# Patient Record
Sex: MALE | Race: WHITE | NOT HISPANIC OR LATINO | Employment: OTHER | URBAN - METROPOLITAN AREA
[De-identification: names, ages, dates, MRNs, and addresses within clinical notes are randomized per-mention and may not be internally consistent; named-entity substitution may affect disease eponyms.]

---

## 2017-01-12 DIAGNOSIS — I25.10 CAD (CORONARY ARTERY DISEASE): Primary | ICD-10-CM

## 2017-01-12 DIAGNOSIS — E78.49 OTHER HYPERLIPIDEMIA: ICD-10-CM

## 2017-01-12 DIAGNOSIS — I25.10 CORONARY ARTERIOSCLEROSIS: ICD-10-CM

## 2017-01-12 DIAGNOSIS — I10 HYPERTENSION: ICD-10-CM

## 2017-01-12 RX ORDER — SIMVASTATIN 40 MG
40 TABLET ORAL AT BEDTIME
Qty: 30 TABLET | Refills: 0 | Status: SHIPPED | OUTPATIENT
Start: 2017-01-12 | End: 2017-02-09

## 2017-01-12 RX ORDER — METOPROLOL SUCCINATE 25 MG/1
12.5 TABLET, EXTENDED RELEASE ORAL DAILY
Qty: 15 TABLET | Refills: 0 | Status: SHIPPED | OUTPATIENT
Start: 2017-01-12 | End: 2017-02-09

## 2017-01-12 NOTE — TELEPHONE ENCOUNTER
Simvastatin 40mg tabs     Last Written Prescription Date: 12/18/2015  Last Fill Quantity: 90, # refills: 3  Last Office Visit with [facility], [facility] or  Health prescribing provider: 11/17/2015       CHOL      122   5/13/2015  HDL       43   5/13/2015  LDL       62   5/13/2015  TRIG       83   5/13/2015  CHOLHDLRATIO      2.8   5/13/2015    Metoprolol succinate er 25mg tabs      Last Written Prescription Date: 10/17/2016  Last Fill Quantity: 45, # refills: 0    Last Office Visit with [facility], [facility] or [facility] prescribing provider:  11/17/2015   Future Office Visit:        BP Readings from Last 3 Encounters:   07/22/16 113/65   07/21/16 137/88   07/21/16 147/96

## 2017-01-13 ENCOUNTER — TELEPHONE (OUTPATIENT)
Dept: FAMILY MEDICINE | Facility: CLINIC | Age: 67
End: 2017-01-13

## 2017-01-13 DIAGNOSIS — R97.20 ELEVATED PROSTATE SPECIFIC ANTIGEN (PSA): Primary | ICD-10-CM

## 2017-01-13 NOTE — TELEPHONE ENCOUNTER
Patient called wanting an order put in to have a PSA draw done.  Patient would like it done today if possible.    Kati Lundberg, [facility]

## 2017-01-14 NOTE — TELEPHONE ENCOUNTER
Spoke with the patient and ordered the PSA. He will be scheduling an appointment for the near future in urology.

## 2017-01-17 ENCOUNTER — TELEPHONE (OUTPATIENT)
Dept: UROLOGY | Facility: CLINIC | Age: 67
End: 2017-01-17

## 2017-01-18 DIAGNOSIS — R97.20 ELEVATED PROSTATE SPECIFIC ANTIGEN (PSA): ICD-10-CM

## 2017-01-18 LAB — PSA SERPL-ACNC: 1.49 UG/L (ref 0–4)

## 2017-01-18 PROCEDURE — 84153 ASSAY OF PSA TOTAL: CPT | Performed by: UROLOGY

## 2017-01-26 ENCOUNTER — OFFICE VISIT (OUTPATIENT)
Dept: ENDOCRINOLOGY | Facility: CLINIC | Age: 67
End: 2017-01-26

## 2017-01-26 VITALS
BODY MASS INDEX: 24.36 KG/M2 | DIASTOLIC BLOOD PRESSURE: 96 MMHG | WEIGHT: 174 LBS | HEIGHT: 71 IN | SYSTOLIC BLOOD PRESSURE: 165 MMHG | HEART RATE: 59 BPM

## 2017-01-26 DIAGNOSIS — E71.41 PRIMARY CARNITINE DEFICIENCY (H): Primary | ICD-10-CM

## 2017-01-26 RX ORDER — LEVOCARNITINE 330 MG/1
330 TABLET ORAL DAILY
Qty: 90 TABLET | Refills: 3 | Status: SHIPPED | OUTPATIENT
Start: 2017-01-26 | End: 2018-01-15

## 2017-01-26 ASSESSMENT — PAIN SCALES - GENERAL: PAINLEVEL: NO PAIN (0)

## 2017-01-26 NOTE — Clinical Note
2017       RE: Ambrocio Vázquez  [address] [address]     Dear Colleague,    Thank you for referring your patient, Ambrocio Vázquez, to the [facility] METABOLIC DISORDERS at [facility]. Please see a copy of my visit note below.    Adult Metabolism Clinic Return Visit  Name:  Ambrocio Vázquez  :   1950  MRN:   [id number]  Date of Visit: 2017  PCP: Joelle Randle    Immunization status is: unknown.  Managing Metabolic Center(s):  [facility].    Chief Complaint:  Mr. Ambrocio Vázquez is a 66 year old male whom I saw in follow up in Metabolism Clinic for carnitine wasting heterozygosity.  Mr. Vázquez was accompanied to this visit by his  wife. He also saw our genetic counselor at this visit.     Assessment:    Ambrocio Vázquez is a 66 year old male with a history of carnitine wasting heterozygosity seen in clinic for routine follow-up.      Plan:    1. Testing ordered at this visit:   Orders Placed This Encounter   Procedures     Carnitine free and total     GENETIC COUNSELING SERVICES   .    Results are as noted, below. Additional recommendations based on these laboratory results:  ***  2. Medications: Continue carnitine as prescribed   4.   Genetic counseling with {METABOLIC PEDIATRIC GEN COUNSELORS:[id number]} to review family history.  5. Continue to observe emergency precautions as previously discussed.  Our on-call metabolic service is available 24 hours/day by calling the page  ([phone]) and asking for the Genetics and Metabolism doctor on call.  6. Return to the Adult Metabolism Clinic in 24 months for follow-up.     7.  We will look into the paper chart to find initial genetic testing and pre-supplementation blood carnitine levels.  ----------  History of Present Illness:  Visit Diagnosis:  Primary carnitine deficiency (H)    Patient Active Problem List   Diagnosis      Primary carnitine deficiency (H)     Sciatica     Essential hypertension, benign     ERECTILE DYSFUNCTION     Disorder of prostate     Colon polyp     Abnormal echocardiogram     CAD (coronary artery disease)     Cardiomyopathy (H)     CARDIOVASCULAR SCREENING; LDL GOAL LESS THAN 100     Advanced directives, counseling/discussion     Former smoker     Hyperlipidemia     Hypertension     History of angina     Coronary arteriosclerosis        Discussed at this visit:  Ambrocio Vázquez is an 66 year old male with a history of carnitine wasting heterozygosity here for routine follow-up. He has no new concerns at this visit. We discussed his history of ECHO with bubble study and family history of abdominal aortic aneurism in relating to his continued screening ECHO and EKGs. We decided to defer decision on this testing to his primary care physician and cardiologist both of whom he will see soon. We also discussed the continued need for carnitine supplementation and decided to continue on his current regimen while we look in his paper chart to determine if a pre-supplementation blood level was ever taken.    Interval History:  Ambrocio was last seen in our clinic on 6/2/15.  Since the last clinic visit Ambrocio was seen for 0 urgent clinic visits due to n/a.  He was seen in the emergency department 0 times, and 0 of those visits were metabolic related.  He currently knows how to contact the 24 hour metabolic specialist on call.  0 hospitalizations occurred. Acute metabolic decompensation was noted during 0 of those hospitalizations.  0 inpatient days were metabolic related with 0 days spent in the intensive care unit. He had general anesthesia and general surgery for inguinal hernia repair since the last clinic visit.  Reported surgical complications were none.      Other health services currently received are primary care, cardiology  Current research study participation none.             Past Medical History:  Past Medical  History   Diagnosis Date     Coronary artery disease      ischemic heart dz.LMA-nl,   stenting of Lad, diag small 80%, Lcx30%, RCA50%     Cardiomyopathy (H)      Hyperlipidaemia      Hypertension      Colon polyp      tubular adenoma     Carnitine deficiency (H)      ED (erectile dysfunction)      Sciatica      BPH (benign prostatic hyperplasia)      Impaired fasting glucose      Intervertebral lumbar disc disorder with myelopathy, lumbar region (aka DISK)      lumbar disk disease     H/O angina pectoris      Pneumonia      possibly as child, pt unsure     PFO (patent foramen ovale)        Personal History:  Family History Update: There was no new family history information elicited at this visit  Family History   Problem Relation Age of Onset     C.A.D. Father      Alzheimer Disease Father      HEART DISEASE Father      Alzheimer Disease Paternal Grandmother      CANCER Brother      brain tumor- age 46     HEART DISEASE Mother      aortic anerysum    .    Lives with wife  Stressors for patient and family: none  Community resources received currently are unknown.  Current insurance status {CURRENT INSURANCE STATUS:[id number]}.     Social History     Social History     Marital Status:      Spouse Name: Nava Vázquez     Number of Children: 3     Years of Education: 18+     Occupational History           [employer]            Social History Main Topics     Smoking status: Former Smoker -- 0.50 packs/day     Types: Cigarettes     Smokeless tobacco: Never Used     Alcohol Use: 0.0 oz/week     0 Standard drinks or equivalent per week      Comment: 1-2 a week     Drug Use: No     Sexual Activity:     Partners: Female     Birth Control/ Protection: Surgical     Other Topics Concern     Parent/Sibling W/ Cabg, Mi Or Angioplasty Before 65f 55m? No     Caffeine Concern No     2-3 coffee a day     Exercise Yes     12 visits to the gym per month.     Seat Belt Yes     Social History Narrative       I have reviewed Ambrocio s past medical history, family history, social history, medications and allergies as documented in the patient's electronic medical record.  There were no additional findings except as noted.     Review of Systems:  Constitional: negative  Eyes: negative - normal vision  Ears/Nose/Throat: negative - normal hearing  Respiratory: negative  Cardiovascular: negative  Gastrointestinal: negative  Genitourinary: negative  Hematologic/Lymphatic: negative  Allergy/Immunologic: negative - no drug allergies  Musculoskeletal: negative  Endocrine: negative  Integument: negative  Neurologic: negative  Psychiatric: negative    Nutrition History:  Not discussed.    Medications:  Current Outpatient Prescriptions   Medication Sig Dispense Refill     levOCARNitine (CARNITOR) 330 MG tablet Take 1 tablet (330 mg) by mouth daily 90 tablet 3     metoprolol (TOPROL-XL) 25 MG 24 hr tablet Take 0.5 tablets (12.5 mg) by mouth daily (Needs follow-up appointment for this medication) 15 tablet 0     simvastatin (ZOCOR) 40 MG tablet Take 1 tablet (40 mg) by mouth At Bedtime (Needs follow-up appointment for this medication) 30 tablet 0     lisinopril (PRINIVIL,ZESTRIL) 30 MG tablet Take 1 tablet (30 mg) by mouth daily 90 tablet 2     finasteride (PROSCAR) 5 MG tablet Take 1 tablet (5 mg) by mouth daily Patient said he takes one tablet daily 90 tablet 3     sildenafil (VIAGRA) 100 MG tablet Take 1 tablet (100 mg) by mouth daily as needed for erectile dysfunction 12 tablet 6     nitroglycerin (NITROSTAT) 0.4 MG SL tablet Place 1 tablet (0.4 mg) under the tongue See Admin Instructions for chest pain 25 tablet 3     naproxen sodium (ALEVE) 220 MG capsule Take 220 mg by mouth as needed.       fish oil-omega-3 fatty acids (FISH OIL) 1000 MG capsule Take 2 g by mouth daily.       ASPIRIN 81 MG OR TABS ONE DAILY       [DISCONTINUED] levOCARNitine (CARNITOR) 330 MG tablet Take 1 tablet (330 mg) by mouth daily 90 tablet 0       "  Allergies:  Allergies   Allergen Reactions     Augmentin Nausea and Vomiting     Sulfa Drugs Nausea        Physical Examination:  Blood pressure 165/96, pulse 59, height 1.803 m (5' 11\"), weight 78.926 kg (174 lb).  Body surface area is 1.99 meters squared.    Physical exam not performed at this visit.    Results of laboratory studies collected at this visit and available when this note was completed:   Results for orders placed or performed in visit on 01/18/17   PSA, screen   Result Value Ref Range    PSA 1.49 0 - 4 ug/L     IRuiz, am acting as a scribe for and in the presence of Dr. Person.    Ruiz Campbell, MS4  [facility] [employer]    MARY PERSON M.D.  Professor and Director   Division of Genetics and Metabolism  Department of Pediatrics  [facility]    Routed to patient in Comm Mgt  Also to Joelle Randle  ***        Again, thank you for allowing me to participate in the care of your patient.      Sincerely,    Mary Person MD    "

## 2017-01-26 NOTE — Clinical Note
2017      RE: Ambrocio Vázquez  [address] [address]       Adult Metabolism Clinic Return Visit  Name:  Ambrocio Vázquez  :   1950  MRN:   [id number]  Date of Visit: 2017  PCP: Joelle Randle    Immunization status is: unknown.  Managing Metabolic Center(s):  [facility] Adult Metabolism [facility].    Chief Complaint:  Mr. Ambrocio Vázquez is a 66 year old male whom I saw in follow up in Metabolism Clinic for carnitine wasting heterozygosity.  Mr. Vázquez was accompanied to this visit by his  wife. He also saw our genetic counselor at this visit.     Assessment:    Ambrocio Vázquez is a 66 year old male with heterozygote state for carnitine uptake disorder seen in clinic for routine follow-up. He has persisting low carnitine on a small supplemental dose of carnitine.     Plan:    1. Testing ordered at this visit:   Orders Placed This Encounter   Procedures     Carnitine free and total     GENETIC COUNSELING SERVICES   .    Results are as noted, below. Additional recommendations based on these laboratory results:  Carnitine levels are marginal.  Ambrocio should continue taking carnitine.  2. Medications: Continue carnitine as prescribed   3.   Genetic counseling with Siomara Sandy MS, [facility] to review family history.  4. Return to the Adult Metabolism Clinic in 24 months for follow-up.     5.  We will look into the paper chart to find initial genetic testing and pre-supplementation blood carnitine levels.  ----------  History of Present Illness:  Visit Diagnosis:  Primary carnitine deficiency (H)    Patient Active Problem List   Diagnosis     Primary carnitine deficiency (H)     Sciatica     Essential hypertension, benign     ERECTILE DYSFUNCTION     Disorder of prostate     Colon polyp     Abnormal echocardiogram     CAD (coronary artery disease)     Cardiomyopathy (H)     CARDIOVASCULAR SCREENING; LDL GOAL LESS THAN 100     Advanced directives,  counseling/discussion     Former smoker     Hyperlipidemia     Hypertension     History of angina     Coronary arteriosclerosis     Discussed at this visit:  Ambrocio Vázquez is an 66 year old male with a history of uptake disorder heterozygosity here for routine follow-up. He has no new concerns at this visit. We discussed his history of ECHO with bubble study and family history of abdominal aortic aneurysm in relating to his continued screening ECHO and EKGs. We decided to defer decision on this testing to his primary care physician and cardiologist both of whom he will see soon. We also discussed the continued need for carnitine supplementation and decided to continue on his current regimen while we look in his paper chart to determine if a pre-supplementation blood level was ever taken.    Interval History:  Ambrocio was last seen in our clinic on 6/7/15 by Dr Mena.  Since the last clinic visit Ambrocio was not seen for any urgent clinic visits.  He was not seen in the emergency department.  No hospitalizations occurred.  He had no general anesthesia and no surgical procedures.      Other health services currently received are primary care, cardiology  Current research study participation: none.             Past Medical History:  Past Medical History   Diagnosis Date     Coronary artery disease      ischemic heart dz.LMA-nl,   stenting of Lad, diag small 80%, Lcx30%, RCA50%     Cardiomyopathy (H)      Hyperlipidaemia      Hypertension      Colon polyp      tubular adenoma     Carnitine deficiency (H)      ED (erectile dysfunction)      Sciatica      BPH (benign prostatic hyperplasia)      Impaired fasting glucose      Intervertebral lumbar disc disorder with myelopathy, lumbar region (aka DISK)      lumbar disk disease     H/O angina pectoris      Pneumonia      possibly as child, pt unsure     PFO (patent foramen ovale)      Personal History:  Family History Update: There was no new family history information  elicited at this visit  Family History   Problem Relation Age of Onset     C.A.D. Father      Alzheimer Disease Father      HEART DISEASE Father      Alzheimer Disease Paternal Grandmother      CANCER Brother      brain tumor- age 46     HEART DISEASE Mother      aortic anerysum    .  Lives with wife  Stressors for patient and family: none  Community resources received currently are unknown.  Current insurance status commercial/private.     Social History     Social History     Marital Status:      Spouse Name: Nava Vázquez     Number of Children: 3     Years of Education: 18+     Occupational History           [employer]            Social History Main Topics     Smoking status: Former Smoker -- 0.50 packs/day     Types: Cigarettes     Smokeless tobacco: Never Used     Alcohol Use: 0.0 oz/week     0 Standard drinks or equivalent per week      Comment: 1-2 a week     Drug Use: No     Sexual Activity:     Partners: Female     Birth Control/ Protection: Surgical     Other Topics Concern     Parent/Sibling W/ Cabg, Mi Or Angioplasty Before 65f 55m? No     Caffeine Concern No     2-3 coffee a day     Exercise Yes     12 visits to the gym per month.     Seat Belt Yes     Social History Narrative      I have reviewed Ambrocio s past medical history, family history, social history, medications and allergies as documented in the patient's electronic medical record.  There were no additional findings except as noted.     Review of Systems:  Constitional: negative  Eyes: negative - normal vision  Ears/Nose/Throat: negative - normal hearing  Respiratory: negative  Cardiovascular: continuing monitoring  Gastrointestinal: negative  Genitourinary: negative  Hematologic/Lymphatic: negative  Allergy/Immunologic: negative - no drug allergies  Musculoskeletal: negative  Endocrine: negative  Integument: negative  Neurologic: negative  Psychiatric: negative    Nutrition History:  Not  "discussed.    Medications:  Current Outpatient Prescriptions   Medication Sig Dispense Refill     levOCARNitine (CARNITOR) 330 MG tablet Take 1 tablet (330 mg) by mouth daily 90 tablet 3     metoprolol (TOPROL-XL) 25 MG 24 hr tablet Take 0.5 tablets (12.5 mg) by mouth daily (Needs follow-up appointment for this medication) 15 tablet 0     simvastatin (ZOCOR) 40 MG tablet Take 1 tablet (40 mg) by mouth At Bedtime (Needs follow-up appointment for this medication) 30 tablet 0     lisinopril (PRINIVIL,ZESTRIL) 30 MG tablet Take 1 tablet (30 mg) by mouth daily 90 tablet 2     finasteride (PROSCAR) 5 MG tablet Take 1 tablet (5 mg) by mouth daily Patient said he takes one tablet daily 90 tablet 3     sildenafil (VIAGRA) 100 MG tablet Take 1 tablet (100 mg) by mouth daily as needed for erectile dysfunction 12 tablet 6     nitroglycerin (NITROSTAT) 0.4 MG SL tablet Place 1 tablet (0.4 mg) under the tongue See Admin Instructions for chest pain 25 tablet 3     naproxen sodium (ALEVE) 220 MG capsule Take 220 mg by mouth as needed.       fish oil-omega-3 fatty acids (FISH OIL) 1000 MG capsule Take 2 g by mouth daily.       ASPIRIN 81 MG OR TABS ONE DAILY       [DISCONTINUED] levOCARNitine (CARNITOR) 330 MG tablet Take 1 tablet (330 mg) by mouth daily 90 tablet 0     Allergies:  Allergies   Allergen Reactions     Augmentin Nausea and Vomiting     Sulfa Drugs Nausea     Physical Examination:  Blood pressure 165/96, pulse 59, height 1.803 m (5' 11\"), weight 78.926 kg (174 lb).  Body surface area is 1.99 meters squared.    Physical exam not performed at this visit.    Results of laboratory studies collected at this visit and available when this note was completed:   Results for orders placed or performed in visit on 01/26/17   Carnitine free and total   Result Value Ref Range    Carnitine Free 23 (L)     CarnitineTotal 31 (L)     Carnitine Esterified 8     Carnitine Esterified/Free Ratio 0.3      Ruiz PEREIRA, am acting as a " scribe for and in the presence of Dr. Garcia.    Ruiz Campbell, MS4  [facility] [employer]    AMBROCIO DOVE was seen in the [facility] Pediatric Metabolism [facility] by me.  I reviewed, edited and augmented the history & performed all key aspects of the physical exam. Assessment and plan were recorded as per my discussion with Ruiz Campbell, MS4.    DARRIAN GARCIA M.D.  Professor and Director   Division of Genetics and Metabolism  Department of Pediatrics  [facility]      Routed to patient in Comm Mgt  Also to Joelle Randle    I spent a total of 30 minutes face-to-face with Ambrocio Dove. Over 50% of my time was spent counseling the patient and /or coordinating care regarding carnitine deficiency.

## 2017-01-26 NOTE — PROGRESS NOTES
Adult Metabolism Clinic Return Visit  Name:  Ambrocio Vázquez  :   1950  MRN:   [id number]  Date of Visit: 2017  PCP: Joelle Randle    Immunization status is: unknown.  Managing Metabolic Center(s):  [facility] Adult Metabolism [facility].    Chief Complaint:  Mr. Ambrocio Vázquez is a 66 year old male whom I saw in follow up in Metabolism Clinic for carnitine wasting heterozygosity.  Mr. Vázquez was accompanied to this visit by his  wife. He also saw our genetic counselor at this visit.     Assessment:    Ambrocio Vázquez is a 66 year old male with heterozygote state for carnitine uptake disorder seen in clinic for routine follow-up. He has persisting low carnitine on a small supplemental dose of carnitine.     Plan:    1. Testing ordered at this visit:   Orders Placed This Encounter   Procedures     Carnitine free and total     GENETIC COUNSELING SERVICES   .    Results are as noted, below. Additional recommendations based on these laboratory results:  Carnitine levels are marginal.  Ambrocio should continue taking carnitine.  2. Medications: Continue carnitine as prescribed   3.   Genetic counseling with Siomara Sandy MS, [facility] to review family history.  4. Return to the Adult Metabolism Clinic in 24 months for follow-up.     5.  We will look into the paper chart to find initial genetic testing and pre-supplementation blood carnitine levels.  ----------  History of Present Illness:  Visit Diagnosis:  Primary carnitine deficiency (H)    Patient Active Problem List   Diagnosis     Primary carnitine deficiency (H)     Sciatica     Essential hypertension, benign     ERECTILE DYSFUNCTION     Disorder of prostate     Colon polyp     Abnormal echocardiogram     CAD (coronary artery disease)     Cardiomyopathy (H)     CARDIOVASCULAR SCREENING; LDL GOAL LESS THAN 100     Advanced directives, counseling/discussion     Former smoker     Hyperlipidemia     Hypertension     History of angina      Coronary arteriosclerosis     Discussed at this visit:  Ambrocio Vázquez is an 66 year old male with a history of uptake disorder heterozygosity here for routine follow-up. He has no new concerns at this visit. We discussed his history of ECHO with bubble study and family history of abdominal aortic aneurysm in relating to his continued screening ECHO and EKGs. We decided to defer decision on this testing to his primary care physician and cardiologist both of whom he will see soon. We also discussed the continued need for carnitine supplementation and decided to continue on his current regimen while we look in his paper chart to determine if a pre-supplementation blood level was ever taken.    Interval History:  Ambrocio was last seen in our clinic on 6/7/15 by Dr Mena.  Since the last clinic visit Ambrocio was not seen for any urgent clinic visits.  He was not seen in the emergency department.  No hospitalizations occurred.  He had no general anesthesia and no surgical procedures.      Other health services currently received are primary care, cardiology  Current research study participation: none.             Past Medical History:  Past Medical History   Diagnosis Date     Coronary artery disease      ischemic heart dz.LMA-nl,   stenting of Lad, diag small 80%, Lcx30%, RCA50%     Cardiomyopathy (H)      Hyperlipidaemia      Hypertension      Colon polyp      tubular adenoma     Carnitine deficiency (H)      ED (erectile dysfunction)      Sciatica      BPH (benign prostatic hyperplasia)      Impaired fasting glucose      Intervertebral lumbar disc disorder with myelopathy, lumbar region (aka DISK)      lumbar disk disease     H/O angina pectoris      Pneumonia      possibly as child, pt unsure     PFO (patent foramen ovale)      Personal History:  Family History Update: There was no new family history information elicited at this visit  Family History   Problem Relation Age of Onset     C.A.D. Father      Alzheimer  Disease Father      HEART DISEASE Father      Alzheimer Disease Paternal Grandmother      CANCER Brother      brain tumor- age 46     HEART DISEASE Mother      aortic anerysum    .  Lives with wife  Stressors for patient and family: none  Community resources received currently are unknown.  Current insurance status commercial/private.     Social History     Social History     Marital Status:      Spouse Name: Nava Vázquez     Number of Children: 3     Years of Education: 18+     Occupational History           [employer]            Social History Main Topics     Smoking status: Former Smoker -- 0.50 packs/day     Types: Cigarettes     Smokeless tobacco: Never Used     Alcohol Use: 0.0 oz/week     0 Standard drinks or equivalent per week      Comment: 1-2 a week     Drug Use: No     Sexual Activity:     Partners: Female     Birth Control/ Protection: Surgical     Other Topics Concern     Parent/Sibling W/ Cabg, Mi Or Angioplasty Before 65f 55m? No     Caffeine Concern No     2-3 coffee a day     Exercise Yes     12 visits to the gym per month.     Seat Belt Yes     Social History Narrative      I have reviewed Ambrocio s past medical history, family history, social history, medications and allergies as documented in the patient's electronic medical record.  There were no additional findings except as noted.     Review of Systems:  Constitional: negative  Eyes: negative - normal vision  Ears/Nose/Throat: negative - normal hearing  Respiratory: negative  Cardiovascular: continuing monitoring  Gastrointestinal: negative  Genitourinary: negative  Hematologic/Lymphatic: negative  Allergy/Immunologic: negative - no drug allergies  Musculoskeletal: negative  Endocrine: negative  Integument: negative  Neurologic: negative  Psychiatric: negative    Nutrition History:  Not discussed.    Medications:  Current Outpatient Prescriptions   Medication Sig Dispense Refill     levOCARNitine (CARNITOR) 330  "MG tablet Take 1 tablet (330 mg) by mouth daily 90 tablet 3     metoprolol (TOPROL-XL) 25 MG 24 hr tablet Take 0.5 tablets (12.5 mg) by mouth daily (Needs follow-up appointment for this medication) 15 tablet 0     simvastatin (ZOCOR) 40 MG tablet Take 1 tablet (40 mg) by mouth At Bedtime (Needs follow-up appointment for this medication) 30 tablet 0     lisinopril (PRINIVIL,ZESTRIL) 30 MG tablet Take 1 tablet (30 mg) by mouth daily 90 tablet 2     finasteride (PROSCAR) 5 MG tablet Take 1 tablet (5 mg) by mouth daily Patient said he takes one tablet daily 90 tablet 3     sildenafil (VIAGRA) 100 MG tablet Take 1 tablet (100 mg) by mouth daily as needed for erectile dysfunction 12 tablet 6     nitroglycerin (NITROSTAT) 0.4 MG SL tablet Place 1 tablet (0.4 mg) under the tongue See Admin Instructions for chest pain 25 tablet 3     naproxen sodium (ALEVE) 220 MG capsule Take 220 mg by mouth as needed.       fish oil-omega-3 fatty acids (FISH OIL) 1000 MG capsule Take 2 g by mouth daily.       ASPIRIN 81 MG OR TABS ONE DAILY       [DISCONTINUED] levOCARNitine (CARNITOR) 330 MG tablet Take 1 tablet (330 mg) by mouth daily 90 tablet 0     Allergies:  Allergies   Allergen Reactions     Augmentin Nausea and Vomiting     Sulfa Drugs Nausea     Physical Examination:  Blood pressure 165/96, pulse 59, height 1.803 m (5' 11\"), weight 78.926 kg (174 lb).  Body surface area is 1.99 meters squared.    Physical exam not performed at this visit.    Results of laboratory studies collected at this visit and available when this note was completed:   Results for orders placed or performed in visit on 01/26/17   Carnitine free and total   Result Value Ref Range    Carnitine Free 23 (L)     CarnitineTotal 31 (L)     Carnitine Esterified 8     Carnitine Esterified/Free Ratio 0.3      Ruiz PEREIRA, am acting as a scribe for and in the presence of Dr. Garcia.    Ruiz Campbell, MS4  [employer]    JUDY DOVE was " seen in the [facility] Pediatric Metabolism Clinic by me.  I reviewed, edited and augmented the history & performed all key aspects of the physical exam. Assessment and plan were recorded as per my discussion with Ruiz Campbell, MS4.    DARRIAN PERSON M.D.  Professor and Director   Division of Genetics and Metabolism  Department of Pediatrics  [facility]      Routed to patient in Comm Mgt  Also to Joelle Randle    I spent a total of 30 minutes face-to-face with Ambrocio Vázquez. Over 50% of my time was spent counseling the patient and /or coordinating care regarding carnitine deficiency.

## 2017-01-26 NOTE — PROGRESS NOTES
Appointment Date: 17    Presenting Information:   Ambrocio Vázquez was seen in Adult Metabolism Clinic for an appointment with Dr. Garcia due to his history of carnitine deficiency.  He was accompanied by his wife, Nava, who was also seen today for the same indication.  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  Their daughter, Delaney, has primary carnitine deficiency due to two mutations in the [id number] gene and she is also followed in our clinic.  Per the request of Dr. Garcia, I met with Feliciano for a brief genetic consult to update the family history, and to update needs.    Family History:   A three generation pedigree was obtained during an appointment for Feliciano's daughter, Delaney.  The family history was updated today and this has been scanned into EPIC.  The following information was provided:      Ambrocio is 66 years of age.  He has a history of heart disease in addition to his low carnitine levels.        Feliciano have a daughter (Delaney) and two sons.  Delaney has very low carnitine levels due to primary carnitine deficiency, which was initially ascertained following her daughter's abnormal  screen.  Delaney is otherwise healthy.  Ambrocio's two sons are reported to be in good health.      Ambrocio's daughter, Delaney, has three daughters, all of whom have a history of heart defects (but are otherwise healthy).  The oldest daughter had surgery to repair an atrial septal defect.  The middle daughter had a small atrial septal defect vs. PFO that spontaneously resolved.  The youngest daughter has a PFO that has not required surgical repair.        Ambrocio had a brother who  in his 40's from a brain tumor.  He had another brother who  at birth.  He has one brother who is thought to be healthy, and this brother has a son with very mild cerebral palsy.  Ambrocio's mother  in her 80's from an aortic aneurysm.  His father  in his 80's from  natural causes.      Ambrocio is primarily [place]ian and Scandinavian, and there is no known consanguinity in the family.    Discussion:  We had a brief discussion about the genetics and inheritance of primary carnitine deficiency.  Primary carnitine deficiency is an autosomal recessive condition that impairs the body's ability to produce energy from long chain fatty acids.  The production of energy from fats requires the cofactor carnitine, which is transported into the mitochondria by a carnitine transporter.  If there is a defect in the transporter (due to mutations in the [id number] gene), the body is unable to properly cycle carnitine and energy production from fats is decreased.  Primary carnitine deficiency has a wide clinical presentation ranging from metabolic decompensation in infancy, to fatigue in adulthood, to asymptomatic individuals.  Primary carnitine deficiency is caused by mutations in the KVP80L6 gene.  Individuals who have a mutation in both copies of their [id number] gene have primary carnitine deficiency and are managed with carnitine supplementation.  Those who have a single mutation are considered carriers.  While carriers are usually asymptomatic, some carriers may have slightly low carnitine levels (are mildly symptomatic) and still benefit from carnitine supplements.      Feliciano's daughter, Delaney, had molecular testing (we believe on a research basis) and was found to have two mutations in the carnitine transporter gene [c.136C>G (P46S) and c.844C>T (R282X)].  These mutations are referenced in an article that was published several years ago that reported on several families with primary carnitine deficiency.  We do not have a copy of Delaney's results in EPIC.  Nava cannot recall if she and Ambrocio had carrier testing to confirm their specific mutations (though we know they are obligate carriers).  We will request their paper charts to see if there is any evidence that they had prior mutation  testing.  Dr. Garcia is also interested if we can find any carnitine levels for Ambrocio and Nava prior to initiation of carnitine supplementation.  We will update Ambrocio and Nava once we have additional information from their old records.      If we are unable to find any evidence of familial mutation testing for Ambrocio and Nava, they would have the option to have repeat testing in a clinical lab if desired.    Plan/Summary:  1. The family history was updated today and scanned into EPIC.  2. We will request the paper charts for Feliciano to see if we can find any evidence of prior genetic test results, and we will update the couple when we have additional information.  3. Further follow up as recommended by Dr. Jose Sandy, MS, [facility]  Certified Genetic Counselor  [facility] [place]  [phone]      Approximate Time Spent in Consultation: 12 minutes

## 2017-01-26 NOTE — MR AVS SNAPSHOT
After Visit Summary   1/26/2017    Ambrocio Vázquez    MRN: [id number]           Patient Information     Date Of Birth          1950        Visit Information        Provider Department      1/26/2017 1:45 PM Mary Garcia MD [facility] Metabolic Disorders        Today's Diagnoses     Primary carnitine deficiency (H)    -  1       Care Instructions    Metabolism Clinic    Maintain metabolic medications.  Call if any general care questions arise - contact our nurse coordinator Mena Oliveira at [phone].            Follow-ups after your visit        Additional Services     GENETIC COUNSELING SERVICES       GENETICS COUNSELING SERVICES ASSOCIATED WITH THIS ENCOUNTER.                  Follow-up notes from your care team     Return in about 2 years (around 1/26/2019).      Your next 10 appointments already scheduled     Jan 26, 2017  3:00 PM   LAB with  LAB   [facility] ([facility])    [address] [place] [id number] [phone]           Patient must bring picture ID.  Patient should be prepared to give a urine specimen  Please do not eat 10-12 hours before your appointment if you are coming in fasting for labs on lipids, cholesterol, or glucose (sugar).  Pregnant women should follow their Care Team instructions. Water with medications is okay. Do not drink coffee or other fluids.   If you have concerns about taking  your medications, please ask at office or if scheduling via [employer], send a message by clicking on Secure Messaging, Message Your Care Team.            Feb 07, 2017  3:30 PM   New Visit with RETA Iglesias MD   [facility]    [address] [facility] [address] [phone]              Who to contact     Please call your clinic at [phone] to:    Ask questions about your health    Make or cancel appointments    Discuss your medicines    Learn about your test  "results    Speak to your doctor   If you have compliments or concerns about an experience at your clinic, or if you wish to file a complaint, please contact [facility] Patient Relations at [phone] or email us at [email or web address]         Additional Information About Your Visit        [employer] Information     [employer] gives you secure access to your electronic health record. If you see a primary care provider, you can also send messages to your care team and make appointments. If you have questions, please call your primary care clinic.  If you do not have a primary care provider, please call [phone] and they will assist you.      [facility] is an electronic gateway that provides easy, online access to your medical records. With [facility], you can request a clinic appointment, read your test results, renew a prescription or communicate with your care team.     To access your existing account, please contact your [facility] or call [phone] for assistance.        Care EveryWhere ID     This is your Care EveryWhere ID. This could be used by other organizations to access your [facility] medical records  [id number]        Your Vitals Were     Pulse Height BMI (Body Mass Index)             59 1.803 m (5' 11\") 24.28 kg/m2          Blood Pressure from Last 3 Encounters:   01/26/17 165/96   07/22/16 113/65   07/21/16 137/88    Weight from Last 3 Encounters:   01/26/17 78.926 kg (174 lb)   07/21/16 76.658 kg (169 lb)   07/20/16 77.565 kg (171 lb)              We Performed the Following     Carnitine free and total     GENETIC COUNSELING SERVICES          Today's Medication Changes          These changes are accurate as of: 1/26/17  2:53 PM.  If you have any questions, ask your nurse or doctor.               Stop taking these medicines if you haven't already. Please contact your care team if you have questions.     doxycycline 100 MG capsule "   Commonly known as:  VIBRAMYCIN   Stopped by:  Mary Garcia MD           doxycycline 100 MG tablet   Commonly known as:  VIBRA-TABS   Stopped by:  Mary Garcia MD           HYDROcodone-acetaminophen 5-325 MG per tablet   Commonly known as:  NORCO   Stopped by:  Mary Garcia MD           metoclopramide 10 MG tablet   Commonly known as:  REGLAN   Stopped by:  Mary Garcia MD                Where to get your medicines      These medications were sent to [facility] - [facility] [address]  [address] [facility] [address]    Hours:  Mon-Fri 8:30am-5:00pm Toll Free [phone] Phone:  [phone]    - levOCARNitine 330 MG tablet             Primary Care Provider Office Phone # Fax #    Joelle Randle [phone]       [facility] [address] [address]        Thank you!     Thank you for choosing [facility] METABOLIC DISORDERS  for your care. Our goal is always to provide you with excellent care. Hearing back from our patients is one way we can continue to improve our services. Please take a few minutes to complete the written survey that you may receive in the mail after your visit with us. Thank you!             Your Updated Medication List - Protect others around you: Learn how to safely use, store and throw away your medicines at www.disposemymeds.org.          This list is accurate as of: 1/26/17  2:53 PM.  Always use your most recent med list.                   Brand Name Dispense Instructions for use    ALEVE 220 MG capsule   Generic drug:  naproxen sodium      Take 220 mg by mouth as needed.       aspirin 81 MG tablet          ONE DAILY       finasteride 5 MG tablet    PROSCAR    90 tablet    Take 1 tablet (5 mg) by mouth daily Patient said he takes one tablet daily       fish oil-omega-3 fatty acids 1000 MG capsule      Take 2 g by mouth daily.       levOCARNitine 330 MG tablet    CARNITOR    90  tablet    Take 1 tablet (330 mg) by mouth daily       lisinopril 30 MG tablet    PRINIVIL,ZESTRIL    90 tablet    Take 1 tablet (30 mg) by mouth daily       metoprolol 25 MG 24 hr tablet    TOPROL-XL    15 tablet    Take 0.5 tablets (12.5 mg) by mouth daily (Needs follow-up appointment for this medication)       nitroglycerin 0.4 MG sublingual tablet    NITROSTAT    25 tablet    Place 1 tablet (0.4 mg) under the tongue See Admin Instructions for chest pain       sildenafil 100 MG cap/tab    REVATIO/VIAGRA    12 tablet    Take 1 tablet (100 mg) by mouth daily as needed for erectile dysfunction       simvastatin 40 MG tablet    ZOCOR    30 tablet    Take 1 tablet (40 mg) by mouth At Bedtime (Needs follow-up appointment for this medication)

## 2017-01-26 NOTE — PATIENT INSTRUCTIONS
Metabolism Clinic    Maintain metabolic medications.  Call if any general care questions arise - contact our nurse coordinator Mena Oliveira at [phone].

## 2017-01-26 NOTE — Clinical Note
2017       RE: Ambrocio Vázquez  [address] [facility] [address]     Dear Colleague,    Thank you for referring your patient, Ambrocio Vázquez, to the  HEALTH METABOLIC DISORDERS at [facility]. Please see a copy of my visit note below.    Appointment Date: 17    Presenting Information:   Ambrocio Vázquez was seen in [facility] for an appointment with Dr. Garcia due to his history of carnitine deficiency.  He was accompanied by his wife, Nava, who was also seen today for the same indication.  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  Their daughter, Delaney, has primary carnitine deficiency due to two mutations in the [id number] gene and she is also followed in our clinic.  Per the request of Dr. Garcia, I met with Feliciano for a brief genetic consult to update the family history, and to update needs.    Family History:   A three generation pedigree was obtained during an appointment for Feliciano's daughter, Delaney.  The family history was updated today and this has been scanned into EPIC.  The following information was provided:      Ambrocio is 66 years of age.  He has a history of heart disease in addition to his low carnitine levels.        Feliciano have a daughter (Delaney) and two sons.  Delaney has very low carnitine levels due to primary carnitine deficiency, which was initially ascertained following her daughter's abnormal  screen.  Delaney is otherwise healthy.  Ambrocio's two sons are reported to be in good health.      Ambrocio's daughter, Delaney, has three daughters, all of whom have a history of heart defects (but are otherwise healthy).  The oldest daughter had surgery to repair an atrial septal defect.  The middle daughter had a small atrial septal defect vs. PFO that spontaneously resolved.  The youngest daughter has a PFO that has not required surgical repair.        Ambrocio  had a brother who  in his 40's from a brain tumor.  He had another brother who  at birth.  He has one brother who is thought to be healthy, and this brother has a son with very mild cerebral palsy.  Ambrocio's mother  in her 80's from an aortic aneurysm.  His father  in his 80's from natural causes.      Ambrocio is primarily [place] and [place]avian, and there is no known consanguinity in the family.    Discussion:  We had a brief discussion about the genetics and inheritance of primary carnitine deficiency.  Primary carnitine deficiency is an autosomal recessive condition that impairs the body's ability to produce energy from long chain fatty acids.  The production of energy from fats requires the cofactor carnitine, which is transported into the mitochondria by a carnitine transporter.  If there is a defect in the transporter (due to mutations in the [id number] gene), the body is unable to properly cycle carnitine and energy production from fats is decreased.  Primary carnitine deficiency has a wide clinical presentation ranging from metabolic decompensation in infancy, to fatigue in adulthood, to asymptomatic individuals.  Primary carnitine deficiency is caused by mutations in the [id number] gene.  Individuals who have a mutation in both copies of their [id number] gene have primary carnitine deficiency and are managed with carnitine supplementation.  Those who have a single mutation are considered carriers.  While carriers are usually asymptomatic, some carriers may have slightly low carnitine levels (are mildly symptomatic) and still benefit from carnitine supplements.      Feliciano's daughter, Delaney, had molecular testing (we believe on a research basis) and was found to have two mutations in the carnitine transporter gene [c.136C>G (P46S) and c.844C>T (R282X)].  These mutations are referenced in an article that was published several years ago that reported on several families with primary carnitine  deficiency.  We do not have a copy of Delaney's results in EPIC.  Nava cannot recall if she and Ambrocio had carrier testing to confirm their specific mutations (though we know they are obligate carriers).  We will request their paper charts to see if there is any evidence that they had prior mutation testing.  Dr. Garcia is also interested if we can find any carnitine levels for Ambrocio and Nava prior to initiation of carnitine supplementation.  We will update Ambrocio and Nava once we have additional information from their old records.      If we are unable to find any evidence of familial mutation testing for Ambrocio and Nava, they would have the option to have repeat testing in a clinical lab if desired.    Plan/Summary:  1. The family history was updated today and scanned into EPIC.  2. We will request the paper charts for Ambrocio and Nava to see if we can find any evidence of prior genetic test results, and we will update the couple when we have additional information.  3. Further follow up as recommended by Dr. Jose Sandy, MS, [facility]  Certified Genetic Counselor  [facility], [place]  [phone]      Approximate Time Spent in Consultation: 12 minutes

## 2017-01-26 NOTE — NURSING NOTE
Chief Complaint   Patient presents with     RECHECK     F/U CARNITINE     Yamileth Hawley, CMA  Endocrinology & Diabetes 3G

## 2017-01-31 LAB
ACYLCARNITINE SERPL-SCNC: 8 UMOL/L
CARN ESTERS/C0 SERPL-SRTO: 0.3 {RATIO}
CARNITINE FREE SERPL-SCNC: 23 UMOL/L
CARNITINE SERPL-SCNC: 31 UMOL/L

## 2017-02-09 ENCOUNTER — OFFICE VISIT (OUTPATIENT)
Dept: FAMILY MEDICINE | Facility: CLINIC | Age: 67
End: 2017-02-09
Payer: COMMERCIAL

## 2017-02-09 VITALS
SYSTOLIC BLOOD PRESSURE: 132 MMHG | HEIGHT: 71 IN | WEIGHT: 170.7 LBS | DIASTOLIC BLOOD PRESSURE: 74 MMHG | TEMPERATURE: 96.6 F | BODY MASS INDEX: 23.9 KG/M2 | HEART RATE: 60 BPM

## 2017-02-09 DIAGNOSIS — E71.41 PRIMARY CARNITINE DEFICIENCY (H): ICD-10-CM

## 2017-02-09 DIAGNOSIS — I25.10 CORONARY ARTERY DISEASE INVOLVING NATIVE HEART WITHOUT ANGINA PECTORIS, UNSPECIFIED VESSEL OR LESION TYPE: ICD-10-CM

## 2017-02-09 DIAGNOSIS — I25.10 CORONARY ARTERIOSCLEROSIS: ICD-10-CM

## 2017-02-09 DIAGNOSIS — N42.9 DISORDER OF PROSTATE, UNSPECIFIED: ICD-10-CM

## 2017-02-09 DIAGNOSIS — Z00.00 ROUTINE GENERAL MEDICAL EXAMINATION AT A HEALTH CARE FACILITY: Primary | ICD-10-CM

## 2017-02-09 DIAGNOSIS — Z23 NEED FOR PROPHYLACTIC VACCINATION AGAINST STREPTOCOCCUS PNEUMONIAE (PNEUMOCOCCUS): ICD-10-CM

## 2017-02-09 DIAGNOSIS — I10 ESSENTIAL HYPERTENSION: ICD-10-CM

## 2017-02-09 DIAGNOSIS — Z23 NEED FOR VACCINATION: ICD-10-CM

## 2017-02-09 DIAGNOSIS — I10 ESSENTIAL HYPERTENSION, BENIGN: ICD-10-CM

## 2017-02-09 DIAGNOSIS — E78.49 OTHER HYPERLIPIDEMIA: ICD-10-CM

## 2017-02-09 DIAGNOSIS — Z87.891 FORMER SMOKER: ICD-10-CM

## 2017-02-09 DIAGNOSIS — Z11.59 NEED FOR HEPATITIS C SCREENING TEST: ICD-10-CM

## 2017-02-09 PROBLEM — Z82.49 FAMILY HISTORY OF AORTIC ANEURYSM: Status: ACTIVE | Noted: 2017-02-09

## 2017-02-09 LAB
ANION GAP SERPL CALCULATED.3IONS-SCNC: 3 MMOL/L (ref 3–14)
BUN SERPL-MCNC: 19 MG/DL (ref 7–30)
CALCIUM SERPL-MCNC: 8.8 MG/DL (ref 8.5–10.1)
CHLORIDE SERPL-SCNC: 104 MMOL/L (ref 94–109)
CHOLEST SERPL-MCNC: 131 MG/DL
CO2 SERPL-SCNC: 32 MMOL/L (ref 20–32)
CREAT SERPL-MCNC: 1.03 MG/DL (ref 0.66–1.25)
GFR SERPL CREATININE-BSD FRML MDRD: 72 ML/MIN/1.7M2
GLUCOSE SERPL-MCNC: 109 MG/DL (ref 70–99)
HDLC SERPL-MCNC: 45 MG/DL
LDLC SERPL CALC-MCNC: 71 MG/DL
NONHDLC SERPL-MCNC: 86 MG/DL
POTASSIUM SERPL-SCNC: 4 MMOL/L (ref 3.4–5.3)
SODIUM SERPL-SCNC: 139 MMOL/L (ref 133–144)
TRIGL SERPL-MCNC: 77 MG/DL

## 2017-02-09 PROCEDURE — 86803 HEPATITIS C AB TEST: CPT | Performed by: FAMILY MEDICINE

## 2017-02-09 PROCEDURE — 90670 PCV13 VACCINE IM: CPT | Performed by: FAMILY MEDICINE

## 2017-02-09 PROCEDURE — 90471 IMMUNIZATION ADMIN: CPT | Performed by: FAMILY MEDICINE

## 2017-02-09 PROCEDURE — 99397 PER PM REEVAL EST PAT 65+ YR: CPT | Mod: 25 | Performed by: FAMILY MEDICINE

## 2017-02-09 PROCEDURE — 80048 BASIC METABOLIC PNL TOTAL CA: CPT | Performed by: FAMILY MEDICINE

## 2017-02-09 PROCEDURE — 80061 LIPID PANEL: CPT | Performed by: FAMILY MEDICINE

## 2017-02-09 PROCEDURE — 36415 COLL VENOUS BLD VENIPUNCTURE: CPT | Performed by: FAMILY MEDICINE

## 2017-02-09 RX ORDER — LISINOPRIL 30 MG/1
30 TABLET ORAL DAILY
Qty: 90 TABLET | Refills: 3 | Status: SHIPPED | OUTPATIENT
Start: 2017-02-09 | End: 2018-01-15

## 2017-02-09 RX ORDER — METOPROLOL SUCCINATE 25 MG/1
12.5 TABLET, EXTENDED RELEASE ORAL DAILY
Qty: 45 TABLET | Refills: 3 | Status: SHIPPED | OUTPATIENT
Start: 2017-02-09 | End: 2017-11-14

## 2017-02-09 RX ORDER — NITROGLYCERIN 0.4 MG/1
0.4 TABLET SUBLINGUAL SEE ADMIN INSTRUCTIONS
Qty: 25 TABLET | Refills: 3 | Status: SHIPPED | OUTPATIENT
Start: 2017-02-09 | End: 2018-04-11

## 2017-02-09 RX ORDER — SIMVASTATIN 40 MG
40 TABLET ORAL AT BEDTIME
Qty: 30 TABLET | Refills: 0 | Status: SHIPPED | OUTPATIENT
Start: 2017-02-09 | End: 2017-03-22

## 2017-02-09 RX ORDER — FINASTERIDE 5 MG/1
5 TABLET, FILM COATED ORAL DAILY
Qty: 90 TABLET | Refills: 3 | Status: SHIPPED | OUTPATIENT
Start: 2017-02-09 | End: 2018-04-27

## 2017-02-09 NOTE — NURSING NOTE
"Chief Complaint   Patient presents with     Physical       Initial /91 mmHg  Pulse 60  Temp(Src) 96.6  F (35.9  C) (Tympanic)  Ht 5' 11\" (1.803 m)  Wt 170 lb 11.2 oz (77.429 kg)  BMI 23.82 kg/m2 Estimated body mass index is 23.82 kg/(m^2) as calculated from the following:    Height as of this encounter: 5' 11\" (1.803 m).    Weight as of this encounter: 170 lb 11.2 oz (77.429 kg).  Medication Reconciliation: complete   Kristin Damian LPN    "

## 2017-02-09 NOTE — MR AVS SNAPSHOT
After Visit Summary   2/9/2017    Ambrocio Vázquez    MRN: [id number]           Patient Information     Date Of Birth          1950        Visit Information        Provider Department      2/9/2017 7:30 AM Joelle Randle MD [facility]        Today's Diagnoses     Routine general medical examination at a health care facility    -  1     Encounter for routine adult medical exam with abnormal findings         Need for hepatitis C screening test         Need for prophylactic vaccination against Streptococcus pneumoniae (pneumococcus)         Coronary artery disease involving native heart without angina pectoris, unspecified vessel or lesion type         Essential hypertension         Other hyperlipidemia         Coronary arteriosclerosis         Disorder of prostate, unspecified         Need for vaccination           Care Instructions      Preventive Health Recommendations:   Male Ages 65 and over    Yearly exam:             See your health care provider every year in order to  o   Review health changes.   o   Discuss preventive care.    o   Review your medicines if your doctor has prescribed any.    Talk with your health care provider about whether you should have a test to screen for prostate cancer (PSA).    Every 3 years, have a diabetes test (fasting glucose). If you are at risk for diabetes, you should have this test more often.    Every 5 years, have a cholesterol test. Have this test more often if you are at risk for high cholesterol or heart disease.     Every 10 years, have a colonoscopy. Or, have a yearly FIT test (stool test). These exams will check for colon cancer.    Talk to with your health care provider about screening for Abdominal Aortic Aneurysm if you have a family history of AAA or have a history of smoking.    Shots:     Get a flu shot each year.     Get a tetanus shot every 10 years.     Talk to your doctor about your pneumonia vaccines. There are now two you  should receive - Pneumovax (PPSV 23) and Prevnar (PCV 13).     Talk to your doctor about a shingles vaccine.     Talk to your doctor about the hepatitis B vaccine.  Nutrition:     Eat at least 5 servings of fruits and vegetables each day.     Eat whole-grain bread, whole-wheat pasta and brown rice instead of white grains and rice.     Talk to your provider about Calcium and Vitamin D.   Lifestyle    Exercise for at least 150 minutes a week (30 minutes a day, 5 days a week). This will help you control your weight and prevent disease.     Limit alcohol to one drink per day.     No smoking.     Wear sunscreen to prevent skin cancer.     See your dentist every six months for an exam and cleaning.     See your eye doctor every 1 to 2 years to screen for conditions such as glaucoma, macular degeneration, cataracts, etc   Preventive Health Recommendations:       Male Ages 65 and over    Yearly exam:             See your health care provider every year in order to  o   Review health changes.   o   Discuss preventive care.    o   Review your medicines if your doctor has prescribed any.  Talk with your health care provider about whether you should have a test to screen for prostate cancer (PSA).  Every 3 years, have a diabetes test (fasting glucose). If you are at risk for diabetes, you should have this test more often.  Every 5 years, have a cholesterol test. Have this test more often if you are at risk for high cholesterol or heart disease.   Every 10 years, have a colonoscopy. Or, have a yearly FIT test (stool test). These exams will check for colon cancer.  Talk to with your health care provider about screening for Abdominal Aortic Aneurysm if you have a family history of AAA or have a history of smoking.  Shots:   Get a flu shot each year.   Get a tetanus shot every 10 years.   Talk to your doctor about your pneumonia vaccines. There are now two you should receive - Pneumovax (PPSV 23) and Prevnar (PCV 13).  Talk to your  doctor about a shingles vaccine.   Talk to your doctor about the hepatitis B vaccine.  Nutrition:   Eat at least 5 servings of fruits and vegetables each day.   Eat whole-grain bread, whole-wheat pasta and brown rice instead of white grains and rice.   Talk to your doctor about Calcium and Vitamin D.   Lifestyle  Exercise for at least 150 minutes a week (30 minutes a day, 5 days a week). This will help you control your weight and prevent disease.   Limit alcohol to one drink per day.   No smoking.   Wear sunscreen to prevent skin cancer.   See your dentist every six months for an exam and cleaning.   See your eye doctor every 1 to 2 years to screen for conditions such as glaucoma, macular degeneration and cataracts.  Preventive Health Recommendations:       Male Ages 65 and over    Yearly exam:             See your health care provider every year in order to  o   Review health changes.   o   Discuss preventive care.    o   Review your medicines if your doctor has prescribed any.  Talk with your health care provider about whether you should have a test to screen for prostate cancer (PSA).  Every 3 years, have a diabetes test (fasting glucose). If you are at risk for diabetes, you should have this test more often.  Every 5 years, have a cholesterol test. Have this test more often if you are at risk for high cholesterol or heart disease.   Every 10 years, have a colonoscopy. Or, have a yearly FIT test (stool test). These exams will check for colon cancer.  Talk to with your health care provider about screening for Abdominal Aortic Aneurysm if you have a family history of AAA or have a history of smoking.  Shots:   Get a flu shot each year.   Get a tetanus shot every 10 years.   Talk to your doctor about your pneumonia vaccines. There are now two you should receive - Pneumovax (PPSV 23) and Prevnar (PCV 13).  Talk to your doctor about a shingles vaccine.   Talk to your doctor about the hepatitis B vaccine.  Nutrition:    Eat at least 5 servings of fruits and vegetables each day.   Eat whole-grain bread, whole-wheat pasta and brown rice instead of white grains and rice.   Talk to your doctor about Calcium and Vitamin D.   Lifestyle  Exercise for at least 150 minutes a week (30 minutes a day, 5 days a week). This will help you control your weight and prevent disease.   Limit alcohol to one drink per day.   No smoking.   Wear sunscreen to prevent skin cancer.   See your dentist every six months for an exam and cleaning.   See your eye doctor every 1 to 2 years to screen for conditions such as glaucoma, macular degeneration and cataracts.  Preventive Health Recommendations:   Male Ages 65 and over    Yearly exam:             See your health care provider every year in order to  o   Review health changes.   o   Discuss preventive care.    o   Review your medicines if your doctor has prescribed any.  Talk with your health care provider about whether you should have a test to screen for prostate cancer (PSA).  Every 3 years, have a diabetes test (fasting glucose). If you are at risk for diabetes, you should have this test more often.  Every 5 years, have a cholesterol test. Have this test more often if you are at risk for high cholesterol or heart disease.   Every 10 years, have a colonoscopy. Or, have a yearly FIT test (stool test). These exams will check for colon cancer.  Talk to with your health care provider about screening for Abdominal Aortic Aneurysm if you have a family history of AAA or have a history of smoking.    Shots:   Get a flu shot each year.   Get a tetanus shot every 10 years.   Talk to your doctor about your pneumonia vaccines. There are now two you should receive - Pneumovax (PPSV 23) and Prevnar (PCV 13).   Talk to your doctor about a shingles vaccine.   Talk to your doctor about the hepatitis B vaccine.  Nutrition:   Eat at least 5 servings of fruits and vegetables each day.   Eat whole-grain bread, whole-wheat  pasta and brown rice instead of white grains and rice.   Talk to your provider about Calcium and Vitamin D.   Lifestyle  Exercise for at least 150 minutes a week (30 minutes a day, 5 days a week). This will help you control your weight and prevent disease.   Limit alcohol to one drink per day.   No smoking.   Wear sunscreen to prevent skin cancer.   See your dentist every six months for an exam and cleaning.   See your eye doctor every 1 to 2 years to screen for conditions such as glaucoma, macular degeneration, cataracts, etc         Follow-ups after your visit        Your next 10 appointments already scheduled     Feb 27, 2017  3:30 PM   New Visit with RETA Iglesias MD   [facility] ([facility]    [address] [address] [facility] [address] [phone]              Who to contact     Normal or non-critical lab and imaging results will be communicated to you by [employer], letter or phone within 4 business days after the clinic has received the results. If you do not hear from us within 7 days, please contact the clinic through [employer] or phone. If you have a critical or abnormal lab result, we will notify you by phone as soon as possible.  Submit refill requests through [employer] or call your pharmacy and they will forward the refill request to us. Please allow 3 business days for your refill to be completed.          If you need to speak with a  for additional information , please call: [phone]             Additional Information About Your Visit        [employer] Information     [employer] gives you secure access to your electronic health record. If you see a primary care provider, you can also send messages to your care team and make appointments. If you have questions, please call your primary care clinic.  If you do not have a primary care provider, please call [phone] and they will assist you.        Care EveryWhere ID     This is your Care EveryWhere ID.  "This could be used by other organizations to access your [facility] medical records  [id number]        Your Vitals Were     Pulse Temperature Height BMI (Body Mass Index)          60 96.6  F (35.9  C) (Tympanic) 5' 11\" (1.803 m) 23.82 kg/m2         Blood Pressure from Last 3 Encounters:   02/09/17 132/74   01/26/17 165/96   07/22/16 113/65    Weight from Last 3 Encounters:   02/09/17 170 lb 11.2 oz (77.429 kg)   01/26/17 174 lb (78.926 kg)   07/21/16 169 lb (76.658 kg)              We Performed the Following     ADMIN 1st VACCINE     BASIC METABOLIC PANEL     HEART FAILURE ACTION PLAN ORDER     Hepatitis C Screen Reflex to HCV RNA Quant and Genotype     LIPID REFLEX TO DIRECT LDL PANEL     PNEUMOCOCCAL CONJ VACCINE 13 VALENT IM (PREVNAR 13)          Today's Medication Changes          These changes are accurate as of: 2/9/17  8:21 AM.  If you have any questions, ask your nurse or doctor.               These medicines have changed or have updated prescriptions.        Dose/Directions    metoprolol 25 MG 24 hr tablet   Commonly known as:  TOPROL-XL   This may have changed:  additional instructions   Used for:  Coronary artery disease involving native heart without angina pectoris, unspecified vessel or lesion type, Essential hypertension   Changed by:  Joelle Randle MD        Dose:  12.5 mg   Take 0.5 tablets (12.5 mg) by mouth daily   Quantity:  45 tablet   Refills:  3       simvastatin 40 MG tablet   Commonly known as:  ZOCOR   This may have changed:  additional instructions   Used for:  Other hyperlipidemia, Coronary arteriosclerosis   Changed by:  Joelle Randle MD        Dose:  40 mg   Take 1 tablet (40 mg) by mouth At Bedtime   Quantity:  30 tablet   Refills:  0            Where to get your medicines      These medications were sent to [facility] - [place], [place] - [facility] [address]  [address], [facility] [address]    Hours:  Mon-Fri 8:30am-5:00pm Toll " Free [phone] Phone:  [phone]    - finasteride 5 MG tablet  - lisinopril 30 MG tablet  - metoprolol 25 MG 24 hr tablet  - nitroglycerin 0.4 MG sublingual tablet  - simvastatin 40 MG tablet             Primary Care Provider Office Phone # Fax #    Joelel Randle [phone]       [facility] [id number] [place] [address]        Thank you!     Thank you for choosing [facility]  for your care. Our goal is always to provide you with excellent care. Hearing back from our patients is one way we can continue to improve our services. Please take a few minutes to complete the written survey that you may receive in the mail after your visit with us. Thank you!             Your Updated Medication List - Protect others around you: Learn how to safely use, store and throw away your medicines at www.disposemymeds.org.          This list is accurate as of: 2/9/17  8:21 AM.  Always use your most recent med list.                   Brand Name Dispense Instructions for use    ALEVE 220 MG capsule   Generic drug:  naproxen sodium      Take 220 mg by mouth as needed.       aspirin 81 MG tablet          ONE DAILY       finasteride 5 MG tablet    PROSCAR    90 tablet    Take 1 tablet (5 mg) by mouth daily Patient said he takes one tablet daily       fish oil-omega-3 fatty acids 1000 MG capsule      Take 2 g by mouth daily.       levOCARNitine 330 MG tablet    CARNITOR    90 tablet    Take 1 tablet (330 mg) by mouth daily       lisinopril 30 MG tablet    PRINIVIL,ZESTRIL    90 tablet    Take 1 tablet (30 mg) by mouth daily       metoprolol 25 MG 24 hr tablet    TOPROL-XL    45 tablet    Take 0.5 tablets (12.5 mg) by mouth daily       nitroglycerin 0.4 MG sublingual tablet    NITROSTAT    25 tablet    Place 1 tablet (0.4 mg) under the tongue See Admin Instructions for chest pain       sildenafil 100 MG cap/tab    REVATIO/VIAGRA    12 tablet    Take 1 tablet (100 mg) by mouth daily  as needed for erectile dysfunction       simvastatin 40 MG tablet    ZOCOR    30 tablet    Take 1 tablet (40 mg) by mouth At Bedtime

## 2017-02-09 NOTE — PATIENT INSTRUCTIONS
Preventive Health Recommendations:   Male Ages 65 and over    Yearly exam:             See your health care provider every year in order to  o   Review health changes.   o   Discuss preventive care.    o   Review your medicines if your doctor has prescribed any.    Talk with your health care provider about whether you should have a test to screen for prostate cancer (PSA).    Every 3 years, have a diabetes test (fasting glucose). If you are at risk for diabetes, you should have this test more often.    Every 5 years, have a cholesterol test. Have this test more often if you are at risk for high cholesterol or heart disease.     Every 10 years, have a colonoscopy. Or, have a yearly FIT test (stool test). These exams will check for colon cancer.    Talk to with your health care provider about screening for Abdominal Aortic Aneurysm if you have a family history of AAA or have a history of smoking.    Shots:     Get a flu shot each year.     Get a tetanus shot every 10 years.     Talk to your doctor about your pneumonia vaccines. There are now two you should receive - Pneumovax (PPSV 23) and Prevnar (PCV 13).     Talk to your doctor about a shingles vaccine.     Talk to your doctor about the hepatitis B vaccine.  Nutrition:     Eat at least 5 servings of fruits and vegetables each day.     Eat whole-grain bread, whole-wheat pasta and brown rice instead of white grains and rice.     Talk to your provider about Calcium and Vitamin D.   Lifestyle    Exercise for at least 150 minutes a week (30 minutes a day, 5 days a week). This will help you control your weight and prevent disease.     Limit alcohol to one drink per day.     No smoking.     Wear sunscreen to prevent skin cancer.     See your dentist every six months for an exam and cleaning.     See your eye doctor every 1 to 2 years to screen for conditions such as glaucoma, macular degeneration, cataracts, etc   Preventive Health Recommendations:       Male Ages 65  and over    Yearly exam:             See your health care provider every year in order to  o   Review health changes.   o   Discuss preventive care.    o   Review your medicines if your doctor has prescribed any.  Talk with your health care provider about whether you should have a test to screen for prostate cancer (PSA).  Every 3 years, have a diabetes test (fasting glucose). If you are at risk for diabetes, you should have this test more often.  Every 5 years, have a cholesterol test. Have this test more often if you are at risk for high cholesterol or heart disease.   Every 10 years, have a colonoscopy. Or, have a yearly FIT test (stool test). These exams will check for colon cancer.  Talk to with your health care provider about screening for Abdominal Aortic Aneurysm if you have a family history of AAA or have a history of smoking.  Shots:   Get a flu shot each year.   Get a tetanus shot every 10 years.   Talk to your doctor about your pneumonia vaccines. There are now two you should receive - Pneumovax (PPSV 23) and Prevnar (PCV 13).  Talk to your doctor about a shingles vaccine.   Talk to your doctor about the hepatitis B vaccine.  Nutrition:   Eat at least 5 servings of fruits and vegetables each day.   Eat whole-grain bread, whole-wheat pasta and brown rice instead of white grains and rice.   Talk to your doctor about Calcium and Vitamin D.   Lifestyle  Exercise for at least 150 minutes a week (30 minutes a day, 5 days a week). This will help you control your weight and prevent disease.   Limit alcohol to one drink per day.   No smoking.   Wear sunscreen to prevent skin cancer.   See your dentist every six months for an exam and cleaning.   See your eye doctor every 1 to 2 years to screen for conditions such as glaucoma, macular degeneration and cataracts.  Preventive Health Recommendations:       Male Ages 65 and over    Yearly exam:             See your health care provider every year in order to  o    Review health changes.   o   Discuss preventive care.    o   Review your medicines if your doctor has prescribed any.  Talk with your health care provider about whether you should have a test to screen for prostate cancer (PSA).  Every 3 years, have a diabetes test (fasting glucose). If you are at risk for diabetes, you should have this test more often.  Every 5 years, have a cholesterol test. Have this test more often if you are at risk for high cholesterol or heart disease.   Every 10 years, have a colonoscopy. Or, have a yearly FIT test (stool test). These exams will check for colon cancer.  Talk to with your health care provider about screening for Abdominal Aortic Aneurysm if you have a family history of AAA or have a history of smoking.  Shots:   Get a flu shot each year.   Get a tetanus shot every 10 years.   Talk to your doctor about your pneumonia vaccines. There are now two you should receive - Pneumovax (PPSV 23) and Prevnar (PCV 13).  Talk to your doctor about a shingles vaccine.   Talk to your doctor about the hepatitis B vaccine.  Nutrition:   Eat at least 5 servings of fruits and vegetables each day.   Eat whole-grain bread, whole-wheat pasta and brown rice instead of white grains and rice.   Talk to your doctor about Calcium and Vitamin D.   Lifestyle  Exercise for at least 150 minutes a week (30 minutes a day, 5 days a week). This will help you control your weight and prevent disease.   Limit alcohol to one drink per day.   No smoking.   Wear sunscreen to prevent skin cancer.   See your dentist every six months for an exam and cleaning.   See your eye doctor every 1 to 2 years to screen for conditions such as glaucoma, macular degeneration and cataracts.  Preventive Health Recommendations:   Male Ages 65 and over    Yearly exam:             See your health care provider every year in order to  o   Review health changes.   o   Discuss preventive care.    o   Review your medicines if your doctor has  prescribed any.  Talk with your health care provider about whether you should have a test to screen for prostate cancer (PSA).  Every 3 years, have a diabetes test (fasting glucose). If you are at risk for diabetes, you should have this test more often.  Every 5 years, have a cholesterol test. Have this test more often if you are at risk for high cholesterol or heart disease.   Every 10 years, have a colonoscopy. Or, have a yearly FIT test (stool test). These exams will check for colon cancer.  Talk to with your health care provider about screening for Abdominal Aortic Aneurysm if you have a family history of AAA or have a history of smoking.    Shots:   Get a flu shot each year.   Get a tetanus shot every 10 years.   Talk to your doctor about your pneumonia vaccines. There are now two you should receive - Pneumovax (PPSV 23) and Prevnar (PCV 13).   Talk to your doctor about a shingles vaccine.   Talk to your doctor about the hepatitis B vaccine.  Nutrition:   Eat at least 5 servings of fruits and vegetables each day.   Eat whole-grain bread, whole-wheat pasta and brown rice instead of white grains and rice.   Talk to your provider about Calcium and Vitamin D.   Lifestyle  Exercise for at least 150 minutes a week (30 minutes a day, 5 days a week). This will help you control your weight and prevent disease.   Limit alcohol to one drink per day.   No smoking.   Wear sunscreen to prevent skin cancer.   See your dentist every six months for an exam and cleaning.   See your eye doctor every 1 to 2 years to screen for conditions such as glaucoma, macular degeneration, cataracts, etc

## 2017-02-09 NOTE — NURSING NOTE
Screening Questionnaire for Adult Immunization    Are you sick today?   No   Do you have allergies to medications, food, a vaccine component or latex?   Yes- see allergies    Have you ever had a serious reaction after receiving a vaccination?   No   Do you have a long-term health problem with heart disease, lung disease, asthma, kidney disease, metabolic disease (e.g. diabetes), anemia, or other blood disorder?   No   Do you have cancer, leukemia, HIV/AIDS, or any other immune system problem?   No   In the past 3 months, have you taken medications that affect  your immune system, such as prednisone, other steroids, or anticancer drugs; drugs for the treatment of rheumatoid arthritis, Crohn s disease, or psoriasis; or have you had radiation treatments?   No   Have you had a seizure, or a brain or other nervous system problem?   No   During the past year, have you received a transfusion of blood or blood     products, or been given immune (gamma) globulin or antiviral drug?   No   For women: Are you pregnant or is there a chance you could become        pregnant during the next month?   No   Have you received any vaccinations in the past 4 weeks?   No     Immunization questionnaire was positive for at least one answer.  Notified Dr. Joelle Randle.      MNVFC doesn't apply on this patient       Screening performed by Kristin Damian on 2/9/2017 at 8:27 AM.

## 2017-02-09 NOTE — PROGRESS NOTES
SUBJECTIVE:     CC: Ambrocio Vázquez is an 66 year old male who presents for preventative health visit.       Healthy Habits:    Do you get at least three servings of calcium containing foods daily (dairy, green leafy vegetables, etc.)? yes    Amount of exercise or daily activities, outside of work: 3 day(s) per week     Problems taking medications regularly No    Medication side effects: No    Have you had an eye exam in the past two years? yes    Do you see a dentist twice per year? yes    Do you have sleep apnea, excessive snoring or daytime drowsiness? Snoring     Bad lyme illness last summer - fully resolve    Going to [place] in a week     Has a gym membership - recumbent bike    Today's PHQ-2 Score: 0  PHQ-2 ( 1999 Pfizer) 2/9/2017 11/17/2015   Q1: Little interest or pleasure in doing things 0 0   Q2: Feeling down, depressed or hopeless 0 0   PHQ-2 Score 0 0       Tough 4 years of work  Planning to slow down and considering retiring     Son is struggling   May have ptsd and depression    Abuse: Current or Past(Physical, Sexual or Emotional)- No  Do you feel safe in your environment - Yes    Social History   Substance Use Topics     Smoking status: Former Smoker -- 0.50 packs/day     Types: Cigarettes     Smokeless tobacco: Never Used     Alcohol Use: 0.0 oz/week     0 Standard drinks or equivalent per week      Comment: 1-2 a week     The patient does not drink >3 drinks per day nor >7 drinks per week.    Last PSA:   PSA   Date Value Ref Range Status   01/18/2017 1.49 0 - 4 ug/L Final     Comment:     Assay Method:  Chemiluminescence using Siemens Vista analyzer       Recent Labs   Lab Test  05/13/15   0726  11/04/14   0730   CHOL  122  124   HDL  43  38*   LDL  62  71   TRIG  83  76   CHOLHDLRATIO  2.8  3.3       Reviewed orders with patient. Reviewed health maintenance and updated orders accordingly - Yes    All Histories reviewed and updated in Epic.      ROS:  C: NEGATIVE for fever, chills, change in  "weight  I: NEGATIVE for worrisome rashes, moles or lesions  E: NEGATIVE for vision changes or irritation  ENT: NEGATIVE for ear, mouth and throat problems  R: NEGATIVE for significant cough or SOB  CV: NEGATIVE for chest pain, palpitations or peripheral edema  GI: NEGATIVE for nausea, abdominal pain, heartburn, or change in bowel habits   male: negative for dysuria, hematuria, decreased urinary stream, erectile dysfunction, urethral discharge  M: NEGATIVE for significant arthralgias or myalgia  N: NEGATIVE for weakness, dizziness or paresthesias  P: NEGATIVE for changes in mood or affect    Problem list, Medication list, Allergies, and Medical/Social/Surgical histories reviewed in [facility] and updated as appropriate.  OBJECTIVE:     /91 mmHg  Pulse 60  Temp(Src) 96.6  F (35.9  C) (Tympanic)  Ht 5' 11\" (1.803 m)  Wt 170 lb 11.2 oz (77.429 kg)  BMI 23.82 kg/m2  EXAM:  GENERAL: healthy, alert and no distress  EYES: Eyes grossly normal to inspection, PERRL and conjunctivae and sclerae normal  HENT: ear canals and TM's normal, nose and mouth without ulcers or lesions  NECK: no adenopathy, no asymmetry, masses, or scars and thyroid normal to palpation  RESP: lungs clear to auscultation - no rales, rhonchi or wheezes  CV: regular rate and rhythm, normal S1 S2, no S3 or S4, no murmur, click or rub, no peripheral edema and peripheral pulses strong  ABDOMEN: soft, nontender, no hepatosplenomegaly, no masses and bowel sounds normal   (male): normal male genitalia without lesions or urethral discharge, no hernia  MS: no gross musculoskeletal defects noted, no edema  SKIN: no suspicious lesions or rashes  NEURO: Normal strength and tone, mentation intact and speech normal  PSYCH: mentation appears normal, affect normal/bright    ASSESSMENT/PLAN:     (Z00.00) Routine general medical examination at a health care facility  (primary encounter diagnosis)  Comment: We discussed self testicular exams, exercise 30mins/day, " and calcium with vitamin D at 1200mg/day, preferably from dietary sources.  Healthy diet and daily Exercise were discussed as well.     Discussed that yearly prostate screening ( psa) is not recommended by USPSTF  Discussed risks/benefits of screening including that no screening could miss cancer and that screening could cause unnecessary procedures.   He understands and agrees and chooses  to do psa testing - he sees urology for this.   Plan:     (Z11.59) Need for hepatitis C screening test  Comment:   Plan: Hepatitis C Screen Reflex to HCV RNA Quant and         Genotype            (Z23) Need for prophylactic vaccination against Streptococcus pneumoniae (pneumococcus)  Comment:   Plan:     (I25.10) Coronary artery disease involving native heart without angina pectoris, unspecified vessel or lesion type  Comment:   Plan: metoprolol (TOPROL-XL) 25 MG 24 hr tablet,         nitroglycerin (NITROSTAT) 0.4 MG sublingual         tablet            (I10) Essential hypertension  Comment:   Plan: BASIC METABOLIC PANEL, metoprolol (TOPROL-XL)         25 MG 24 hr tablet, lisinopril         (PRINIVIL,ZESTRIL) 30 MG tablet            (E78.4) Other hyperlipidemia  Comment:   Plan: LIPID REFLEX TO DIRECT LDL PANEL, simvastatin         (ZOCOR) 40 MG tablet            (I25.10) Coronary arteriosclerosis  Comment: 5/4/10 - 100% occlusion of proximal LAD with 2 [place] stents.   6/28/11 - seen in f/u. Adenosine stress 6/23/11 - normal LV perfusion, some atypical chest qamar  Plan: HEART FAILURE ACTION PLAN ORDER, simvastatin         (ZOCOR) 40 MG tablet            (N42.9) Disorder of prostate, unspecified  Comment:   Plan: finasteride (PROSCAR) 5 MG tablet            (Z23) Need for vaccination  Comment:   Plan: PNEUMOCOCCAL CONJ VACCINE 13 VALENT IM (PREVNAR        13), ADMIN 1st VACCINE              COUNSELING:  Reviewed preventive health counseling, as reflected in patient instructions       Regular exercise       Healthy  "diet/nutrition         reports that he has quit smoking. His smoking use included Cigarettes. He smoked 0.50 packs per day. He has never used smokeless tobacco.    Estimated body mass index is 24.28 kg/(m^2) as calculated from the following:    Height as of 1/26/17: 5' 11\" (1.803 m).    Weight as of 1/26/17: 174 lb (78.926 kg).       Counseling Resources:  ATP IV Guidelines  Pooled Cohorts Equation Calculator  FRAX Risk Assessment  ICSI Preventive Guidelines  Dietary Guidelines for Americans, 2010  USDA's MyPlate  ASA Prophylaxis  Lung CA Screening    Joelle Randle MD  [facility]  "

## 2017-02-10 LAB — HCV AB SERPL QL IA: NORMAL

## 2017-02-17 ENCOUNTER — TELEPHONE (OUTPATIENT)
Dept: ONCOLOGY | Facility: CLINIC | Age: 67
End: 2017-02-17

## 2017-02-20 NOTE — TELEPHONE ENCOUNTER
Mr. Vázquez had previously filed a complaint that resulted in me being contacted by a  in the corporate compliance department at [facility]. His concern was his medical history  had previously been given to a genetic counselor by his daughter and  this medical information  was in his daughter's chart. In 2017, he was in clinic and saw a  genetic counselor and they discussed his medical history again.    The genetic counselor that saw Mr. Vázquez in 2017 explained that when a patient sees a genetic counselor, it is standard of care to take a thorough family history. It is also standard of care to ask family history when seeing a health care provider.  I also explained that his name,  and other demographic information was not in his daughter's records. His medical history in his daughter's medical record could not be linked back to him.     Mr. Vázquez requested my contact information. I said that I would contact him with my name and phone number through [employer] which I did do on Friday, 17. I also gave him the phone number to patient relations.     Babita Trinidad MS [facility]  [phone].

## 2017-02-27 ENCOUNTER — OFFICE VISIT (OUTPATIENT)
Dept: UROLOGY | Facility: CLINIC | Age: 67
End: 2017-02-27
Payer: COMMERCIAL

## 2017-02-27 VITALS — RESPIRATION RATE: 18 BRPM | HEART RATE: 80 BPM | SYSTOLIC BLOOD PRESSURE: 124 MMHG | DIASTOLIC BLOOD PRESSURE: 79 MMHG

## 2017-02-27 DIAGNOSIS — N52.01 ERECTILE DYSFUNCTION DUE TO ARTERIAL INSUFFICIENCY: Primary | ICD-10-CM

## 2017-02-27 PROCEDURE — 99214 OFFICE O/P EST MOD 30 MIN: CPT | Performed by: UROLOGY

## 2017-02-27 RX ORDER — SILDENAFIL CITRATE 20 MG/1
40-100 TABLET ORAL PRN
Qty: 50 TABLET | Refills: 3 | Status: SHIPPED | OUTPATIENT
Start: 2017-02-27 | End: 2018-01-15

## 2017-02-27 NOTE — MR AVS SNAPSHOT
After Visit Summary   2/27/2017    Ambrocio Vázquez    MRN: [id number]           Patient Information     Date Of Birth          1950        Visit Information        Provider Department      2/27/2017 3:30 PM RETA Iglesias MD [facility]        Today's Diagnoses     Erectile dysfunction due to arterial insufficiency    -  1      Care Instructions    Per Physician's instructions          Follow-ups after your visit        Who to contact     If you have questions or need follow up information about today's clinic visit or your schedule please contact [facility] directly at [phone].  Normal or non-critical lab and imaging results will be communicated to you by [email or web address], letter or phone within 4 business days after the clinic has received the results. If you do not hear from us within 7 days, please contact the clinic through [employer] or phone. If you have a critical or abnormal lab result, we will notify you by phone as soon as possible.  Submit refill requests through [employer] or call your pharmacy and they will forward the refill request to us. Please allow 3 business days for your refill to be completed.          Additional Information About Your Visit        MyChart Information     E-Sign gives you secure access to your electronic health record. If you see a primary care provider, you can also send messages to your care team and make appointments. If you have questions, please call your primary care clinic.  If you do not have a primary care provider, please call [phone] and they will assist you.        Care EveryWhere ID     This is your Care EveryWhere ID. This could be used by other organizations to access your [facility] medical records  [id number]        Your Vitals Were     Pulse Respirations                80 18           Blood Pressure from Last 3 Encounters:   02/27/17 124/79   02/09/17 132/74   01/26/17 (!) 165/96    Weight from Last 3 Encounters:    02/09/17 170 lb 11.2 oz (77.4 kg)   01/26/17 174 lb (78.9 kg)   07/21/16 169 lb (76.7 kg)              Today, you had the following     No orders found for display         Today's Medication Changes          These changes are accurate as of: 2/27/17 11:59 PM.  If you have any questions, ask your nurse or doctor.               Start taking these medicines.        Dose/Directions    sildenafil 20 MG tablet   Commonly known as:  REVATIO/VIAGRA   Used for:  Erectile dysfunction due to arterial insufficiency   Started by:  RETA Iglesias MD        Dose:   mg   Take 2-5 tablets ( mg) by mouth as needed for erectile dysfunction.  Never use with nitroglycerin, terazosin or doxazosin.   Quantity:  50 tablet   Refills:  3            Where to get your medicines      Some of these will need a paper prescription and others can be bought over the counter.  Ask your nurse if you have questions.     Bring a paper prescription for each of these medications     sildenafil 20 MG tablet                Primary Care Provider Office Phone # Fax #    Joelle Aniyah Randle [phone]       [facility] [address] [facility] [id number]        Thank you!     Thank you for choosing [facility]  for your care. Our goal is always to provide you with excellent care. Hearing back from our patients is one way we can continue to improve our services. Please take a few minutes to complete the written survey that you may receive in the mail after your visit with us. Thank you!             Your Updated Medication List - Protect others around you: Learn how to safely use, store and throw away your medicines at www.disposemymeds.org.          This list is accurate as of: 2/27/17 11:59 PM.  Always use your most recent med list.                   Brand Name Dispense Instructions for use    ALEVE 220 MG capsule   Generic drug:  naproxen sodium      Take 220 mg by mouth as needed.       aspirin 81 MG  tablet          ONE DAILY       finasteride 5 MG tablet    PROSCAR    90 tablet    Take 1 tablet (5 mg) by mouth daily Patient said he takes one tablet daily       fish oil-omega-3 fatty acids 1000 MG capsule      Take 2 g by mouth daily.       levOCARNitine 330 MG tablet    CARNITOR    90 tablet    Take 1 tablet (330 mg) by mouth daily       lisinopril 30 MG tablet    PRINIVIL,ZESTRIL    90 tablet    Take 1 tablet (30 mg) by mouth daily       metoprolol 25 MG 24 hr tablet    TOPROL-XL    45 tablet    Take 0.5 tablets (12.5 mg) by mouth daily       nitroglycerin 0.4 MG sublingual tablet    NITROSTAT    25 tablet    Place 1 tablet (0.4 mg) under the tongue See Admin Instructions for chest pain       sildenafil 100 MG cap/tab    REVATIO/VIAGRA    12 tablet    Take 1 tablet (100 mg) by mouth daily as needed for erectile dysfunction       sildenafil 20 MG tablet    REVATIO/VIAGRA    50 tablet    Take 2-5 tablets ( mg) by mouth as needed for erectile dysfunction.  Never use with nitroglycerin, terazosin or doxazosin.       simvastatin 40 MG tablet    ZOCOR    30 tablet    Take 1 tablet (40 mg) by mouth At Bedtime

## 2017-02-27 NOTE — NURSING NOTE
"Chief Complaint   Patient presents with     RECHECK     PSA, Discuss medication        Initial /79 (BP Location: Right arm, Patient Position: Chair, Cuff Size: Adult Large)  Pulse 80  Resp 18 Estimated body mass index is 23.81 kg/(m^2) as calculated from the following:    Height as of 2/9/17: 5' 11\" (1.803 m).    Weight as of 2/9/17: 170 lb 11.2 oz (77.4 kg).  BP completed using cuff size: regular      Mindi Kong CMA     "

## 2017-02-28 NOTE — PROGRESS NOTES
Appointment source: Established Patient  Patient name: Ambrocio Vázquez  Urology Staff: Shon Iglesias MD    Subjective: This is a 66 year old year old male returning for follow up of elevated PSA    Objective:  PSA history:    Component      Latest Ref Rng & Units 1/3/2008 4/6/2009 8/22/2011 1/8/2013   PSA      0 - 4 ug/L 3.63 2.9 1.61 3.75     Component      Latest Ref Rng & Units 4/10/2013 7/22/2014 11/10/2015 1/18/2017   PSA      0 - 4 ug/L 1.54 1.74 1.88 1.49     Assessment:  Continues finasteride for control of voiding symptoms. Doing well without significant voiding issues.    PSA 1.49 ng/ml down from 1.88 ng/ml two years ago.    Two prior benign prostate biopsies when PSA was in 5 ng range.    Rectal examination normal.    Is complaining of mild erectile dysfunction.    Plan:  Return in one year for follow up. Sildenafil prescription for erectile dysfunction.    Total time 25 minutes, counseling 20 minutes discussing significance of PSA

## 2017-03-22 DIAGNOSIS — E78.49 OTHER HYPERLIPIDEMIA: ICD-10-CM

## 2017-03-22 DIAGNOSIS — I25.10 CORONARY ARTERIOSCLEROSIS: ICD-10-CM

## 2017-03-22 RX ORDER — SIMVASTATIN 40 MG
40 TABLET ORAL AT BEDTIME
Qty: 90 TABLET | Refills: 3 | Status: SHIPPED | OUTPATIENT
Start: 2017-03-22 | End: 2017-11-14

## 2017-03-22 NOTE — TELEPHONE ENCOUNTER
Prescription approved per [facility] Refill Protocol.  Lonnie Mckeon RN

## 2017-03-28 ENCOUNTER — HOSPITAL ENCOUNTER (EMERGENCY)
Facility: CLINIC | Age: 67
Discharge: HOME | End: 2017-03-28
Attending: EMERGENCY MEDICINE | Admitting: EMERGENCY MEDICINE
Payer: COMMERCIAL

## 2017-03-28 ENCOUNTER — APPOINTMENT (OUTPATIENT)
Dept: CT IMAGING | Facility: CLINIC | Age: 67
End: 2017-03-28
Attending: EMERGENCY MEDICINE
Payer: COMMERCIAL

## 2017-03-28 VITALS
DIASTOLIC BLOOD PRESSURE: 108 MMHG | SYSTOLIC BLOOD PRESSURE: 174 MMHG | TEMPERATURE: 98.2 F | OXYGEN SATURATION: 96 % | BODY MASS INDEX: 25.8 KG/M2 | RESPIRATION RATE: 9 BRPM | WEIGHT: 185 LBS

## 2017-03-28 DIAGNOSIS — K29.00 ACUTE GASTRITIS WITHOUT HEMORRHAGE, UNSPECIFIED GASTRITIS TYPE: ICD-10-CM

## 2017-03-28 LAB
ALBUMIN SERPL-MCNC: 3.9 G/DL (ref 3.4–5)
ALP SERPL-CCNC: 60 U/L (ref 40–150)
ALT SERPL W P-5'-P-CCNC: 18 U/L (ref 0–70)
ANION GAP SERPL CALCULATED.3IONS-SCNC: 5 MMOL/L (ref 3–14)
AST SERPL W P-5'-P-CCNC: 13 U/L (ref 0–45)
BASOPHILS # BLD AUTO: 0 10E9/L (ref 0–0.2)
BASOPHILS NFR BLD AUTO: 0.2 %
BILIRUB SERPL-MCNC: 1.1 MG/DL (ref 0.2–1.3)
BUN SERPL-MCNC: 17 MG/DL (ref 7–30)
CALCIUM SERPL-MCNC: 8.8 MG/DL (ref 8.5–10.1)
CHLORIDE SERPL-SCNC: 107 MMOL/L (ref 94–109)
CO2 SERPL-SCNC: 27 MMOL/L (ref 20–32)
CREAT SERPL-MCNC: 0.83 MG/DL (ref 0.66–1.25)
DIFFERENTIAL METHOD BLD: ABNORMAL
EOSINOPHIL # BLD AUTO: 0 10E9/L (ref 0–0.7)
EOSINOPHIL NFR BLD AUTO: 0.3 %
ERYTHROCYTE [DISTWIDTH] IN BLOOD BY AUTOMATED COUNT: 12.4 % (ref 10–15)
GFR SERPL CREATININE-BSD FRML MDRD: ABNORMAL ML/MIN/1.7M2
GLUCOSE SERPL-MCNC: 174 MG/DL (ref 70–99)
HCT VFR BLD AUTO: 52 % (ref 40–53)
HGB BLD-MCNC: 17.7 G/DL (ref 13.3–17.7)
IMM GRANULOCYTES # BLD: 0 10E9/L (ref 0–0.4)
IMM GRANULOCYTES NFR BLD: 0.1 %
LIPASE SERPL-CCNC: 108 U/L (ref 73–393)
LYMPHOCYTES # BLD AUTO: 0.6 10E9/L (ref 0.8–5.3)
LYMPHOCYTES NFR BLD AUTO: 5.8 %
MCH RBC QN AUTO: 31.4 PG (ref 26.5–33)
MCHC RBC AUTO-ENTMCNC: 34 G/DL (ref 31.5–36.5)
MCV RBC AUTO: 92 FL (ref 78–100)
MONOCYTES # BLD AUTO: 0.3 10E9/L (ref 0–1.3)
MONOCYTES NFR BLD AUTO: 2.6 %
NEUTROPHILS # BLD AUTO: 10 10E9/L (ref 1.6–8.3)
NEUTROPHILS NFR BLD AUTO: 91 %
PLATELET # BLD AUTO: 183 10E9/L (ref 150–450)
POTASSIUM SERPL-SCNC: 5.4 MMOL/L (ref 3.4–5.3)
PROT SERPL-MCNC: 7.7 G/DL (ref 6.8–8.8)
RBC # BLD AUTO: 5.64 10E12/L (ref 4.4–5.9)
SODIUM SERPL-SCNC: 139 MMOL/L (ref 133–144)
TROPONIN I SERPL-MCNC: NORMAL UG/L (ref 0–0.04)
WBC # BLD AUTO: 11 10E9/L (ref 4–11)

## 2017-03-28 PROCEDURE — 96375 TX/PRO/DX INJ NEW DRUG ADDON: CPT

## 2017-03-28 PROCEDURE — 25000128 H RX IP 250 OP 636

## 2017-03-28 PROCEDURE — 84484 ASSAY OF TROPONIN QUANT: CPT | Performed by: EMERGENCY MEDICINE

## 2017-03-28 PROCEDURE — 96361 HYDRATE IV INFUSION ADD-ON: CPT

## 2017-03-28 PROCEDURE — 25500064 ZZH RX 255 OP 636: Performed by: RADIOLOGY

## 2017-03-28 PROCEDURE — 99285 EMERGENCY DEPT VISIT HI MDM: CPT | Mod: 25

## 2017-03-28 PROCEDURE — 25000125 ZZHC RX 250: Performed by: RADIOLOGY

## 2017-03-28 PROCEDURE — 83690 ASSAY OF LIPASE: CPT | Performed by: EMERGENCY MEDICINE

## 2017-03-28 PROCEDURE — 80053 COMPREHEN METABOLIC PANEL: CPT | Performed by: EMERGENCY MEDICINE

## 2017-03-28 PROCEDURE — 25000128 H RX IP 250 OP 636: Performed by: EMERGENCY MEDICINE

## 2017-03-28 PROCEDURE — 96376 TX/PRO/DX INJ SAME DRUG ADON: CPT

## 2017-03-28 PROCEDURE — 96374 THER/PROPH/DIAG INJ IV PUSH: CPT | Mod: 59

## 2017-03-28 PROCEDURE — 85025 COMPLETE CBC W/AUTO DIFF WBC: CPT | Performed by: EMERGENCY MEDICINE

## 2017-03-28 PROCEDURE — 99285 EMERGENCY DEPT VISIT HI MDM: CPT | Performed by: EMERGENCY MEDICINE

## 2017-03-28 PROCEDURE — 74177 CT ABD & PELVIS W/CONTRAST: CPT

## 2017-03-28 RX ORDER — PROMETHAZINE HYDROCHLORIDE 25 MG/ML
12.5 INJECTION, SOLUTION INTRAMUSCULAR; INTRAVENOUS
Status: DISCONTINUED | OUTPATIENT
Start: 2017-03-28 | End: 2017-03-28 | Stop reason: HOSPADM

## 2017-03-28 RX ORDER — SODIUM CHLORIDE 9 MG/ML
1000 INJECTION, SOLUTION INTRAVENOUS CONTINUOUS
Status: DISCONTINUED | OUTPATIENT
Start: 2017-03-28 | End: 2017-03-28 | Stop reason: HOSPADM

## 2017-03-28 RX ORDER — ONDANSETRON 2 MG/ML
INJECTION INTRAMUSCULAR; INTRAVENOUS
Status: COMPLETED
Start: 2017-03-28 | End: 2017-03-28

## 2017-03-28 RX ORDER — ONDANSETRON 2 MG/ML
4 INJECTION INTRAMUSCULAR; INTRAVENOUS
Status: COMPLETED | OUTPATIENT
Start: 2017-03-28 | End: 2017-03-28

## 2017-03-28 RX ORDER — PROMETHAZINE HYDROCHLORIDE 25 MG/1
12.5 TABLET ORAL EVERY 6 HOURS PRN
Qty: 7 TABLET | Refills: 1 | Status: SHIPPED | OUTPATIENT
Start: 2017-03-28 | End: 2017-11-14

## 2017-03-28 RX ORDER — ONDANSETRON 2 MG/ML
4 INJECTION INTRAMUSCULAR; INTRAVENOUS ONCE
Status: DISCONTINUED | OUTPATIENT
Start: 2017-03-28 | End: 2017-03-28 | Stop reason: HOSPADM

## 2017-03-28 RX ORDER — HYDROCODONE BITARTRATE AND ACETAMINOPHEN 5; 325 MG/1; MG/1
1 TABLET ORAL EVERY 4 HOURS PRN
Qty: 7 TABLET | Refills: 0 | Status: SHIPPED | OUTPATIENT
Start: 2017-03-28 | End: 2017-11-14

## 2017-03-28 RX ORDER — HYDROMORPHONE HYDROCHLORIDE 1 MG/ML
0.5 INJECTION, SOLUTION INTRAMUSCULAR; INTRAVENOUS; SUBCUTANEOUS EVERY 30 MIN PRN
Status: DISCONTINUED | OUTPATIENT
Start: 2017-03-28 | End: 2017-03-28 | Stop reason: HOSPADM

## 2017-03-28 RX ORDER — IOPAMIDOL 755 MG/ML
90 INJECTION, SOLUTION INTRAVASCULAR ONCE
Status: COMPLETED | OUTPATIENT
Start: 2017-03-28 | End: 2017-03-28

## 2017-03-28 RX ADMIN — SODIUM CHLORIDE 62 ML: 9 INJECTION, SOLUTION INTRAVENOUS at 09:48

## 2017-03-28 RX ADMIN — SODIUM CHLORIDE 1000 ML: 9 INJECTION, SOLUTION INTRAVENOUS at 08:19

## 2017-03-28 RX ADMIN — SODIUM CHLORIDE 1000 ML: 9 INJECTION, SOLUTION INTRAVENOUS at 09:16

## 2017-03-28 RX ADMIN — HYDROMORPHONE HYDROCHLORIDE 0.5 MG: 1 INJECTION, SOLUTION INTRAMUSCULAR; INTRAVENOUS; SUBCUTANEOUS at 11:31

## 2017-03-28 RX ADMIN — ONDANSETRON 4 MG: 2 INJECTION INTRAMUSCULAR; INTRAVENOUS at 08:20

## 2017-03-28 RX ADMIN — PROMETHAZINE HYDROCHLORIDE 12.5 MG: 25 INJECTION INTRAMUSCULAR; INTRAVENOUS at 11:27

## 2017-03-28 RX ADMIN — HYDROMORPHONE HYDROCHLORIDE 0.5 MG: 1 INJECTION, SOLUTION INTRAMUSCULAR; INTRAVENOUS; SUBCUTANEOUS at 08:22

## 2017-03-28 RX ADMIN — IOPAMIDOL 90 ML: 755 INJECTION, SOLUTION INTRAVENOUS at 09:47

## 2017-03-28 RX ADMIN — ONDANSETRON 4 MG: 2 INJECTION INTRAMUSCULAR; INTRAVENOUS at 08:51

## 2017-03-28 ASSESSMENT — ENCOUNTER SYMPTOMS
HEMATOLOGIC/LYMPHATIC NEGATIVE: 1
EYES NEGATIVE: 1
NEUROLOGICAL NEGATIVE: 1
NAUSEA: 1
ABDOMINAL PAIN: 1
MUSCULOSKELETAL NEGATIVE: 1
CARDIOVASCULAR NEGATIVE: 1
ENDOCRINE NEGATIVE: 1
ALLERGIC/IMMUNOLOGIC NEGATIVE: 1
RESPIRATORY NEGATIVE: 1
CONSTITUTIONAL NEGATIVE: 1

## 2017-03-28 NOTE — DISCHARGE INSTRUCTIONS
Gastritis (Adult)    Gastritis is inflammation and irritation of the stomach lining. It can be present for a short time (acute) or be long lasting (chronic). Gastritis is often caused by infection with bacteria called H pylori. More than a third of people in the [place] have this bacteria in their bodies. In many cases, H pylori causes no problems or symptoms. In some people, though, the infection irritates the stomach lining and causes gastritis. Other causes of stomach irritation include drinking alcohol or taking pain-relieving medicines called NSAIDs (such as aspirin or ibuprofen).   Symptoms of gastritis can include:    Abdominal pain or bloating    Loss of appetite    Nausea or vomiting    Vomiting blood or having black stools    Feeling more tired than usual  An inflamed and irritated stomach lining is more likely to develop a sore called an ulcer. To help prevent this, gastritis should be treated.  Home care  If needed, medicines may be prescribed. If you have H pylori infection, treating it will likely relieve your symptoms. Other changes can help reduce stomach irritation and help it heal.    If you have been prescribed medicines for H pylori infection, take them as directed. Take all of the medicine until it is finished or your healthcare provider tells you to stop, even if you feel better.    Your healthcare provider may recommend avoiding NSAIDs. If you take daily aspirin for your heart or other medical reasons, do not stop without talking to your healthcare provider first.    Avoid drinking alcohol.    Stop smoking. Smoking can irritate the stomach and delay healing. As much as possible, stay away from second hand smoke.  Follow-up care  Follow up with your healthcare provider, or as advised by our staff. Testing may be needed to check for inflammation or an ulcer.  When to seek medical advice  Call your healthcare provider for any of the following:    Stomach pain that gets worse or moves to the lower  right abdomen (appendix area)    Chest pain that appears or gets worse, or spreads to the back, neck, shoulder, or arm    Frequent vomiting (can t keep down liquids)    Blood in the stool or vomit (red or black in color)    Feeling weak or dizzy    Fever of 100.4 F (38 C) or higher, or as directed by your healthcare provider    1946-4357 [employer]. [address] [place] [address]. All rights reserved. This information is not intended as a substitute for professional medical care. Always follow your healthcare professional's instructions.          Understanding Gastritis  Gastritis is a painful inflammation of the stomach lining. It has a number of causes. Gastritis and its symptoms can be relieved with treatment. Work with your doctor to find ways to treat your symptoms.    When you have gastritis  Acids may damage the stomach lining when the built-in defenses of the stomach don t function as they should. The stomach lining can then become inflamed. When this occurs, it is called gastritis.  Causes of gastritis  Gastritis has many causes. They may include:    Aspirin and anti-inflammatory medications    Tobacco use    Alcohol use    Helicobacter pylori (H. pylori) bacteria    Trauma from injuries, burns, or major surgery    Critical illness or autoimmune disorders     An irritated or inflamed stomach lining        A healthy stomach lining    Common symptoms  With gastritis, you may notice one or more of the following:    A burning feeling in your upper abdomen    Pain that occurs after eating certain foods    Gas or a bloated feeling in your stomach    Frequent belching    Nausea with or without vomiting    Loss of appetite    Fatigue    [id number] [employer]. [address] [place] [address]. All rights reserved. This information is not intended as a substitute for professional medical care. Always follow your healthcare professional's instructions.

## 2017-03-28 NOTE — ED PROVIDER NOTES
History     Chief Complaint   Patient presents with     Abdominal Pain     mid upper abd pain since 2330 with n/v     HPI  Ambrocio Vázquez is a 67 year old male with a history of primary carnitine deficiency, history of sciatica, essential hypertension, rectal dysfunction former smoker and a known history of coronary for sclerosis with coronary artery disease who presents for mid upper abdominal pain since 11:30 PM last night with some nausea and vomiting.  Patient reports he has a history of Lyme's disease which was treated in 2016.  Symptoms are somewhat reminiscent although he has no headache or joint pain.  Patient tells me he is on lisinopril, metoprolol, finasteride, baby aspirin, and simvastatin.  His wife had similar symptoms with some nausea and diarrhea but no vomiting no abdominal pain over the weekend.  He did have 1 alcoholic drink a [place] ice tea with his wife.  No report of daily alcohol use.  He did have a normal bowel movement this morning at about vomited 3:30 AM prior to ED arrival and continues to have upper abdominal cramping and discomfort.  He reports no prior history of abdominal surgery.  Because of ongoing pain and discomfort utilizing emergency department for further care.    Social history: Lives in [place], [place]. Here in ED with spouse by private car.    Past medical history:  Patient Active Problem List   Diagnosis     Primary carnitine deficiency (H)     Sciatica     Essential hypertension, benign     ERECTILE DYSFUNCTION     Disorder of prostate     Colon polyp     Abnormal echocardiogram     CAD (coronary artery disease)     Cardiomyopathy (H)     CARDIOVASCULAR SCREENING; LDL GOAL LESS THAN 100     Advanced directives, counseling/discussion     Former smoker     Hyperlipidemia     Hypertension     History of angina     Coronary arteriosclerosis     Family history of aortic aneurysm     Medications:  Current Facility-Administered Medications   Medication      HYDROmorphone (PF) (DILAUDID) injection 0.5 mg     0.9% sodium chloride infusion     ondansetron (ZOFRAN) injection 4 mg     promethazine (PHENERGAN) IV injection 12.5 mg     Current Outpatient Prescriptions   Medication     HYDROcodone-acetaminophen (NORCO) 5-325 MG per tablet     promethazine (PHENERGAN) 25 MG tablet     simvastatin (ZOCOR) 40 MG tablet     metoprolol (TOPROL-XL) 25 MG 24 hr tablet     lisinopril (PRINIVIL,ZESTRIL) 30 MG tablet     finasteride (PROSCAR) 5 MG tablet     levOCARNitine (CARNITOR) 330 MG tablet     fish oil-omega-3 fatty acids (FISH OIL) 1000 MG capsule     ASPIRIN 81 MG OR TABS     sildenafil (REVATIO/VIAGRA) 20 MG tablet     nitroglycerin (NITROSTAT) 0.4 MG sublingual tablet     naproxen sodium (ALEVE) 220 MG capsule     Allergies:     Allergies   Allergen Reactions     Augmentin Nausea and Vomiting     Sulfa Drugs Nausea     I have reviewed the Medications, Allergies, Past Medical and Surgical History, and Social History in the Epic system.    Review of Systems   Constitutional: Negative.    HENT: Negative.    Eyes: Negative.    Respiratory: Negative.    Cardiovascular: Negative.    Gastrointestinal: Positive for abdominal pain and nausea.   Endocrine: Negative.    Genitourinary: Negative.    Musculoskeletal: Negative.    Skin: Negative.    Allergic/Immunologic: Negative.    Neurological: Negative.    Hematological: Negative.        Physical Exam   BP: (!) 186/100  Heart Rate: 56  Temp: 98.2  F (36.8  C)  Resp: 16  Weight: 83.9 kg (185 lb)  SpO2: 99 %  Physical Exam   Constitutional: He is oriented to person, place, and time. He appears well-developed and well-nourished. He appears distressed.   HENT:   Head: Normocephalic and atraumatic.   Eyes: Conjunctivae and EOM are normal. Pupils are equal, round, and reactive to light. Right eye exhibits no discharge. Left eye exhibits no discharge. No scleral icterus.   Neck: Normal range of motion. Neck supple. No JVD present. No tracheal  deviation present. No thyromegaly present.   Cardiovascular: Normal rate and regular rhythm.    Pulmonary/Chest: Effort normal and breath sounds normal. No stridor. No respiratory distress. He has no wheezes. He has no rales. He exhibits no tenderness.   Abdominal: Soft. He exhibits no distension and no mass. There is tenderness in the epigastric area. There is no rebound and no guarding.       Lymphadenopathy:     He has no cervical adenopathy.   Neurological: He is alert and oriented to person, place, and time. He displays normal reflexes. No cranial nerve deficit. He exhibits normal muscle tone. Coordination normal.   Skin: He is not diaphoretic.   Psychiatric: His behavior is normal. Judgment and thought content normal.       ED Course     ED Course     Procedures             EKG Interpretation:      Interpreted by Jose Lay  Time reviewed: 8:30AM  Symptoms at time of EKG: Epigastric abdominal pain   Rhythm: normal sinus   Rate: Normal  Axis: Normal  Ectopy: none  Conduction: normal  ST Segments/ T Waves: Non-specific ST-T wave changes  Q Waves: nonspecific  Comparison to prior: Unchanged from 11/17/15    Clinical Impression: no acute changes          Critical Care time:  none                 ED medications:  Medications   HYDROmorphone (PF) (DILAUDID) injection 0.5 mg (0.5 mg Intravenous Given 3/28/17 1131)   0.9% sodium chloride infusion (1,000 mLs Intravenous New Bag 3/28/17 0916)   ondansetron (ZOFRAN) injection 4 mg (not administered)   promethazine (PHENERGAN) IV injection 12.5 mg (12.5 mg Intravenous Given 3/28/17 1127)   ondansetron (ZOFRAN) injection 4 mg (4 mg Intravenous Given 3/28/17 0820)   0.9% sodium chloride BOLUS (0 mLs Intravenous Stopped 3/28/17 0916)   iopamidol (ISOVUE-370) solution 90 mL (90 mLs Intravenous Given 3/28/17 0947)   sodium chloride 0.9 % for CT scan flush dose 62 mL (62 mLs Intravenous Given 3/28/17 0948)   ondansetron (ZOFRAN) 2 MG/ML injection (4 mg  Given 3/28/17  0851)       ED labs and imaging:  Results for orders placed or performed during the hospital encounter of 03/28/17 (from the past 24 hour(s))   Troponin I   Result Value Ref Range    Troponin I ES  0.000 - 0.045 ug/L     <0.015  The 99th percentile for upper reference range is 0.045 ug/L.  Troponin values in   the range of 0.045 - 0.120 ug/L may be associated with risks of adverse   clinical events.     CBC with platelets, differential   Result Value Ref Range    WBC 11.0 4.0 - 11.0 10e9/L    RBC Count 5.64 4.4 - 5.9 10e12/L    Hemoglobin 17.7 13.3 - 17.7 g/dL    Hematocrit 52.0 40.0 - 53.0 %    MCV 92 78 - 100 fl    MCH 31.4 26.5 - 33.0 pg    MCHC 34.0 31.5 - 36.5 g/dL    RDW 12.4 10.0 - 15.0 %    Platelet Count 183 150 - 450 10e9/L    Diff Method Automated Method     % Neutrophils 91.0 %    % Lymphocytes 5.8 %    % Monocytes 2.6 %    % Eosinophils 0.3 %    % Basophils 0.2 %    % Immature Granulocytes 0.1 %    Absolute Neutrophil 10.0 (H) 1.6 - 8.3 10e9/L    Absolute Lymphocytes 0.6 (L) 0.8 - 5.3 10e9/L    Absolute Monocytes 0.3 0.0 - 1.3 10e9/L    Absolute Eosinophils 0.0 0.0 - 0.7 10e9/L    Absolute Basophils 0.0 0.0 - 0.2 10e9/L    Abs Immature Granulocytes 0.0 0 - 0.4 10e9/L   Comprehensive metabolic panel   Result Value Ref Range    Sodium 139 133 - 144 mmol/L    Potassium 5.4 (H) 3.4 - 5.3 mmol/L    Chloride 107 94 - 109 mmol/L    Carbon Dioxide 27 20 - 32 mmol/L    Anion Gap 5 3 - 14 mmol/L    Glucose 174 (H) 70 - 99 mg/dL    Urea Nitrogen 17 7 - 30 mg/dL    Creatinine 0.83 0.66 - 1.25 mg/dL    GFR Estimate >90  Non  GFR Calc   >60 mL/min/1.7m2    GFR Estimate If Black >90   GFR Calc   >60 mL/min/1.7m2    Calcium 8.8 8.5 - 10.1 mg/dL    Bilirubin Total 1.1 0.2 - 1.3 mg/dL    Albumin 3.9 3.4 - 5.0 g/dL    Protein Total 7.7 6.8 - 8.8 g/dL    Alkaline Phosphatase 60 40 - 150 U/L    ALT 18 0 - 70 U/L    AST 13 0 - 45 U/L   Lipase   Result Value Ref Range    Lipase 108 73 - 393 U/L    CT Abdomen Pelvis w Contrast    Narrative    CT ABDOMEN AND PELVIS WITH CONTRAST  3/28/2017 10:02 AM     HISTORY: Epigastric upper abdominal pain and discomfort.  Evaluate for  acute pancreatitis versus other acute intra-abdominal process  including internal hernia, obstruction versus mesenteric ischemia  versus other    TECHNIQUE:  CT abdomen and pelvis with 90 mL Isovue-370 IV. Radiation  dose for this scan was reduced using automated exposure control,  adjustment of the mA and/or kV according to patient size, or iterative  reconstruction technique.    COMPARISON: [facility] 7/22/2016.    FINDINGS: There are multiple fluid-dilated mid abdominal small bowel  loops. The proximal small bowel is nondilated. The most distal small  bowel is also decompressed. There is fluid distending the stomach.  There is some wall and fold thickening of the distal stomach. A few of  the small bowel folds at the jejunum also appear mildly thickened.  There is no free air or abscess. Moderate rectal distention with  stool. Normal appendix. Aortic calcifications. Liver, gallbladder,  adrenals, spleen, and kidneys show no acute abnormalities. A few small  cysts within the right kidney and liver are suggested. There is no  acute inflammatory change of the pancreas, or pancreatic fluid  collection identified. There is a pancreatic head calcification noted  on series 3 image 33. No biliary or pancreatic duct dilatation noted.  Enlarged prostate measuring 5.4 cm.      Impression    IMPRESSION:  1. Multiple mid abdominal fluid-dilated small bowel loops. Some fold  thickening of the distal stomach and a few proximal small bowel loops.  Findings may represent an enteritis and gastritis. A developing small  bowel obstruction cannot be entirely excluded, and close clinical  followup is recommended.  2. No other acute abnormality. No abscess or free air.  3. Enlarged prostate.       ED Vitals:  Vitals:    03/28/17 0845 03/28/17 0900 03/28/17 0930  03/28/17 1114   BP: (!) 183/99 138/85 (!) 181/102 (!) 174/112   Resp: 8 14 12    Temp:       TempSrc:       SpO2: 99%  99% 97%   Weight:         Assessments & Plan (with Medical Decision Making)   Clinical impression: pleasant 67-year-old male who presented with acute upper abdominal pain, nausea and vomiting.  History of hypertension lisinopril metoprolol, takes a baby aspirin also on Zocor.  Reports allergy to sulfa and Augmentin.  The Jimmy disease in 2016.  Wife with similar symptoms of nausea and vomiting.  No diarrhea no abdominal discomfort.  No foreign travel.  No fever no bloody stools.  On my exam he is appears uncomfortable but in no acute distress.  Mild tenderness in the upper abdomen no tenderness over the liver.  He appears bloated.  Hyperactive bowel sounds.  Afebrile hypertensive initially in triage.       ED course and Plan:   he was given IV fluids.  He was given IV Dilaudid for pain and discomfort.  He was also given IV Zofran.  Labs were obtained and CT imaging were obtained.  with location of his pain in the epigatrium an EKG was obtained which showed sinus bradycardia with non-specific T-wave changes which is unchanged from comparison EKG dated 11/7/2015. Normal arrival troponin was symptom onset at 11:30 PM.  Normal lipase normal liver enzymes. CT abdomen and pelvis today shows enteritis and gastritis with multiple dilated small bowel loops and thickened distal and proximal small bowel.  There is no free or abscess.  No obstruction or hernia.  See detailed report for radiologist interpretation above.  Patient was reevaluated after CT imaging IV antibiotics and IV Dilaudid.  He had some relief with his nausea after additional doses of Zofran and Phenergan.  Pain was modestly controlled.  He is discharged to home with symptoms related to gastritis of unclear cause.  for gi bleeding or perforation.  He requested vicodin for pain as he tolerated this when he had lyme's arthritis and lyme's  disease treated in 2016.  he preferred phenergan for nausea over zofran. Patient was hypertensive during his course of care and emergency department.  This is multifactorial in the context of pain and he also has an underlying history of hypertension did not take his lisinopril today.          Disclaimer: This note consists of symbols derived from keyboarding, dictation and/or voice recognition software. As a result, there may be errors in the script that have gone undetected. Please consider this when interpreting information found in this chart.  I have reviewed the nursing notes.    I have reviewed the findings, diagnosis, plan and need for follow up with the patient.    New Prescriptions    HYDROCODONE-ACETAMINOPHEN (NORCO) 5-325 MG PER TABLET    Take 1 tablet by mouth every 4 hours as needed    PROMETHAZINE (PHENERGAN) 25 MG TABLET    Take 0.5 tablets (12.5 mg) by mouth every 6 hours as needed for nausea       Final diagnoses:   Acute gastritis without hemorrhage, unspecified gastritis type - Inflammatory changes noted over the gastric and proximal small bowel on CT imaging       3/28/2017   [facility] EMERGENCY DEPARTMENT     Jose Lay MD  03/28/17 4170

## 2017-03-28 NOTE — ED AVS SNAPSHOT
[facility] Emergency Department    [address] [address]    Phone:  [phone]    Fax:  [phone]                                       Ambrocio Vázquez   MRN: [id number]    Department:  [facility] Emergency Department   Date of Visit:  3/28/2017           After Visit Summary Signature Page     I have received my discharge instructions, and my questions have been answered. I have discussed any challenges I see with this plan with the nurse or doctor.    ..........................................................................................................................................  Patient/Patient Representative Signature      ..........................................................................................................................................  Patient Representative Print Name and Relationship to Patient    ..................................................               ................................................  Date                                            Time    ..........................................................................................................................................  Reviewed by Signature/Title    ...................................................              ..............................................  Date                                                            Time

## 2017-03-28 NOTE — ED AVS SNAPSHOT
[facility] Emergency Department    [address] [facility] [address]    Phone:  [phone]    Fax:  [phone]                                       Ambrocio Vázquez   MRN: [id number]    Department:  [facility] Emergency Department   Date of Visit:  3/28/2017           Patient Information     Date Of Birth          1950        Your diagnoses for this visit were:     Acute gastritis without hemorrhage, unspecified gastritis type Inflammatory changes noted over the gastric and proximal small bowel on CT imaging       You were seen by Jose Lay MD.      Follow-up Information     Follow up with [facility] Emergency Department.    Specialty:  EMERGENCY MEDICINE    Why:  As needed, If symptoms worsen including persistent nausea and vomiting despite the use of Phenergan, bloody stools or fever or worsening of progressive abdominal pain despite use of Vicodin.     Contact information:    [address] [place] [id number] [phone]    Additional information:    The medical center is located at   [address] [place] [address]. (between [address]   [address] in [place], four miles north   of [place]).        Discharge Instructions         Gastritis (Adult)    Gastritis is inflammation and irritation of the stomach lining. It can be present for a short time (acute) or be long lasting (chronic). Gastritis is often caused by infection with bacteria called H pylori. More than a third of people in the  have this bacteria in their bodies. In many cases, H pylori causes no problems or symptoms. In some people, though, the infection irritates the stomach lining and causes gastritis. Other causes of stomach irritation include drinking alcohol or taking pain-relieving medicines called NSAIDs (such as aspirin or ibuprofen).   Symptoms of gastritis can include:    Abdominal pain or bloating    Loss of appetite    Nausea or vomiting    Vomiting blood or having black  stools    Feeling more tired than usual  An inflamed and irritated stomach lining is more likely to develop a sore called an ulcer. To help prevent this, gastritis should be treated.  Home care  If needed, medicines may be prescribed. If you have H pylori infection, treating it will likely relieve your symptoms. Other changes can help reduce stomach irritation and help it heal.    If you have been prescribed medicines for H pylori infection, take them as directed. Take all of the medicine until it is finished or your healthcare provider tells you to stop, even if you feel better.    Your healthcare provider may recommend avoiding NSAIDs. If you take daily aspirin for your heart or other medical reasons, do not stop without talking to your healthcare provider first.    Avoid drinking alcohol.    Stop smoking. Smoking can irritate the stomach and delay healing. As much as possible, stay away from second hand smoke.  Follow-up care  Follow up with your healthcare provider, or as advised by our staff. Testing may be needed to check for inflammation or an ulcer.  When to seek medical advice  Call your healthcare provider for any of the following:    Stomach pain that gets worse or moves to the lower right abdomen (appendix area)    Chest pain that appears or gets worse, or spreads to the back, neck, shoulder, or arm    Frequent vomiting (can t keep down liquids)    Blood in the stool or vomit (red or black in color)    Feeling weak or dizzy    Fever of 100.4 F (38 C) or higher, or as directed by your healthcare provider    8949-2651 [employer]. [address] [place] [address], [place] [address]. All rights reserved. This information is not intended as a substitute for professional medical care. Always follow your healthcare professional's instructions.          Understanding Gastritis  Gastritis is a painful inflammation of the stomach lining. It has a number of causes. Gastritis and its symptoms can be relieved  with treatment. Work with your doctor to find ways to treat your symptoms.    When you have gastritis  Acids may damage the stomach lining when the built-in defenses of the stomach don t function as they should. The stomach lining can then become inflamed. When this occurs, it is called gastritis.  Causes of gastritis  Gastritis has many causes. They may include:    Aspirin and anti-inflammatory medications    Tobacco use    Alcohol use    Helicobacter pylori (H. pylori) bacteria    Trauma from injuries, burns, or major surgery    Critical illness or autoimmune disorders     An irritated or inflamed stomach lining        A healthy stomach lining    Common symptoms  With gastritis, you may notice one or more of the following:    A burning feeling in your upper abdomen    Pain that occurs after eating certain foods    Gas or a bloated feeling in your stomach    Frequent belching    Nausea with or without vomiting    Loss of appetite    Fatigue    7684-9877 [employer]. [address] [place] [address]. All rights reserved. This information is not intended as a substitute for professional medical care. Always follow your healthcare professional's instructions.          Discharge References/Attachments     HYDROCODONE BITARTRATE, ACETAMINOPHEN ORAL TABLET (ENGLISH)    PROMETHAZINE HYDROCHLORIDE ORAL TABLET (ENGLISH)      24 Hour Appointment Hotline       To make an appointment at any [facility], call [phone] ([phone]). If you don't have a family doctor or clinic, we will help you find one. [facility] are conveniently located to serve the needs of you and your family.             Review of your medicines      START taking        Dose / Directions Last dose taken    HYDROcodone-acetaminophen 5-325 MG per tablet   Commonly known as:  NORCO   Dose:  1 tablet   Quantity:  7 tablet        Take 1 tablet by mouth every 4 hours as needed   Refills:  0        promethazine 25 MG  tablet   Commonly known as:  PHENERGAN   Dose:  12.5 mg   Quantity:  7 tablet        Take 0.5 tablets (12.5 mg) by mouth every 6 hours as needed for nausea   Refills:  1          Our records show that you are taking the medicines listed below. If these are incorrect, please call your family doctor or clinic.        Dose / Directions Last dose taken    ALEVE 220 MG capsule   Dose:  220 mg   Generic drug:  naproxen sodium        Take 220 mg by mouth as needed.   Refills:  0        aspirin 81 MG tablet   Quantity:           ONE DAILY   Refills:  0        finasteride 5 MG tablet   Commonly known as:  PROSCAR   Dose:  5 mg   Quantity:  90 tablet        Take 1 tablet (5 mg) by mouth daily Patient said he takes one tablet daily   Refills:  3        fish oil-omega-3 fatty acids 1000 MG capsule   Dose:  1 g        Take 1 g by mouth 2 times daily   Refills:  0        levOCARNitine 330 MG tablet   Commonly known as:  CARNITOR   Dose:  330 mg   Quantity:  90 tablet        Take 1 tablet (330 mg) by mouth daily   Refills:  3        lisinopril 30 MG tablet   Commonly known as:  PRINIVIL,ZESTRIL   Dose:  30 mg   Quantity:  90 tablet        Take 1 tablet (30 mg) by mouth daily   Refills:  3        metoprolol 25 MG 24 hr tablet   Commonly known as:  TOPROL-XL   Dose:  12.5 mg   Quantity:  45 tablet        Take 0.5 tablets (12.5 mg) by mouth daily   Refills:  3        nitroglycerin 0.4 MG sublingual tablet   Commonly known as:  NITROSTAT   Dose:  0.4 mg   Quantity:  25 tablet        Place 1 tablet (0.4 mg) under the tongue See Admin Instructions for chest pain   Refills:  3        sildenafil 20 MG tablet   Commonly known as:  REVATIO/VIAGRA   Dose:   mg   Quantity:  50 tablet        Take 2-5 tablets ( mg) by mouth as needed for erectile dysfunction.  Never use with nitroglycerin, terazosin or doxazosin.   Refills:  3        simvastatin 40 MG tablet   Commonly known as:  ZOCOR   Dose:  40 mg   Quantity:  90 tablet         Take 1 tablet (40 mg) by mouth At Bedtime   Refills:  3                Prescriptions were sent or printed at these locations (2 Prescriptions)                   [facility] [place], [place] - [address] [facility]   [address] [facility] [address]    Telephone:  [phone]   Fax:  [phone]   Hours:                  E-Prescribed (1 of 2)         promethazine (PHENERGAN) 25 MG tablet                 Printed at Department/Unit printer (1 of 2)         HYDROcodone-acetaminophen (NORCO) 5-325 MG per tablet                Procedures and tests performed during your visit     CBC with platelets, differential    CT Abdomen Pelvis w Contrast    Comprehensive metabolic panel    EKG 12 lead    Lipase    Troponin I      Orders Needing Specimen Collection     None      Pending Results     Date and Time Order Name Status Description    3/28/2017 0814 CT Abdomen Pelvis w Contrast Preliminary             Pending Culture Results     No orders found from 3/26/2017 to 3/29/2017.             Test Results from your hospital stay     3/28/2017  9:05 AM - Interface, Flexilab Results      Component Results     Component Value Ref Range & Units Status    Troponin I ES  0.000 - 0.045 ug/L Final    <0.015  The 99th percentile for upper reference range is 0.045 ug/L.  Troponin values in   the range of 0.045 - 0.120 ug/L may be associated with risks of adverse   clinical events.           3/28/2017  8:40 AM - Interface, Flexilab Results      Component Results     Component Value Ref Range & Units Status    WBC 11.0 4.0 - 11.0 10e9/L Final    RBC Count 5.64 4.4 - 5.9 10e12/L Final    Hemoglobin 17.7 13.3 - 17.7 g/dL Final    Hematocrit 52.0 40.0 - 53.0 % Final    MCV 92 78 - 100 fl Final    MCH 31.4 26.5 - 33.0 pg Final    MCHC 34.0 31.5 - 36.5 g/dL Final    RDW 12.4 10.0 - 15.0 % Final    Platelet Count 183 150 - 450 10e9/L Final    Diff Method Automated Method  Final    % Neutrophils 91.0 % Final    % Lymphocytes 5.8 % Final     % Monocytes 2.6 % Final    % Eosinophils 0.3 % Final    % Basophils 0.2 % Final    % Immature Granulocytes 0.1 % Final    Absolute Neutrophil 10.0 (H) 1.6 - 8.3 10e9/L Final    Absolute Lymphocytes 0.6 (L) 0.8 - 5.3 10e9/L Final    Absolute Monocytes 0.3 0.0 - 1.3 10e9/L Final    Absolute Eosinophils 0.0 0.0 - 0.7 10e9/L Final    Absolute Basophils 0.0 0.0 - 0.2 10e9/L Final    Abs Immature Granulocytes 0.0 0 - 0.4 10e9/L Final         3/28/2017  9:05 AM - Interface, Flexilab Results      Component Results     Component Value Ref Range & Units Status    Sodium 139 133 - 144 mmol/L Final    Potassium 5.4 (H) 3.4 - 5.3 mmol/L Final    Specimen slightly hemolyzed, potassium may be falsely elevated    Chloride 107 94 - 109 mmol/L Final    Carbon Dioxide 27 20 - 32 mmol/L Final    Anion Gap 5 3 - 14 mmol/L Final    Glucose 174 (H) 70 - 99 mg/dL Final    Urea Nitrogen 17 7 - 30 mg/dL Final    Creatinine 0.83 0.66 - 1.25 mg/dL Final    GFR Estimate >90  Non  GFR Calc   >60 mL/min/1.7m2 Final    GFR Estimate If Black >90   GFR Calc   >60 mL/min/1.7m2 Final    Calcium 8.8 8.5 - 10.1 mg/dL Final    Bilirubin Total 1.1 0.2 - 1.3 mg/dL Final    Albumin 3.9 3.4 - 5.0 g/dL Final    Protein Total 7.7 6.8 - 8.8 g/dL Final    Alkaline Phosphatase 60 40 - 150 U/L Final    ALT 18 0 - 70 U/L Final    AST 13 0 - 45 U/L Final         3/28/2017  9:05 AM - Interface, Flexilab Results      Component Results     Component Value Ref Range & Units Status    Lipase 108 73 - 393 U/L Final         3/28/2017 10:14 AM - Interface, Radiant Ib      Narrative     CT ABDOMEN AND PELVIS WITH CONTRAST  3/28/2017 10:02 AM     HISTORY: Epigastric upper abdominal pain and discomfort.  Evaluate for  acute pancreatitis versus other acute intra-abdominal process  including internal hernia, obstruction versus mesenteric ischemia  versus other    TECHNIQUE:  CT abdomen and pelvis with 90 mL Isovue-370 IV. Radiation  dose for  this scan was reduced using automated exposure control,  adjustment of the mA and/or kV according to patient size, or iterative  reconstruction technique.    COMPARISON: [facility] 7/22/2016.    FINDINGS: There are multiple fluid-dilated mid abdominal small bowel  loops. The proximal small bowel is nondilated. The most distal small  bowel is also decompressed. There is fluid distending the stomach.  There is some wall and fold thickening of the distal stomach. A few of  the small bowel folds at the jejunum also appear mildly thickened.  There is no free air or abscess. Moderate rectal distention with  stool. Normal appendix. Aortic calcifications. Liver, gallbladder,  adrenals, spleen, and kidneys show no acute abnormalities. A few small  cysts within the right kidney and liver are suggested. There is no  acute inflammatory change of the pancreas, or pancreatic fluid  collection identified. There is a pancreatic head calcification noted  on series 3 image 33. No biliary or pancreatic duct dilatation noted.  Enlarged prostate measuring 5.4 cm.        Impression     IMPRESSION:  1. Multiple mid abdominal fluid-dilated small bowel loops. Some fold  thickening of the distal stomach and a few proximal small bowel loops.  Findings may represent an enteritis and gastritis. A developing small  bowel obstruction cannot be entirely excluded, and close clinical  followup is recommended.  2. No other acute abnormality. No abscess or free air.  3. Enlarged prostate.                Thank you for choosing [facility]       Thank you for choosing [facility] for your care. Our goal is always to provide you with excellent care. Hearing back from our patients is one way we can continue to improve our services. Please take a few minutes to complete the written survey that you may receive in the mail after you visit with us. Thank you!        [employer] Information     [employer] gives you secure access to your electronic health record. If you see a  primary care provider, you can also send messages to your care team and make appointments. If you have questions, please call your primary care clinic.  If you do not have a primary care provider, please call [phone] and they will assist you.        Care EveryWhere ID     This is your Care EveryWhere ID. This could be used by other organizations to access your [facility] medical records  [id number]        After Visit Summary       This is your record. Keep this with you and show to your community pharmacist(s) and doctor(s) at your next visit.

## 2017-05-22 DIAGNOSIS — H92.12 OTORRHEA, LEFT: ICD-10-CM

## 2017-05-24 RX ORDER — OFLOXACIN 3 MG/ML
SOLUTION AURICULAR (OTIC)
Qty: 5 ML | Refills: 0 | Status: SHIPPED | OUTPATIENT
Start: 2017-05-24 | End: 2017-11-14

## 2017-06-07 ENCOUNTER — RADIANT APPOINTMENT (OUTPATIENT)
Dept: ULTRASOUND IMAGING | Facility: CLINIC | Age: 67
End: 2017-06-07
Attending: FAMILY MEDICINE
Payer: COMMERCIAL

## 2017-06-07 ENCOUNTER — OFFICE VISIT (OUTPATIENT)
Dept: OPTOMETRY | Facility: CLINIC | Age: 67
End: 2017-06-07
Payer: COMMERCIAL

## 2017-06-07 DIAGNOSIS — H52.11 MYOPIA OF RIGHT EYE: ICD-10-CM

## 2017-06-07 DIAGNOSIS — Z87.891 FORMER SMOKER: ICD-10-CM

## 2017-06-07 DIAGNOSIS — H52.4 PRESBYOPIA: Primary | ICD-10-CM

## 2017-06-07 DIAGNOSIS — H26.9 CATARACTS, BOTH EYES: ICD-10-CM

## 2017-06-07 DIAGNOSIS — H52.02 HYPEROPIA OF LEFT EYE WITH ASTIGMATISM: ICD-10-CM

## 2017-06-07 DIAGNOSIS — H52.202 HYPEROPIA OF LEFT EYE WITH ASTIGMATISM: ICD-10-CM

## 2017-06-07 PROCEDURE — 76775 US EXAM ABDO BACK WALL LIM: CPT

## 2017-06-07 PROCEDURE — 92015 DETERMINE REFRACTIVE STATE: CPT | Performed by: OPTOMETRIST

## 2017-06-07 PROCEDURE — 92004 COMPRE OPH EXAM NEW PT 1/>: CPT | Performed by: OPTOMETRIST

## 2017-06-07 ASSESSMENT — REFRACTION_WEARINGRX
OS_CYLINDER: +1.00
OS_ADD: +2.50
OD_SPHERE: -0.25
SPECS_TYPE: PAL
OD_CYLINDER: SPHERE
OS_AXIS: 145
OS_SPHERE: -0.50
OD_ADD: +2.50

## 2017-06-07 ASSESSMENT — VISUAL ACUITY
OD_SC: 20/20
OD_CC+: -1
OS_SC: 20/120
OS_SC: 20/20
OD_CC: 20/20
CORRECTION_TYPE: GLASSES
OS_CC: 20/20
METHOD: SNELLEN - LINEAR
OS_CC: 20/30
OS_CC+: -1
OD_CC: 20/30 -1
OD_SC: 20/80 -1

## 2017-06-07 ASSESSMENT — TONOMETRY
OS_IOP_MMHG: 10
IOP_METHOD: APPLANATION
OD_IOP_MMHG: 9

## 2017-06-07 ASSESSMENT — REFRACTION_MANIFEST
OS_AXIS: 165
OS_ADD: +2.50
OD_CYLINDER: SPHERE
OD_SPHERE: -0.25
OS_CYLINDER: +0.75
OD_ADD: +2.50
OS_SPHERE: -0.50

## 2017-06-07 ASSESSMENT — CONF VISUAL FIELD
OD_NORMAL: 1
OS_NORMAL: 1

## 2017-06-07 ASSESSMENT — CUP TO DISC RATIO
OS_RATIO: 0.3
OD_RATIO: 0.4

## 2017-06-07 ASSESSMENT — EXTERNAL EXAM - RIGHT EYE: OD_EXAM: NORMAL

## 2017-06-07 ASSESSMENT — SLIT LAMP EXAM - LIDS
COMMENTS: NORMAL
COMMENTS: NORMAL

## 2017-06-07 ASSESSMENT — EXTERNAL EXAM - LEFT EYE: OS_EXAM: NORMAL

## 2017-06-07 NOTE — PATIENT INSTRUCTIONS
A final glasses prescription was given.  The prescription change is very mild, no change necessary.  Monitor cataracts by having yearly exams.  Wear sunglasses when outside.  Return to clinic 1 year for Comprehensive Vision Exam      Divya Friedman O.D  [address] [place]  [address]    [phone]

## 2017-06-07 NOTE — PROGRESS NOTES
Chief Complaint   Patient presents with     COMPREHENSIVE EYE EXAM      Accompanied by self   Last Eye Exam: 9 months ago/ ok per insurance  Dilated Previously: Yes    What are you currently using to see?  glasses       Distance Vision Acuity: Satisfied with vision    Near Vision Acuity: Not satisfied     Eye Comfort: good  Do you use eye drops? : No  Occupation or Hobbies: engineer Joelle Sandhu, Optometric Tech          Medical, surgical and family histories reviewed and updated 6/7/2017.       OBJECTIVE: See Ophthalmology exam    ASSESSMENT:    ICD-10-CM    1. Presbyopia H52.4 EYE EXAM (SIMPLE-NONBILLABLE)     REFRACTION   2. Myopia of right eye H52.11 EYE EXAM (SIMPLE-NONBILLABLE)     REFRACTION   3. Hyperopia of left eye with astigmatism H52.02 EYE EXAM (SIMPLE-NONBILLABLE)    H52.202 REFRACTION   4. Cataracts, both eyes H26.9 EYE EXAM (SIMPLE-NONBILLABLE)    Mild      PLAN:   A final glasses prescription was given.  The prescription change is very mild, no change necessary.  Monitor cataracts by having yearly exams.  Wear sunglasses when outside.  Return to clinic 1 year for Comprehensive Vision Exam      Divya Friedman O.D  [address]  [place]  [address]    [phone]

## 2017-06-22 DIAGNOSIS — N52.01 ERECTILE DYSFUNCTION DUE TO ARTERIAL INSUFFICIENCY: ICD-10-CM

## 2017-06-22 RX ORDER — SILDENAFIL CITRATE 20 MG/1
40-100 TABLET ORAL PRN
Qty: 50 TABLET | Refills: 3 | Status: CANCELLED | OUTPATIENT
Start: 2017-06-22

## 2017-06-22 RX ORDER — SILDENAFIL 100 MG/1
100 TABLET, FILM COATED ORAL DAILY PRN
Qty: 10 TABLET | Refills: 3 | Status: SHIPPED | OUTPATIENT
Start: 2017-06-22 | End: 2019-01-29

## 2017-11-14 ENCOUNTER — OFFICE VISIT (OUTPATIENT)
Dept: FAMILY MEDICINE | Facility: CLINIC | Age: 67
End: 2017-11-14
Payer: COMMERCIAL

## 2017-11-14 VITALS
BODY MASS INDEX: 22.78 KG/M2 | HEART RATE: 60 BPM | WEIGHT: 162.7 LBS | TEMPERATURE: 98.1 F | HEIGHT: 71 IN | DIASTOLIC BLOOD PRESSURE: 103 MMHG | SYSTOLIC BLOOD PRESSURE: 159 MMHG

## 2017-11-14 DIAGNOSIS — M54.31 SCIATICA OF RIGHT SIDE: ICD-10-CM

## 2017-11-14 DIAGNOSIS — I10 ESSENTIAL HYPERTENSION: ICD-10-CM

## 2017-11-14 DIAGNOSIS — Z00.01 ENCOUNTER FOR ROUTINE ADULT MEDICAL EXAM WITH ABNORMAL FINDINGS: Primary | ICD-10-CM

## 2017-11-14 DIAGNOSIS — I25.10 CORONARY ARTERIOSCLEROSIS: ICD-10-CM

## 2017-11-14 DIAGNOSIS — G56.03 BILATERAL CARPAL TUNNEL SYNDROME: ICD-10-CM

## 2017-11-14 DIAGNOSIS — I10 ESSENTIAL HYPERTENSION, BENIGN: ICD-10-CM

## 2017-11-14 DIAGNOSIS — I25.10 CORONARY ARTERY DISEASE INVOLVING NATIVE HEART WITHOUT ANGINA PECTORIS, UNSPECIFIED VESSEL OR LESION TYPE: ICD-10-CM

## 2017-11-14 DIAGNOSIS — M25.551 HIP PAIN, RIGHT: ICD-10-CM

## 2017-11-14 DIAGNOSIS — E78.49 OTHER HYPERLIPIDEMIA: ICD-10-CM

## 2017-11-14 PROCEDURE — 99397 PER PM REEVAL EST PAT 65+ YR: CPT | Performed by: FAMILY MEDICINE

## 2017-11-14 PROCEDURE — 99213 OFFICE O/P EST LOW 20 MIN: CPT | Mod: 25 | Performed by: FAMILY MEDICINE

## 2017-11-14 RX ORDER — METOPROLOL SUCCINATE 25 MG/1
25 TABLET, EXTENDED RELEASE ORAL DAILY
Qty: 90 TABLET | Refills: 1 | Status: SHIPPED | OUTPATIENT
Start: 2017-11-14 | End: 2018-03-14

## 2017-11-14 RX ORDER — SIMVASTATIN 40 MG
40 TABLET ORAL AT BEDTIME
Qty: 90 TABLET | Refills: 3 | Status: SHIPPED | OUTPATIENT
Start: 2017-11-14 | End: 2018-07-26

## 2017-11-14 RX ORDER — LISINOPRIL 40 MG/1
40 TABLET ORAL DAILY
Qty: 90 TABLET | Refills: 1 | Status: SHIPPED | OUTPATIENT
Start: 2017-11-14 | End: 2018-03-14
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
## 2017-11-14 NOTE — NURSING NOTE
"Chief Complaint   Patient presents with     Physical       Initial BP (!) 159/103 (BP Location: Right arm, Patient Position: Sitting, Cuff Size: Adult Large)  Pulse 60  Temp 98.1  F (36.7  C) (Oral)  Ht 5' 10.75\" (1.797 m)  Wt 162 lb 11.2 oz (73.8 kg)  BMI 22.85 kg/m2 Estimated body mass index is 22.85 kg/(m^2) as calculated from the following:    Height as of this encounter: 5' 10.75\" (1.797 m).    Weight as of this encounter: 162 lb 11.2 oz (73.8 kg).  Medication Reconciliation: complete   Kristin Damian LPN    "

## 2017-11-14 NOTE — MR AVS SNAPSHOT
After Visit Summary   11/14/2017    Ambrocio Vázquez    MRN: [id number]           Patient Information     Date Of Birth          1950        Visit Information        Provider Department      11/14/2017 2:00 PM Joelle Randle MD [facility]        Today's Diagnoses     Encounter for routine adult medical exam with abnormal findings    -  1    Hip pain, right        Sciatica of right side        Other hyperlipidemia        Coronary arteriosclerosis        Essential hypertension, benign        Coronary artery disease involving native heart without angina pectoris, unspecified vessel or lesion type        Essential hypertension        Bilateral carpal tunnel syndrome          Care Instructions      Preventive Health Recommendations:   Male Ages 65 and over    Yearly exam:             See your health care provider every year in order to  o   Review health changes.   o   Discuss preventive care.    o   Review your medicines if your doctor has prescribed any.    Talk with your health care provider about whether you should have a test to screen for prostate cancer (PSA).    Every 3 years, have a diabetes test (fasting glucose). If you are at risk for diabetes, you should have this test more often.    Every 5 years, have a cholesterol test. Have this test more often if you are at risk for high cholesterol or heart disease.     Every 10 years, have a colonoscopy. Or, have a yearly FIT test (stool test). These exams will check for colon cancer.    Talk to with your health care provider about screening for Abdominal Aortic Aneurysm if you have a family history of AAA or have a history of smoking.    Shots:     Get a flu shot each year.     Get a tetanus shot every 10 years.     Talk to your doctor about your pneumonia vaccines. There are now two you should receive - Pneumovax (PPSV 23) and Prevnar (PCV 13).     Talk to your doctor about a shingles vaccine.     Talk to your doctor about the  hepatitis B vaccine.  Nutrition:     Eat at least 5 servings of fruits and vegetables each day.     Eat whole-grain bread, whole-wheat pasta and brown rice instead of white grains and rice.     Talk to your provider about Calcium and Vitamin D.   Lifestyle    Exercise for at least 150 minutes a week (30 minutes a day, 5 days a week). This will help you control your weight and prevent disease.     Limit alcohol to one drink per day.     No smoking.     Wear sunscreen to prevent skin cancer.     See your dentist every six months for an exam and cleaning.     See your eye doctor every 1 to 2 years to screen for conditions such as glaucoma, macular degeneration, cataracts, etc           Follow-ups after your visit        Who to contact     Normal or non-critical lab and imaging results will be communicated to you by [employer], letter or phone within 4 business days after the clinic has received the results. If you do not hear from us within 7 days, please contact the clinic through [employer] or phone. If you have a critical or abnormal lab result, we will notify you by phone as soon as possible.  Submit refill requests through [employer] or call your pharmacy and they will forward the refill request to us. Please allow 3 business days for your refill to be completed.          If you need to speak with a  for additional information , please call: [phone]             Additional Information About Your Visit        [employer] Information     [employer] gives you secure access to your electronic health record. If you see a primary care provider, you can also send messages to your care team and make appointments. If you have questions, please call your primary care clinic.  If you do not have a primary care provider, please call [phone] and they will assist you.        Care EveryWhere ID     This is your Care EveryWhere ID. This could be used by other organizations to access your [facility]  "records  [id number]        Your Vitals Were     Pulse Temperature Height BMI (Body Mass Index)          60 98.1  F (36.7  C) (Oral) 5' 10.75\" (1.797 m) 22.85 kg/m2         Blood Pressure from Last 3 Encounters:   11/14/17 (!) 159/103   03/28/17 (!) 174/108   02/27/17 124/79    Weight from Last 3 Encounters:   11/14/17 162 lb 11.2 oz (73.8 kg)   03/28/17 185 lb (83.9 kg)   02/09/17 170 lb 11.2 oz (77.4 kg)              We Performed the Following     Basic metabolic panel          Today's Medication Changes          These changes are accurate as of: 11/14/17  3:13 PM.  If you have any questions, ask your nurse or doctor.               These medicines have changed or have updated prescriptions.        Dose/Directions    * lisinopril 30 MG tablet   Commonly known as:  PRINIVIL,ZESTRIL   This may have changed:  Another medication with the same name was added. Make sure you understand how and when to take each.   Used for:  Essential hypertension   Changed by:  Joelle Randle MD        Dose:  30 mg   Take 1 tablet (30 mg) by mouth daily   Quantity:  90 tablet   Refills:  3       * lisinopril 40 MG tablet   Commonly known as:  PRINIVIL/ZESTRIL   This may have changed:  You were already taking a medication with the same name, and this prescription was added. Make sure you understand how and when to take each.   Used for:  Essential hypertension, benign   Changed by:  Joelle Randle MD        Dose:  40 mg   Take 1 tablet (40 mg) by mouth daily   Quantity:  90 tablet   Refills:  1       metoprolol 25 MG 24 hr tablet   Commonly known as:  TOPROL-XL   This may have changed:  how much to take   Used for:  Coronary artery disease involving native heart without angina pectoris, unspecified vessel or lesion type, Essential hypertension   Changed by:  Joelle Randle MD        Dose:  25 mg   Take 1 tablet (25 mg) by mouth daily   Quantity:  90 tablet   Refills:  1       * Notice:  This list has 2 medication(s) " that are the same as other medications prescribed for you. Read the directions carefully, and ask your doctor or other care provider to review them with you.      Stop taking these medicines if you haven't already. Please contact your care team if you have questions.     HYDROcodone-acetaminophen 5-325 MG per tablet   Commonly known as:  NORCO   Stopped by:  Joelle Randle MD           ofloxacin 0.3 % otic solution   Commonly known as:  FLOXIN   Stopped by:  Joelle Randle MD           promethazine 25 MG tablet   Commonly known as:  PHENERGAN   Stopped by:  Joelle Randle MD                Where to get your medicines      These medications were sent to [facility] - [place], [place] - [address]  [address] [facility] [address]    Hours:  Mon-Fri 8:30am-5:00pm Toll Free [phone] Phone:  [phone]     lisinopril 40 MG tablet    metoprolol 25 MG 24 hr tablet    simvastatin 40 MG tablet                Primary Care Provider Office Phone # Fax #    Joelle Randle [phone] [address] [facility] [address]        Equal Access to Services     [facility] AH: Hadii aad ku hadasho Soomaali, waaxda luqadaha, qaybta kaalmada adeegyada, waxay idiin haykarln adegary kharaqueta argueta ah. So [facility] [phone].    ATENCIÓN: Si habla español, tiene a armenta disposición servicios gratuitos de asistencia lingüística. Cl al [phone].    We comply with applicable federal civil rights laws and [place] laws. We do not discriminate on the basis of race, color, national origin, age, disability, sex, sexual orientation, or gender identity.            Thank you!     Thank you for choosing [facility]  for your care. Our goal is always to provide you with excellent care. Hearing back from our patients is one way we can continue to improve our services. Please take a few minutes to complete the written survey that you may  receive in the mail after your visit with us. Thank you!             Your Updated Medication List - Protect others around you: Learn how to safely use, store and throw away your medicines at www.disposemymeds.org.          This list is accurate as of: 11/14/17  3:13 PM.  Always use your most recent med list.                   Brand Name Dispense Instructions for use Diagnosis    ALEVE 220 MG capsule   Generic drug:  naproxen sodium      Take 220 mg by mouth as needed.        aspirin 81 MG tablet          ONE DAILY        finasteride 5 MG tablet    PROSCAR    90 tablet    Take 1 tablet (5 mg) by mouth daily Patient said he takes one tablet daily    Disorder of prostate, unspecified       fish oil-omega-3 fatty acids 1000 MG capsule      Take 1 g by mouth 2 times daily        levOCARNitine 330 MG tablet    CARNITOR    90 tablet    Take 1 tablet (330 mg) by mouth daily    Primary carnitine deficiency (H)       * lisinopril 30 MG tablet    PRINIVIL,ZESTRIL    90 tablet    Take 1 tablet (30 mg) by mouth daily    Essential hypertension       * lisinopril 40 MG tablet    PRINIVIL/ZESTRIL    90 tablet    Take 1 tablet (40 mg) by mouth daily    Essential hypertension, benign       metoprolol 25 MG 24 hr tablet    TOPROL-XL    90 tablet    Take 1 tablet (25 mg) by mouth daily    Coronary artery disease involving native heart without angina pectoris, unspecified vessel or lesion type, Essential hypertension       nitroGLYcerin 0.4 MG sublingual tablet    NITROSTAT    25 tablet    Place 1 tablet (0.4 mg) under the tongue See Admin Instructions for chest pain    Coronary artery disease involving native heart without angina pectoris, unspecified vessel or lesion type       sildenafil 100 MG tablet    VIAGRA    10 tablet    Take 1 tablet (100 mg) by mouth daily as needed for erectile dysfunction Take 30 min to 4 hours before intercourse.  Never use with nitroglycerin, terazosin or doxazosin.    Erectile dysfunction due to arterial  insufficiency       sildenafil 20 MG tablet    REVATIO    50 tablet    Take 2-5 tablets ( mg) by mouth as needed for erectile dysfunction.  Never use with nitroglycerin, terazosin or doxazosin.    Erectile dysfunction due to arterial insufficiency       simvastatin 40 MG tablet    ZOCOR    90 tablet    Take 1 tablet (40 mg) by mouth At Bedtime    Other hyperlipidemia, Coronary arteriosclerosis       * Notice:  This list has 2 medication(s) that are the same as other medications prescribed for you. Read the directions carefully, and ask your doctor or other care provider to review them with you.

## 2017-12-07 ENCOUNTER — TELEPHONE (OUTPATIENT)
Dept: FAMILY MEDICINE | Facility: CLINIC | Age: 67
End: 2017-12-07

## 2017-12-07 DIAGNOSIS — M54.30 SCIATICA, UNSPECIFIED LATERALITY: Primary | ICD-10-CM

## 2017-12-07 NOTE — TELEPHONE ENCOUNTER
Reason for call:  Patient reporting a symptom    Symptom or request: Ambrocio was recently seen and he has considered spine surgeon appointment and thinks he does need to do something.  He stating that his sciatica is really acting up also and it's difficult for him to stand, walk or even sleep.  He states that he is not able to go back to previous surgeon, however he is out of network now and so that is not an option.  He would need to see someone else.  What would you recommend for him?  Please review and advise.  Thank you..Jessica Mckeon    Duration (how long have symptoms been present): Shortly after appointment on 11/14/17.      Have you been treated for this before? No    Additional comments: none    Phone Number patient can be reached at:  Home number on file [phone] (home)    Best Time:  Any time    Can we leave a detailed message on this number:  YES    Call taken on 12/7/2017 at 3:08 PM by Jessica Mckeon

## 2017-12-08 NOTE — TELEPHONE ENCOUNTER
Left message with wife to call the clinic RN.  Karen Salas RN    Need to give patient phone number for the surgeon:  All areas ~ [phone]

## 2017-12-08 NOTE — TELEPHONE ENCOUNTER
Patient given phone number to ortho. He will call them. He would like to schedule an appointment and peak to a surgeon. He would like a referral asap to see if he can still get in before the end of the year. Please advise. Thank you.  Karen Salas RN

## 2017-12-08 NOTE — TELEPHONE ENCOUNTER
I have placed a referral for him to a medical spine specialist.   This may not be a surgeon, however. Would he like a surgeon ?     NEDA Randle MD

## 2017-12-12 ENCOUNTER — OFFICE VISIT (OUTPATIENT)
Dept: NEUROSURGERY | Facility: CLINIC | Age: 67
End: 2017-12-12
Payer: COMMERCIAL

## 2017-12-12 VITALS
HEART RATE: 55 BPM | TEMPERATURE: 97.3 F | DIASTOLIC BLOOD PRESSURE: 82 MMHG | RESPIRATION RATE: 16 BRPM | HEIGHT: 71 IN | BODY MASS INDEX: 23.8 KG/M2 | SYSTOLIC BLOOD PRESSURE: 141 MMHG | WEIGHT: 170 LBS | OXYGEN SATURATION: 98 %

## 2017-12-12 DIAGNOSIS — M54.16 LUMBAR RADICULOPATHY: Primary | ICD-10-CM

## 2017-12-12 PROCEDURE — 99204 OFFICE O/P NEW MOD 45 MIN: CPT | Performed by: NURSE PRACTITIONER

## 2017-12-12 ASSESSMENT — PAIN SCALES - GENERAL: PAINLEVEL: SEVERE PAIN (6)

## 2017-12-12 NOTE — PROGRESS NOTES
"Dr. Gabino Velez  [facility]  Neurosurgery Clinic Visit      CC: Right leg pain    Primary care Provider: Joelle Randle      Reason For Visit:   I was asked by Dr. Randle to consult on the patient for right sided sciatica.      HPI: Ambrocio Vázquez is a 67 year old male with right lower extremity pain that has becoming increasingly more bothersome over the past several months.  His history is significant for previous back surgery, which he states was an L4-5 decompression in 2001 with Dr. Villegas at [facility].  He did well following his lumbar surgery and describes similar pain these past few months that he experienced prior to his surgery.  He also has noticed weakness to the right foot, \"It tends to flap when I walk.\"  He states this has been present for 4+ weeks.  He denies falls.  He denies changes to bowel or bladder. He describes an intermittent pain to the mid-lower back, \"But my main issue is my right leg.\"  He states he has \"nerve pain\" along the right shin and lateral calf that starts below the knee and radiates into the foot.  He describes it as, \"A brutal pain.\" The pain increases with activities such as prolonged walking.  He finds relief while bending forward and leaning on an object.  When asked about pain in the thigh or upper leg, \"That isn't from my back, I have hip issues.\"  He states he has pain in the right hip, notably when getting up in the morning, and it shoots into the right anterior thigh.  He denies pain to the LLE.  He has not had recent PT or injections.    Pain right now: 6    Past Medical History:   Diagnosis Date     BPH (benign prostatic hyperplasia)      Cardiomyopathy (H)      Carnitine deficiency (H)      Colon polyp     tubular adenoma     Coronary artery disease     ischemic heart dz.LMA-nl,   stenting of Lad, diag small 80%, Lcx30%, RCA50%     ED (erectile dysfunction)      H/O angina pectoris      Hyperlipidaemia      Hypertension      Impaired " fasting glucose      Intervertebral lumbar disc disorder with myelopathy, lumbar region (aka DISK)     lumbar disk disease     PFO (patent foramen ovale)      Pneumonia     possibly as child, pt unsure     Sciatica        Past Medical History reviewed with patient during visit.    Past Surgical History:   Procedure Laterality Date     C LAMINECTOMY,>2 SGMT,LUMBAR  2001     COLONOSCOPY       COLONOSCOPY  5/3/2013    Procedure: COLONOSCOPY;  Colonoscopy;  Surgeon: Genaro Krishnan MD;  Location: [facility]     CORONARY ANGIOGRAPHY ADULT ORDER  6/7/2014    No restenosis     HEART CATH, ANGIOPLASTY  5/2010    complex  angioplasty/thrombectomy SHAZIA stenting proximal and ostial LAD;LMA-nl,   stenting of Lad, diag small 80%, Lcx30%, RCA50%     HERNIA REPAIR      [facility]     HERNIORRHAPHY INGUINAL  1/11/2013    Procedure: HERNIORRHAPHY INGUINAL;  LEFT INGUINAL HERNIA REPAIR WITH MESH;  Surgeon: Santos Coleman MD;  Location: [facility]     HERNIORRHAPHY INGUINAL Right 12/29/2015    Procedure: HERNIORRHAPHY INGUINAL;  Surgeon: Santos Coleman MD;  Location:  OR     PAST SURGICAL HISTORY  2002    hernia repair, right     PAST SURGICAL HISTORY      pilonidal cyst and sebaceous cyst surg     PE TUBES  2/10    left ear PE for persistant ZINA     SOFT TISSUE SURGERY      right axilla lipoma removed     Past Surgical History reviewed with patient during visit.    Current Outpatient Prescriptions   Medication     simvastatin (ZOCOR) 40 MG tablet     lisinopril (PRINIVIL/ZESTRIL) 40 MG tablet     metoprolol (TOPROL-XL) 25 MG 24 hr tablet     sildenafil (VIAGRA) 100 MG cap/tab     sildenafil (REVATIO/VIAGRA) 20 MG tablet     lisinopril (PRINIVIL,ZESTRIL) 30 MG tablet     finasteride (PROSCAR) 5 MG tablet     nitroglycerin (NITROSTAT) 0.4 MG sublingual tablet     levOCARNitine (CARNITOR) 330 MG tablet     naproxen sodium (ALEVE) 220 MG capsule     fish oil-omega-3 fatty acids (FISH OIL) 1000 MG capsule     ASPIRIN 81 MG  "OR TABS     No current facility-administered medications for this visit.        Allergies   Allergen Reactions     Augmentin Nausea and Vomiting     Sulfa Drugs Nausea       Social History     Social History     Marital status:      Spouse name: Nava Vázquez     Number of children: 3     Years of education: 18+     Occupational History           [employer]       [employer]     Social History Main Topics     Smoking status: Former Smoker     Packs/day: 0.50     Types: Cigarettes     Smokeless tobacco: Never Used     Alcohol use 0.0 oz/week     0 Standard drinks or equivalent per week      Comment: 1-2 a week     Drug use: No     Sexual activity: Yes     Partners: Female     Birth control/ protection: Surgical     Other Topics Concern     Parent/Sibling W/ Cabg, Mi Or Angioplasty Before 65f 55m? No     Caffeine Concern No     2-3 coffee a day     Exercise Yes     12 visits to the gym per month.     Seat Belt Yes     Social History Narrative       Family History   Problem Relation Age of Onset     C.A.D. Father      Alzheimer Disease Father      HEART DISEASE Father      CANCER Brother      brain tumor- age 46     HEART DISEASE Mother      aortic anerysum      Alzheimer Disease Paternal Grandmother      Glaucoma No family hx of      Macular Degeneration No family hx of          ROS: 10 point ROS neg other than the symptoms noted above in the HPI.    Vital Signs: /82  Pulse 55  Temp 97.3  F (36.3  C) (Oral)  Resp 16  Ht 5' 10.75\" (1.797 m)  Wt 170 lb (77.1 kg)  SpO2 98%  BMI 23.88 kg/m2    Examination:  Constitutional:  Alert, well nourished, NAD.  HEENT: Normocephalic, atraumatic.   Pulm:  Without shortness of breath   CV:  No pitting edema of BLE.    Neurological:  Awake  Alert  Oriented x 3  Speech clear  Cranial nerves II - XII intact    Motor exam   Shoulder Abduction:  Right:  5/5   Left:  5/5  Biceps:                      Right:  5/5   Left:  " "5/5  Triceps:                     Right:  5/5   Left:  5/5  Wrist Extensors:       Right:  5/5   Left:  5/5  Wrist Flexors:           Right:  5/5   Left:  5/5  Intrinsics:                   Right:  5/5   Left:  5/5  Hip Flexor:                Right: 5/5  Left:  5/5  Hip Adductor:             Right:  5/5  Left:  5/5  Hip Abductor:             Right:  5/5  Left:  5/5  Gastroc Soleus:        Right:  5/5  Left:  5/5  Tib/Ant:                      Right:  4-5/5  Left:  5/5  EHL:                          Right:  3/5  Left:  5/5  DF:                            Right:  3/5  Left:  5/5  PF:                            Right:  5/5   Left:  5/5  Sensation normal to bilateral upper and lower extremities  Clonus negative  DTRs 1+ symmetric  Gait: Able to stand from a seated position. Normal non-antalgic, non-myelopathic gait.  Cervical examination reveals good range of motion.  No tenderness to palpation of the cervical spine or paraspinous muscles bilaterally.    Lumbar examination reveals no tenderness of the spine or paraspinous muscles.  Hip height is symmetrical. Negative SI joint, sciatic notch or greater trochanteric tenderness to palpation bilaterally.  FROM without difficulty. Straight leg raise is negative bilaterally.      Imaging: None recently    Assessment/Plan:   Lumbar Radiculopathy  Lumbar DDD     Ambrocio Vázquez is a 67 year old male with right lower extremity pain that has becoming increasingly more bothersome over the past several months.  His history is significant for previous back surgery, which he states was an L4-5 decompression in 2001 with Dr. Villegas at [facility].  He did well following his lumbar surgery and describes similar pain these past few months that he experienced prior to his surgery.  He also has noticed weakness to the right foot, \"It tends to flap when I walk.\"  He states this has been present for 4+ weeks.  He denies falls.  He denies changes to bowel or bladder. He describes an intermittent " "pain to the mid-lower back, \"But my main issue is my right leg.\"  He states he has \"nerve pain\" along the right shin and lateral calf that starts below the knee and radiates into the foot.  He describes it as, \"A brutal pain.\" The pain increases with activities such as prolonged walking.  He finds relief while bending forward and leaning on an object.  When asked about pain in the thigh or upper leg, \"That isn't from my back, I have hip issues.\"  He states he has pain in the right hip, notably when getting up in the morning, and it shoots into the right anterior thigh.  He denies pain to the LLE.  He has not had recent PT or injections.  We discussed ordering a lumbar MRI.  He is agreeable.  We will contact him with results once received.    Patient Instructions   Schedule lumbar MRI  We will call you with results  Please contact the clinic if pain persists at [phone].      [facility] Wink[facility]  [facility]  [address] [address]    Tel [phone]  Pager [phone]    "

## 2017-12-12 NOTE — MR AVS SNAPSHOT
After Visit Summary   12/12/2017    Ambrocio Vázquez    MRN: [id number]           Patient Information     Date Of Birth          1950        Visit Information        Provider Department      12/12/2017 9:00 AM Stacy Winkler NP [facility]        Today's Diagnoses     Lumbar radiculopathy    -  1      Care Instructions    Schedule lumbar MRI  We will call you with results  Please contact the clinic if pain persists at [phone].            Follow-ups after your visit        Future tests that were ordered for you today     Open Future Orders        Priority Expected Expires Ordered    MR Lumbar Spine w/o Contrast Routine  12/12/2018 12/12/2017            Who to contact     If you have questions or need follow up information about today's clinic visit or your schedule please contact [facility] directly at [phone].  Normal or non-critical lab and imaging results will be communicated to you by Colibriahart, letter or phone within 4 business days after the clinic has received the results. If you do not hear from us within 7 days, please contact the clinic through Colibriahart or phone. If you have a critical or abnormal lab result, we will notify you by phone as soon as possible.  Submit refill requests through Exacaster or call your pharmacy and they will forward the refill request to us. Please allow 3 business days for your refill to be completed.          Additional Information About Your Visit        MyChart Information     Exacaster gives you secure access to your electronic health record. If you see a primary care provider, you can also send messages to your care team and make appointments. If you have questions, please call your primary care clinic.  If you do not have a primary care provider, please call [phone] and they will assist you.        Care EveryWhere ID     This is your Care EveryWhere ID. This could be used by other organizations to access your  "[facility] medical records  [id number]        Your Vitals Were     Pulse Temperature Respirations Height Pulse Oximetry BMI (Body Mass Index)    55 97.3  F (36.3  C) (Oral) 16 5' 10.75\" (1.797 m) 98% 23.88 kg/m2       Blood Pressure from Last 3 Encounters:   12/12/17 141/82   11/14/17 (!) 159/103   03/28/17 (!) 174/108    Weight from Last 3 Encounters:   12/12/17 170 lb (77.1 kg)   11/14/17 162 lb 11.2 oz (73.8 kg)   03/28/17 185 lb (83.9 kg)               Primary Care Provider Office Phone # Fax #    Joelle Randle [phone] [address] [place] [address]        Equal Access to Services     [facility]: Hadii aad ku hadasho Soomaali, waaxda luqadaha, qaybta kaalmada adeegyada, manuel castilloin hayirvin argueta . So [facility] [phone].    ATENCIÓN: Si habla español, tiene a armenta disposición servicios gratuitos de asistencia lingüística. Cl al [phone].    We comply with applicable federal civil rights laws and [place] laws. We do not discriminate on the basis of race, color, national origin, age, disability, sex, sexual orientation, or gender identity.            Thank you!     Thank you for choosing [facility]  for your care. Our goal is always to provide you with excellent care. Hearing back from our patients is one way we can continue to improve our services. Please take a few minutes to complete the written survey that you may receive in the mail after your visit with us. Thank you!             Your Updated Medication List - Protect others around you: Learn how to safely use, store and throw away your medicines at www.disposemymeds.org.          This list is accurate as of: 12/12/17  9:43 AM.  Always use your most recent med list.                   Brand Name Dispense Instructions for use Diagnosis    ALEVE 220 MG capsule   Generic drug:  naproxen sodium      Take 220 mg by mouth as needed.        aspirin 81 MG tablet          ONE DAILY        " finasteride 5 MG tablet    PROSCAR    90 tablet    Take 1 tablet (5 mg) by mouth daily Patient said he takes one tablet daily    Disorder of prostate, unspecified       fish oil-omega-3 fatty acids 1000 MG capsule      Take 1 g by mouth 2 times daily        levOCARNitine 330 MG tablet    CARNITOR    90 tablet    Take 1 tablet (330 mg) by mouth daily    Primary carnitine deficiency (H)       * lisinopril 30 MG tablet    PRINIVIL,ZESTRIL    90 tablet    Take 1 tablet (30 mg) by mouth daily    Essential hypertension       * lisinopril 40 MG tablet    PRINIVIL/ZESTRIL    90 tablet    Take 1 tablet (40 mg) by mouth daily    Essential hypertension, benign       metoprolol 25 MG 24 hr tablet    TOPROL-XL    90 tablet    Take 1 tablet (25 mg) by mouth daily    Coronary artery disease involving native heart without angina pectoris, unspecified vessel or lesion type, Essential hypertension       nitroGLYcerin 0.4 MG sublingual tablet    NITROSTAT    25 tablet    Place 1 tablet (0.4 mg) under the tongue See Admin Instructions for chest pain    Coronary artery disease involving native heart without angina pectoris, unspecified vessel or lesion type       sildenafil 100 MG tablet    VIAGRA    10 tablet    Take 1 tablet (100 mg) by mouth daily as needed for erectile dysfunction Take 30 min to 4 hours before intercourse.  Never use with nitroglycerin, terazosin or doxazosin.    Erectile dysfunction due to arterial insufficiency       sildenafil 20 MG tablet    REVATIO    50 tablet    Take 2-5 tablets ( mg) by mouth as needed for erectile dysfunction.  Never use with nitroglycerin, terazosin or doxazosin.    Erectile dysfunction due to arterial insufficiency       simvastatin 40 MG tablet    ZOCOR    90 tablet    Take 1 tablet (40 mg) by mouth At Bedtime    Other hyperlipidemia, Coronary arteriosclerosis       * Notice:  This list has 2 medication(s) that are the same as other medications prescribed for you. Read the  directions carefully, and ask your doctor or other care provider to review them with you.

## 2017-12-12 NOTE — NURSING NOTE
"Ambrocio Vázquez is a 67 year old male who presents for:  Chief Complaint   Patient presents with     Neurologic Problem     Right lower leg pain from the lateral to the anterior to the ankle. He also has a lot of hip pain, has arthritic hips. Onset: 30 years. He has had surgery on his low back, 2001, decompression. It helped him for 16 years. The last 6 months pain has gotten worse. He has trouble sleeping. Bottom of his right foot and ankle are numb. He feels he has foot drop.         Initial Vitals:  /82  Pulse 55  Temp 97.3  F (36.3  C) (Oral)  Resp 16  Ht 5' 10.75\" (1.797 m)  Wt 170 lb (77.1 kg)  SpO2 98%  BMI 23.88 kg/m2 Estimated body mass index is 23.88 kg/(m^2) as calculated from the following:    Height as of this encounter: 5' 10.75\" (1.797 m).    Weight as of this encounter: 170 lb (77.1 kg).. Body surface area is 1.96 meters squared. BP completed using cuff size: regular  Severe Pain (6)    Do you feel safe in your environment?  Yes  Do you need any refills today? No    Nursing Comments: Right lower leg pain from the lateral to the anterior to the ankle. He also has a lot of hip pain, has arthritic hips. Onset: 30 years. He has had surgery on his low back, 2001, decompression. It helped him for 16 years. The last 6 months pain has gotten worse. He has trouble sleeping. Bottom of his right foot and ankle are numb. He feels he has foot drop.          Kary Sullivan"

## 2017-12-12 NOTE — PATIENT INSTRUCTIONS
Schedule lumbar MRI  We will call you with results  Please contact the clinic if pain persists at [phone].

## 2017-12-12 NOTE — LETTER
"    12/12/2017         RE: Ambrocio Vázquez  [address] [address]        Dear Colleague,    Thank you for referring your patient, Ambrocio Vázquez, to the [facility]. Please see a copy of my visit note below.    Dr. Gabino Velez  [facility]  Neurosurgery Clinic Visit      CC: Right leg pain    Primary care Provider: Joelle Randle      Reason For Visit:   I was asked by Dr. Randle to consult on the patient for right sided sciatica.      HPI: Ambrocio Vázquez is a 67 year old male with right lower extremity pain that has becoming increasingly more bothersome over the past several months.  His history is significant for previous back surgery, which he states was an L4-5 decompression in 2001 with Dr. Villegas at [facility].  He did well following his lumbar surgery and describes similar pain these past few months that he experienced prior to his surgery.  He also has noticed weakness to the right foot, \"It tends to flap when I walk.\"  He states this has been present for 4+ weeks.  He denies falls.  He denies changes to bowel or bladder. He describes an intermittent pain to the mid-lower back, \"But my main issue is my right leg.\"  He states he has \"nerve pain\" along the right shin and lateral calf that starts below the knee and radiates into the foot.  He describes it as, \"A brutal pain.\" The pain increases with activities such as prolonged walking.  He finds relief while bending forward and leaning on an object.  When asked about pain in the thigh or upper leg, \"That isn't from my back, I have hip issues.\"  He states he has pain in the right hip, notably when getting up in the morning, and it shoots into the right anterior thigh.  He denies pain to the LLE.  He has not had recent PT or injections.    Pain right now: 6    Past Medical History:   Diagnosis Date     BPH (benign prostatic hyperplasia)      Cardiomyopathy (H)      Carnitine deficiency (H)      " Colon polyp     tubular adenoma     Coronary artery disease     ischemic heart dz.LMA-nl,   stenting of Lad, diag small 80%, Lcx30%, RCA50%     ED (erectile dysfunction)      H/O angina pectoris      Hyperlipidaemia      Hypertension      Impaired fasting glucose      Intervertebral lumbar disc disorder with myelopathy, lumbar region (aka DISK)     lumbar disk disease     PFO (patent foramen ovale)      Pneumonia     possibly as child, pt unsure     Sciatica        Past Medical History reviewed with patient during visit.    Past Surgical History:   Procedure Laterality Date     C LAMINECTOMY,>2 SGMT,LUMBAR  2001     COLONOSCOPY       COLONOSCOPY  5/3/2013    Procedure: COLONOSCOPY;  Colonoscopy;  Surgeon: Genaro Krishnan MD;  Location: [facility]     CORONARY ANGIOGRAPHY ADULT ORDER  6/7/2014    No restenosis     HEART CATH, ANGIOPLASTY  5/2010    complex  angioplasty/thrombectomy SHAZIA stenting proximal and ostial LAD;LMA-nl,   stenting of Lad, diag small 80%, Lcx30%, RCA50%     HERNIA REPAIR      [facility]     HERNIORRHAPHY INGUINAL  1/11/2013    Procedure: HERNIORRHAPHY INGUINAL;  LEFT INGUINAL HERNIA REPAIR WITH MESH;  Surgeon: Santos Coleman MD;  Location:  OR     HERNIORRHAPHY INGUINAL Right 12/29/2015    Procedure: HERNIORRHAPHY INGUINAL;  Surgeon: Santos Coleman MD;  Location:  OR     PAST SURGICAL HISTORY  2002    hernia repair, right     PAST SURGICAL HISTORY      pilonidal cyst and sebaceous cyst surg     PE TUBES  2/10    left ear PE for persistant ZINA     SOFT TISSUE SURGERY      right axilla lipoma removed     Past Surgical History reviewed with patient during visit.    Current Outpatient Prescriptions   Medication     simvastatin (ZOCOR) 40 MG tablet     lisinopril (PRINIVIL/ZESTRIL) 40 MG tablet     metoprolol (TOPROL-XL) 25 MG 24 hr tablet     sildenafil (VIAGRA) 100 MG cap/tab     sildenafil (REVATIO/VIAGRA) 20 MG tablet     lisinopril (PRINIVIL,ZESTRIL) 30 MG tablet      "finasteride (PROSCAR) 5 MG tablet     nitroglycerin (NITROSTAT) 0.4 MG sublingual tablet     levOCARNitine (CARNITOR) 330 MG tablet     naproxen sodium (ALEVE) 220 MG capsule     fish oil-omega-3 fatty acids (FISH OIL) 1000 MG capsule     ASPIRIN 81 MG OR TABS     No current facility-administered medications for this visit.        Allergies   Allergen Reactions     Augmentin Nausea and Vomiting     Sulfa Drugs Nausea       Social History     Social History     Marital status:      Spouse name: Nava Vázquez     Number of children: 3     Years of education: 18+     Occupational History           [employer]       [employer]     Social History Main Topics     Smoking status: Former Smoker     Packs/day: 0.50     Types: Cigarettes     Smokeless tobacco: Never Used     Alcohol use 0.0 oz/week     0 Standard drinks or equivalent per week      Comment: 1-2 a week     Drug use: No     Sexual activity: Yes     Partners: Female     Birth control/ protection: Surgical     Other Topics Concern     Parent/Sibling W/ Cabg, Mi Or Angioplasty Before 65f 55m? No     Caffeine Concern No     2-3 coffee a day     Exercise Yes     12 visits to the gym per month.     Seat Belt Yes     Social History Narrative       Family History   Problem Relation Age of Onset     C.A.D. Father      Alzheimer Disease Father      HEART DISEASE Father      CANCER Brother      brain tumor- age 46     HEART DISEASE Mother      aortic anerysum      Alzheimer Disease Paternal Grandmother      Glaucoma No family hx of      Macular Degeneration No family hx of          ROS: 10 point ROS neg other than the symptoms noted above in the HPI.    Vital Signs: /82  Pulse 55  Temp 97.3  F (36.3  C) (Oral)  Resp 16  Ht 5' 10.75\" (1.797 m)  Wt 170 lb (77.1 kg)  SpO2 98%  BMI 23.88 kg/m2    Examination:  Constitutional:  Alert, well nourished, NAD.  HEENT: Normocephalic, atraumatic.   Pulm:  Without shortness of " breath   CV:  No pitting edema of BLE.    Neurological:  Awake  Alert  Oriented x 3  Speech clear  Cranial nerves II - XII intact    Motor exam   Shoulder Abduction:  Right:  5/5   Left:  5/5  Biceps:                      Right:  5/5   Left:  5/5  Triceps:                     Right:  5/5   Left:  5/5  Wrist Extensors:       Right:  5/5   Left:  5/5  Wrist Flexors:           Right:  5/5   Left:  5/5  Intrinsics:                   Right:  5/5   Left:  5/5  Hip Flexor:                Right: 5/5  Left:  5/5  Hip Adductor:             Right:  5/5  Left:  5/5  Hip Abductor:             Right:  5/5  Left:  5/5  Gastroc Soleus:        Right:  5/5  Left:  5/5  Tib/Ant:                      Right:  4-5/5  Left:  5/5  EHL:                          Right:  3/5  Left:  5/5  DF:                            Right:  3/5  Left:  5/5  PF:                            Right:  5/5   Left:  5/5  Sensation normal to bilateral upper and lower extremities  Clonus negative  DTRs 1+ symmetric  Gait: Able to stand from a seated position. Normal non-antalgic, non-myelopathic gait.  Cervical examination reveals good range of motion.  No tenderness to palpation of the cervical spine or paraspinous muscles bilaterally.    Lumbar examination reveals no tenderness of the spine or paraspinous muscles.  Hip height is symmetrical. Negative SI joint, sciatic notch or greater trochanteric tenderness to palpation bilaterally.  FROM without difficulty. Straight leg raise is negative bilaterally.      Imaging: None recently    Assessment/Plan:   Lumbar Radiculopathy  Lumbar DDD     Ambrocio Vázquez is a 67 year old male with right lower extremity pain that has becoming increasingly more bothersome over the past several months.  His history is significant for previous back surgery, which he states was an L4-5 decompression in 2001 with Dr. Villegas at [facility].  He did well following his lumbar surgery and describes similar pain these past few months that he  "experienced prior to his surgery.  He also has noticed weakness to the right foot, \"It tends to flap when I walk.\"  He states this has been present for 4+ weeks.  He denies falls.  He denies changes to bowel or bladder. He describes an intermittent pain to the mid-lower back, \"But my main issue is my right leg.\"  He states he has \"nerve pain\" along the right shin and lateral calf that starts below the knee and radiates into the foot.  He describes it as, \"A brutal pain.\" The pain increases with activities such as prolonged walking.  He finds relief while bending forward and leaning on an object.  When asked about pain in the thigh or upper leg, \"That isn't from my back, I have hip issues.\"  He states he has pain in the right hip, notably when getting up in the morning, and it shoots into the right anterior thigh.  He denies pain to the LLE.  He has not had recent PT or injections.  We discussed ordering a lumbar MRI.  He is agreeable.  We will contact him with results once received.    Patient Instructions   Schedule lumbar MRI  We will call you with results  Please contact the clinic if pain persists at [phone].      Stacy Winkler [facility]  [facility]  [address] [address]    Tel [phone]  Pager [phone]      Again, thank you for allowing me to participate in the care of your patient.        Sincerely,        Stacy Winkler, NP    "

## 2017-12-13 ENCOUNTER — HOSPITAL ENCOUNTER (OUTPATIENT)
Dept: MRI IMAGING | Facility: CLINIC | Age: 67
Discharge: HOME | End: 2017-12-13
Attending: NURSE PRACTITIONER | Admitting: NURSE PRACTITIONER
Payer: COMMERCIAL

## 2017-12-13 DIAGNOSIS — M54.16 LUMBAR RADICULOPATHY: ICD-10-CM

## 2017-12-13 PROCEDURE — 72148 MRI LUMBAR SPINE W/O DYE: CPT

## 2017-12-18 ENCOUNTER — TELEPHONE (OUTPATIENT)
Dept: FAMILY MEDICINE | Facility: CLINIC | Age: 67
End: 2017-12-18

## 2017-12-18 NOTE — TELEPHONE ENCOUNTER
Message left on [phone] (H) advising him to call the neurosurgery office at number noted below.   Lonnie Mckeon RN

## 2017-12-18 NOTE — TELEPHONE ENCOUNTER
Reason for call:  Patient reporting a symptom     Symptom or request:   Ambrocio has been having hip and back pain.  He was hoping to get his results of his recent MRI, but was advised to call the ordering physican at [facility] and so number was given to contact them.  He states that it's been brutal trying to stand or sleep at night.  He was wondering if he would get something for the pain.  He had found some very old hydrocodone in his medicine cabinet but they don't seem to work very well -  Old medication from 1998 and 2001.  He is also wondering if he should have a hip x-ray.  He had them x-rayed about 10 years ago and showed at that time some changes, but thinking he needs another x-ray to see how bad it has gotten.  It is both hips, however his right hip is worst.  Please call and assess. Thank you..Jessica Mckeon    Duration (how long have symptoms been present): quite a while    Have you been treated for this before? No    Additional comments: none    Phone Number patient can be reached at:  Home number on file [phone] (home)    Best Time:  Any time    Can we leave a detailed message on this number:  YES    Call taken on 12/18/2017 at 9:26 AM by Jessica Mckeon

## 2017-12-18 NOTE — TELEPHONE ENCOUNTER
Neurosurgery Office visit note on 12/12/17:  Patient Instructions   Schedule lumbar MRI  We will call you with results  Please contact the clinic if pain persists at [phone].        Stacy Winkler [facility]  [facility]  [address] [address]     Tel [phone]

## 2017-12-19 ENCOUNTER — TELEPHONE (OUTPATIENT)
Dept: NEUROSURGERY | Facility: CLINIC | Age: 67
End: 2017-12-19

## 2017-12-19 ENCOUNTER — TELEPHONE (OUTPATIENT)
Dept: FAMILY MEDICINE | Facility: CLINIC | Age: 67
End: 2017-12-19

## 2017-12-19 DIAGNOSIS — M54.16 LUMBAR RADICULOPATHY: ICD-10-CM

## 2017-12-19 DIAGNOSIS — M48.061 SPINAL STENOSIS OF LUMBAR REGION WITHOUT NEUROGENIC CLAUDICATION: Primary | ICD-10-CM

## 2017-12-19 RX ORDER — HYDROCODONE BITARTRATE AND ACETAMINOPHEN 5; 325 MG/1; MG/1
1-2 TABLET ORAL EVERY 6 HOURS PRN
Qty: 20 TABLET | Refills: 0 | Status: SHIPPED | OUTPATIENT
Start: 2017-12-19 | End: 2018-01-15

## 2017-12-19 NOTE — TELEPHONE ENCOUNTER
Notes reviewed.  Spinal stenosis and upcoming appointment with neurosurgery to discuss surgery.    I will give norco for severe pain. He should try to use nsaids or tylenol for mild pain.     Prescription  Walked to pharmacy.    NEDA Randle MD

## 2017-12-19 NOTE — TELEPHONE ENCOUNTER
Reason for Call:  Medication or medication refill:    Do you use a [facility]?  Name of the pharmacy and phone number for the current request:  [facility] - [phone]    Name of the medication requested: Norco or something to help with the pain in spine and hip.  He received MRI results from Stacy (neuro) but she is not able to prescribed medication.  Advised to contact his primary.  Please review and order if appropriate.  Thank you..Jessica Mckeon     Other request: none    Can we leave a detailed message on this number? YES    Phone number patient can be reached at: Cell number on file:    Telephone Information:   Mobile [phone]       Best Time: any time    Call taken on 12/19/2017 at 2:50 PM by Jessica Mckeon

## 2017-12-19 NOTE — TELEPHONE ENCOUNTER
TC to patient to review MRI results.  Patient continues to have RLE pain and is interested in discussing surgical options.  He plans to look into seeing if Dr. Villegas, his previous surgeon is in his network, otherwise, he will contact our office to schedule with Dr. Velez.

## 2018-01-09 ENCOUNTER — OFFICE VISIT (OUTPATIENT)
Dept: NEUROSURGERY | Facility: CLINIC | Age: 68
End: 2018-01-09
Payer: COMMERCIAL

## 2018-01-09 VITALS
BODY MASS INDEX: 24.08 KG/M2 | HEIGHT: 71 IN | SYSTOLIC BLOOD PRESSURE: 172 MMHG | WEIGHT: 172 LBS | OXYGEN SATURATION: 99 % | HEART RATE: 61 BPM | TEMPERATURE: 98 F | DIASTOLIC BLOOD PRESSURE: 99 MMHG | RESPIRATION RATE: 16 BRPM

## 2018-01-09 DIAGNOSIS — M54.16 LUMBAR RADICULOPATHY: Primary | ICD-10-CM

## 2018-01-09 DIAGNOSIS — M96.1 POST LAMINECTOMY SYNDROME: ICD-10-CM

## 2018-01-09 DIAGNOSIS — M54.41 CHRONIC BILATERAL LOW BACK PAIN WITH BILATERAL SCIATICA: ICD-10-CM

## 2018-01-09 DIAGNOSIS — M40.30 FLAT BACK: ICD-10-CM

## 2018-01-09 DIAGNOSIS — M54.42 CHRONIC BILATERAL LOW BACK PAIN WITH BILATERAL SCIATICA: ICD-10-CM

## 2018-01-09 DIAGNOSIS — G89.29 CHRONIC BILATERAL LOW BACK PAIN WITH BILATERAL SCIATICA: ICD-10-CM

## 2018-01-09 PROCEDURE — 99214 OFFICE O/P EST MOD 30 MIN: CPT | Performed by: NEUROLOGICAL SURGERY

## 2018-01-09 RX ORDER — METHYLPREDNISOLONE 4 MG
TABLET, DOSE PACK ORAL
Qty: 21 TABLET | Refills: 0 | Status: SHIPPED | OUTPATIENT
Start: 2018-01-09 | End: 2018-01-15

## 2018-01-09 RX ORDER — OXYCODONE AND ACETAMINOPHEN 5; 325 MG/1; MG/1
1-2 TABLET ORAL EVERY 4 HOURS PRN
Qty: 50 TABLET | Refills: 0 | Status: ON HOLD | OUTPATIENT
Start: 2018-01-09 | End: 2018-01-31

## 2018-01-09 ASSESSMENT — PAIN SCALES - GENERAL: PAINLEVEL: SEVERE PAIN (6)

## 2018-01-09 NOTE — LETTER
1/9/2018         RE: Ambrocio Vázquez  [address] [address]        Dear Colleague,    Thank you for referring your patient, Ambrocio Vázquez, to the [facility]. Please see a copy of my visit note below.    Neurosurgery Follow Up Clinic Visit      Ambrocio Vázquez returns for follow up visit regarding his LBP and R>L pain    Currently the patient describes having chronic LBP and R>L radicular pain and paresthesias. He has difficulty ambulating and feels that his pain is affecting his ability to ambulate. He has a positive shopping cart sign and has tried PT and TODD in the past. He has had lumbar decompression by Dr. Myers in 2001. He also feels that his right foot flops when he ambulates.    Exam is stable  Right dorsiflexion 4-/5  Otherwise there are no focal deficits    MRI lumbar - severe foraminal stenosis and severe DDD from L3-S1 with loss of lordosis and compression of the exiting roots. He also has flat back syndrome      Plan:   1. Will obtain scoliosis X-rays and right hip X-ray  2. Medrol dospak  3. Oxycodone  4. I have recommended bilateral L3-S1 decompression with facetectomies and TLIFs to elevate the DDD and decompress the foramen. I discussed with the patient and spouse the risk of surgery to include, but, not be limited to; nerve injury, pseudoarthrosis, failure of hardware, failure of improvement of symptoms, CSF leak,  infection, post op hematoma, the need for recurrent surgery, paralysis, coma and death. He will call if he wants to proceed with surgery.            Again, thank you for allowing me to participate in the care of your patient.        Sincerely,        Gabino Velez MD

## 2018-01-09 NOTE — MR AVS SNAPSHOT
After Visit Summary   1/9/2018    Ambrocio Vázquez    MRN: [id number]           Patient Information     Date Of Birth          1950        Visit Information        Provider Department      1/9/2018 3:20 PM Gabino Velez MD [facility]        Today's Diagnoses     Lumbar radiculopathy    -  1      Care Instructions    - special x-rays are being ordered for you and we will contact you to discuss them tomorrow    - medrol dose pac and oxycodone are being prescribed today, please take as directed          Follow-ups after your visit        Who to contact     If you have questions or need follow up information about today's clinic visit or your schedule please contact [facility] directly at [phone].  Normal or non-critical lab and imaging results will be communicated to you by [employer], letter or phone within 4 business days after the clinic has received the results. If you do not hear from us within 7 days, please contact the clinic through [employer] or phone. If you have a critical or abnormal lab result, we will notify you by phone as soon as possible.  Submit refill requests through [employer] or call your pharmacy and they will forward the refill request to us. Please allow 3 business days for your refill to be completed.          Additional Information About Your Visit        MyChart Information     [employer] gives you secure access to your electronic health record. If you see a primary care provider, you can also send messages to your care team and make appointments. If you have questions, please call your primary care clinic.  If you do not have a primary care provider, please call [phone] and they will assist you.        Care EveryWhere ID     This is your Care EveryWhere ID. This could be used by other organizations to access your [place] medical records  [id number]        Your Vitals Were     Pulse Temperature Respirations Height Pulse Oximetry BMI  "(Body Mass Index)    61 98  F (36.7  C) (Oral) 16 5' 10.75\" (1.797 m) 99% 24.16 kg/m2       Blood Pressure from Last 3 Encounters:   01/09/18 (!) 172/99   12/12/17 141/82   11/14/17 (!) 159/103    Weight from Last 3 Encounters:   01/09/18 172 lb (78 kg)   12/12/17 170 lb (77.1 kg)   11/14/17 162 lb 11.2 oz (73.8 kg)              Today, you had the following     No orders found for display         Today's Medication Changes          These changes are accurate as of: 1/9/18  3:56 PM.  If you have any questions, ask your nurse or doctor.               Start taking these medicines.        Dose/Directions    methylPREDNISolone 4 MG tablet   Commonly known as:  MEDROL DOSEPAK   Used for:  Lumbar radiculopathy   Started by:  Gabino Velez MD        Follow package instructions   Quantity:  21 tablet   Refills:  0       oxyCODONE-acetaminophen 5-325 MG per tablet   Commonly known as:  PERCOCET   Used for:  Lumbar radiculopathy   Started by:  Gabino Velez MD        Dose:  1-2 tablet   Take 1-2 tablets by mouth every 4 hours as needed for pain   Quantity:  50 tablet   Refills:  0            Where to get your medicines      Some of these will need a paper prescription and others can be bought over the counter.  Ask your nurse if you have questions.     Bring a paper prescription for each of these medications     methylPREDNISolone 4 MG tablet    oxyCODONE-acetaminophen 5-325 MG per tablet                Primary Care Provider Office Phone # Fax #    Joelle Randle [phone] [address] [address] the [facility] [address]        Equal Access to Services     GLENDY BUI AH: Hadpatricia mauricio Sodarnell, waaxda luqadaha, qaybta kaalmamanuel plata. So [facility] [phone].    ATENCIÓN: Si habla español, tiene a armenta disposición servicios gratuitos de asistencia lingüística. Llame al [phone].    We comply with applicable federal civil rights " laws and [place] laws. We do not discriminate on the basis of race, color, national origin, age, disability, sex, sexual orientation, or gender identity.            Thank you!     Thank you for choosing [facility]  for your care. Our goal is always to provide you with excellent care. Hearing back from our patients is one way we can continue to improve our services. Please take a few minutes to complete the written survey that you may receive in the mail after your visit with us. Thank you!             Your Updated Medication List - Protect others around you: Learn how to safely use, store and throw away your medicines at www.disposemymeds.org.          This list is accurate as of: 1/9/18  3:56 PM.  Always use your most recent med list.                   Brand Name Dispense Instructions for use Diagnosis    ALEVE 220 MG capsule   Generic drug:  naproxen sodium      Take 220 mg by mouth as needed.        aspirin 81 MG tablet          ONE DAILY        finasteride 5 MG tablet    PROSCAR    90 tablet    Take 1 tablet (5 mg) by mouth daily Patient said he takes one tablet daily    Disorder of prostate, unspecified       fish oil-omega-3 fatty acids 1000 MG capsule      Take 1 g by mouth 2 times daily        HYDROcodone-acetaminophen 5-325 MG per tablet    NORCO    20 tablet    Take 1-2 tablets by mouth every 6 hours as needed for moderate to severe pain maximum 4 tablet(s) per day    Spinal stenosis of lumbar region without neurogenic claudication, Lumbar radiculopathy       levOCARNitine 330 MG tablet    CARNITOR    90 tablet    Take 1 tablet (330 mg) by mouth daily    Primary carnitine deficiency (H)       * lisinopril 30 MG tablet    PRINIVIL,ZESTRIL    90 tablet    Take 1 tablet (30 mg) by mouth daily    Essential hypertension       * lisinopril 40 MG tablet    PRINIVIL/ZESTRIL    90 tablet    Take 1 tablet (40 mg) by mouth daily    Essential hypertension, benign       methylPREDNISolone 4 MG tablet     MEDROL DOSEPAK    21 tablet    Follow package instructions    Lumbar radiculopathy       metoprolol 25 MG 24 hr tablet    TOPROL-XL    90 tablet    Take 1 tablet (25 mg) by mouth daily    Coronary artery disease involving native heart without angina pectoris, unspecified vessel or lesion type, Essential hypertension       nitroGLYcerin 0.4 MG sublingual tablet    NITROSTAT    25 tablet    Place 1 tablet (0.4 mg) under the tongue See Admin Instructions for chest pain    Coronary artery disease involving native heart without angina pectoris, unspecified vessel or lesion type       oxyCODONE-acetaminophen 5-325 MG per tablet    PERCOCET    50 tablet    Take 1-2 tablets by mouth every 4 hours as needed for pain    Lumbar radiculopathy       sildenafil 100 MG tablet    VIAGRA    10 tablet    Take 1 tablet (100 mg) by mouth daily as needed for erectile dysfunction Take 30 min to 4 hours before intercourse.  Never use with nitroglycerin, terazosin or doxazosin.    Erectile dysfunction due to arterial insufficiency       sildenafil 20 MG tablet    REVATIO    50 tablet    Take 2-5 tablets ( mg) by mouth as needed for erectile dysfunction.  Never use with nitroglycerin, terazosin or doxazosin.    Erectile dysfunction due to arterial insufficiency       simvastatin 40 MG tablet    ZOCOR    90 tablet    Take 1 tablet (40 mg) by mouth At Bedtime    Other hyperlipidemia, Coronary arteriosclerosis       * Notice:  This list has 2 medication(s) that are the same as other medications prescribed for you. Read the directions carefully, and ask your doctor or other care provider to review them with you.

## 2018-01-09 NOTE — NURSING NOTE
"Ambrocio Vázquez is a 67 year old male who presents for:  Chief Complaint   Patient presents with     Back Pain     Right sciatica. Onset: most of adult life. Hx of surgery in 2001 with Dr. Villegas, L4-5 decompression at [facility]. The surgery did help with the sciatica. The sciatica has been starting to come back but in the last several months it has become unbearable. He denies any pain in the left leg but has numbness in bilateral feet. Pain in the right leg is mainly from below knee down. He has arthritic hips and has pain in right hip. No recent treatments.         Initial Vitals:  BP (!) 172/99  Pulse 61  Temp 98  F (36.7  C) (Oral)  Resp 16  Ht 5' 10.75\" (1.797 m)  Wt 172 lb (78 kg)  SpO2 99%  BMI 24.16 kg/m2 Estimated body mass index is 24.16 kg/(m^2) as calculated from the following:    Height as of this encounter: 5' 10.75\" (1.797 m).    Weight as of this encounter: 172 lb (78 kg).. Body surface area is 1.97 meters squared. BP completed using cuff size: regular  Severe Pain (6)    Do you feel safe in your environment?  Yes  Do you need any refills today? No    Nursing Comments: Right sciatica. Onset: most of adult life. Hx of surgery in 2001 with Dr. Villegas, L4-5 decompression at [facility]. The surgery did help with the sciatica. The sciatica has been starting to come back but in the last several months it has become unbearable. He denies any pain in the left leg but has numbness in bilateral feet. Pain in the right leg is mainly from below knee down. He has arthritic hips and has pain in right hip. No recent treatments.         Kary Sullivan    "

## 2018-01-09 NOTE — PATIENT INSTRUCTIONS
- special x-rays are being ordered for you and we will contact you to discuss them tomorrow    - medrol dose pac and oxycodone are being prescribed today, please take as directed

## 2018-01-10 ENCOUNTER — TELEPHONE (OUTPATIENT)
Dept: FAMILY MEDICINE | Facility: CLINIC | Age: 68
End: 2018-01-10

## 2018-01-10 NOTE — PROGRESS NOTES
Neurosurgery Follow Up Clinic Visit      Ambrocio Vázquez returns for follow up visit regarding his LBP and R>L pain    Currently the patient describes having chronic LBP and R>L radicular pain and paresthesias. He has difficulty ambulating and feels that his pain is affecting his ability to ambulate. He has a positive shopping cart sign and has tried PT and TODD in the past. He has had lumbar decompression by Dr. Myers in 2001. He also feels that his right foot flops when he ambulates.    Exam is stable  Right dorsiflexion 4-/5  Otherwise there are no focal deficits    MRI lumbar - severe foraminal stenosis and severe DDD from L3-S1 with loss of lordosis and compression of the exiting roots. He also has flat back syndrome      Plan:   1. Will obtain scoliosis X-rays and right hip X-ray  2. Medrol dospak  3. Oxycodone  4. I have recommended bilateral L3-S1 decompression with facetectomies and TLIFs to elevate the DDD and decompress the foramen. I discussed with the patient and spouse the risk of surgery to include, but, not be limited to; nerve injury, pseudoarthrosis, failure of hardware, failure of improvement of symptoms, CSF leak,  infection, post op hematoma, the need for recurrent surgery, paralysis, coma and death. He will call if he wants to proceed with surgery.

## 2018-01-10 NOTE — TELEPHONE ENCOUNTER
Reason for Call:  Other Visit on 11/14/17.      Detailed comments: Ambrocio was seen on 11/14/17 for his back and leg issues.  It was scheduled as a well visit, however he was told that he already had one  in February of 2017 so this wasn't going to be a well visit.  It was coded as such anyway and he received a huge bill that insurance will not cover due to the fact that this is his 2nd one in 2017.  Not sure if Kristin told him that he already had one or who.  Could you please review and advise.  Thank you..Jessica Mckeon    Phone Number Patient can be reached at: Home number on file [phone] (home)    Best Time: any time    Can we leave a detailed message on this number? YES    Call taken on 1/10/2018 at 2:43 PM by Jessica Mckeon

## 2018-01-10 NOTE — NURSING NOTE
Spoke to Dr Velez and he would like to obtain 2 vw scoli xrays, special lumbar x-ray, and right hip x-ray. Spoke to patient on the phone to discuss. Patient ok with obtaining imaging at [facility] where they have the right equipment for Dr Velez' specifications. Orders faxed to [facility] on 1/10/18. Informed patient they will be contacting him to schedule. Patient is still going to think about the surgery that was recommended by Dr Velez and call to schedule if he wishes to proceed.

## 2018-01-11 ENCOUNTER — TELEPHONE (OUTPATIENT)
Dept: FAMILY MEDICINE | Facility: CLINIC | Age: 68
End: 2018-01-11

## 2018-01-11 NOTE — LETTER
[facility]  [address]  [facility] [address] [phone]      February 6, 2018      Ambrocio Vázquez  [address] [place] [address]        Dear Ambrocio,     As part of [facility]'s commitment to health and wellness we have recently reviewed your chart and your medical record indicates that you are due for one or more of the following:    -- Lab tests: You are due for the following: Cholesterol labs.  Please schedule a lab   appointment. Plan to come fasting 8-10 hours prior to your appointment - nothing to eat or drink EXCEPT water.     -- Blood pressure check. I noticed that your blood pressure was elevated at a recent visit.Please call to schedule a nurse only visit to have it rechecked.      Please try to schedule and/or complete the tests above within the next 2-4 weeks.   The number to call to schedule an appointment at [facility] [phone].    While we work hard to maintain accurate records on all our patients, it is always possible that this notice does not accurately reflect tests that you may have had. To ensure that we do not continue to send you notices please verify, at your next visit or by a On[employer] Player message, that we have accurate dates of your tests, even if these were done many years ago.     Working together for your health is our goal.    Thank you for choosing [facility] for your healthcare needs.      Sincerely,     [facility] Care Team

## 2018-01-11 NOTE — TELEPHONE ENCOUNTER
Panel Management Review      Patient has the following on his problem list:     Hypertension   Last three blood pressure readings:  BP Readings from Last 3 Encounters:   01/09/18 (!) 172/99   12/12/17 141/82   11/14/17 (!) 159/103     Blood pressure: FAILED    HTN Guidelines:  Age 18-59 BP range:  Less than 140/90  Age 60-85 with Diabetes:  Less than 140/90  Age 60-85 without Diabetes:  less than 150/90        Composite cancer screening  Chart review shows that this patient is due/due soon for the following None  Summary:    Patient is due/failing the following:   BMP and LDL    Action needed:       Type of outreach:will send letter will send letter     Questions for provider review:    Do you want patient to be seen in clinic or do lab and bp check?                                                                                                                                     Kristin Damian LPN       Chart routed to Provider .

## 2018-01-12 ENCOUNTER — TELEPHONE (OUTPATIENT)
Dept: NEUROSURGERY | Facility: CLINIC | Age: 68
End: 2018-01-12

## 2018-01-12 NOTE — TELEPHONE ENCOUNTER
Spoke to patient. He was seen by Dr Velez on 1/9/18 at [facility]. He recommended bilateral L3-S1 decompression with facetectomies and TLIFs to elevate the DDD and decompress the foramen. Patient was going to think about it and call when he wants to proceed. Patient has decided he would like to move forward with surgery. Patient informed he is scheduled for the x-rays Dr Velez at [facility] on 1/15/2018. Informed patient that surgery scheduler will be in contact with him to set that up. Message was sent to Dr Velez for surgery form. Patient verbalized understanding.

## 2018-01-12 NOTE — TELEPHONE ENCOUNTER
I'm sorry. I didn't see that he had already had one in 2017.  It looks like it was mainly a problem based visit but I'm not sure if I can change a charge once it is billed out.      I will send this to my  and clinic manager to see what they think    Thanks, NEDA Randle MD

## 2018-01-15 ENCOUNTER — TELEPHONE (OUTPATIENT)
Dept: FAMILY MEDICINE | Facility: CLINIC | Age: 68
End: 2018-01-15

## 2018-01-15 DIAGNOSIS — I25.10 CAD (CORONARY ARTERY DISEASE): Primary | ICD-10-CM

## 2018-01-15 NOTE — TELEPHONE ENCOUNTER
Tita from [facility] called and said that patient needs an MRSA-MSSA test done. Spoke with Dylan at the [facility] at [phone] because MSSA test is listed as being done only at [facility]. Dylan said that if MRSA-PCR is ordered . [id number]; that the results will be given as if the MSSA was also ordered. Left message with Brittney at [facility] at [phone] to have Babita Fowler call back tomorrow to let her know.   Lonnie Mckeon, RN

## 2018-01-16 ENCOUNTER — OFFICE VISIT (OUTPATIENT)
Dept: FAMILY MEDICINE | Facility: CLINIC | Age: 68
End: 2018-01-16
Payer: COMMERCIAL

## 2018-01-16 VITALS
HEIGHT: 71 IN | WEIGHT: 163.5 LBS | SYSTOLIC BLOOD PRESSURE: 137 MMHG | BODY MASS INDEX: 22.89 KG/M2 | TEMPERATURE: 97.9 F | HEART RATE: 57 BPM | DIASTOLIC BLOOD PRESSURE: 97 MMHG

## 2018-01-16 DIAGNOSIS — M54.16 LUMBAR RADICULOPATHY: ICD-10-CM

## 2018-01-16 DIAGNOSIS — Z01.812 PRE-OPERATIVE LABORATORY EXAMINATION: ICD-10-CM

## 2018-01-16 DIAGNOSIS — I10 ESSENTIAL HYPERTENSION, BENIGN: ICD-10-CM

## 2018-01-16 DIAGNOSIS — F41.9 ANXIETY: ICD-10-CM

## 2018-01-16 DIAGNOSIS — Z01.818 PRE-OPERATIVE EXAMINATION: Primary | ICD-10-CM

## 2018-01-16 DIAGNOSIS — D58.2 ELEVATED HEMOGLOBIN (H): ICD-10-CM

## 2018-01-16 LAB
ANION GAP SERPL CALCULATED.3IONS-SCNC: 4 MMOL/L (ref 3–14)
BASOPHILS # BLD AUTO: 0 10E9/L (ref 0–0.2)
BASOPHILS NFR BLD AUTO: 0.3 %
BUN SERPL-MCNC: 23 MG/DL (ref 7–30)
CALCIUM SERPL-MCNC: 9.4 MG/DL (ref 8.5–10.1)
CHLORIDE SERPL-SCNC: 106 MMOL/L (ref 94–109)
CO2 SERPL-SCNC: 31 MMOL/L (ref 20–32)
CREAT SERPL-MCNC: 0.9 MG/DL (ref 0.66–1.25)
DIFFERENTIAL METHOD BLD: ABNORMAL
EOSINOPHIL # BLD AUTO: 0.3 10E9/L (ref 0–0.7)
EOSINOPHIL NFR BLD AUTO: 2.8 %
ERYTHROCYTE [DISTWIDTH] IN BLOOD BY AUTOMATED COUNT: 13.2 % (ref 10–15)
GFR SERPL CREATININE-BSD FRML MDRD: 84 ML/MIN/1.7M2
GLUCOSE SERPL-MCNC: 107 MG/DL (ref 70–99)
HCT VFR BLD AUTO: 54.6 % (ref 40–53)
HGB BLD-MCNC: 18 G/DL (ref 13.3–17.7)
LYMPHOCYTES # BLD AUTO: 1.8 10E9/L (ref 0.8–5.3)
LYMPHOCYTES NFR BLD AUTO: 18.5 %
MCH RBC QN AUTO: 31.5 PG (ref 26.5–33)
MCHC RBC AUTO-ENTMCNC: 33 G/DL (ref 31.5–36.5)
MCV RBC AUTO: 96 FL (ref 78–100)
MONOCYTES # BLD AUTO: 0.9 10E9/L (ref 0–1.3)
MONOCYTES NFR BLD AUTO: 9.7 %
MRSA DNA SPEC QL NAA+PROBE: NEGATIVE
NEUTROPHILS # BLD AUTO: 6.6 10E9/L (ref 1.6–8.3)
NEUTROPHILS NFR BLD AUTO: 68.7 %
PLATELET # BLD AUTO: 204 10E9/L (ref 150–450)
POTASSIUM SERPL-SCNC: 4.4 MMOL/L (ref 3.4–5.3)
RBC # BLD AUTO: 5.72 10E12/L (ref 4.4–5.9)
SODIUM SERPL-SCNC: 141 MMOL/L (ref 133–144)
SPECIMEN SOURCE: NORMAL
WBC # BLD AUTO: 9.5 10E9/L (ref 4–11)

## 2018-01-16 PROCEDURE — 99214 OFFICE O/P EST MOD 30 MIN: CPT | Performed by: FAMILY MEDICINE

## 2018-01-16 PROCEDURE — 87641 MR-STAPH DNA AMP PROBE: CPT | Performed by: NEUROLOGICAL SURGERY

## 2018-01-16 PROCEDURE — 87640 STAPH A DNA AMP PROBE: CPT | Mod: 59 | Performed by: NEUROLOGICAL SURGERY

## 2018-01-16 PROCEDURE — 85025 COMPLETE CBC W/AUTO DIFF WBC: CPT | Performed by: FAMILY MEDICINE

## 2018-01-16 PROCEDURE — 36415 COLL VENOUS BLD VENIPUNCTURE: CPT | Performed by: FAMILY MEDICINE

## 2018-01-16 PROCEDURE — 80048 BASIC METABOLIC PNL TOTAL CA: CPT | Performed by: FAMILY MEDICINE

## 2018-01-16 RX ORDER — LORAZEPAM 0.5 MG/1
0.5 TABLET ORAL EVERY 8 HOURS PRN
Qty: 10 TABLET | Refills: 0 | Status: SHIPPED | OUTPATIENT
Start: 2018-01-16 | End: 2018-06-06

## 2018-01-16 NOTE — PROGRESS NOTES
[facility]  [address] [address]  [phone]  Dept: [phone]    PRE-OP EVALUATION:  Today's date: 2018    Ambrocio Vázquez (: 1950) presents for pre-operative evaluation assessment as requested by Dr. Velez .  He requires evaluation and anesthesia risk assessment prior to undergoing surgery/procedure for treatment of :  Right-sided findings are at L4-L5 and L5-S1 : Severe right L4-L5 foraminal stenosis  And moderate-severe right L5-S1 foraminal stenosis   Small-moderate-sized left L5-S1 disc herniation causing mild displacement of the left S1 nerve root.   Moderate-severe left L3-L4 foraminal stenosis due to loss of disc space height and disc material extending into the left L3-L4 neural foramen.    Proposed procedure: Bilateral L3 to S1 decompression with facetectomies and Transforaminal lumbar interbody fusion    Date of Surgery/ Procedure: 2018  Time of Surgery/ Procedure: 7:30am   Hospital/Surgical Facility: [facility]   Primary Physician: Joelle Randle  Type of Anesthesia Anticipated: General    Patient has a Health Care Directive or Living Will:  YES     1. Yes - Do you have a history of heart attack, stroke, stent, bypass or surgery on an artery in the head, neck, heart or legs? Stent in heart -    Has an upcoming appointment with cardiology for clearance. Will do a nuc med stress test   2. NO - Do you ever have any pain or discomfort in your chest?  3. NO - Do you have a history of  Heart Failure?  4. NO - Are you troubled by shortness of breath when: walking on the level, up a slight hill or at night?  5. NO - Do you currently have a cold, bronchitis or other respiratory infection?  6. NO - Do you have a cough, shortness of breath or wheezing?  7. NO - Do you sometimes get pains in the calves of your legs when you walk?  8. NO - Do you or anyone in your family have previous history of blood clots?  9. NO - Do you or does  anyone in your family have a serious bleeding problem such as prolonged bleeding following surgeries or cuts?  10. NO - Have you ever had problems with anemia or been told to take iron pills?  11. NO - Have you had any abnormal blood loss such as black, tarry or bloody stools, or abnormal vaginal bleeding?  12. NO - Have you ever had a blood transfusion?  13. NO - Have you or any of your relatives ever had problems with anesthesia?  14. Yes - Do you have sleep apnea, excessive snoring or daytime drowsiness? Snoring   15. NO - Do you have any prosthetic heart valves?  16. NO - Do you have prosthetic joints?  17. NO - Is there any chance that you may be pregnant?        HPI:                                                      Brief HPI related to upcoming procedure: history of sciatica but recently pain in right leg and hip was worsening and causing right foot weakness   On prednisone and percocet for pain  Not sleeping due to pain    Anxiety is high due to upcoming surgery  He is worrying a lot and it is affecting his sleep     See problem list for active medical problems.  Problems all longstanding and stable, except as noted/documented.  See ROS for pertinent symptoms related to these conditions.                                                                                                  .    MEDICAL HISTORY:                                                    Patient Active Problem List    Diagnosis Date Noted     Family history of aortic aneurysm 02/09/2017     Priority: Medium     Gets the ultrasounds done with cardiologist  Mother had AAA       Coronary arteriosclerosis 12/18/2015     Priority: Medium     5/4/10 - 100% occlusion of proximal LAD with 2 [place] stents.   6/28/11 - seen in f/u. Adenosine stress 6/23/11 - normal LV perfusion, some atypical chest qamar       Hyperlipidemia      Priority: Medium     Diagnosis updated by automated process. Provider to review and confirm.       Hypertension       Priority: Medium     History of angina      Priority: Medium     Former smoker 01/18/2014     Priority: Medium     Advanced directives, counseling/discussion 03/13/2013     Priority: Medium     Advance Care Planning:   ACP Review and Resources Provided: Reviewed chart for advance care plan. Ambrocio Vázquez has no plan or code status on file. Discussed available resources and provided with information.   Added by Christina Garrido on 3/13/2013         CARDIOVASCULAR SCREENING; LDL GOAL LESS THAN 100 10/31/2010     Priority: Medium     CAD (coronary artery disease) 05/07/2010     Priority: Medium     5/4/10 - 100% occlusion of proximal LAD with 2 [place] stents.   6/28/11 - seen in f/u. Adenosine stress 6/23/11 - normal LV perfusion, some atypical chest pain but no ECG changes consistent with ischemia    Stent placed 5/10 - was on effient x 2 years . Now off        Cardiomyopathy (H) 05/07/2010     Priority: Medium     Mild - likely due to CAD, 2 stent placement 5/4/10 - EF 40%        Colon polyp 04/29/2009     Priority: Medium     Seen 8/09 colonoscopy - tubular adenoma - repeat recommended in 3 years.       Abnormal echocardiogram 04/29/2009     Priority: Medium     Some dilatation of cardiac root, some thickeing of LV - followed by Dr. 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 His daughter is affected and has two mutations in this gene.  He is maintained on one 330 mg tablet daily with normal levels.         Sciatica 03/31/2008     Priority: Medium     S/p 2 injections - follow by [facility]       Essential hypertension, benign 03/31/2008     Priority: Medium     ERECTILE DYSFUNCTION 03/31/2008     Priority: Medium     Disorder of prostate 03/31/2008     Priority: Medium      Followed by Urology for elevated PSA levels - Bx's done in past.  Problem list name updated by automated process. Provider to review        Past Medical History:   Diagnosis Date     Arthritis      BPH (benign prostatic hyperplasia)      Cardiomyopathy (H)      Carnitine deficiency (H)      Colon polyp     tubular adenoma     Coronary artery disease     ischemic heart dz.LMA-nl,   stenting of Lad, diag small 80%, Lcx30%, RCA50%     ED (erectile dysfunction)      H/O angina pectoris      Hyperlipidaemia      Hypertension     Cardilogist last visit several yeara ago now follewd by Primary DR.      Impaired fasting glucose      Intervertebral lumbar disc disorder with myelopathy, lumbar region (aka DISK)     lumbar disk disease     Other chronic pain     Back pain for many years.     PFO (patent foramen ovale)      Sciatica      Stented coronary artery     Pt will call for cardiac clearance prior to surgery. [facility]     Past Surgical History:   Procedure Laterality Date     C LAMINECTOMY,>2 SGMT,LUMBAR  2001     COLONOSCOPY       COLONOSCOPY  5/3/2013    Procedure: COLONOSCOPY;  Colonoscopy;  Surgeon: Genaro Krishnan MD;  Location: [facility]     CORONARY ANGIOGRAPHY ADULT ORDER  6/7/2014    No restenosis     HEART CATH, ANGIOPLASTY  5/2010    complex  angioplasty/thrombectomy SHAZIA stenting proximal and ostial LAD;LMA-nl,   stenting of Lad, diag small 80%, Lcx30%, RCA50%     HERNIA REPAIR      LIH RI     HERNIORRHAPHY INGUINAL  1/11/2013    Procedure: HERNIORRHAPHY INGUINAL;  LEFT INGUINAL HERNIA REPAIR WITH MESH;  Surgeon: Santos Coleman MD;  Location:  OR     HERNIORRHAPHY INGUINAL Right 12/29/2015    Procedure: HERNIORRHAPHY INGUINAL;  Surgeon: Santos Coleman MD;  Location:  OR     PAST SURGICAL HISTORY  2002    hernia repair, right     PAST SURGICAL HISTORY      pilonidal cyst and sebaceous cyst surg     PE TUBES  2/10    left ear PE for persistant ZINA     SOFT TISSUE SURGERY       right axilla lipoma removed     Current Outpatient Prescriptions   Medication Sig Dispense Refill     oxyCODONE-acetaminophen (PERCOCET) 5-325 MG per tablet Take 1-2 tablets by mouth every 4 hours as needed for pain 50 tablet 0     simvastatin (ZOCOR) 40 MG tablet Take 1 tablet (40 mg) by mouth At Bedtime 90 tablet 3     lisinopril (PRINIVIL/ZESTRIL) 40 MG tablet Take 1 tablet (40 mg) by mouth daily 90 tablet 1     metoprolol (TOPROL-XL) 25 MG 24 hr tablet Take 1 tablet (25 mg) by mouth daily 90 tablet 1     sildenafil (VIAGRA) 100 MG cap/tab Take 1 tablet (100 mg) by mouth daily as needed for erectile dysfunction Take 30 min to 4 hours before intercourse.  Never use with nitroglycerin, terazosin or doxazosin. 10 tablet 3     finasteride (PROSCAR) 5 MG tablet Take 1 tablet (5 mg) by mouth daily Patient said he takes one tablet daily 90 tablet 3     nitroglycerin (NITROSTAT) 0.4 MG sublingual tablet Place 1 tablet (0.4 mg) under the tongue See Admin Instructions for chest pain 25 tablet 3     fish oil-omega-3 fatty acids (FISH OIL) 1000 MG capsule Take 1 g by mouth daily        ASPIRIN 81 MG OR TABS ONE DAILY       OTC products: no recent use of herbal medications or other supplements  Just completed a course of prednisone  Takes occasional nsaids     Allergies   Allergen Reactions     Augmentin Nausea and Vomiting     Sulfa Drugs Nausea      Latex Allergy: NO    Social History   Substance Use Topics     Smoking status: Former Smoker     Packs/day: 0.50     Years: 30.00     Types: Cigarettes     Quit date: 1/15/2010     Smokeless tobacco: Never Used     Alcohol use 0.0 oz/week     0 Standard drinks or equivalent per week      Comment: 1-2 a week     History   Drug Use No       REVIEW OF SYSTEMS:                                                    Constitutional, neuro, ENT, endocrine, pulmonary, cardiac, gastrointestinal, genitourinary, musculoskeletal - exception is right leg/hip symptoms,  integument and psychiatric  "systems are negative, except as otherwise noted.      EXAM:                                                    BP (!) 137/97 (BP Location: Right arm, Patient Position: Sitting, Cuff Size: Adult Regular)  Pulse 57  Temp 97.9  F (36.6  C) (Tympanic)  Ht 5' 10.75\" (1.797 m)  Wt 163 lb 8 oz (74.2 kg)  BMI 22.97 kg/m2   BP Readings from Last 6 Encounters:   01/16/18 (!) 137/97   01/09/18 (!) 172/99   12/12/17 141/82   11/14/17 (!) 159/103   03/28/17 (!) 174/108   02/27/17 124/79         GENERAL APPEARANCE: healthy, alert and no distress     EYES: EOMI,  PERRL     HENT: ear canals and TM's normal and nose and mouth without ulcers or lesions     NECK: no adenopathy, no asymmetry, masses, or scars and thyroid normal to palpation     RESP: lungs clear to auscultation - no rales, rhonchi or wheezes     CV: regular rates and rhythm, normal S1 S2, no S3 or S4 and no murmur, click or rub     ABDOMEN:  soft, nontender, no HSM or masses and bowel sounds normal     MS: extremities normal- no gross deformities noted, no evidence of inflammation in joints, FROM in all extremities.     NEURO: Normal strength and tone, sensory exam grossly normal, mentation intact and speech normal     PSYCH: mentation appears normal. and affect normal/bright    DIAGNOSTICS:                                                    EKG: Not indicated due to upcoming nuc med stress test and cardiology appointment     Results for orders placed or performed in visit on 01/16/18   Basic metabolic panel   Result Value Ref Range    Sodium 141 133 - 144 mmol/L    Potassium 4.4 3.4 - 5.3 mmol/L    Chloride 106 94 - 109 mmol/L    Carbon Dioxide 31 20 - 32 mmol/L    Anion Gap 4 3 - 14 mmol/L    Glucose 107 (H) 70 - 99 mg/dL    Urea Nitrogen 23 7 - 30 mg/dL    Creatinine 0.90 0.66 - 1.25 mg/dL    GFR Estimate 84 >60 mL/min/1.7m2    GFR Estimate If Black >90 >60 mL/min/1.7m2    Calcium 9.4 8.5 - 10.1 mg/dL   CBC with platelets differential   Result Value Ref Range "    WBC 9.5 4.0 - 11.0 10e9/L    RBC Count 5.72 4.4 - 5.9 10e12/L    Hemoglobin 18.0 (H) 13.3 - 17.7 g/dL    Hematocrit 54.6 (H) 40.0 - 53.0 %    MCV 96 78 - 100 fl    MCH 31.5 26.5 - 33.0 pg    MCHC 33.0 31.5 - 36.5 g/dL    RDW 13.2 10.0 - 15.0 %    Platelet Count 204 150 - 450 10e9/L    Diff Method Automated Method     % Neutrophils 68.7 %    % Lymphocytes 18.5 %    % Monocytes 9.7 %    % Eosinophils 2.8 %    % Basophils 0.3 %    Absolute Neutrophil 6.6 1.6 - 8.3 10e9/L    Absolute Lymphocytes 1.8 0.8 - 5.3 10e9/L    Absolute Monocytes 0.9 0.0 - 1.3 10e9/L    Absolute Eosinophils 0.3 0.0 - 0.7 10e9/L    Absolute Basophils 0.0 0.0 - 0.2 10e9/L   Methicillin Resistant Staph Aureus PCR   Result Value Ref Range    Specimen Description Nares     S Aur Meth Resis PCR Negative NEG^Negative       Recent Labs   Lab Test  03/28/17   0758  02/09/17   0825  07/29/16   1345  07/22/16   1010   06/07/10   0657   05/03/10   1304   HGB  17.7   --    --   15.4   < >  15.0   < >  17.0   PLT  183   --    --   163   < >  195   < >  198   INR   --    --    --    --    --   1.08   --   1.06   NA  139  139   --   137   < >  143   < >  142   POTASSIUM  5.4*  4.0   --   3.9   < >  3.9   < >  4.6   CR  0.83  1.03   --   0.66   < >  0.90   < >  0.90   A1C   --    --   6.2*   --    --    --    --    --     < > = values in this interval not displayed.        IMPRESSION:                                                    Reason for surgery/procedure:  lumbar L3-5 foraminal stenosis    Diagnosis/reason for consult:   Pre op consultation  Hypertension  CAD with history of cardiac stenting  Hyperlipidemia   Primary carnitine deficiency  BPH    The proposed surgical procedure is considered INTERMEDIATE risk.    REVISED CARDIAC RISK INDEX  The patient has the following serious cardiovascular risks for perioperative complications such as (MI, PE, VFib and 3  AV Block):  No serious cardiac risks  INTERPRETATION: 1 risks: Class II (low risk - 0.9%  complication rate)    The patient has the following additional risks for perioperative complications:  No identified additional risks      RECOMMENDATIONS:                                                        --Patient is to take all scheduled medications on the day of surgery EXCEPT for modifications listed below.  No nsaids for 7 days prior  No ASA for 7 days prior - confirm with cardiology  No fish oil for 7 days prior    Hypertension - he feels it is elevated due to anxiety and pain. He reports normal blood pressure in home environment. Will continue current blood pressure medications and he should take them on the day of surgery.     Has upcoming nuc med study and cardiology appointment     Anxiety about procedure - I have given him a small quantity of ativan to use for severe anxiety over the next few weeks.     APPROVAL GIVEN to proceed with proposed procedure, without further diagnostic evaluation, assuming he is also cleared by cardiology.      Signed Electronically by: Joelle Randle MD    Copy of this evaluation report is provided to requesting physician.    [facility] Preop Guidelines

## 2018-01-16 NOTE — TELEPHONE ENCOUNTER
Babita Fowler RN at [facility] notified of nasal testing and pre-op being done today.  Lonnie Mckeon RN

## 2018-01-16 NOTE — NURSING NOTE
"Chief Complaint   Patient presents with     Pre-Op Exam       Initial BP (!) 137/97 (BP Location: Right arm, Patient Position: Sitting, Cuff Size: Adult Regular)  Pulse 57  Temp 97.9  F (36.6  C) (Tympanic)  Ht 5' 10.75\" (1.797 m)  Wt 163 lb 8 oz (74.2 kg)  BMI 22.97 kg/m2 Estimated body mass index is 22.97 kg/(m^2) as calculated from the following:    Height as of this encounter: 5' 10.75\" (1.797 m).    Weight as of this encounter: 163 lb 8 oz (74.2 kg).  Medication Reconciliation: complete   Kristin Damian LPN    "

## 2018-01-16 NOTE — LETTER
[facility]  [address] [address]  [phone]        January 24, 2019    Ambrocio Vázquez  [address] [address]              Dear Ambrocio Vázquez    This is to remind you that your LAB is due.    You may call our office at [phone] to schedule an appointment.    Please disregard this notice if you have already had your labs drawn or made an appointment.        Sincerely,        Joelle Randle MD

## 2018-01-16 NOTE — TELEPHONE ENCOUNTER
Per clinic manager,  The situation was discussed with the patient and a reduction in the bill will occur.     NEDA Randle MD

## 2018-01-16 NOTE — MR AVS SNAPSHOT
After Visit Summary   1/16/2018    Ambrocio Vázquez    MRN: [id number]           Patient Information     Date Of Birth          1950        Visit Information        Provider Department      1/16/2018 11:30 AM Joelle Randle MD [facility]        Today's Diagnoses     Pre-operative examination    -  1    Pre-operative laboratory examination        Lumbar radiculopathy          Care Instructions      Before Your Surgery      Call your surgeon if there is any change in your health. This includes signs of a cold or flu (such as a sore throat, runny nose, cough, rash or fever).    Do not smoke, drink alcohol or take over the counter medicine (unless your surgeon or primary care doctor tells you to) for the 24 hours before and after surgery.    If you take prescribed drugs: Follow your doctor s orders about which medicines to take and which to stop until after surgery.    Eating and drinking prior to surgery: follow the instructions from your surgeon    Take a shower or bath the night before surgery. Use the soap your surgeon gave you to gently clean your skin. If you do not have soap from your surgeon, use your regular soap. Do not shave or scrub the surgery site.  Wear clean pajamas and have clean sheets on your bed.           Follow-ups after your visit        Your next 10 appointments already scheduled     Jan 19, 2018 10:30 AM CST   Ech Complete with [id number]   [facility] CV Echocardiography (Cardiovascular Imaging at [facility])    [address] [place] [address] [phone]           1. Please bring or wear a comfortable two-piece outfit. 2. You may eat, drink and take your normal medicines. 3. For any questions that cannot be answered, please contact the ordering physician            Jan 19, 2018 11:30 AM CST   NM SH CV MPI WITH NEO 1 DAY with SCINM1   [facility] CV Nuclear Medicine (Cardiovascular Imaging at [place]  [facility]    [address]  [address]  [place] [id number] [phone]           For a ONE day exam: Allow 3-4 hours for test. For a TWO day exam: Allow 2 hours PER day for test.  You may need to stop some medicines before the test. Follow your doctor s orders. - If you take a beta blocker: Follow your doctor s specific instructions on taking it prior to and on the day of your exam. - If you take Aggrenox or dipyridamole (Persantine, Permole), stop taking it 48 hours before your test. - If you take Viagra, Cialis or Levitra, stop taking it 48 hours before your test. - If you take theophylline or aminophylline, stop taking it 12 hours before your test.  For patients with diabetes: - If you take insulin, call your diabetes care team. Ask if you should take a 1/2 dose the morning of your test. - If you take diabetes medicine by mouth, don t take it on the morning of your test. Bring it with you to take after the test. (If you have questions, call your diabetes care team.)  Do not take nitrates on the day of your test. Do not wear your Nitro-Patch.  Stop all caffeine 12 hours before the test. This includes coffee, tea, soda pop, chocolate and certain medicines (such as Anacin, Excedrin and NoDoz). Also avoid decaf coffee and tea, as these contain small amounts of caffeine.  No alcohol, smoking or other tobacco for 12 hours before the test.  Stop eating 3 hours before the test. You may drink water.  Please wear a loose two-piece outfit. If you will have an exercise test, bring rubber-soled walking shoes.  When you arrive, please tell us if you: - Have diabetes - Are breastfeeding - May be pregnant - Have a pacemaker of ICD (implantable defibrillator).  Please call your Imaging Department at your exam site with any questions.            Jan 23, 2018  9:00 AM CST   Pre-Op physical with Joelle Randle MD   [facility]    [address] [facility] [address]    [phone]            Jan 23, 2018  1:30 PM CST   New Visit with Sammy Linares MD   [facility] ([facility])    [address]  [place] [id number] [phone]            Jan 29, 2018   Procedure with Gabino Velez MD   [facility] (--)    [address]  [facility] [id number] [phone]              Future tests that were ordered for you today     Open Future Orders        Priority Expected Expires Ordered    Follow-Up with Cardiologist Routine 1/22/2018 1/15/2019 1/15/2018    NM Lexiscan stress test Routine 1/17/2018 1/15/2019 1/15/2018    Echocardiogram Complete Routine 1/17/2018 1/15/2019 1/15/2018            Who to contact     Normal or non-critical lab and imaging results will be communicated to you by [employer], letter or phone within 4 business days after the clinic has received the results. If you do not hear from us within 7 days, please contact the clinic through [employer] or phone. If you have a critical or abnormal lab result, we will notify you by phone as soon as possible.  Submit refill requests through [employer] or call your pharmacy and they will forward the refill request to us. Please allow 3 business days for your refill to be completed.          If you need to speak with a  for additional information , please call: [phone]             Additional Information About Your Visit        [employer] Information     [employer] gives you secure access to your electronic health record. If you see a primary care provider, you can also send messages to your care team and make appointments. If you have questions, please call your primary care clinic.  If you do not have a primary care provider, please call [phone] and they will assist you.        Care EveryWhere ID     This is your Care EveryWhere ID. This could be used by other organizations to access your [facility]  "records  [id number]        Your Vitals Were     Pulse Temperature Height BMI (Body Mass Index)          57 97.9  F (36.6  C) (Tympanic) 5' 10.75\" (1.797 m) 22.97 kg/m2         Blood Pressure from Last 3 Encounters:   01/16/18 (!) 137/97   01/09/18 (!) 172/99   12/12/17 141/82    Weight from Last 3 Encounters:   01/16/18 163 lb 8 oz (74.2 kg)   01/15/18 165 lb (74.8 kg)   01/09/18 172 lb (78 kg)              We Performed the Following     Basic metabolic panel     CBC with platelets differential     Methicillin Resistant Staph Aureus PCR        Primary Care Provider Office Phone # Fax #    Joelle Randle [phone] [address] [facility] [address]        Equal Access to Services     [facility]: Hadii nevin beyer hadasho Sodarnell, waaxda luqadaha, qaybta kaalmada adeegyada, manuel cabrera hayirvin argueta . So [facility] [phone].    ATENCIÓN: Si habla español, tiene a armenta disposición servicios gratuitos de asistencia lingüística. Llame al [phone].    We comply with applicable federal civil rights laws and [place] laws. We do not discriminate on the basis of race, color, national origin, age, disability, sex, sexual orientation, or gender identity.            Thank you!     Thank you for choosing [facility]  for your care. Our goal is always to provide you with excellent care. Hearing back from our patients is one way we can continue to improve our services. Please take a few minutes to complete the written survey that you may receive in the mail after your visit with us. Thank you!             Your Updated Medication List - Protect others around you: Learn how to safely use, store and throw away your medicines at www.disposemymeds.org.          This list is accurate as of: 1/16/18 12:16 PM.  Always use your most recent med list.                   Brand Name Dispense Instructions for use Diagnosis    aspirin 81 MG tablet          ONE DAILY        finasteride 5 " MG tablet    PROSCAR    90 tablet    Take 1 tablet (5 mg) by mouth daily Patient said he takes one tablet daily    Disorder of prostate, unspecified       fish oil-omega-3 fatty acids 1000 MG capsule      Take 1 g by mouth daily        lisinopril 40 MG tablet    PRINIVIL/ZESTRIL    90 tablet    Take 1 tablet (40 mg) by mouth daily    Essential hypertension, benign       metoprolol succinate 25 MG 24 hr tablet    TOPROL-XL    90 tablet    Take 1 tablet (25 mg) by mouth daily    Coronary artery disease involving native heart without angina pectoris, unspecified vessel or lesion type, Essential hypertension       nitroGLYcerin 0.4 MG sublingual tablet    NITROSTAT    25 tablet    Place 1 tablet (0.4 mg) under the tongue See Admin Instructions for chest pain    Coronary artery disease involving native heart without angina pectoris, unspecified vessel or lesion type       oxyCODONE-acetaminophen 5-325 MG per tablet    PERCOCET    50 tablet    Take 1-2 tablets by mouth every 4 hours as needed for pain    Lumbar radiculopathy       sildenafil 100 MG tablet    VIAGRA    10 tablet    Take 1 tablet (100 mg) by mouth daily as needed for erectile dysfunction Take 30 min to 4 hours before intercourse.  Never use with nitroglycerin, terazosin or doxazosin.    Erectile dysfunction due to arterial insufficiency       simvastatin 40 MG tablet    ZOCOR    90 tablet    Take 1 tablet (40 mg) by mouth At Bedtime    Other hyperlipidemia, Coronary arteriosclerosis

## 2018-01-18 ENCOUNTER — TELEPHONE (OUTPATIENT)
Dept: FAMILY MEDICINE | Facility: CLINIC | Age: 68
End: 2018-01-18

## 2018-01-18 NOTE — TELEPHONE ENCOUNTER
Reason for Call:  Other question -      Detailed comments: Ambrocio is having a NM Mirtha Stress test tomorrow morning and he was suppose to check with his provider about his beta blockers.  Should he not take his metoprolol tomorrow?  Please review and advise in Dr. Randle's absence.  Thank you..Jessica Mckeon    Phone Number Patient can be reached at: Home number on file [phone] (home) If no answer at home call his cell.  Thank you..Jessica Mckeon    Best Time: none    Can we leave a detailed message on this number? YES    Call taken on 1/18/2018 at 2:58 PM by Jessica Mckeon

## 2018-01-19 ENCOUNTER — HOSPITAL ENCOUNTER (OUTPATIENT)
Dept: CARDIOLOGY | Facility: CLINIC | Age: 68
Discharge: HOME | End: 2018-01-19
Attending: INTERNAL MEDICINE | Admitting: INTERNAL MEDICINE
Payer: COMMERCIAL

## 2018-01-19 DIAGNOSIS — I25.10 CAD (CORONARY ARTERY DISEASE): ICD-10-CM

## 2018-01-19 PROCEDURE — 93018 CV STRESS TEST I&R ONLY: CPT | Performed by: INTERNAL MEDICINE

## 2018-01-19 PROCEDURE — 93017 CV STRESS TEST TRACING ONLY: CPT

## 2018-01-19 PROCEDURE — 78452 HT MUSCLE IMAGE SPECT MULT: CPT | Mod: 26 | Performed by: INTERNAL MEDICINE

## 2018-01-19 PROCEDURE — 93306 TTE W/DOPPLER COMPLETE: CPT

## 2018-01-19 PROCEDURE — A9502 TC99M TETROFOSMIN: HCPCS | Performed by: INTERNAL MEDICINE

## 2018-01-19 PROCEDURE — 93016 CV STRESS TEST SUPVJ ONLY: CPT | Performed by: INTERNAL MEDICINE

## 2018-01-19 PROCEDURE — 34300033 ZZH RX 343: Performed by: INTERNAL MEDICINE

## 2018-01-19 PROCEDURE — 93306 TTE W/DOPPLER COMPLETE: CPT | Mod: 26 | Performed by: INTERNAL MEDICINE

## 2018-01-19 RX ORDER — AMINOPHYLLINE 25 MG/ML
50-100 INJECTION, SOLUTION INTRAVENOUS
Status: DISCONTINUED | OUTPATIENT
Start: 2018-01-19 | End: 2018-01-19 | Stop reason: CLARIF

## 2018-01-19 RX ORDER — ACYCLOVIR 200 MG/1
0-1 CAPSULE ORAL
Status: DISCONTINUED | OUTPATIENT
Start: 2018-01-19 | End: 2018-01-19 | Stop reason: CLARIF

## 2018-01-19 RX ORDER — REGADENOSON 0.08 MG/ML
0.4 INJECTION, SOLUTION INTRAVENOUS ONCE
Status: DISCONTINUED | OUTPATIENT
Start: 2018-01-19 | End: 2018-01-19 | Stop reason: CLARIF

## 2018-01-19 RX ORDER — ALBUTEROL SULFATE 90 UG/1
2 AEROSOL, METERED RESPIRATORY (INHALATION) EVERY 5 MIN PRN
Status: DISCONTINUED | OUTPATIENT
Start: 2018-01-19 | End: 2018-01-19 | Stop reason: CLARIF

## 2018-01-19 RX ADMIN — TETROFOSMIN 3.5 MCI.: 1.38 INJECTION, POWDER, LYOPHILIZED, FOR SOLUTION INTRAVENOUS at 11:38

## 2018-01-19 RX ADMIN — TETROFOSMIN 9.2 MCI.: 1.38 INJECTION, POWDER, LYOPHILIZED, FOR SOLUTION INTRAVENOUS at 13:43

## 2018-01-23 ENCOUNTER — TELEPHONE (OUTPATIENT)
Dept: NEUROSURGERY | Facility: CLINIC | Age: 68
End: 2018-01-23

## 2018-01-23 ENCOUNTER — TELEPHONE (OUTPATIENT)
Dept: FAMILY MEDICINE | Facility: CLINIC | Age: 68
End: 2018-01-23

## 2018-01-23 ENCOUNTER — OFFICE VISIT (OUTPATIENT)
Dept: CARDIOLOGY | Facility: CLINIC | Age: 68
End: 2018-01-23
Payer: COMMERCIAL

## 2018-01-23 VITALS
WEIGHT: 166 LBS | DIASTOLIC BLOOD PRESSURE: 80 MMHG | BODY MASS INDEX: 23.24 KG/M2 | HEIGHT: 71 IN | SYSTOLIC BLOOD PRESSURE: 146 MMHG | HEART RATE: 60 BPM

## 2018-01-23 DIAGNOSIS — I10 ESSENTIAL HYPERTENSION: ICD-10-CM

## 2018-01-23 DIAGNOSIS — I25.10 CORONARY ARTERY DISEASE INVOLVING NATIVE CORONARY ARTERY OF NATIVE HEART WITHOUT ANGINA PECTORIS: Primary | ICD-10-CM

## 2018-01-23 DIAGNOSIS — Q21.12 PFO (PATENT FORAMEN OVALE): ICD-10-CM

## 2018-01-23 DIAGNOSIS — Q25.43 ANEURYSM OF AORTIC ROOT: ICD-10-CM

## 2018-01-23 PROCEDURE — 99204 OFFICE O/P NEW MOD 45 MIN: CPT | Performed by: INTERNAL MEDICINE

## 2018-01-23 NOTE — PROGRESS NOTES
Service Date: 01/23/2018      PRIMARY CARE PHYSICIAN:  Dr. Joelle Randle.      Dear Dr. Randle:      I had the pleasure of seeing your patient, Ambrocio Vázquez, at [facility] for preoperative cardiology evaluation.      HISTORY OF PRESENT ILLNESS:  Kurt Vázquez is a delightful 67-year-old male accompanied by his wife at [facility] for cardiology evaluation before back surgery on 01/29.  The patient has a previous decompression of L4-5 in 2001.  He has now developed worsening sciatic pain in his right leg.  The patient finds standing quite painful and his walking has been limited.  He is status post chronic total occlusion of his LAD with intracoronary stenting of the vessel in 2010.  He has not had any recurrent angina.  A small diagonal branch vessel was severely stenosed but too small for intervention.  He had moderate circumflex and right coronary artery disease.  He has a history of hyperlipidemia which has been under excellent control with simvastatin.  I reviewed his lipids with him today from 02/09/2017 including total cholesterol 131, HDL 45, LDL 71 and triglycerides 77.  The patient has a history of a patent foramen ovale noted on echocardiography in 2015.  A repeat echocardiogram was performed on 01/19/2018 demonstrating mild aortic root dilatation at 3.9 cm.  The interatrial septum was aneurysmal with a probable left-to-right intraatrial shunt suggesting a PFO.  The shunt did not appear significant.  The right ventricle was mildly dilated similarly to a previous echo on 05/13/2015.  Left ventricular size and function was normal without regional wall motion abnormalities.  Resting ejection fraction was 60%-65%.  Insignificant tricuspid insufficiency was noted.  The patient also underwent a nuclear stress test on 01/19 without evidence of ischemia or infarction.  His resting ejection fraction was 62%.  This is unchanged from 10/26/2012.  The patient states that he  goes to the gym 12 times per month and does some bike pedaling free of angina.  He denies PND, orthopnea or peripheral edema.      Since the patient was last seen by Dr. Mckinnon in 2014 the patient underwent redo right inguinal herniorrhaphy.  No other hospitalizations or surgery.      PHYSICAL EXAMINATION:  As listed below.  The patient's systolic blood pressure is mildly elevated at 146/80.  I noted that his blood pressure has been somewhat elevated throughout the month of January and even in November.      ASSESSMENT:   1.  Ambrocio Vázquez is a pleasant 67-year-old male who presents for cardiology preoperative evaluation.  The patient has no evidence of ischemia or infarction.  His echocardiogram is normal other than an aortic root enlargement probably on the basis of ongoing hypertension.  Additionally the patient has a patent foramen ovale which is likely mild.  The patient's cardiac risk for his pending surgery is low with a morbidity and mortality rate of under 1%.   2.  Systolic hypertension.  The patient has had both systolic and diastolic hypertension over the last several months according to our records.  His primary care physician needs to attend to this sometime around, before or after his surgery.  Perhaps the addition of amlodipine would be helpful.   3.  The patient's PFO probably represents trivial shunting.  I have asked the patient to return to see Dr. Mckinnon in 1 year to discuss whether a followup echocardiography or even transesophageal echocardiogram should be considered.   4.  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  Lowering his blood pressure and avoiding heavy lifting is recommended.        It is my pleasure to assist in the care of Ambrocio Vázquez.  All his questions were answered to his satisfaction.      Sammy Beckham MD       cc:   Joelle Randle MD    [facility]    [address] [place]  [address]         SAMMY BECKHAM MD, FACC              D: 2018   T: 2018   MT: KO      Name:     JUDY DOVE   MRN:      [id number]        Account:      [id number]   :      1950           Service Date: 2018      Document: [id number]

## 2018-01-23 NOTE — TELEPHONE ENCOUNTER
Returned call. Spoke to patient. Verified check in time and he should stop ASA 7 days prior to surgery . Patient verbalized understanding.

## 2018-01-23 NOTE — PROGRESS NOTES
HPI and Plan:   See dictation:[id number]    Orders Placed This Encounter   Procedures     Follow-Up with Cardiologist       No orders of the defined types were placed in this encounter.      There are no discontinued medications.      Encounter Diagnoses   Name Primary?     Coronary artery disease involving native coronary artery of native heart without angina pectoris Yes     PFO (patent foramen ovale)        CURRENT MEDICATIONS:  Current Outpatient Prescriptions   Medication Sig Dispense Refill     LORazepam (ATIVAN) 0.5 MG tablet Take 1 tablet (0.5 mg) by mouth every 8 hours as needed for anxiety 10 tablet 0     oxyCODONE-acetaminophen (PERCOCET) 5-325 MG per tablet Take 1-2 tablets by mouth every 4 hours as needed for pain 50 tablet 0     simvastatin (ZOCOR) 40 MG tablet Take 1 tablet (40 mg) by mouth At Bedtime 90 tablet 3     lisinopril (PRINIVIL/ZESTRIL) 40 MG tablet Take 1 tablet (40 mg) by mouth daily 90 tablet 1     metoprolol (TOPROL-XL) 25 MG 24 hr tablet Take 1 tablet (25 mg) by mouth daily 90 tablet 1     finasteride (PROSCAR) 5 MG tablet Take 1 tablet (5 mg) by mouth daily Patient said he takes one tablet daily 90 tablet 3     nitroglycerin (NITROSTAT) 0.4 MG sublingual tablet Place 1 tablet (0.4 mg) under the tongue See Admin Instructions for chest pain 25 tablet 3     fish oil-omega-3 fatty acids (FISH OIL) 1000 MG capsule Take 1 g by mouth daily        ASPIRIN 81 MG OR TABS ONE DAILY       sildenafil (VIAGRA) 100 MG cap/tab Take 1 tablet (100 mg) by mouth daily as needed for erectile dysfunction Take 30 min to 4 hours before intercourse.  Never use with nitroglycerin, terazosin or doxazosin. 10 tablet 3       ALLERGIES     Allergies   Allergen Reactions     Augmentin Nausea and Vomiting     Sulfa Drugs Nausea       PAST MEDICAL HISTORY:  Past Medical History:   Diagnosis Date     Arthritis      BPH (benign prostatic hyperplasia)      Cardiomyopathy (H)      Carnitine deficiency (H)      Colon polyp      tubular adenoma     Coronary artery disease     ischemic heart dz.LMA-nl,   stenting of Lad, diag small 80%, Lcx30%, RCA50%     ED (erectile dysfunction)      H/O angina pectoris      Hyperlipidaemia      Hypertension     Cardilogist last visit several yeara ago now follewd by Primary DR.      Impaired fasting glucose      Intervertebral lumbar disc disorder with myelopathy, lumbar region (aka DISK)     lumbar disk disease     Other chronic pain     Back pain for many years.     PFO (patent foramen ovale)      Sciatica      Stented coronary artery     Pt will call for cardiac clearance prior to surgery. [facility]       PAST SURGICAL HISTORY:  Past Surgical History:   Procedure Laterality Date     C LAMINECTOMY,>2 SGMT,LUMBAR       COLONOSCOPY       COLONOSCOPY  5/3/2013    Procedure: COLONOSCOPY;  Colonoscopy;  Surgeon: Genaro Krishnan MD;  Location: [facility]     CORONARY ANGIOGRAPHY ADULT ORDER  2014    No restenosis     HEART CATH, ANGIOPLASTY  2010    complex  angioplasty/thrombectomy SHAZIA stenting proximal and ostial LAD;LMA-nl,   stenting of Lad, diag small 80%, Lcx30%, RCA50%     HERNIA REPAIR      [facility]     HERNIORRHAPHY INGUINAL  2013    Procedure: HERNIORRHAPHY INGUINAL;  LEFT INGUINAL HERNIA REPAIR WITH MESH;  Surgeon: Satnos Coleman MD;  Location:  OR     HERNIORRHAPHY INGUINAL Right 2015    Procedure: HERNIORRHAPHY INGUINAL;  Surgeon: Santos Coleman MD;  Location:  OR     PAST SURGICAL HISTORY  2002    hernia repair, right     PAST SURGICAL HISTORY      pilonidal cyst and sebaceous cyst surg     PE TUBES  2/10    left ear PE for persistant ZINA     SOFT TISSUE SURGERY      right axilla lipoma removed       FAMILY HISTORY:  Family History   Problem Relation Age of Onset     C.A.D. Father      Alzheimer Disease Father      HEART DISEASE Father      CANCER Brother      brain tumor- age 46     HEART DISEASE Mother      aortic anerysum      Alzheimer  "Disease Paternal Grandmother      Glaucoma No family hx of      Macular Degeneration No family hx of        SOCIAL HISTORY:  Social History     Social History     Marital status:      Spouse name: Nava Vázquez     Number of children: 3     Years of education: 18+     Occupational History           [employer]       [employer]     Social History Main Topics     Smoking status: Former Smoker     Packs/day: 0.50     Years: 30.00     Types: Cigarettes     Quit date: 1/15/2010     Smokeless tobacco: Never Used     Alcohol use 0.0 oz/week     0 Standard drinks or equivalent per week      Comment: 1-2 a week     Drug use: No     Sexual activity: Yes     Partners: Female     Birth control/ protection: Surgical     Other Topics Concern     Parent/Sibling W/ Cabg, Mi Or Angioplasty Before 65f 55m? No     Caffeine Concern No     2-3 coffee a day     Exercise Yes     12 visits to the gym per month.     Seat Belt Yes     Social History Narrative       Review of Systems:  Skin:  Negative       Eyes:  Positive for glasses    ENT:  Negative      Respiratory:  Negative       Cardiovascular:  Negative      Gastroenterology: Negative      Genitourinary:  not assessed      Musculoskeletal:  Positive for back pain    Neurologic:  Positive for headaches    Psychiatric:  Negative      Heme/Lymph/Imm:  Negative      Endocrine:  Negative        Physical Exam:  Vitals: /80  Pulse 60  Ht 1.797 m (5' 10.75\")  Wt 75.3 kg (166 lb)  BMI 23.32 kg/m2    Constitutional:  cooperative, alert and oriented, well developed, well nourished, in no acute distress        Skin:  warm and dry to the touch, no apparent skin lesions or masses noted          Head:  normocephalic, no masses or lesions        Eyes:  pupils equal and round, conjunctivae and lids unremarkable, sclera white, no xanthalasma, EOMS intact, no nystagmus        Lymph:      ENT:  no pallor or cyanosis, dentition good        Neck:  carotid " pulses are full and equal bilaterally, JVP normal, no carotid bruit        Respiratory:  normal breath sounds, clear to auscultation, normal A-P diameter, normal symmetry, normal respiratory excursion, no use of accessory muscles         Cardiac: regular rhythm, normal S1/S2, no S3 or S4, apical impulse not displaced, no murmurs, gallops or rubs                pulses full and equal, no bruits auscultated                                        GI:  abdomen soft, non-tender, BS normoactive, no mass, no HSM, no bruits        Extremities and Muscular Skeletal:  no deformities, clubbing, cyanosis, erythema observed;no edema              Neurological:  no gross motor deficits;affect appropriate        Psych:  Alert and Oriented x 3        CC  No referring provider defined for this encounter.

## 2018-01-23 NOTE — TELEPHONE ENCOUNTER
Reason for Call:  Other questions:    Detailed comments: Ambrocio has seen Cardiologist today.  Has been cleared for surgery after all his testing.  After he left the clinic, cardiologist called him and stated he was going back through his records and is a little bit concerned about his hx of high blood pressure.  He is wondering if Dr. Randle would like to intervene or adjust his medication before his surgery?    Ambrocio also would like your opinion if he should continue with aspirin before, during and after surgery or stop it.  His cardiologist of course states he should continue.  He just wanted your thoughts on it also.  Please review and advise.  Thank you..Jessica Mckeon    Phone Number Patient can be reached at: Home number on file [phone] (home)    Best Time: any time    Can we leave a detailed message on this number? YES    Call taken on 1/23/2018 at 3:40 PM by Jessica Mckeon

## 2018-01-23 NOTE — MR AVS SNAPSHOT
After Visit Summary   1/23/2018    Ambrocio Vázquez    MRN: [id number]           Patient Information     Date Of Birth          1950        Visit Information        Provider Department      1/23/2018 1:30 PM Sammy Linares MD [facility]        Today's Diagnoses     Coronary artery disease involving native coronary artery of native heart without angina pectoris    -  1    PFO (patent foramen ovale)           Follow-ups after your visit        Additional Services     Follow-Up with Cardiologist                 Your next 10 appointments already scheduled     Jan 29, 2018   Procedure with Gabino Velez MD   [facility] (--)    [address] [facility] [address]   [phone]              Future tests that were ordered for you today     Open Future Orders        Priority Expected Expires Ordered    Follow-Up with Cardiologist Routine 1/23/2019 1/24/2019 1/23/2018            Who to contact     If you have questions or need follow up information about today's clinic visit or your schedule please contact [facility] directly at [phone].  Normal or non-critical lab and imaging results will be communicated to you by [employer], letter or phone within 4 business days after the clinic has received the results. If you do not hear from us within 7 days, please contact the clinic through [employer] or phone. If you have a critical or abnormal lab result, we will notify you by phone as soon as possible.  Submit refill requests through [employer] or call your pharmacy and they will forward the refill request to us. Please allow 3 business days for your refill to be completed.          Additional Information About Your Visit        MyChart Information     [employer] gives you secure access to your electronic health record. If you see a primary care provider, you can also send messages to your care  "team and make appointments. If you have questions, please call your primary care clinic.  If you do not have a primary care provider, please call [phone] and they will assist you.        Care EveryWhere ID     This is your Care EveryWhere ID. This could be used by other organizations to access your [facility] medical records  [id number]        Your Vitals Were     Pulse Height BMI (Body Mass Index)             60 1.797 m (5' 10.75\") 23.32 kg/m2          Blood Pressure from Last 3 Encounters:   01/23/18 146/80   01/16/18 (!) 137/97   01/09/18 (!) 172/99    Weight from Last 3 Encounters:   01/23/18 75.3 kg (166 lb)   01/16/18 74.2 kg (163 lb 8 oz)   01/15/18 74.8 kg (165 lb)              We Performed the Following     Follow-Up with Cardiologist        Primary Care Provider Office Phone # Fax #    Joelle Randle [phone] [address] [facility] [address]        Equal Access to Services     [facility]: Hadii aad ku hadasho Sodarnell, waaxda luqadaha, qaybta kaalmada geo, manuel argueta . So [facility] [phone].    ATENCIÓN: Si habla español, tiene a armenta disposición servicios gratuitos de asistencia lingüística. [facility] [phone].    We comply with applicable federal civil rights laws and [place] laws. We do not discriminate on the basis of race, color, national origin, age, disability, sex, sexual orientation, or gender identity.            Thank you!     Thank you for choosing [facility]  for your care. Our goal is always to provide you with excellent care. Hearing back from our patients is one way we can continue to improve our services. Please take a few minutes to complete the written survey that you may receive in the mail after your visit with us. Thank you!             Your Updated Medication List - Protect others around you: Learn how to safely use, store and throw away your medicines at " www.disposemymeds.org.          This list is accurate as of: 1/23/18  2:04 PM.  Always use your most recent med list.                   Brand Name Dispense Instructions for use Diagnosis    aspirin 81 MG tablet          ONE DAILY        finasteride 5 MG tablet    PROSCAR    90 tablet    Take 1 tablet (5 mg) by mouth daily Patient said he takes one tablet daily    Disorder of prostate, unspecified       fish oil-omega-3 fatty acids 1000 MG capsule      Take 1 g by mouth daily        lisinopril 40 MG tablet    PRINIVIL/ZESTRIL    90 tablet    Take 1 tablet (40 mg) by mouth daily    Essential hypertension, benign       LORazepam 0.5 MG tablet    ATIVAN    10 tablet    Take 1 tablet (0.5 mg) by mouth every 8 hours as needed for anxiety    Anxiety       metoprolol succinate 25 MG 24 hr tablet    TOPROL-XL    90 tablet    Take 1 tablet (25 mg) by mouth daily    Coronary artery disease involving native heart without angina pectoris, unspecified vessel or lesion type, Essential hypertension       nitroGLYcerin 0.4 MG sublingual tablet    NITROSTAT    25 tablet    Place 1 tablet (0.4 mg) under the tongue See Admin Instructions for chest pain    Coronary artery disease involving native heart without angina pectoris, unspecified vessel or lesion type       oxyCODONE-acetaminophen 5-325 MG per tablet    PERCOCET    50 tablet    Take 1-2 tablets by mouth every 4 hours as needed for pain    Lumbar radiculopathy       sildenafil 100 MG tablet    VIAGRA    10 tablet    Take 1 tablet (100 mg) by mouth daily as needed for erectile dysfunction Take 30 min to 4 hours before intercourse.  Never use with nitroglycerin, terazosin or doxazosin.    Erectile dysfunction due to arterial insufficiency       simvastatin 40 MG tablet    ZOCOR    90 tablet    Take 1 tablet (40 mg) by mouth At Bedtime    Other hyperlipidemia, Coronary arteriosclerosis

## 2018-01-23 NOTE — LETTER
1/23/2018      Joelle Randle MD  [address] [facility] [address]      RE: Ambrocio Vázquez       Dear Colleague,    I had the pleasure of seeing Ambrocio Vázquez in the [facility].    Service Date: 01/23/2018      PRIMARY CARE PHYSICIAN:  Dr. Joelle Randle.      Dear Dr. Randle:      I had the pleasure of seeing your patient, Ambrocio Vázquez, at [facility] for preoperative cardiology evaluation.      HISTORY OF PRESENT ILLNESS:  Kurt Vázquez is a delightful 67-year-old male accompanied by his wife at [facility] for cardiology evaluation before back surgery on 01/29.  The patient has a previous decompression of L4-5 in 2001.  He has now developed worsening sciatic pain in his right leg.  The patient finds standing quite painful and his walking has been limited.  He is status post chronic total occlusion of his LAD with intracoronary stenting of the vessel in 2010.  He has not had any recurrent angina.  A small diagonal branch vessel was severely stenosed but too small for intervention.  He had moderate circumflex and right coronary artery disease.  He has a history of hyperlipidemia which has been under excellent control with simvastatin.  I reviewed his lipids with him today from 02/09/2017 including total cholesterol 131, HDL 45, LDL 71 and triglycerides 77.  The patient has a history of a patent foramen ovale noted on echocardiography in 2015.  A repeat echocardiogram was performed on 01/19/2018 demonstrating mild aortic root dilatation at 3.9 cm.  The interatrial septum was aneurysmal with a probable left-to-right intraatrial shunt suggesting a PFO.  The shunt did not appear significant.  The right ventricle was mildly dilated similarly to a previous echo on 05/13/2015.  Left ventricular size and function was normal without regional wall motion abnormalities.  Resting ejection fraction was 60%-65%.  Insignificant tricuspid  insufficiency was noted.  The patient also underwent a nuclear stress test on 01/19 without evidence of ischemia or infarction.  His resting ejection fraction was 62%.  This is unchanged from 10/26/2012.  The patient states that he goes to the gym 12 times per month and does some bike pedaling free of angina.  He denies PND, orthopnea or peripheral edema.      Since the patient was last seen by Dr. Mckinnon in 2014 the patient underwent redo right inguinal herniorrhaphy.  No other hospitalizations or surgery.      PHYSICAL EXAMINATION:  As listed below.  The patient's systolic blood pressure is mildly elevated at 146/80.  I noted that his blood pressure has been somewhat elevated throughout the month of January and even in November.      ASSESSMENT:   1.  Ambrocio Vázquez is a pleasant 67-year-old male who presents for cardiology preoperative evaluation.  The patient has no evidence of ischemia or infarction.  His echocardiogram is normal other than an aortic root enlargement probably on the basis of ongoing hypertension.  Additionally the patient has a patent foramen ovale which is likely mild.  The patient's cardiac risk for his pending surgery is low with a morbidity and mortality rate of under 1%.   2.  Systolic hypertension.  The patient has had both systolic and diastolic hypertension over the last several months according to our records.  His primary care physician needs to attend to this sometime around, before or after his surgery.  Perhaps the addition of amlodipine would be helpful.   3.  The patient's PFO probably represents trivial shunting.  I have asked the patient to return to see Dr. Mckinnon in 1 year to discuss whether a followup echocardiography or even transesophageal echocardiogram should be considered.   4.  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  Lowering his blood pressure and avoiding heavy lifting is recommended.        It is my pleasure to assist in the care of Ambrocio  Gurpreet.  All his questions were answered to his satisfaction.     Outpatient Encounter Prescriptions as of 1/23/2018   Medication Sig Dispense Refill     LORazepam (ATIVAN) 0.5 MG tablet Take 1 tablet (0.5 mg) by mouth every 8 hours as needed for anxiety 10 tablet 0     oxyCODONE-acetaminophen (PERCOCET) 5-325 MG per tablet Take 1-2 tablets by mouth every 4 hours as needed for pain 50 tablet 0     simvastatin (ZOCOR) 40 MG tablet Take 1 tablet (40 mg) by mouth At Bedtime 90 tablet 3     lisinopril (PRINIVIL/ZESTRIL) 40 MG tablet Take 1 tablet (40 mg) by mouth daily 90 tablet 1     metoprolol (TOPROL-XL) 25 MG 24 hr tablet Take 1 tablet (25 mg) by mouth daily 90 tablet 1     finasteride (PROSCAR) 5 MG tablet Take 1 tablet (5 mg) by mouth daily Patient said he takes one tablet daily 90 tablet 3     nitroglycerin (NITROSTAT) 0.4 MG sublingual tablet Place 1 tablet (0.4 mg) under the tongue See Admin Instructions for chest pain 25 tablet 3     fish oil-omega-3 fatty acids (FISH OIL) 1000 MG capsule Take 1 g by mouth daily        ASPIRIN 81 MG OR TABS ONE DAILY       sildenafil (VIAGRA) 100 MG cap/tab Take 1 tablet (100 mg) by mouth daily as needed for erectile dysfunction Take 30 min to 4 hours before intercourse.  Never use with nitroglycerin, terazosin or doxazosin. 10 tablet 3     No facility-administered encounter medications on file as of 1/23/2018.      Again, thank you for allowing me to participate in the care of your patient.      Sincerely,    Sammy Linares MD     [facility]

## 2018-01-24 ENCOUNTER — ANESTHESIA EVENT (OUTPATIENT)
Dept: SURGERY | Facility: CLINIC | Age: 68
DRG: 454 | End: 2018-01-24
Payer: COMMERCIAL

## 2018-01-24 NOTE — TELEPHONE ENCOUNTER
I think Ambrocio and I discussed the blood pressure and he felt it was elevated due to anxiety? He says it is normal at home. He is currently on topril 25 and lisinopril 40.  Pulse is 55-60 so I wouldn't recommend increasing the toprol.   I did give him some ativan to take in the days prior to surgery because he is very nervous. This may lower his blood pressure .    Can you ask him to confirm that home blood pressures are low? If not, we should add in a little bit of diuretic this week, and then would need to check lytes in a week or two.      Because the surgery is non vascular and he hasn't recently had a cardiac event, I would recommend holding the aspirin.       Thanks, NEDA Randle MD     Current Outpatient Prescriptions   Medication     LORazepam (ATIVAN) 0.5 MG tablet     oxyCODONE-acetaminophen (PERCOCET) 5-325 MG per tablet     simvastatin (ZOCOR) 40 MG tablet     lisinopril (PRINIVIL/ZESTRIL) 40 MG tablet     metoprolol (TOPROL-XL) 25 MG 24 hr tablet     sildenafil (VIAGRA) 100 MG cap/tab     finasteride (PROSCAR) 5 MG tablet     nitroglycerin (NITROSTAT) 0.4 MG sublingual tablet     fish oil-omega-3 fatty acids (FISH OIL) 1000 MG capsule     ASPIRIN 81 MG OR TABS     No current facility-administered medications for this visit.            BP Readings from Last 6 Encounters:   01/23/18 146/80   01/16/18 (!) 137/97   01/09/18 (!) 172/99   12/12/17 141/82   11/14/17 (!) 159/103   03/28/17 (!) 174/108

## 2018-01-25 ENCOUNTER — TELEPHONE (OUTPATIENT)
Dept: NEUROSURGERY | Facility: CLINIC | Age: 68
End: 2018-01-25

## 2018-01-25 NOTE — TELEPHONE ENCOUNTER
Pre-operative Education    Education included but not limited to:  - Pre-operative physical with primary care physician within 30 days of surgical date. Pre op completed on 1/16/18 at [facility].   - Pre-operative clearance (cardiology, hematology, etc).   - Discontinue Aspirin, NSAIDs, Diclofenac x 7 days prior to surgical date.   -May try tylenol for pain 1000 mg three times per day for pain  -Do not begin taking Non-Steroidal Anti-Inflammatory Drugs or NSAIDs (Advil,Motrin, Ibuprofen,Nuprin, Diclofenac,Meloxicam, Aleve, Celebrex, Aspirin, etc.) until 12 weeks after surgery if you had a fusion. May cause bleeding and interfere with bone healing.  -Patient is not a smoker  -Forms to be completed  -Surgical risks: blood clots in the leg or lung, problems urinating, nerve damage, drainage from the incision, infection,stiffness  -Preparation timeline  - When to start NPO           -Special bathing procedure.Patient received Hibiclens and showering instruction sheet.   Hospital stay:  Checking in  The surgery itself   Recovery room  - Transition to the hospital room where you will begin your recovery  - Managing pain   -2-4 night hospitalization.   - Post operative incisional pain x 1-2 weeks which will require pain medications and muscle relaxants.   -Do NOT drive while taking narcotic pain medication.  -Post operative incision care-Keep your incision clean and dry at all times. OK to remove dressing on postop day 2. OK to shower on postop day 3 and allow water to run over incision, pat dry after shower. No bathing, swimming or submerging in water. Call immediately or come to ED if any drainage occurs, or you develop new pain. Watch for signs of infection: redness, swelling, warmth, drainage, and fever of 101 degrees or higher. Notify clinic.   - Post operative activity limitations: 6-8 weeks, no lifting > 10 pounds, no bending, twisting, or overhead reaching.  -Orthotics will fit you for a brace in the  [facility].Lumbar Fusion: wear for 6-8 weeks   - Follow up appointments in 2 weeks for suture/staple removal and 6 weeks with Nurse Practitioner with X-ray prior. Please call to schedule follow up appointment at [phone].   - Spine Education book was also given to the patient for further review. [facility] surgery info and patient instructions to [email or web address] on 1/25/18.      Patient verbalized understanding of above instructions. All questions were answered to the best of my ability and the patient's satisfaction. Patient advised to call with any additional questions or concerns.  Deepika Fernandez RN

## 2018-01-25 NOTE — TELEPHONE ENCOUNTER
Patient reports that when he was at the cardiology office, his BP was good. Patient could not remember what they were. Cardiologist was pleased with the BP's. They just wanted him to touch base with his primary. He has been off of the aspirin. PAtient does not feel that he soul need to take the diuretic. Patient has surgery on Monday.   Karen Salas RN

## 2018-01-25 NOTE — PATIENT INSTRUCTIONS
Patient Instructions  Pre-Operative:  -Surgery scheduled at [facility] for bilateral L3-S1 decompression with facetectomies and TLIFs (transforaminal lumbar interbody fusion)  -Surgical risks: blood clots in the leg or lung, problems urinating, nerve damage, drainage from the incision, infection,stiffness  - Pre-operative physical with primary care physician within 30 days of surgical date. Pre op completed on 1/16/2018.   -2-4 night hospitalization.    -Shower procedure: please shower with antimicrobial soap the night before surgery and morning of surgery. Please refer to showering instruction sheet in folder.  -Stop all solid foods 8 hours before surgery.  -Keep drinking clear liquids until 4 hours before surgery. Clear liquids include water, clear juice, black coffee, or clear tea without milk, Gatorade, clear soda.   - Discontinue Aspirin, NSAIDs (Advil/Ibuprofen, Naproxen,Nuprin,Relafen/Nabumetone, Diclofenac,Meloxicam, Aleve, Celebrex) x 7 days prior to surgical date.  - May try tylenol for pain 1000 mg three times per day for pain      Post-Operative:  -Do not begin taking Non-Steroidal Anti-Inflammatory Drugs or NSAIDs (Advil/ibuprofen, Naproxen,Nuprin, Relafen/Nabumetone, Diclofenac,Meloxicam, Aleve, Celebrex, Aspirin, etc.) until 12 weeks after surgery. May cause bleeding and interfere with bone healing.   - Post operative incisional pain x 1-2 weeks which will require pain medications and muscle relaxants. You will receive medication upon discharge.  -Do NOT drive while taking narcotic pain medication.  -Do not drink alcohol while using any pain medication  -Post operative incision care- Watch for signs of infection: redness, swelling, warmth, drainage, and fever of 101 degrees or higher. Notify clinic [phone].  -No submerging incision in water such as pools, hot tubs,baths for at least 8 weeks or until incision is healed. Showers are fine.   - Post operative activity limitations: 6-8  weeks, no lifting > 10 pounds, no bending, twisting, or overhead reaching.  -Orthotics will fit you for a brace in the hospital.Lumbar Fusion: wear for 6-8 weeks   - Follow up appointments: 2 weeks suture/staple removal and 6 week post op follow up visit with Nurse practitioner and x-ray prior to appointment.Please call to schedule follow up appointment at [phone].  -If you are currently employed, you will need to be off work for 4-6 weeks for post op recovery and healing.   - For information on spinal fusion please visit our website at [email or web address]   -Please call AMISHA Poe with questions or concerns at [phone].

## 2018-01-25 NOTE — H&P (VIEW-ONLY)
[facility]  [address] [address] [address] [facility] [place] [address]  [phone]  Dept: [phone]    PRE-OP EVALUATION:  Today's date: 2018    Ambrocio Vázquez (: 1950) presents for pre-operative evaluation assessment as requested by Dr. Velez .  He requires evaluation and anesthesia risk assessment prior to undergoing surgery/procedure for treatment of :  Right-sided findings are at L4-L5 and L5-S1 : Severe right L4-L5 foraminal stenosis  And moderate-severe right L5-S1 foraminal stenosis   Small-moderate-sized left L5-S1 disc herniation causing mild displacement of the left S1 nerve root.   Moderate-severe left L3-L4 foraminal stenosis due to loss of disc space height and disc material extending into the left L3-L4 neural foramen.    Proposed procedure: Bilateral L3 to S1 decompression with facetectomies and Transforaminal lumbar interbody fusion    Date of Surgery/ Procedure: 2018  Time of Surgery/ Procedure: 7:30am   Hospital/Surgical Facility: [facility]   Primary Physician: Joelle Randle  Type of Anesthesia Anticipated: General    Patient has a Health Care Directive or Living Will:  YES     1. Yes - Do you have a history of heart attack, stroke, stent, bypass or surgery on an artery in the head, neck, heart or legs? Stent in heart -    Has an upcoming appointment with cardiology for clearance. Will do a nuc med stress test   2. NO - Do you ever have any pain or discomfort in your chest?  3. NO - Do you have a history of  Heart Failure?  4. NO - Are you troubled by shortness of breath when: walking on the level, up a slight hill or at night?  5. NO - Do you currently have a cold, bronchitis or other respiratory infection?  6. NO - Do you have a cough, shortness of breath or wheezing?  7. NO - Do you sometimes get pains in the calves of your legs when you walk?  8. NO - Do you or anyone in your family have previous history of blood clots?  9. NO - Do you or does  anyone in your family have a serious bleeding problem such as prolonged bleeding following surgeries or cuts?  10. NO - Have you ever had problems with anemia or been told to take iron pills?  11. NO - Have you had any abnormal blood loss such as black, tarry or bloody stools, or abnormal vaginal bleeding?  12. NO - Have you ever had a blood transfusion?  13. NO - Have you or any of your relatives ever had problems with anesthesia?  14. Yes - Do you have sleep apnea, excessive snoring or daytime drowsiness? Snoring   15. NO - Do you have any prosthetic heart valves?  16. NO - Do you have prosthetic joints?  17. NO - Is there any chance that you may be pregnant?        HPI:                                                      Brief HPI related to upcoming procedure: history of sciatica but recently pain in right leg and hip was worsening and causing right foot weakness   On prednisone and percocet for pain  Not sleeping due to pain    Anxiety is high due to upcoming surgery  He is worrying a lot and it is affecting his sleep     See problem list for active medical problems.  Problems all longstanding and stable, except as noted/documented.  See ROS for pertinent symptoms related to these conditions.                                                                                                  .    MEDICAL HISTORY:                                                    Patient Active Problem List    Diagnosis Date Noted     Family history of aortic aneurysm 02/09/2017     Priority: Medium     Gets the ultrasounds done with cardiologist  Mother had AAA       Coronary arteriosclerosis 12/18/2015     Priority: Medium     5/4/10 - 100% occlusion of proximal LAD with 2 [place] stents.   6/28/11 - seen in f/u. Adenosine stress 6/23/11 - normal LV perfusion, some atypical chest qamar       Hyperlipidemia      Priority: Medium     Diagnosis updated by automated process. Provider to review and confirm.       Hypertension       Priority: Medium     History of angina      Priority: Medium     Former smoker 01/18/2014     Priority: Medium     Advanced directives, counseling/discussion 03/13/2013     Priority: Medium     Advance Care Planning:   ACP Review and Resources Provided: Reviewed chart for advance care plan. Ambrocio Vázquez has no plan or code status on file. Discussed available resources and provided with information.   Added by Christina Garrido on 3/13/2013         CARDIOVASCULAR SCREENING; LDL GOAL LESS THAN 100 10/31/2010     Priority: Medium     CAD (coronary artery disease) 05/07/2010     Priority: Medium     5/4/10 - 100% occlusion of proximal LAD with 2 [place] stents.   6/28/11 - seen in f/u. Adenosine stress 6/23/11 - normal LV perfusion, some atypical chest pain but no ECG changes consistent with ischemia    Stent placed 5/10 - was on effient x 2 years . Now off        Cardiomyopathy (H) 05/07/2010     Priority: Medium     Mild - likely due to CAD, 2 stent placement 5/4/10 - EF 40%        Colon polyp 04/29/2009     Priority: Medium     Seen 8/09 colonoscopy - tubular adenoma - repeat recommended in 3 years.       Abnormal echocardiogram 04/29/2009     Priority: Medium     Some dilatation of cardiac root, some thickeing of LV - followed by Dr. 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 His daughter is affected and has two mutations in this gene.  He is maintained on one 330 mg tablet daily with normal levels.         Sciatica 03/31/2008     Priority: Medium     S/p 2 injections - follow by [facility]       Essential hypertension, benign 03/31/2008     Priority: Medium     ERECTILE DYSFUNCTION 03/31/2008     Priority: Medium     Disorder of prostate 03/31/2008     Priority: Medium      Followed by Urology for elevated PSA levels - Bx's done in past.  Problem list name updated by automated process. Provider to review        Past Medical History:   Diagnosis Date     Arthritis      BPH (benign prostatic hyperplasia)      Cardiomyopathy (H)      Carnitine deficiency (H)      Colon polyp     tubular adenoma     Coronary artery disease     ischemic heart dz.LMA-nl,   stenting of Lad, diag small 80%, Lcx30%, RCA50%     ED (erectile dysfunction)      H/O angina pectoris      Hyperlipidaemia      Hypertension     Cardilogist last visit several yeara ago now follewd by Primary DR.      Impaired fasting glucose      Intervertebral lumbar disc disorder with myelopathy, lumbar region (aka DISK)     lumbar disk disease     Other chronic pain     Back pain for many years.     PFO (patent foramen ovale)      Sciatica      Stented coronary artery     Pt will call for cardiac clearance prior to surgery. [facility]     Past Surgical History:   Procedure Laterality Date     C LAMINECTOMY,>2 SGMT,LUMBAR  2001     COLONOSCOPY       COLONOSCOPY  5/3/2013    Procedure: COLONOSCOPY;  Colonoscopy;  Surgeon: Genaro Krishnan MD;  Location: [facility]     CORONARY ANGIOGRAPHY ADULT ORDER  6/7/2014    No restenosis     HEART CATH, ANGIOPLASTY  5/2010    complex  angioplasty/thrombectomy SHAZIA stenting proximal and ostial LAD;LMA-nl,   stenting of Lad, diag small 80%, Lcx30%, RCA50%     HERNIA REPAIR      [facility] RI     HERNIORRHAPHY INGUINAL  1/11/2013    Procedure: HERNIORRHAPHY INGUINAL;  LEFT INGUINAL HERNIA REPAIR WITH MESH;  Surgeon: Santos Coleman MD;  Location:  OR     HERNIORRHAPHY INGUINAL Right 12/29/2015    Procedure: HERNIORRHAPHY INGUINAL;  Surgeon: Santos Coleman MD;  Location:  OR     PAST SURGICAL HISTORY  2002    hernia repair, right     PAST SURGICAL HISTORY      pilonidal cyst and sebaceous cyst surg     PE TUBES  2/10    left ear PE for persistant ZINA     SOFT TISSUE SURGERY       right axilla lipoma removed     Current Outpatient Prescriptions   Medication Sig Dispense Refill     oxyCODONE-acetaminophen (PERCOCET) 5-325 MG per tablet Take 1-2 tablets by mouth every 4 hours as needed for pain 50 tablet 0     simvastatin (ZOCOR) 40 MG tablet Take 1 tablet (40 mg) by mouth At Bedtime 90 tablet 3     lisinopril (PRINIVIL/ZESTRIL) 40 MG tablet Take 1 tablet (40 mg) by mouth daily 90 tablet 1     metoprolol (TOPROL-XL) 25 MG 24 hr tablet Take 1 tablet (25 mg) by mouth daily 90 tablet 1     sildenafil (VIAGRA) 100 MG cap/tab Take 1 tablet (100 mg) by mouth daily as needed for erectile dysfunction Take 30 min to 4 hours before intercourse.  Never use with nitroglycerin, terazosin or doxazosin. 10 tablet 3     finasteride (PROSCAR) 5 MG tablet Take 1 tablet (5 mg) by mouth daily Patient said he takes one tablet daily 90 tablet 3     nitroglycerin (NITROSTAT) 0.4 MG sublingual tablet Place 1 tablet (0.4 mg) under the tongue See Admin Instructions for chest pain 25 tablet 3     fish oil-omega-3 fatty acids (FISH OIL) 1000 MG capsule Take 1 g by mouth daily        ASPIRIN 81 MG OR TABS ONE DAILY       OTC products: no recent use of herbal medications or other supplements  Just completed a course of prednisone  Takes occasional nsaids     Allergies   Allergen Reactions     Augmentin Nausea and Vomiting     Sulfa Drugs Nausea      Latex Allergy: NO    Social History   Substance Use Topics     Smoking status: Former Smoker     Packs/day: 0.50     Years: 30.00     Types: Cigarettes     Quit date: 1/15/2010     Smokeless tobacco: Never Used     Alcohol use 0.0 oz/week     0 Standard drinks or equivalent per week      Comment: 1-2 a week     History   Drug Use No       REVIEW OF SYSTEMS:                                                    Constitutional, neuro, ENT, endocrine, pulmonary, cardiac, gastrointestinal, genitourinary, musculoskeletal - exception is right leg/hip symptoms,  integument and psychiatric  "systems are negative, except as otherwise noted.      EXAM:                                                    BP (!) 137/97 (BP Location: Right arm, Patient Position: Sitting, Cuff Size: Adult Regular)  Pulse 57  Temp 97.9  F (36.6  C) (Tympanic)  Ht 5' 10.75\" (1.797 m)  Wt 163 lb 8 oz (74.2 kg)  BMI 22.97 kg/m2   BP Readings from Last 6 Encounters:   01/16/18 (!) 137/97   01/09/18 (!) 172/99   12/12/17 141/82   11/14/17 (!) 159/103   03/28/17 (!) 174/108   02/27/17 124/79         GENERAL APPEARANCE: healthy, alert and no distress     EYES: EOMI,  PERRL     HENT: ear canals and TM's normal and nose and mouth without ulcers or lesions     NECK: no adenopathy, no asymmetry, masses, or scars and thyroid normal to palpation     RESP: lungs clear to auscultation - no rales, rhonchi or wheezes     CV: regular rates and rhythm, normal S1 S2, no S3 or S4 and no murmur, click or rub     ABDOMEN:  soft, nontender, no HSM or masses and bowel sounds normal     MS: extremities normal- no gross deformities noted, no evidence of inflammation in joints, FROM in all extremities.     NEURO: Normal strength and tone, sensory exam grossly normal, mentation intact and speech normal     PSYCH: mentation appears normal. and affect normal/bright    DIAGNOSTICS:                                                    EKG: Not indicated due to upcoming nuc med stress test and cardiology appointment     Results for orders placed or performed in visit on 01/16/18   Basic metabolic panel   Result Value Ref Range    Sodium 141 133 - 144 mmol/L    Potassium 4.4 3.4 - 5.3 mmol/L    Chloride 106 94 - 109 mmol/L    Carbon Dioxide 31 20 - 32 mmol/L    Anion Gap 4 3 - 14 mmol/L    Glucose 107 (H) 70 - 99 mg/dL    Urea Nitrogen 23 7 - 30 mg/dL    Creatinine 0.90 0.66 - 1.25 mg/dL    GFR Estimate 84 >60 mL/min/1.7m2    GFR Estimate If Black >90 >60 mL/min/1.7m2    Calcium 9.4 8.5 - 10.1 mg/dL   CBC with platelets differential   Result Value Ref Range "    WBC 9.5 4.0 - 11.0 10e9/L    RBC Count 5.72 4.4 - 5.9 10e12/L    Hemoglobin 18.0 (H) 13.3 - 17.7 g/dL    Hematocrit 54.6 (H) 40.0 - 53.0 %    MCV 96 78 - 100 fl    MCH 31.5 26.5 - 33.0 pg    MCHC 33.0 31.5 - 36.5 g/dL    RDW 13.2 10.0 - 15.0 %    Platelet Count 204 150 - 450 10e9/L    Diff Method Automated Method     % Neutrophils 68.7 %    % Lymphocytes 18.5 %    % Monocytes 9.7 %    % Eosinophils 2.8 %    % Basophils 0.3 %    Absolute Neutrophil 6.6 1.6 - 8.3 10e9/L    Absolute Lymphocytes 1.8 0.8 - 5.3 10e9/L    Absolute Monocytes 0.9 0.0 - 1.3 10e9/L    Absolute Eosinophils 0.3 0.0 - 0.7 10e9/L    Absolute Basophils 0.0 0.0 - 0.2 10e9/L   Methicillin Resistant Staph Aureus PCR   Result Value Ref Range    Specimen Description Nares     S Aur Meth Resis PCR Negative NEG^Negative       Recent Labs   Lab Test  03/28/17   0758  02/09/17   0825  07/29/16   1345  07/22/16   1010   06/07/10   0657   05/03/10   1304   HGB  17.7   --    --   15.4   < >  15.0   < >  17.0   PLT  183   --    --   163   < >  195   < >  198   INR   --    --    --    --    --   1.08   --   1.06   NA  139  139   --   137   < >  143   < >  142   POTASSIUM  5.4*  4.0   --   3.9   < >  3.9   < >  4.6   CR  0.83  1.03   --   0.66   < >  0.90   < >  0.90   A1C   --    --   6.2*   --    --    --    --    --     < > = values in this interval not displayed.        IMPRESSION:                                                    Reason for surgery/procedure:  lumbar L3-5 foraminal stenosis    Diagnosis/reason for consult:   Pre op consultation  Hypertension  CAD with history of cardiac stenting  Hyperlipidemia   Primary carnitine deficiency  BPH    The proposed surgical procedure is considered INTERMEDIATE risk.    REVISED CARDIAC RISK INDEX  The patient has the following serious cardiovascular risks for perioperative complications such as (MI, PE, VFib and 3  AV Block):  No serious cardiac risks  INTERPRETATION: 1 risks: Class II (low risk - 0.9%  complication rate)    The patient has the following additional risks for perioperative complications:  No identified additional risks      RECOMMENDATIONS:                                                        --Patient is to take all scheduled medications on the day of surgery EXCEPT for modifications listed below.  No nsaids for 7 days prior  No ASA for 7 days prior - confirm with cardiology  No fish oil for 7 days prior    Hypertension - he feels it is elevated due to anxiety and pain. He reports normal blood pressure in home environment. Will continue current blood pressure medications and he should take them on the day of surgery.     Has upcoming nuc med study and cardiology appointment     Anxiety about procedure - I have given him a small quantity of ativan to use for severe anxiety over the next few weeks.     APPROVAL GIVEN to proceed with proposed procedure, without further diagnostic evaluation, assuming he is also cleared by cardiology.      Signed Electronically by: Joelle Randle MD    Copy of this evaluation report is provided to requesting physician.    [facility] Preop Guidelines

## 2018-01-25 NOTE — PHARMACY-ADMISSION MEDICATION HISTORY
Pharmacy reviewed prior to admission med list from pre-admitting rn, ISIDRO Ohara        Prior to Admission medications    Medication Sig Last Dose Taking? Auth Provider   oxyCODONE-acetaminophen (PERCOCET) 5-325 MG per tablet Take 1-2 tablets by mouth every 4 hours as needed for pain  Yes Gabino Velez MD   simvastatin (ZOCOR) 40 MG tablet Take 1 tablet (40 mg) by mouth At Bedtime  Yes Joelle Randle MD   lisinopril (PRINIVIL/ZESTRIL) 40 MG tablet Take 1 tablet (40 mg) by mouth daily  Yes Joelle Randle MD   metoprolol (TOPROL-XL) 25 MG 24 hr tablet Take 1 tablet (25 mg) by mouth daily  Yes Joelle Randle MD   sildenafil (VIAGRA) 100 MG cap/tab Take 1 tablet (100 mg) by mouth daily as needed for erectile dysfunction Take 30 min to 4 hours before intercourse.  Never use with nitroglycerin, terazosin or doxazosin.  Yes Joelle Randle MD   finasteride (PROSCAR) 5 MG tablet Take 1 tablet (5 mg) by mouth daily Patient said he takes one tablet daily  Yes Joelle Randle MD   nitroglycerin (NITROSTAT) 0.4 MG sublingual tablet Place 1 tablet (0.4 mg) under the tongue See Admin Instructions for chest pain  Yes Joelle Randle MD   fish oil-omega-3 fatty acids (FISH OIL) 1000 MG capsule Take 1 g by mouth daily   Yes Reported, Patient   ASPIRIN 81 MG OR TABS ONE DAILY  Yes Shelbie Mathew MD   LORazepam (ATIVAN) 0.5 MG tablet Take 1 tablet (0.5 mg) by mouth every 8 hours as needed for anxiety   Joelle Randle MD

## 2018-01-29 ENCOUNTER — HOSPITAL ENCOUNTER (INPATIENT)
Facility: CLINIC | Age: 68
LOS: 3 days | Discharge: HOME | DRG: 454 | End: 2018-02-01
Attending: NEUROLOGICAL SURGERY | Admitting: NEUROLOGICAL SURGERY
Payer: COMMERCIAL

## 2018-01-29 ENCOUNTER — APPOINTMENT (OUTPATIENT)
Dept: GENERAL RADIOLOGY | Facility: CLINIC | Age: 68
DRG: 454 | End: 2018-01-29
Attending: NEUROLOGICAL SURGERY
Payer: COMMERCIAL

## 2018-01-29 ENCOUNTER — ANESTHESIA (OUTPATIENT)
Dept: SURGERY | Facility: CLINIC | Age: 68
DRG: 454 | End: 2018-01-29
Payer: COMMERCIAL

## 2018-01-29 DIAGNOSIS — Z98.1 S/P LUMBAR FUSION: Primary | ICD-10-CM

## 2018-01-29 LAB
ABO + RH BLD: NORMAL
ABO + RH BLD: NORMAL
BLD GP AB SCN SERPL QL: NORMAL
BLOOD BANK CMNT PATIENT-IMP: NORMAL
GLUCOSE BLDC GLUCOMTR-MCNC: 135 MG/DL (ref 70–99)
SPECIMEN EXP DATE BLD: NORMAL

## 2018-01-29 PROCEDURE — 86850 RBC ANTIBODY SCREEN: CPT | Performed by: ANESTHESIOLOGY

## 2018-01-29 PROCEDURE — C1762 CONN TISS, HUMAN(INC FASCIA): HCPCS | Performed by: NEUROLOGICAL SURGERY

## 2018-01-29 PROCEDURE — 22853 INSJ BIOMECHANICAL DEVICE: CPT | Performed by: NEUROLOGICAL SURGERY

## 2018-01-29 PROCEDURE — 25000128 H RX IP 250 OP 636: Performed by: ANESTHESIOLOGY

## 2018-01-29 PROCEDURE — 71000013 ZZH RECOVERY PHASE 1 LEVEL 1 EA ADDTL HR: Performed by: NEUROLOGICAL SURGERY

## 2018-01-29 PROCEDURE — 22216 INCIS ADDL SPINE SEGMENT: CPT | Performed by: NEUROLOGICAL SURGERY

## 2018-01-29 PROCEDURE — 22633 ARTHRD CMBN 1NTRSPC LUMBAR: CPT | Performed by: NEUROLOGICAL SURGERY

## 2018-01-29 PROCEDURE — 22214 INCIS 1 VERTEBRAL SEG LUMBAR: CPT | Mod: 51 | Performed by: NEUROLOGICAL SURGERY

## 2018-01-29 PROCEDURE — 37000009 ZZH ANESTHESIA TECHNICAL FEE, EACH ADDTL 15 MIN: Performed by: NEUROLOGICAL SURGERY

## 2018-01-29 PROCEDURE — 71000012 ZZH RECOVERY PHASE 1 LEVEL 1 FIRST HR: Performed by: NEUROLOGICAL SURGERY

## 2018-01-29 PROCEDURE — 22634 ARTHRD CMBN 1NTRSPC EA ADDL: CPT | Performed by: NEUROLOGICAL SURGERY

## 2018-01-29 PROCEDURE — 25000132 ZZH RX MED GY IP 250 OP 250 PS 637: Mod: GY | Performed by: NEUROLOGICAL SURGERY

## 2018-01-29 PROCEDURE — 27210794 ZZH OR GENERAL SUPPLY STERILE: Performed by: NEUROLOGICAL SURGERY

## 2018-01-29 PROCEDURE — C1713 ANCHOR/SCREW BN/BN,TIS/BN: HCPCS | Performed by: NEUROLOGICAL SURGERY

## 2018-01-29 PROCEDURE — 25000125 ZZHC RX 250: Performed by: NEUROLOGICAL SURGERY

## 2018-01-29 PROCEDURE — 27210995 ZZH RX 272: Performed by: NEUROLOGICAL SURGERY

## 2018-01-29 PROCEDURE — 40000985 XR LUMBAR SPINE PORT 1 VW: Mod: TC

## 2018-01-29 PROCEDURE — 25000128 H RX IP 250 OP 636

## 2018-01-29 PROCEDURE — 00000146 ZZHCL STATISTIC GLUCOSE BY METER IP

## 2018-01-29 PROCEDURE — 37000008 ZZH ANESTHESIA TECHNICAL FEE, 1ST 30 MIN: Performed by: NEUROLOGICAL SURGERY

## 2018-01-29 PROCEDURE — 0SG3071 FUSION OF LUMBOSACRAL JOINT WITH AUTOLOGOUS TISSUE SUBSTITUTE, POSTERIOR APPROACH, POSTERIOR COLUMN, OPEN APPROACH: ICD-10-PCS | Performed by: NEUROLOGICAL SURGERY

## 2018-01-29 PROCEDURE — 0ST40ZZ RESECTION OF LUMBOSACRAL DISC, OPEN APPROACH: ICD-10-PCS | Performed by: NEUROLOGICAL SURGERY

## 2018-01-29 PROCEDURE — 25000566 ZZH SEVOFLURANE, EA 15 MIN: Performed by: NEUROLOGICAL SURGERY

## 2018-01-29 PROCEDURE — 25000125 ZZHC RX 250: Performed by: NURSE ANESTHETIST, CERTIFIED REGISTERED

## 2018-01-29 PROCEDURE — 25000128 H RX IP 250 OP 636: Performed by: NURSE ANESTHETIST, CERTIFIED REGISTERED

## 2018-01-29 PROCEDURE — 36000069 ZZH SURGERY LEVEL 5 EA 15 ADDTL MIN: Performed by: NEUROLOGICAL SURGERY

## 2018-01-29 PROCEDURE — 8E0WXBF COMPUTER ASSISTED PROCEDURE OF TRUNK REGION, WITH FLUOROSCOPY: ICD-10-PCS | Performed by: NEUROLOGICAL SURGERY

## 2018-01-29 PROCEDURE — 25000128 H RX IP 250 OP 636: Performed by: NEUROLOGICAL SURGERY

## 2018-01-29 PROCEDURE — 12000007 ZZH R&B INTERMEDIATE

## 2018-01-29 PROCEDURE — 86900 BLOOD TYPING SEROLOGIC ABO: CPT | Performed by: ANESTHESIOLOGY

## 2018-01-29 PROCEDURE — 25000300 ZZH OR RX SURGIFLO HEMOSTATIC MATRIX 10ML 199102S OPNP: Performed by: NEUROLOGICAL SURGERY

## 2018-01-29 PROCEDURE — 36000071 ZZH SURGERY LEVEL 5 W FLUORO 1ST 30 MIN: Performed by: NEUROLOGICAL SURGERY

## 2018-01-29 PROCEDURE — 86901 BLOOD TYPING SEROLOGIC RH(D): CPT | Performed by: ANESTHESIOLOGY

## 2018-01-29 PROCEDURE — 40000278 XR SURGERY CARM FLUORO LESS THAN 5 MIN: Mod: TC

## 2018-01-29 PROCEDURE — 0SG1071 FUSION OF 2 OR MORE LUMBAR VERTEBRAL JOINTS WITH AUTOLOGOUS TISSUE SUBSTITUTE, POSTERIOR APPROACH, POSTERIOR COLUMN, OPEN APPROACH: ICD-10-PCS | Performed by: NEUROLOGICAL SURGERY

## 2018-01-29 PROCEDURE — 0ST20ZZ RESECTION OF LUMBAR VERTEBRAL DISC, OPEN APPROACH: ICD-10-PCS | Performed by: NEUROLOGICAL SURGERY

## 2018-01-29 PROCEDURE — 25800025 ZZH RX 258: Performed by: NEUROLOGICAL SURGERY

## 2018-01-29 PROCEDURE — A9270 NON-COVERED ITEM OR SERVICE: HCPCS | Mod: GY | Performed by: NEUROLOGICAL SURGERY

## 2018-01-29 PROCEDURE — 0SG10AJ FUSION OF 2 OR MORE LUMBAR VERTEBRAL JOINTS WITH INTERBODY FUSION DEVICE, POSTERIOR APPROACH, ANTERIOR COLUMN, OPEN APPROACH: ICD-10-PCS | Performed by: NEUROLOGICAL SURGERY

## 2018-01-29 PROCEDURE — 0SG30AJ FUSION OF LUMBOSACRAL JOINT WITH INTERBODY FUSION DEVICE, POSTERIOR APPROACH, ANTERIOR COLUMN, OPEN APPROACH: ICD-10-PCS | Performed by: NEUROLOGICAL SURGERY

## 2018-01-29 PROCEDURE — 22842 INSERT SPINE FIXATION DEVICE: CPT | Performed by: NEUROLOGICAL SURGERY

## 2018-01-29 PROCEDURE — 93010 ELECTROCARDIOGRAM REPORT: CPT | Performed by: INTERNAL MEDICINE

## 2018-01-29 PROCEDURE — 40000306 ZZH STATISTIC PRE PROC ASSESS II: Performed by: NEUROLOGICAL SURGERY

## 2018-01-29 DEVICE — GRAFT BONE MAGNIFUSE 1CMX10CM 7509211: Type: IMPLANTABLE DEVICE | Site: SPINE LUMBAR | Status: FUNCTIONAL

## 2018-01-29 DEVICE — IMPLANTABLE DEVICE: Type: IMPLANTABLE DEVICE | Site: SPINE LUMBAR | Status: FUNCTIONAL

## 2018-01-29 RX ORDER — SIMVASTATIN 40 MG
40 TABLET ORAL AT BEDTIME
Status: DISCONTINUED | OUTPATIENT
Start: 2018-01-29 | End: 2018-02-01 | Stop reason: HOSPADM

## 2018-01-29 RX ORDER — VANCOMYCIN HCL 900 MCG/MG
POWDER (GRAM) MISCELLANEOUS PRN
Status: DISCONTINUED | OUTPATIENT
Start: 2018-01-29 | End: 2018-01-29 | Stop reason: HOSPADM

## 2018-01-29 RX ORDER — CEFAZOLIN SODIUM 2 G/100ML
2 INJECTION, SOLUTION INTRAVENOUS EVERY 8 HOURS
Status: DISCONTINUED | OUTPATIENT
Start: 2018-01-29 | End: 2018-02-01 | Stop reason: HOSPADM

## 2018-01-29 RX ORDER — METHADONE HYDROCHLORIDE 10 MG/ML
20 INJECTION, SOLUTION INTRAMUSCULAR; INTRAVENOUS; SUBCUTANEOUS ONCE
Status: COMPLETED | OUTPATIENT
Start: 2018-01-29 | End: 2018-01-29

## 2018-01-29 RX ORDER — LIDOCAINE HYDROCHLORIDE 10 MG/ML
INJECTION, SOLUTION INFILTRATION; PERINEURAL PRN
Status: DISCONTINUED | OUTPATIENT
Start: 2018-01-29 | End: 2018-01-29

## 2018-01-29 RX ORDER — ONDANSETRON 4 MG/1
4 TABLET, ORALLY DISINTEGRATING ORAL EVERY 6 HOURS PRN
Status: DISCONTINUED | OUTPATIENT
Start: 2018-01-29 | End: 2018-02-01 | Stop reason: HOSPADM

## 2018-01-29 RX ORDER — ONDANSETRON 2 MG/ML
INJECTION INTRAMUSCULAR; INTRAVENOUS PRN
Status: DISCONTINUED | OUTPATIENT
Start: 2018-01-29 | End: 2018-01-29

## 2018-01-29 RX ORDER — ONDANSETRON 2 MG/ML
4 INJECTION INTRAMUSCULAR; INTRAVENOUS EVERY 30 MIN PRN
Status: DISCONTINUED | OUTPATIENT
Start: 2018-01-29 | End: 2018-01-29 | Stop reason: HOSPADM

## 2018-01-29 RX ORDER — OXYCODONE AND ACETAMINOPHEN 5; 325 MG/1; MG/1
1-2 TABLET ORAL EVERY 4 HOURS PRN
Status: DISCONTINUED | OUTPATIENT
Start: 2018-01-29 | End: 2018-02-01 | Stop reason: HOSPADM

## 2018-01-29 RX ORDER — METOCLOPRAMIDE 5 MG/1
5 TABLET ORAL EVERY 6 HOURS PRN
Status: DISCONTINUED | OUTPATIENT
Start: 2018-01-29 | End: 2018-02-01 | Stop reason: HOSPADM

## 2018-01-29 RX ORDER — HYDRALAZINE HYDROCHLORIDE 20 MG/ML
2.5-5 INJECTION INTRAMUSCULAR; INTRAVENOUS EVERY 10 MIN PRN
Status: DISCONTINUED | OUTPATIENT
Start: 2018-01-29 | End: 2018-01-29 | Stop reason: HOSPADM

## 2018-01-29 RX ORDER — FENTANYL CITRATE 50 UG/ML
INJECTION, SOLUTION INTRAMUSCULAR; INTRAVENOUS PRN
Status: DISCONTINUED | OUTPATIENT
Start: 2018-01-29 | End: 2018-01-29

## 2018-01-29 RX ORDER — FINASTERIDE 5 MG/1
5 TABLET, FILM COATED ORAL DAILY
Status: DISCONTINUED | OUTPATIENT
Start: 2018-01-30 | End: 2018-02-01 | Stop reason: HOSPADM

## 2018-01-29 RX ORDER — HYDROMORPHONE HYDROCHLORIDE 1 MG/ML
.3-.5 INJECTION, SOLUTION INTRAMUSCULAR; INTRAVENOUS; SUBCUTANEOUS EVERY 10 MIN PRN
Status: DISCONTINUED | OUTPATIENT
Start: 2018-01-29 | End: 2018-01-29 | Stop reason: HOSPADM

## 2018-01-29 RX ORDER — LISINOPRIL 40 MG/1
40 TABLET ORAL DAILY
Status: DISCONTINUED | OUTPATIENT
Start: 2018-01-30 | End: 2018-01-30

## 2018-01-29 RX ORDER — LIDOCAINE 40 MG/G
CREAM TOPICAL
Status: DISCONTINUED | OUTPATIENT
Start: 2018-01-29 | End: 2018-02-01 | Stop reason: HOSPADM

## 2018-01-29 RX ORDER — METOPROLOL SUCCINATE 25 MG/1
25 TABLET, EXTENDED RELEASE ORAL DAILY
Status: DISCONTINUED | OUTPATIENT
Start: 2018-01-30 | End: 2018-01-30

## 2018-01-29 RX ORDER — METOPROLOL TARTRATE 1 MG/ML
1-2 INJECTION, SOLUTION INTRAVENOUS EVERY 5 MIN PRN
Status: DISCONTINUED | OUTPATIENT
Start: 2018-01-29 | End: 2018-01-29 | Stop reason: HOSPADM

## 2018-01-29 RX ORDER — SODIUM CHLORIDE, SODIUM LACTATE, POTASSIUM CHLORIDE, CALCIUM CHLORIDE 600; 310; 30; 20 MG/100ML; MG/100ML; MG/100ML; MG/100ML
INJECTION, SOLUTION INTRAVENOUS CONTINUOUS
Status: DISCONTINUED | OUTPATIENT
Start: 2018-01-29 | End: 2018-01-29 | Stop reason: HOSPADM

## 2018-01-29 RX ORDER — SODIUM CHLORIDE AND POTASSIUM CHLORIDE 150; 900 MG/100ML; MG/100ML
INJECTION, SOLUTION INTRAVENOUS CONTINUOUS
Status: DISCONTINUED | OUTPATIENT
Start: 2018-01-29 | End: 2018-01-30 | Stop reason: CLARIF

## 2018-01-29 RX ORDER — LIDOCAINE 40 MG/G
CREAM TOPICAL
Status: DISCONTINUED | OUTPATIENT
Start: 2018-01-29 | End: 2018-01-29 | Stop reason: HOSPADM

## 2018-01-29 RX ORDER — METOCLOPRAMIDE HYDROCHLORIDE 5 MG/ML
5 INJECTION INTRAMUSCULAR; INTRAVENOUS EVERY 6 HOURS PRN
Status: DISCONTINUED | OUTPATIENT
Start: 2018-01-29 | End: 2018-02-01 | Stop reason: HOSPADM

## 2018-01-29 RX ORDER — DIAZEPAM 5 MG
5 TABLET ORAL EVERY 8 HOURS PRN
Status: DISCONTINUED | OUTPATIENT
Start: 2018-01-29 | End: 2018-02-01 | Stop reason: HOSPADM

## 2018-01-29 RX ORDER — ONDANSETRON 4 MG/1
4 TABLET, ORALLY DISINTEGRATING ORAL EVERY 30 MIN PRN
Status: DISCONTINUED | OUTPATIENT
Start: 2018-01-29 | End: 2018-01-29 | Stop reason: HOSPADM

## 2018-01-29 RX ORDER — PROPOFOL 10 MG/ML
INJECTION, EMULSION INTRAVENOUS PRN
Status: DISCONTINUED | OUTPATIENT
Start: 2018-01-29 | End: 2018-01-29

## 2018-01-29 RX ORDER — OXYCODONE HYDROCHLORIDE 5 MG/1
5 TABLET ORAL EVERY 4 HOURS PRN
Status: DISCONTINUED | OUTPATIENT
Start: 2018-01-29 | End: 2018-01-31

## 2018-01-29 RX ORDER — ONDANSETRON 2 MG/ML
INJECTION INTRAMUSCULAR; INTRAVENOUS
Status: COMPLETED
Start: 2018-01-29 | End: 2018-01-29

## 2018-01-29 RX ORDER — ACETAMINOPHEN 325 MG/1
975 TABLET ORAL EVERY 8 HOURS
Status: DISCONTINUED | OUTPATIENT
Start: 2018-01-29 | End: 2018-02-01 | Stop reason: HOSPADM

## 2018-01-29 RX ORDER — NALOXONE HYDROCHLORIDE 0.4 MG/ML
.1-.4 INJECTION, SOLUTION INTRAMUSCULAR; INTRAVENOUS; SUBCUTANEOUS
Status: DISCONTINUED | OUTPATIENT
Start: 2018-01-29 | End: 2018-02-01 | Stop reason: HOSPADM

## 2018-01-29 RX ORDER — CEFAZOLIN SODIUM 1 G/3ML
1 INJECTION, POWDER, FOR SOLUTION INTRAMUSCULAR; INTRAVENOUS SEE ADMIN INSTRUCTIONS
Status: DISCONTINUED | OUTPATIENT
Start: 2018-01-29 | End: 2018-01-29 | Stop reason: HOSPADM

## 2018-01-29 RX ORDER — GLYCOPYRROLATE 0.2 MG/ML
INJECTION, SOLUTION INTRAMUSCULAR; INTRAVENOUS PRN
Status: DISCONTINUED | OUTPATIENT
Start: 2018-01-29 | End: 2018-01-29

## 2018-01-29 RX ORDER — NEOSTIGMINE METHYLSULFATE 1 MG/ML
VIAL (ML) INJECTION PRN
Status: DISCONTINUED | OUTPATIENT
Start: 2018-01-29 | End: 2018-01-29

## 2018-01-29 RX ORDER — DIPHENHYDRAMINE HCL 12.5MG/5ML
12.5 LIQUID (ML) ORAL EVERY 6 HOURS PRN
Status: DISCONTINUED | OUTPATIENT
Start: 2018-01-29 | End: 2018-02-01 | Stop reason: HOSPADM

## 2018-01-29 RX ORDER — ACETAMINOPHEN 325 MG/1
650 TABLET ORAL EVERY 4 HOURS PRN
Status: DISCONTINUED | OUTPATIENT
Start: 2018-02-01 | End: 2018-02-01 | Stop reason: HOSPADM

## 2018-01-29 RX ORDER — FENTANYL CITRATE 50 UG/ML
25-50 INJECTION, SOLUTION INTRAMUSCULAR; INTRAVENOUS EVERY 5 MIN PRN
Status: DISCONTINUED | OUTPATIENT
Start: 2018-01-29 | End: 2018-01-29 | Stop reason: HOSPADM

## 2018-01-29 RX ORDER — NITROGLYCERIN 0.4 MG/1
0.4 TABLET SUBLINGUAL EVERY 5 MIN PRN
Status: DISCONTINUED | OUTPATIENT
Start: 2018-01-29 | End: 2018-02-01 | Stop reason: HOSPADM

## 2018-01-29 RX ORDER — HYDROMORPHONE HYDROCHLORIDE 2 MG/1
2-4 TABLET ORAL EVERY 4 HOURS PRN
Status: DISCONTINUED | OUTPATIENT
Start: 2018-01-29 | End: 2018-02-01 | Stop reason: HOSPADM

## 2018-01-29 RX ORDER — DEXAMETHASONE SODIUM PHOSPHATE 4 MG/ML
INJECTION, SOLUTION INTRA-ARTICULAR; INTRALESIONAL; INTRAMUSCULAR; INTRAVENOUS; SOFT TISSUE PRN
Status: DISCONTINUED | OUTPATIENT
Start: 2018-01-29 | End: 2018-01-29

## 2018-01-29 RX ORDER — PROCHLORPERAZINE MALEATE 5 MG
5 TABLET ORAL EVERY 6 HOURS PRN
Status: DISCONTINUED | OUTPATIENT
Start: 2018-01-29 | End: 2018-02-01 | Stop reason: HOSPADM

## 2018-01-29 RX ORDER — FENTANYL CITRATE 50 UG/ML
25-50 INJECTION, SOLUTION INTRAMUSCULAR; INTRAVENOUS
Status: DISCONTINUED | OUTPATIENT
Start: 2018-01-29 | End: 2018-01-29 | Stop reason: HOSPADM

## 2018-01-29 RX ORDER — MEPERIDINE HYDROCHLORIDE 25 MG/ML
12.5 INJECTION INTRAMUSCULAR; INTRAVENOUS; SUBCUTANEOUS
Status: DISCONTINUED | OUTPATIENT
Start: 2018-01-29 | End: 2018-01-29 | Stop reason: HOSPADM

## 2018-01-29 RX ORDER — DOCUSATE SODIUM 100 MG/1
100 CAPSULE, LIQUID FILLED ORAL 2 TIMES DAILY
Status: DISCONTINUED | OUTPATIENT
Start: 2018-01-29 | End: 2018-02-01 | Stop reason: HOSPADM

## 2018-01-29 RX ORDER — CEFAZOLIN SODIUM 2 G/100ML
2 INJECTION, SOLUTION INTRAVENOUS
Status: COMPLETED | OUTPATIENT
Start: 2018-01-29 | End: 2018-01-29

## 2018-01-29 RX ORDER — ONDANSETRON 2 MG/ML
4 INJECTION INTRAMUSCULAR; INTRAVENOUS EVERY 6 HOURS PRN
Status: DISCONTINUED | OUTPATIENT
Start: 2018-01-29 | End: 2018-02-01 | Stop reason: HOSPADM

## 2018-01-29 RX ORDER — HYDROMORPHONE HYDROCHLORIDE 1 MG/ML
.3-.5 INJECTION, SOLUTION INTRAMUSCULAR; INTRAVENOUS; SUBCUTANEOUS
Status: DISCONTINUED | OUTPATIENT
Start: 2018-01-29 | End: 2018-02-01 | Stop reason: HOSPADM

## 2018-01-29 RX ORDER — ALBUTEROL SULFATE 0.83 MG/ML
2.5 SOLUTION RESPIRATORY (INHALATION) EVERY 4 HOURS PRN
Status: DISCONTINUED | OUTPATIENT
Start: 2018-01-29 | End: 2018-01-29 | Stop reason: HOSPADM

## 2018-01-29 RX ORDER — NALOXONE HYDROCHLORIDE 0.4 MG/ML
.1-.4 INJECTION, SOLUTION INTRAMUSCULAR; INTRAVENOUS; SUBCUTANEOUS
Status: DISCONTINUED | OUTPATIENT
Start: 2018-01-29 | End: 2018-01-29 | Stop reason: HOSPADM

## 2018-01-29 RX ORDER — BUPIVACAINE HYDROCHLORIDE AND EPINEPHRINE 5; 5 MG/ML; UG/ML
INJECTION, SOLUTION EPIDURAL; INTRACAUDAL; PERINEURAL PRN
Status: DISCONTINUED | OUTPATIENT
Start: 2018-01-29 | End: 2018-01-29 | Stop reason: HOSPADM

## 2018-01-29 RX ORDER — EPHEDRINE SULFATE 50 MG/ML
INJECTION, SOLUTION INTRAMUSCULAR; INTRAVENOUS; SUBCUTANEOUS PRN
Status: DISCONTINUED | OUTPATIENT
Start: 2018-01-29 | End: 2018-01-29

## 2018-01-29 RX ORDER — DIPHENHYDRAMINE HYDROCHLORIDE 50 MG/ML
12.5 INJECTION INTRAMUSCULAR; INTRAVENOUS EVERY 6 HOURS PRN
Status: DISCONTINUED | OUTPATIENT
Start: 2018-01-29 | End: 2018-02-01 | Stop reason: HOSPADM

## 2018-01-29 RX ADMIN — SIMVASTATIN 40 MG: 40 TABLET, FILM COATED ORAL at 20:50

## 2018-01-29 RX ADMIN — LIDOCAINE HYDROCHLORIDE 50 MG: 10 INJECTION, SOLUTION INFILTRATION; PERINEURAL at 07:50

## 2018-01-29 RX ADMIN — Medication 5 MG: at 08:35

## 2018-01-29 RX ADMIN — SODIUM CHLORIDE, POTASSIUM CHLORIDE, SODIUM LACTATE AND CALCIUM CHLORIDE: 600; 310; 30; 20 INJECTION, SOLUTION INTRAVENOUS at 08:35

## 2018-01-29 RX ADMIN — Medication 5 MG: at 08:43

## 2018-01-29 RX ADMIN — CEFAZOLIN SODIUM 2 G: 2 INJECTION, SOLUTION INTRAVENOUS at 17:38

## 2018-01-29 RX ADMIN — GLYCOPYRROLATE 0.4 MG: 0.2 INJECTION, SOLUTION INTRAMUSCULAR; INTRAVENOUS at 12:07

## 2018-01-29 RX ADMIN — FENTANYL CITRATE 125 MCG: 50 INJECTION, SOLUTION INTRAMUSCULAR; INTRAVENOUS at 07:50

## 2018-01-29 RX ADMIN — MIDAZOLAM 2 MG: 1 INJECTION INTRAMUSCULAR; INTRAVENOUS at 07:46

## 2018-01-29 RX ADMIN — DEXAMETHASONE SODIUM PHOSPHATE 4 MG: 4 INJECTION, SOLUTION INTRA-ARTICULAR; INTRALESIONAL; INTRAMUSCULAR; INTRAVENOUS; SOFT TISSUE at 07:50

## 2018-01-29 RX ADMIN — DOCUSATE SODIUM 100 MG: 100 CAPSULE, LIQUID FILLED ORAL at 20:50

## 2018-01-29 RX ADMIN — HYDROMORPHONE HYDROCHLORIDE: 10 INJECTION, SOLUTION INTRAMUSCULAR; INTRAVENOUS; SUBCUTANEOUS at 22:47

## 2018-01-29 RX ADMIN — PHENYLEPHRINE HYDROCHLORIDE 200 MCG: 10 INJECTION, SOLUTION INTRAMUSCULAR; INTRAVENOUS; SUBCUTANEOUS at 07:55

## 2018-01-29 RX ADMIN — ONDANSETRON 4 MG: 2 INJECTION INTRAMUSCULAR; INTRAVENOUS at 16:37

## 2018-01-29 RX ADMIN — Medication 2 MG: at 12:07

## 2018-01-29 RX ADMIN — ACETAMINOPHEN 975 MG: 325 TABLET, FILM COATED ORAL at 20:50

## 2018-01-29 RX ADMIN — Medication 5 MG: at 08:40

## 2018-01-29 RX ADMIN — SODIUM CHLORIDE, POTASSIUM CHLORIDE, SODIUM LACTATE AND CALCIUM CHLORIDE: 600; 310; 30; 20 INJECTION, SOLUTION INTRAVENOUS at 07:10

## 2018-01-29 RX ADMIN — Medication 5 MG: at 08:14

## 2018-01-29 RX ADMIN — PROPOFOL 100 MG: 10 INJECTION, EMULSION INTRAVENOUS at 07:50

## 2018-01-29 RX ADMIN — ONDANSETRON 4 MG: 2 INJECTION INTRAMUSCULAR; INTRAVENOUS at 11:48

## 2018-01-29 RX ADMIN — HYDROMORPHONE HYDROCHLORIDE: 10 INJECTION, SOLUTION INTRAMUSCULAR; INTRAVENOUS; SUBCUTANEOUS at 13:00

## 2018-01-29 RX ADMIN — SODIUM CHLORIDE, POTASSIUM CHLORIDE, SODIUM LACTATE AND CALCIUM CHLORIDE: 600; 310; 30; 20 INJECTION, SOLUTION INTRAVENOUS at 15:00

## 2018-01-29 RX ADMIN — CEFAZOLIN 1 G: 1 INJECTION, POWDER, FOR SOLUTION INTRAMUSCULAR; INTRAVENOUS at 10:05

## 2018-01-29 RX ADMIN — METHADONE HYDROCHLORIDE 20 MG: 10 INJECTION, SOLUTION INTRAMUSCULAR; INTRAVENOUS; SUBCUTANEOUS at 07:55

## 2018-01-29 RX ADMIN — SODIUM CHLORIDE, POTASSIUM CHLORIDE, SODIUM LACTATE AND CALCIUM CHLORIDE: 600; 310; 30; 20 INJECTION, SOLUTION INTRAVENOUS at 11:12

## 2018-01-29 RX ADMIN — DIAZEPAM 5 MG: 5 TABLET ORAL at 14:17

## 2018-01-29 RX ADMIN — CEFAZOLIN SODIUM 2 G: 2 INJECTION, SOLUTION INTRAVENOUS at 08:00

## 2018-01-29 RX ADMIN — CEFAZOLIN 1 G: 1 INJECTION, POWDER, FOR SOLUTION INTRAMUSCULAR; INTRAVENOUS at 12:08

## 2018-01-29 RX ADMIN — ROCURONIUM BROMIDE 40 MG: 10 INJECTION INTRAVENOUS at 07:50

## 2018-01-29 RX ADMIN — FENTANYL CITRATE 50 MCG: 50 INJECTION INTRAMUSCULAR; INTRAVENOUS at 14:18

## 2018-01-29 RX ADMIN — POTASSIUM CHLORIDE AND SODIUM CHLORIDE: 900; 150 INJECTION, SOLUTION INTRAVENOUS at 17:38

## 2018-01-29 ASSESSMENT — LIFESTYLE VARIABLES: TOBACCO_USE: 1

## 2018-01-29 NOTE — ANESTHESIA CARE TRANSFER NOTE
Patient: Ambrocio Vázquez    Procedure(s):  Bilateral L3 to S1 decompression with facetectomies and Transforaminal lumbar interbody fusion - Wound Class: I-Clean    Diagnosis: Severe stenosis, severe ddd from L3 to S1 with loss of lordosis and compression of the exiting roots  Diagnosis Additional Information: No value filed.    Anesthesia Type:   General, ETT     Note:  Airway :Face Mask and Nasal Cannula  Patient transferred to:PACU  Comments: Report and sign off to RN in PACU.  Good resps, skin pink, monitors on, VSS,  O2 via Face Tent.Handoff Report: Identifed the Patient, Identified the Reponsible Provider, Reviewed the pertinent medical history, Discussed the surgical course, Reviewed Intra-OP anesthesia mangement and issues during anesthesia, Set expectations for post-procedure period and Allowed opportunity for questions and acknowledgement of understanding      Vitals: (Last set prior to Anesthesia Care Transfer)    CRNA VITALS  1/29/2018 1151 - 1/29/2018 1232      1/29/2018             Pulse: 83    SpO2: 99 %    Resp Rate (observed): (!)  4    EKG: NSR                Electronically Signed By: LILI Perez CRNA  January 29, 2018  12:32 PM

## 2018-01-29 NOTE — ANESTHESIA PREPROCEDURE EVALUATION
Anesthesia Evaluation     . Pt has had prior anesthetic.     No history of anesthetic complications          ROS/MED HX    ENT/Pulmonary:     (+)tobacco use, Past use , . .    Neurologic:     (+)other neuro radiculopathy to right leg causing foot weakness    Cardiovascular:     (+) Dyslipidemia, hypertension--CAD (normal stress test and echocardiogram 1/18), --stent,. : . . . :. . congenital heart disease PFO:,       METS/Exercise Tolerance:     Hematologic:         Musculoskeletal:   (+) arthritis, , , -       GI/Hepatic:         Renal/Genitourinary:     (+) BPH,       Endo:         Psychiatric:         Infectious Disease:         Malignancy:         Other: Comment: Primary carnitine deficiency, on replacement tablet   (+) H/O Chronic Pain,H/O chronic opiod use ,                    Physical Exam  Normal systems: pulmonary    Airway   Mallampati: II  TM distance: >3 FB  Neck ROM: full    Dental     Cardiovascular   Rhythm and rate: regular and normal      Pulmonary                     Anesthesia Plan      History & Physical Review  History and physical reviewed and following examination; no interval change.    ASA Status:  3 .    NPO Status:  > 8 hours    Plan for General and ETT with Intravenous and Propofol induction. Maintenance will be Balanced.    PONV prophylaxis:  Ondansetron (or other 5HT-3) and Dexamethasone or Solumedrol       Postoperative Care  Postoperative pain management:  IV analgesics, Oral pain medications and Multi-modal analgesia.      Consents  Anesthetic plan, risks, benefits and alternatives discussed with:  Patient..                          .

## 2018-01-29 NOTE — IP AVS SNAPSHOT
MRN:[id number]                      After Visit Summary   1/29/2018    Ambrocio Vázquez    MRN: [id number]           Thank you!     Thank you for choosing [facility] for your care. Our goal is always to provide you with excellent care. Hearing back from our patients is one way we can continue to improve our services. Please take a few minutes to complete the written survey that you may receive in the mail after you visit. If you would like to speak to someone directly about your visit please contact Patient Relations at [phone]. Thank you!          Patient Information     Date Of Birth          1950        About your hospital stay     You were admitted on:  January 29, 2018 You last received care in the:  [facility]    You were discharged on:  February 1, 2018        Reason for your hospital stay       You were in the hospital for a L3-S1 transforaminal lumbar interbody fusion                  Who to Call     For medical emergencies, please call 911.  For non-urgent questions about your medical care, please call your primary care provider or clinic, [phone]  For questions related to your surgery, please call your surgery clinic        Attending Provider     Provider Specialty    Gabino Velez MD Neurosurgery       Primary Care Provider Office Phone # Fax #    Joelle Randle [phone]      After Care Instructions     Activity       Your activity upon discharge: Discharge instructions:  No lifting of more than 10 pounds, no bending, no twisting until follow up visit.  Wear brace when out of bed.    Ok to shampoo hair, shower or bathe, but, do not scrub or submerge incision under water until evaluated post-operatively in clinic.    Ok to walk as tolerated, avoid bed rest and prolonged sitting.    No contact sports until after follow up visit  No high impact activities such as; running/jogging, snowmobile or 4 elena  riding or any other recreational vehicles.    Do not take NSAIDS (ibuprofen, advil, aleve, naproxen, etc) for pain control.    Do NOT drive while taking narcotic pain medication.    Call the [facility] at [phone] for increasing redness, swelling or pus draining from the incision, increased pain or any other questions and concerns.            Diet       Follow this diet upon discharge: Orders Placed This Encounter      Advance Diet as Tolerated: Regular Diet Adult            Wound care and dressings       Instructions to care for your wound at home: Keep your incision clean and dry at all times.  OK to remove dressing on postop day 2.  OK to shower on postop day 3 and allow water to run over incision, pat dry after shower.  No bathing swimming or submerging in water.  Call immediately or come to ED if any drainage occurs, or you develop new pain, redness, or swelling.                  Follow-up Appointments     Follow-up and recommended labs and tests        Please follow up at the [facility] in 10-14 days for staple removal and in 6 weeks with a lumbar xray prior.  Please call the clinic at [phone] to schedule your appointment with Anh Lynne CNP or Stacy Winkler CNP                  Future tests that were ordered for you     XR Lumbar Spine 2/3 Views                 Further instructions from your care team       May restart Aspirin on 2-2-2018  Please follow up with your primary care doctor regarding your blood pressure and plus within 7 days take blood pressure and pulse in am each day,record the numbers and bring to your primary care appointment.      Call Dr Velez if any of these issues occur:  1) Increased/persistent redness,bleeding, localized warmth and swelling,drainage (yellow/clear/odorous) or tenderness at or incision site. Or incision pulling apart.  2) Increased pain not controlled with oral pain medications.sedation  3) Persistent headache, dizziness,  lightheaded   4)Persistent constipation despite taking OTC stool softners as directed   5) calf pain/swollen/hard/warm area,swelling chest pain or shortness of breath  6)increased/persistent numbness or tingling in arms or legs, weakness in extremities or falls  7) Generalized feeling of illness.  8) persistent fever, chills, sweats temp 101 or greater  9) trouble voiding, incontinence of bowel and/or bladder  10) too sleepy-could be amount of pain medication.  11)  If unable to wake call 911  Other instructions:  1) change dressing daily or more often for moist drainage, cover with seran wrap for one week when showering and then change dressing. Wash hands before and after changing dressing. Avoid touching incision  2) take teds off 3 times a day for 20 minutes  3)no heavy lifting, nothing more then 6-10lbs. No bending, lifiting or twisting, no sitting for long periods of time. Follow physical therapy restrictions and exercises-Slowly increase  activity  4) avoid sitting or laying in one position too long, walk as tolerated, log roll  5) wear brace when up per physical therapy, inspect skin under brace daily and call md if sore area starts.  6) take an over the counter stool softener as directed while on narcotics to prevent constipation or to stay regular. Take a suppository or a laxative if no bowel movement in 2 days despite taking stool softener    Follow up with Dr Velez as previously scheduled    You will need to purchase an over counter stool softener (senna or mirilax/use as directed)    Thank you for using [facility]!!    Pending Results     No orders found from 1/27/2018 to 1/30/2018.            Statement of Approval     Ordered          02/01/18 0835  I have reviewed and agree with all the recommendations and orders detailed in this document.  EFFECTIVE NOW     Approved and electronically signed by:  Anh Lynne APRN CNP             Admission Information     Date & Time Provider  "Department Dept. Phone    1/29/2018 Gabino Velez MD [facility] [phone]      Your Vitals Were     Blood Pressure Pulse Temperature Respirations Height Weight    119/60 (BP Location: Right arm) 62 97.5  F (36.4  C) (Oral) 14 1.778 m (5' 10\") 74.8 kg (165 lb)    Pulse Oximetry BMI (Body Mass Index)                95% 23.68 kg/m2          [employer] Information     [employer] gives you secure access to your electronic health record. If you see a primary care provider, you can also send messages to your care team and make appointments. If you have questions, please call your primary care clinic.  If you do not have a primary care provider, please call [phone] and they will assist you.        Care EveryWhere ID     This is your Care EveryWhere ID. This could be used by other organizations to access your [facility] medical records  [id number]        Equal Access to Services     ZULEYMA BUI AH: Keisha hernandezo Sodarnell, waaxda luqissa, qaybta kaalmada adean, manuel argueta . So [facility] [phone].    ATENCIÓN: Si habla español, tiene a armenta disposición servicios gratuitos de asistencia lingüística. Llame al [phone].    We comply with applicable federal civil rights laws and [place] laws. We do not discriminate on the basis of race, color, national origin, age, disability, sex, sexual orientation, or gender identity.               Review of your medicines      START taking        Dose / Directions    cyclobenzaprine 5 MG tablet   Commonly known as:  FLEXERIL        Dose:  5 mg   Take 1 tablet (5 mg) by mouth 3 times daily as needed for muscle spasms   Quantity:  90 tablet   Refills:  1       oxyCODONE IR 5 MG tablet   Commonly known as:  ROXICODONE   Notes to Patient:  Can have anytime  Can take tylenol along with this  Keep track of when and how much you take          Dose:  5 mg   Take 1 tablet (5 mg) by mouth every 4 hours as needed for moderate to " severe pain   Quantity:  60 tablet   Refills:  0         CONTINUE these medicines which have NOT CHANGED        Dose / Directions    finasteride 5 MG tablet   Commonly known as:  PROSCAR   Used for:  Disorder of prostate, unspecified        Dose:  5 mg   Take 1 tablet (5 mg) by mouth daily Patient said he takes one tablet daily   Quantity:  90 tablet   Refills:  3       fish oil-omega-3 fatty acids 1000 MG capsule        Dose:  1 g   Take 1 g by mouth daily   Refills:  0       lisinopril 40 MG tablet   Commonly known as:  PRINIVIL/ZESTRIL   Used for:  Essential hypertension, benign   Notes to Patient:  Take blood pressure daily and keep track for primary care md        Dose:  40 mg   Take 1 tablet (40 mg) by mouth daily   Quantity:  90 tablet   Refills:  1       LORazepam 0.5 MG tablet   Commonly known as:  ATIVAN   Used for:  Anxiety   Notes to Patient:  Avoid taking at the same time as pain medication. May increase drowsiness        Dose:  0.5 mg   Take 1 tablet (0.5 mg) by mouth every 8 hours as needed for anxiety   Quantity:  10 tablet   Refills:  0       metoprolol succinate 25 MG 24 hr tablet   Commonly known as:  TOPROL-XL   Used for:  Coronary artery disease involving native heart without angina pectoris, unspecified vessel or lesion type, Essential hypertension   Notes to Patient:  Hold if hr is less than 60 and/or if blood pressure is <100        Dose:  25 mg   Take 1 tablet (25 mg) by mouth daily   Quantity:  90 tablet   Refills:  1       nitroGLYcerin 0.4 MG sublingual tablet   Commonly known as:  NITROSTAT   Used for:  Coronary artery disease involving native heart without angina pectoris, unspecified vessel or lesion type        Dose:  0.4 mg   Place 1 tablet (0.4 mg) under the tongue See Admin Instructions for chest pain   Quantity:  25 tablet   Refills:  3       sildenafil 100 MG tablet   Commonly known as:  VIAGRA   Used for:  Erectile dysfunction due to arterial insufficiency        Dose:  100 mg    Take 1 tablet (100 mg) by mouth daily as needed for erectile dysfunction Take 30 min to 4 hours before intercourse.  Never use with nitroglycerin, terazosin or doxazosin.   Quantity:  10 tablet   Refills:  3       simvastatin 40 MG tablet   Commonly known as:  ZOCOR   Used for:  Other hyperlipidemia, Coronary arteriosclerosis        Dose:  40 mg   Take 1 tablet (40 mg) by mouth At Bedtime   Quantity:  90 tablet   Refills:  3         STOP taking     aspirin 81 MG tablet           oxyCODONE-acetaminophen 5-325 MG per tablet   Commonly known as:  PERCOCET                Where to get your medicines      These medications were sent [place] - [address] [facility]  [address] [facility] [address]     Phone:  [phone]     cyclobenzaprine 5 MG tablet         Some of these will need a paper prescription and others can be bought over the counter. Ask your nurse if you have questions.     Bring a paper prescription for each of these medications     oxyCODONE IR 5 MG tablet                Protect others around you: Learn how to safely use, store and throw away your medicines at www.disposemymeds.org.        Information about OPIOIDS     PRESCRIPTION OPIOIDS: WHAT YOU NEED TO KNOW    Prescription opioids can be used to help relieve moderate to severe pain and are often prescribed following a surgery or injury, or for certain health conditions. These medications can be an important part of treatment but also come with serious risks. It is important to work with your health care provider to make sure you are getting the safest, most effective care.    WHAT ARE THE RISKS AND SIDE EFFECTS OF OPIOID USE?  Prescription opioids carry serious risks of addiction and overdose, especially with prolonged use. An opioid overdose, often marked by slowed breathing can cause sudden death. The use of prescription opioids can have a number of side effects as well, even when taken as  directed:      Tolerance - meaning you might need to take more of a medication for the same pain relief    Physical dependence - meaning you have symptoms of withdrawal when a medication is stopped    Increased sensitivity to pain    Constipation    Nausea, vomiting, and dry mouth    Sleepiness and dizziness    Confusion    Depression    Low levels of testosterone that can result in lower sex drive, energy, and strength    Itching and sweating    RISKS ARE GREATER WITH:    History of drug misuse, substance use disorder, or overdose    Mental health conditions (such as depression or anxiety)    Sleep apnea    Older age (65 years or older)    Pregnancy    Avoid alcohol while taking prescription opioids.   Also, unless specifically advised by your health care provider, medications to avoid include:    Benzodiazepines (such as Xanax or Valium)    Muscle relaxants (such as Soma or Flexeril)    Hypnotics (such as Ambien or Lunesta)    Other prescription opioids    KNOW YOUR OPTIONS:  Talk to your health care provider about ways to manage your pain that do not involve prescription opioids. Some of these options may actually work better and have fewer risks and side effects:    Pain relievers such as acetaminophen, ibuprofen, and naproxen    Some medications that are also used for depression or seizures    Physical therapy and exercise    Cognitive behavioral therapy, a psychological, goal-directed approach, in which patients learn how to modify physical, behavioral, and emotional triggers of pain and stress    IF YOU ARE PRESCRIBED OPIOIDS FOR PAIN:    Never take opioids in greater amounts or more often than prescribed    Follow up with your primary health care provider and work together to create a plan on how to manage your pain.    Talk about ways to help manage your pain that do not involve prescription opioids    Talk about all concerns and side effects    Help prevent misuse and abuse    Never sell or share  prescription opioids    Never use another person's prescription opioids    Store prescription opioids in a secure place and out of reach of others (this may include visitors, children, friends, and family)    Visit www.cdc.gov/drugoverdose to learn about risks of opioid abuse and overdose    If you believe you may be struggling with addiction, tell your health care provider and ask for guidance or call [facility]'s National Helpline at 2-849-826-HELP    LEARN MORE / www.cdc.gov/drugoverdose/prescribing/guideline.html    Safely dispose of unused prescription opioids: Find your local drug take-back programs and more information about the importance of safe disposal at www.doseofreality.[place].gov             Medication List: This is a list of all your medications and when to take them. Check marks below indicate your daily home schedule. Keep this list as a reference.      Medications           Morning Afternoon Evening Bedtime As Needed    cyclobenzaprine 5 MG tablet   Commonly known as:  FLEXERIL   Take 1 tablet (5 mg) by mouth 3 times daily as needed for muscle spasms                                   finasteride 5 MG tablet   Commonly known as:  PROSCAR   Take 1 tablet (5 mg) by mouth daily Patient said he takes one tablet daily   Last time this was given:  5 mg on 2/1/2018  7:53 AM   Next Dose Due:  Friday                                     fish oil-omega-3 fatty acids 1000 MG capsule   Take 1 g by mouth daily   Next Dose Due:  Restart friday                                   lisinopril 40 MG tablet   Commonly known as:  PRINIVIL/ZESTRIL   Take 1 tablet (40 mg) by mouth daily   Last time this was given:  40 mg on 1/30/2018  8:25 AM   Next Dose Due:  Hold until you see your primary care md   Notes to Patient:  Take blood pressure daily and keep track for primary care md                                LORazepam 0.5 MG tablet   Commonly known as:  ATIVAN   Take 1 tablet (0.5 mg) by mouth every 8 hours as needed for anxiety    Notes to Patient:  Avoid taking at the same time as pain medication. May increase drowsiness                                   metoprolol succinate 25 MG 24 hr tablet   Commonly known as:  TOPROL-XL   Take 1 tablet (25 mg) by mouth daily   Last time this was given:  25 mg on 1/30/2018  8:25 AM   Notes to Patient:  Hold if hr is less than 60 and/or if blood pressure is <100                                   nitroGLYcerin 0.4 MG sublingual tablet   Commonly known as:  NITROSTAT   Place 1 tablet (0.4 mg) under the tongue See Admin Instructions for chest pain                                   oxyCODONE IR 5 MG tablet   Commonly known as:  ROXICODONE   Take 1 tablet (5 mg) by mouth every 4 hours as needed for moderate to severe pain   Next Dose Due:  Can take one tab for pain 1-4  2 tabs for pain 5-10   Notes to Patient:  Can have anytime  Can take tylenol along with this  Keep track of when and how much you take                                  sildenafil 100 MG tablet   Commonly known as:  VIAGRA   Take 1 tablet (100 mg) by mouth daily as needed for erectile dysfunction Take 30 min to 4 hours before intercourse.  Never use with nitroglycerin, terazosin or doxazosin.                                simvastatin 40 MG tablet   Commonly known as:  ZOCOR   Take 1 tablet (40 mg) by mouth At Bedtime   Last time this was given:  40 mg on 1/31/2018  9:22 PM   Next Dose Due:  Thursday at bedtime

## 2018-01-29 NOTE — OP NOTE
OPERATIVE REPORT        PREOPERATIVE DIAGNOSIS:   1. Severe foraminal stenosis and severe DDD from L3-S1 with loss of lordosis and compression of the exiting roots  2. Flat back syndrome  3. Lumbar radiculopathy right> left  4. Low back pain    POSTOPERATIVE DIAGNOSIS: Same    PROCEDURE: L3, L4 and L5 Berg-Maloney osteotomies with L3-4, L4-5 and L5-S1 transforaminal lumbar interbody fusion   1. L3 Berg-Maloney (Meaghan) osteotomy with complete facetectomy and interpedicular decompression with exposure of the L3 and L4 roots bilaterally  2. L4 Berg-Maloney (Meaghan) osteotomy with complete facetectomy and interpedicular decompression with exposure of the L4 and L5 roots bilaterally  3. L5 Berg-Maloney (Meaghan) osteotomy with complete facetectomy and interpedicular decompression with exposure of the L5 and LS1 roots bilaterally  4. L3-4 complete discectomy with arthrodesis and placement of a 7-14 x 30 mm Globus Rise expandable interbody graft with locally harvested autologous bone placed centrally and anteriorly  5. L4-5 complete discectomy with arthrodesis and placement of a 7-14 x 30 mm Globus Rise expandable interbody graft with locally harvested autologous bone placed centrally and anteriorly  6. L5-S1 complete discectomy with arthrodesis and placement of a 7-14 x 30 mm Globus Rise expandable interbody graft with locally harvested autologous bone placed centrally and anteriorly  7. Placement of Globus Creo GARNER pedicle screws bilaterally from L3 to S1  8. L3 - S1 posterior lateral fusion with placement of Medtronic Magifuse and locally harvested autologous bone  9. Use of Stealth and O-arm intraoperative neuronavigation  10. Use of C-arm fluoroscopy and intraoperative microscope    ASSISTANT: Tommy Monaco    INDICATION FOR PROCEDURE: The patient is a 67 year old male that presented with the above clinical and radiographic findings. After reviewing the examination and imaging studies, the decision was made to  proceed with the above procedure. The patient understood the risks of surgery to be infection, CSF leak, nerve root injury, failure of hardware, the need for recurrent surgery, epidural fibrosis, weakness, coma and death. The patient voiced understanding and wanted to proceed.     DESCRIPTION OF PROCEDURE: The patient was seen in the pre op area and the procedure was discussed with the patient and spouse once again and all questions were answered. The consent was then signed and the lumbar spine was marked with a marker. The patient was transported to the operating room on a stretcher and received general endotracheal anesthesia and a Bourgeois catheter was placed. The patient was placed on the operating table in the prone position on the Timo frame with all pressure points padded. The back was then prepped and draped in a normal sterile fashion and C-arm was used to identify the appropriate level. Local anesthesia was injected along the planned incision with 10 blade scalpel used to make a midline incision with dissection down through the subcutaneous tissue to the fascia. The fascia was opened bilaterally with the Bovie cautery. Subperiosteal dissection was used to expose the lamina facet joint and transverse processes from L3-S1. The Versatrac retractor was then placed to retract the paraspinous muscles. The operating microscope was the brought into the field and attention then turned to the decompression where a L3, L4 and L5 Berg-Maloney osteotomies were performed with the Midas Jaime drill, the Kerrison rongeur and the pituitary rongeur which were used to remove the spinous process, bilateral lamina and facet joints. This completely decompressed and exposed the exiting roots bilaterally and the traversing roots bilaterally at each level from L3-S1. Kambin's triangle was exposed from the right side and the disk spaces were incised with the 11 blade knife after retracting the thecal sac and nerve root medially.  The disk was removed with the pituitary rongeur and the endplate harley from size 6-8 mm. The nerves were thoroughly decompressed and size 7-14 x 30 mm Globus Rise expandable interbody grafts were placed at L3-4, L4-5 and L5-S1 with autologous bone placed both anteriorly and centrally inside the graft. The Stealth and O-arm were then brought in for intraoperative pedicle screw placement and the pedicle screws were placed using the normal technique of a  hole with the Midas Jaime drill, the gear shift probe to penetrate the pedicle and the 6.5 mm tap to tap the pedicle. The pedicle screws were then navigated down the pedicles from L3 to S1. The connecting rods were placed, compresssed and secured from L3-S1 and the locking caps were secured with the counter torque system.  I then placed 5ml of Evicel were placed over the dura. Copious irrigation was performed with saline. The wound was irrigated once again and the retractors were removed. Decortication of the lateral facet and transverse process was performed from L3-S1 and Medtronic Magnifuse and locally harvested autologous bone were placed out laterally for the posterolateral fusion. Next, 2 Timo-Minor drains were left subfascially. The fascia was closed with 0 Vicryl, the subcutaneous tissue closed with 2 - 0 and 3-0 Vicryl, and the skin closed with staples. The patient was then transported back to the stretcher, extubated, and sent to recovery.     At the end of the case, all counts were correct    No complications    Estimated blood loss 200 ml     IV fluids See Anesthesia    The patient received Ancef preoperatively       Gabino Velez MD, MS, FAANS

## 2018-01-29 NOTE — IP AVS SNAPSHOT
[facility]    [address] [facility] [address]    Phone:  [phone]    Fax:  [phone]                                       After Visit Summary   1/29/2018    Ambrocio Vázquez    MRN: [id number]           After Visit Summary Signature Page     I have received my discharge instructions, and my questions have been answered. I have discussed any challenges I see with this plan with the nurse or doctor.    ..........................................................................................................................................  Patient/Patient Representative Signature      ..........................................................................................................................................  Patient Representative Print Name and Relationship to Patient    ..................................................               ................................................  Date                                            Time    ..........................................................................................................................................  Reviewed by Signature/Title    ...................................................              ..............................................  Date                                                            Time

## 2018-01-29 NOTE — ANESTHESIA POSTPROCEDURE EVALUATION
Patient: Ambrocio Vázquez    Procedure(s):  Bilateral L3 to S1 decompression with facetectomies and Transforaminal lumbar interbody fusion - Wound Class: I-Clean    Diagnosis:Severe stenosis, severe ddd from L3 to S1 with loss of lordosis and compression of the exiting roots  Diagnosis Additional Information: PREOPERATIVE DIAGNOSIS:   1. Severe foraminal stenosis and severe DDD from L3-S1 with loss of lordosis and compression of the exiting roots  2. Flat back syndrome  3. Lumbar radiculopathy right> left  4. Low back pain     POSTOPERATIVE DIAGNOSIS: Eh, e     PROCEDURE: L3, L4 and L5 Berg-Maloney osteotomies with L3-4, L4-5 and L5-S1 transforaminal lumbar interbody fusion   1. L3 Berg-Maloney (Meaghan) osteotomy with complete facetectomy and interpedicular decompression with exposure of the L3 and,  L4 roots bilaterally  2. L4 Berg-Maloney (Meaghan) osteotomy with complete facetectomy and interpedicular decompression with exposure of the L4 and L5 roots bilaterally  3. L5 Berg-Maloney (Meaghan) osteotomy with complete facetectomy and interpedic, ular decompression with exposure of the L5 and LS1 roots bilaterally  4. L3-4 complete discectomy with arthrodesis and placement of a 7-14 x 30 mm Globus Rise expandable interbody graft with locally harvested autologous bone placed centrally and ante, riorly  5. L4-5 complete discectomy with arthrodesis and placement of a 7-14 x 30 mm Globus Rise expandable interbody graft with locally harvested autologous bone placed centrally and anteriorly  6. L5-S1 complete discectomy with arthrodesis and plac, ement of a 7-14 x 30 mm Globus Rise expandable interbody graft with locally harvested autologous bone placed centrally and anteriorly  7. Placement of Globus Creo GARNER pedicle screws bilaterally from L3 to S1  8. L3 - S1 posterior lateral fusion with p, lacement of Medtronic Magifuse and locally harvested autologous bone  9. Use of Stealth and O-arm intraoperative  neuronavigation  10. Use of C-arm fluoroscopy and intraoperative microscope       Anesthesia Type:  General, ETT    Note:  Anesthesia Post Evaluation    Patient location during evaluation: PACU  Patient participation: Able to fully participate in evaluation  Level of consciousness: awake  Pain management: adequate  Airway patency: patent  Cardiovascular status: acceptable  Respiratory status: acceptable  Hydration status: acceptable  PONV: controlled     Anesthetic complications: None          Last vitals:  Vitals:    01/29/18 1340 01/29/18 1345 01/29/18 1400   BP: 138/74 138/74 120/74   Pulse:      Resp: 13 10 11   Temp:      SpO2: 98% 99% 100%         Electronically Signed By: Kishan Smith MD  January 29, 2018  2:05 PM

## 2018-01-30 ENCOUNTER — APPOINTMENT (OUTPATIENT)
Dept: PHYSICAL THERAPY | Facility: CLINIC | Age: 68
DRG: 454 | End: 2018-01-30
Attending: NEUROLOGICAL SURGERY
Payer: COMMERCIAL

## 2018-01-30 ENCOUNTER — APPOINTMENT (OUTPATIENT)
Dept: OCCUPATIONAL THERAPY | Facility: CLINIC | Age: 68
DRG: 454 | End: 2018-01-30
Attending: NEUROLOGICAL SURGERY
Payer: COMMERCIAL

## 2018-01-30 LAB
ERYTHROCYTE [DISTWIDTH] IN BLOOD BY AUTOMATED COUNT: 12.2 % (ref 10–15)
GLUCOSE SERPL-MCNC: 104 MG/DL (ref 70–99)
HCT VFR BLD AUTO: 38.5 % (ref 40–53)
HGB BLD-MCNC: 12.7 G/DL (ref 13.3–17.7)
INTERPRETATION ECG - MUSE: NORMAL
MCH RBC QN AUTO: 32.3 PG (ref 26.5–33)
MCHC RBC AUTO-ENTMCNC: 33 G/DL (ref 31.5–36.5)
MCV RBC AUTO: 98 FL (ref 78–100)
PLATELET # BLD AUTO: 138 10E9/L (ref 150–450)
RBC # BLD AUTO: 3.93 10E12/L (ref 4.4–5.9)
WBC # BLD AUTO: 9.7 10E9/L (ref 4–11)

## 2018-01-30 PROCEDURE — 99222 1ST HOSP IP/OBS MODERATE 55: CPT | Performed by: INTERNAL MEDICINE

## 2018-01-30 PROCEDURE — 36415 COLL VENOUS BLD VENIPUNCTURE: CPT | Performed by: NEUROLOGICAL SURGERY

## 2018-01-30 PROCEDURE — 97116 GAIT TRAINING THERAPY: CPT | Mod: GP | Performed by: PHYSICAL THERAPIST

## 2018-01-30 PROCEDURE — 40000133 ZZH STATISTIC OT WARD VISIT: Performed by: REHABILITATION PRACTITIONER

## 2018-01-30 PROCEDURE — 25000132 ZZH RX MED GY IP 250 OP 250 PS 637: Mod: GY | Performed by: NEUROLOGICAL SURGERY

## 2018-01-30 PROCEDURE — 12000007 ZZH R&B INTERMEDIATE

## 2018-01-30 PROCEDURE — 97535 SELF CARE MNGMENT TRAINING: CPT | Mod: GO | Performed by: REHABILITATION PRACTITIONER

## 2018-01-30 PROCEDURE — 99207 ZZC CONSULT E&M CHANGED TO INITIAL LEVEL: CPT | Performed by: INTERNAL MEDICINE

## 2018-01-30 PROCEDURE — 97165 OT EVAL LOW COMPLEX 30 MIN: CPT | Mod: GO | Performed by: REHABILITATION PRACTITIONER

## 2018-01-30 PROCEDURE — 82947 ASSAY GLUCOSE BLOOD QUANT: CPT | Performed by: NEUROLOGICAL SURGERY

## 2018-01-30 PROCEDURE — 40000193 ZZH STATISTIC PT WARD VISIT: Performed by: PHYSICAL THERAPIST

## 2018-01-30 PROCEDURE — 97530 THERAPEUTIC ACTIVITIES: CPT | Mod: GP | Performed by: PHYSICAL THERAPIST

## 2018-01-30 PROCEDURE — A9270 NON-COVERED ITEM OR SERVICE: HCPCS | Mod: GY | Performed by: NEUROLOGICAL SURGERY

## 2018-01-30 PROCEDURE — 85027 COMPLETE CBC AUTOMATED: CPT | Performed by: NEUROLOGICAL SURGERY

## 2018-01-30 PROCEDURE — 25000128 H RX IP 250 OP 636: Performed by: NEUROLOGICAL SURGERY

## 2018-01-30 PROCEDURE — 97161 PT EVAL LOW COMPLEX 20 MIN: CPT | Mod: GP | Performed by: PHYSICAL THERAPIST

## 2018-01-30 RX ORDER — METOPROLOL SUCCINATE 25 MG/1
25 TABLET, EXTENDED RELEASE ORAL DAILY
Status: DISCONTINUED | OUTPATIENT
Start: 2018-01-31 | End: 2018-02-01 | Stop reason: HOSPADM

## 2018-01-30 RX ADMIN — HYDROMORPHONE HYDROCHLORIDE: 10 INJECTION, SOLUTION INTRAMUSCULAR; INTRAVENOUS; SUBCUTANEOUS at 14:10

## 2018-01-30 RX ADMIN — ACETAMINOPHEN 975 MG: 325 TABLET, FILM COATED ORAL at 14:05

## 2018-01-30 RX ADMIN — CEFAZOLIN SODIUM 2 G: 2 INJECTION, SOLUTION INTRAVENOUS at 08:25

## 2018-01-30 RX ADMIN — HYDROMORPHONE HYDROCHLORIDE: 10 INJECTION, SOLUTION INTRAMUSCULAR; INTRAVENOUS; SUBCUTANEOUS at 06:22

## 2018-01-30 RX ADMIN — SIMVASTATIN 40 MG: 40 TABLET, FILM COATED ORAL at 22:02

## 2018-01-30 RX ADMIN — FINASTERIDE 5 MG: 5 TABLET, FILM COATED ORAL at 08:25

## 2018-01-30 RX ADMIN — ACETAMINOPHEN 975 MG: 325 TABLET, FILM COATED ORAL at 06:22

## 2018-01-30 RX ADMIN — DOCUSATE SODIUM 100 MG: 100 CAPSULE, LIQUID FILLED ORAL at 08:25

## 2018-01-30 RX ADMIN — DOCUSATE SODIUM 100 MG: 100 CAPSULE, LIQUID FILLED ORAL at 20:05

## 2018-01-30 RX ADMIN — ACETAMINOPHEN 975 MG: 325 TABLET, FILM COATED ORAL at 22:02

## 2018-01-30 RX ADMIN — METOPROLOL SUCCINATE 25 MG: 25 TABLET, EXTENDED RELEASE ORAL at 08:25

## 2018-01-30 RX ADMIN — CEFAZOLIN SODIUM 2 G: 2 INJECTION, SOLUTION INTRAVENOUS at 16:00

## 2018-01-30 RX ADMIN — CEFAZOLIN SODIUM 2 G: 2 INJECTION, SOLUTION INTRAVENOUS at 00:42

## 2018-01-30 RX ADMIN — LISINOPRIL 40 MG: 40 TABLET ORAL at 08:25

## 2018-01-30 NOTE — PROGRESS NOTES
" 01/30/18 1054   Quick Adds   Type of Visit Initial Occupational Therapy Evaluation   Living Environment   Lives With spouse   Living Arrangements house   Home Accessibility stairs within home;stairs to enter home   Number of Stairs to Enter Home 2   Number of Stairs Within Home 12   Transportation Available family or friend will provide;car   Living Environment Comment Spouse (julieta) will be home for support prn at discharge, has walk in shower with built in seat.   Self-Care   Dominant Hand left   Usual Activity Tolerance good   Current Activity Tolerance moderate   Regular Exercise no   Equipment Currently Used at Home none   Activity/Exercise/Self-Care Comment  Indep PLOF, limited by pain.    Functional Level Prior   Ambulation 0-->independent   Transferring 0-->independent   Toileting 0-->independent   Bathing 0-->independent   Dressing 0-->independent   Eating 0-->independent   Communication 0-->understands/communicates without difficulty   Swallowing 0-->swallows foods/liquids without difficulty   Cognition 0 - no cognition issues reported   Fall history within last six months no   Which of the above functional risks had a recent onset or change? ambulation;transferring;toileting;bathing;dressing   Prior Functional Level Comment Indep PLOF, reports he is \"90%\" retired, limited by back pain and RLE radiculopathy.   General Information   Onset of Illness/Injury or Date of Surgery - Date 01/29/18   Referring Physician Dr. Velez   Patient/Family Goals Statement patient plans to return home   Additional Occupational Profile Info/Pertinent History of Current Problem Patient is POD #1 for  #1 s/p L3-S1 transforaminal lumbar interbody fusion. Pt had severe foraminal stenosis and severe DDD From L3-S1. He reports baseline RLE radicular pain and L leg/foot numbness.  Baseline intermittent numbness in hands, possibly due to CTS.   General Observations patient was in chair and c/o lightheadedness. BP was taken- 98/60. " Per RN request, patient was transferred back to bed. Limited session as a result   Cognitive Status Examination   Orientation orientation to person, place and time   Level of Consciousness alert   Able to Follow Commands WNL/WFL   Personal Safety (Cognitive) WNL/WFL   Memory intact   Cognitive Comment intact, patient is fatigued   Visual Perception   Visual Perception Wears glasses   Sensory Examination   Sensory Comments patient reports most foot pain and numbness and siatic pain   Pain Assessment   Patient Currently in Pain Yes, see Vital Sign flowsheet  (rating pain 5/10 with movement, 3/10 at rest)   Integumentary/Edema   Integumentary/Edema no deficits were identifed   Posture   Posture not impaired   Range of Motion (ROM)   ROM Quick Adds No deficits were identified   Strength   Manual Muscle Testing Quick Adds No deficits were identified   Hand Strength   Hand Strength Comments intact   Muscle Tone Assessment   Muscle Tone Quick Adds No deficits were identified   Coordination   Upper Extremity Coordination No deficits were identified   Mobility   Bed Mobility Comments min A with bed mobility- treatment initiated-defer to OT daily note for details   Transfer Skill: Bed to Chair/Chair to Bed   Level of [place]: Bed to Chair (treatment initiated-defer to OT daily note for details)   Transfer Skill: Sit to Stand   Level of [place]: Sit/Stand stand-by assist   Physical Assist/Nonphysical Assist: Sit/Stand supervision   Balance   Balance Comments LOB was not noted, general unsteadiness observed   Eating/Self Feeding   Level of [place]: Eating independent   Instrumental Activities of Daily Living (IADL)   IADL Comments spouse to A as needed with all IADL's   Activities of Daily Living Analysis   Impairments Contributing to Impaired Activities of Daily Living balance impaired;pain;post surgical precautions   General Therapy Interventions   Planned Therapy Interventions ADL retraining;progressive  "activity/exercise;transfer training   Clinical Impression   Criteria for Skilled Therapeutic Interventions Met yes, treatment indicated   OT Diagnosis decreased ADL's   Influenced by the following impairments balance impaired;pain;post surgical precautions   Assessment of Occupational Performance 5 or more Performance Deficits   Identified Performance Deficits decreased ADL's and IADL's- dsg. toileting, bathing, community and functional mobility, work duties, driving   Clinical Decision Making (Complexity) Low complexity   Therapy Frequency daily   Predicted Duration of Therapy Intervention (days/wks) 3 days   Anticipated Equipment Needs at Discharge bath sponge;reacher;raised toilet seat   Anticipated Discharge Disposition Home   Risks and Benefits of Treatment have been explained. Yes   Patient, Family & other staff in agreement with plan of care Yes   [facility] TM \"6 Clicks\"   2016, Trustees of [facility], under license to [employer].  All rights reserved.   6 Clicks Short Forms Daily Activity Inpatient Short Form   [facility]  \"6 Clicks\" Daily Activity Inpatient Short Form   1. Putting on and taking off regular lower body clothing? 2 - A Lot   2. Bathing (including washing, rinsing, drying)? 3 - A Little   3. Toileting, which includes using toilet, bedpan or urinal? 3 - A Little   4. Putting on and taking off regular upper body clothing? 3 - A Little   5. Taking care of personal grooming such as brushing teeth? 4 - None   6. Eating meals? 4 - None   Daily Activity Raw Score (Score out of 24.Lower scores equate to lower levels of function) 19   Total Evaluation Time   Total Evaluation Time (Minutes) 10     "

## 2018-01-30 NOTE — PROGRESS NOTES
[facility]    Neurosurgery Progress Note    Date of Service (when I saw the patient): 01/30/2018     Assessment & Plan   Ambrocio Vázquez is a 67 year old male who was admitted on 1/29/2018. Pt presented with lumbar radicular pain.  Pt had severe foraminal stenosis and severe DDD From L3-S1 with loss of lordosis and compression of the exiting roots with flat back syndrome.  The pt underwent a L3-S1 transforaminal lumbar interbody fusion with Dr. Gabino Velez on 1-.  Today pt is lying in bed. He notes only incisional pain. He states that prior to surgery he had severe radicular pain with lying down or walking.  We will have him work with PT and OT today and wean off the PCA.     Active Problems:    S/P lumbar fusion    Assessment: stable     Plan: PT/OT and ambulation  Brace when out of bed   Wean off PCA and start oral analgesics   Encourage use of IS and deep breathing   Keep drains in until 30 cc or less output in one shift  Suture/Staple removal in 10-14 days         I have discussed the following assessment and plan Dr. Gabino Velez  who is in agreement with initial plan and will follow up with further consultation recommendations.    [facility]  [facility]  [place] [address]    Tel [phone]  Pager [phone]      Interval History   Stable with incisional pain     Physical Exam   Temp: 97.4  F (36.3  C) Temp src: Oral BP: 111/63   Heart Rate: 56 Resp: 16 SpO2: 100 % O2 Device: Nasal cannula Oxygen Delivery: 2 LPM  Vitals:    01/15/18 1447 01/29/18 0606   Weight: 165 lb (74.8 kg) 165 lb (74.8 kg)     Vital Signs with Ranges  Temp:  [96.3  F (35.7  C)-97.4  F (36.3  C)] 97.4  F (36.3  C)  Heart Rate:  [52-91] 56  Resp:  [7-18] 16  BP: (100-161)/() 111/63  SpO2:  [94 %-100 %] 100 %  I/O last 3 completed shifts:  In: 4070 [P.O.:240; I.V.:3830]  Out: 2695 [Urine:1800; Drains:695;  "Blood:200]    Heart Rate: 56, Blood pressure 111/63, pulse 62, temperature 97.4  F (36.3  C), temperature source Oral, resp. rate 16, height 5' 10\" (1.778 m), weight 165 lb (74.8 kg), SpO2 100 %.  165 lbs 0 oz  HEENT:  Normocephalic, atraumatic.  PERRLA.  EOM s intact.    Neck:  Supple, non-tender, without lymphadenopathy.  Heart:  No peripheral edema  Lungs:  No SOB  Abdomen:  Soft, non-tender, non-distended.   Skin:  Warm and dry, good capillary refill.  Extremities:  Good radial and dorsalis pedis pulses bilaterally, no edema, cyanosis or clubbing.    NEUROLOGICAL EXAMINATION:     Mental status:  Alert and Oriented x 3, speech is fluent.  Cranial nerves:  II-XII intact.   Motor:  Strength is 5/5 throughout the upper and lower extremities  Shoulder Abduction:  Right:  5/5   Left:  5/5  Biceps:                      Right:  5/5   Left:  5/5  Triceps:                     Right:  5/5   Left:  5/5  Wrist Extensors:       Right:  5/5   Left:  5/5  Wrist Flexors:           Right:  5/5   Left:  5/5  interosseus :            Right:  5/5   Left:  5/5   Hip Flexor:                Right: 5/5  Left:  5/5  Hip Adductor:             Right:  5/5  Left:  5/5  Hip Abductor:             Right:  5/5  Left:  5/5  Gastroc Soleus:        Right:  5/5  Left:  5/5  Tib/Ant:                      Right:  5/5  Left:  5/5  EHL:                     Right:  5/5  Left:  5/5  Sensation:  intact  Gait: deferred           Medications     0.9% sodium chloride + KCl 20 mEq/L 75 mL/hr at 01/30/18 0129       finasteride  5 mg Oral Daily     lisinopril  40 mg Oral Daily     metoprolol succinate  25 mg Oral Daily     simvastatin  40 mg Oral At Bedtime     sodium chloride (PF)  3 mL Intracatheter Q8H     ceFAZolin  2 g Intravenous Q8H     HYDROmorphone   Intravenous PCA     acetaminophen  975 mg Oral Q8H     docusate sodium  100 mg Oral BID       Data     All new lab and imaging data was personally reviewed by me.  CBC RESULTS:   Recent Labs   Lab Test  " 01/30/18   0659   WBC  9.7   RBC  3.93*   HGB  12.7*   HCT  38.5*   MCV  98   MCH  32.3   MCHC  33.0   RDW  12.2   PLT  138*     Basic Metabolic Panel:  Lab Results   Component Value Date     01/16/2018      Lab Results   Component Value Date    POTASSIUM 4.4 01/16/2018     Lab Results   Component Value Date    CHLORIDE 106 01/16/2018     Lab Results   Component Value Date    KENY 9.4 01/16/2018     Lab Results   Component Value Date    CO2 31 01/16/2018     Lab Results   Component Value Date    BUN 23 01/16/2018     Lab Results   Component Value Date    CR 0.90 01/16/2018     Lab Results   Component Value Date     01/30/2018     INR:  Lab Results   Component Value Date    INR 1.08 06/07/2010    INR 1.06 05/03/2010    INR 1.06 01/03/2008     35 minutes were spent with patient and on the floor.

## 2018-01-30 NOTE — PROGRESS NOTES
I followed with patient this morning and this afternoon.  Patient tried his current lace corset and wants to continue to use his since it works for him.  The orthosis is appropriate.  I did not provide new.

## 2018-01-30 NOTE — PLAN OF CARE
Problem: Patient Care Overview  Goal: Plan of Care/Patient Progress Review  Outcome: Improving  Pain well controlled with pca. Up at bedside with assist of 2. BLE numbness rt>lt. mian clear liquids in small amt. Nausea resolved with zofran. 350ml u/o per ander dk stephanie. RT SHAYLA spilled charting includes estimate of output. Small amt blood on drsg due to spill.

## 2018-01-30 NOTE — PROGRESS NOTES
" 01/30/18 0847   Quick Adds   Type of Visit Initial PT Evaluation   Living Environment   Lives With spouse   Living Arrangements house   Home Accessibility stairs to enter home;stairs within home   Number of Stairs to Enter Home 2  (no railing)   Number of Stairs Within Home 12   Stair Railings at Home inside, present on right side   Transportation Available family or friend will provide   Living Environment Comment Spouse (julieta) will be home for support prn at discharge, has walk in shower with built in seat.   Self-Care   Dominant Hand left   Usual Activity Tolerance good   Current Activity Tolerance moderate   Regular Exercise no   Equipment Currently Used at Home none   Activity/Exercise/Self-Care Comment Indep PLOF, limited by pain.   Functional Level Prior   Ambulation 0-->independent   Transferring 0-->independent   Toileting 0-->independent   Bathing 0-->independent   Dressing 0-->independent   Eating 0-->independent   Communication 0-->understands/communicates without difficulty   Swallowing 0-->swallows foods/liquids without difficulty   Cognition 0 - no cognition issues reported   Fall history within last six months no   Which of the above functional risks had a recent onset or change? ambulation;transferring   Prior Functional Level Comment Indep PLOF, reports he is \"90%\" retired, limited by back pain and RLE radiculopathy.   General Information   Onset of Illness/Injury or Date of Surgery - Date 01/29/18   Referring Physician Gabino Velez MD   Patient/Family Goals Statement d/c home with spouse in \"2-4 days\"   Pertinent History of Current Problem (include personal factors and/or comorbidities that impact the POC) Pt is seen POD #1 s/p L3-S1 transforaminal lumbar interbody fusion. Pt had severe foraminal stenosis and severe DDD From L3-S1. He reports baseline RLE radicular pain and L leg/foot numbness.  Baseline intermittent numbness in hands, possibly due to CTS.   Precautions/Limitations " fall precautions;spinal precautions  (brace on when OOB, lumbosacral corset)   General Observations pt awake, resting in bed, spouse at bedside.  Pt has old lumbosacral corset from previous spine surgery many years ago, also has new issued LSO from orthotics if old one does not work/fit.   Cognitive Status Examination   Orientation orientation to person, place and time   Cognitive Comment pt does not recall events of last evening, relates this due to pain meds   Pain Assessment   Patient Currently in Pain Yes, see Vital Sign flowsheet  (3/10 incisional pain reported, denies radicular pain current)   Integumentary/Edema   Integumentary/Edema Comments incisional dressing dry and intact, drains x 2, no swelling noted distally in legs   Posture    Posture Not impaired   Posture Comments maintains good posture with cues   Range of Motion (ROM)   ROM Comment WNL BLEs and BUEs, spinal precautions   Strength   Strength Comments Full 5/5 strength B ankle DF and PF, symetrical UE strength   Bed Mobility   Bed Mobility Comments Log roll with min A for correct technique.   Transfer Skills   Transfer Comments Sit <> stand with CGA, mild unsteadiness upon standing, lightheadedness reported.   Gait   Gait Comments Amb at bedside with UE support on IV pole and CGA, mild/moderate unsteadiness noted at times, wide EMMA, improving with practice/distance.  Appears at risk for falls currently, but anticipate continued improvement.   Balance   Balance Comments Impaired standing balance and gait stability initially when OOB, needing CGA to ensure safety, moderate sway with NBOS EO.  Compensating with UE support on IV pole.   Sensory Examination   Sensory Perception no deficits were identified   Sensory Perception Comments denies n/t in feet currently   Coordination   Coordination no deficits were identified   Muscle Tone   Muscle Tone no deficits were identified   Modality Interventions   Planned Modality Interventions Cryotherapy  "  Planned Modality Interventions Comments prn   General Therapy Interventions   Planned Therapy Interventions balance training;bed mobility training;gait training;neuromuscular re-education;ROM;strengthening;stretching;transfer training;risk factor education;progressive activity/exercise;home program guidelines;orthotic fitting/training   Clinical Impression   Criteria for Skilled Therapeutic Intervention yes, treatment indicated   PT Diagnosis Impaired functional mobility and gait stability   Influenced by the following impairments Pain, functional weakness, impaired balance, spinal precautions, LSO   Functional limitations due to impairments Impaired safety and tolerance with bed mobilities, transfers, gait skills, stairs   Clinical Presentation Stable/Uncomplicated   Clinical Presentation Rationale stable medical status post op, indep PLOF, clinical presentation, low pain levels   Clinical Decision Making (Complexity) Low complexity   Therapy Frequency` 2 times/day   Predicted Duration of Therapy Intervention (days/wks) 2-3 days   Anticipated Equipment Needs at Discharge (consider SEC?)   Anticipated Discharge Disposition Home with Assist   Risk & Benefits of therapy have been explained Yes   Patient, Family & other staff in agreement with plan of care Yes   [facility] TM \"6 Clicks\"   2016, Trustees of [facility], under license to [employer].  All rights reserved.   6 Clicks Short Forms Basic Mobility Inpatient Short Form   [facility]  \"6 Clicks\" V.2 Basic Mobility Inpatient Short Form   1. Turning from your back to your side while in a flat bed without using bedrails? 3 - A Little   2. Moving from lying on your back to sitting on the side of a flat bed without using bedrails? 3 - A Little   3. Moving to and from a bed to a chair (including a wheelchair)? 3 - A Little   4. Standing up from a chair using your arms (e.g., wheelchair, or bedside chair)? 3 - A Little   5. To walk " in hospital room? 3 - A Little   6. Climbing 3-5 steps with a railing? 3 - A Little   Basic Mobility Raw Score (Score out of 24.Lower scores equate to lower levels of function) 18   Total Evaluation Time   Total Evaluation Time (Minutes) 15

## 2018-01-30 NOTE — PROGRESS NOTES
"   01/30/18 1100   [facility] AM-PAC  \"6 Clicks\" Daily Activity Inpatient Short Form   1. Putting on and taking off regular lower body clothing? 2 - A Lot   2. Bathing (including washing, rinsing, drying)? 3 - A Little   3. Toileting, which includes using toilet, bedpan or urinal? 3 - A Little   4. Putting on and taking off regular upper body clothing? 3 - A Little   5. Taking care of personal grooming such as brushing teeth? 4 - None   6. Eating meals? 4 - None   Daily Activity Raw Score (Score out of 24.Lower scores equate to lower levels of function) 19   Total Evaluation Time   Total Evaluation Time (Minutes) 10     "

## 2018-01-30 NOTE — PLAN OF CARE
Problem: Patient Care Overview  Goal: Plan of Care/Patient Progress Review  OT- eval completed and treatment initiated. Patient is POD #1 for  #1 s/p L3-S1 transforaminal lumbar interbody fusion. Pt had severe foraminal stenosis and severe DDD From L3-S1. He reports baseline RLE radicular pain and L leg/foot numbness.  Baseline intermittent numbness in hands, possibly due to CTS. Prior to admission, patient was living with spouse in traditional 2 story home, spouse will be available to A with ADL's and IADL's once home.    Discharge Planner OT   Patient plan for discharge: home  Current status: Upon OT arrival, patient was in chair and c/o lightheadedness, BP was 98/60, per RN request, patient transferred to bed with Radha for support of lines, vc's for technique and A with lift B LE 's into bed. Patient tolerated and executed log roll techniques well. Educated in and provided spine handouts for spine precautions, proper body mechanics, EC/WS techniques, advancement of activity following surgery, driving readiness and AE recommendations, throughout education, patient was engaged in instruction and verbalized understanding. ADl training deferred to next session.   Barriers to return to prior living situation: stairs- defer to PT, none from OT standpoint  Recommendations for discharge: home  Rationale for recommendations: patient is below baseline for ADL's and functional mobility d/t recent surgery. Will continue with OT.       Entered by: Michelle Ferrara 01/30/2018 12:14 PM   '

## 2018-01-30 NOTE — PLAN OF CARE
Problem: Patient Care Overview  Goal: Plan of Care/Patient Progress Review    PT:  Eval complete, treatment initiated.  Pt seen POD #1 s/p L3-S1 transforaminal lumbar interbody fusion.  Baseline RLE radicular pain, some numbness/tingling in L foot.  Lives with spouse in a multi-level home, functions independently at baseline.  Spouse reports she is available prn at time of discharge for support.      Discharge Planner PT   Patient plan for discharge: home with spouse  Current status: Pt reports improving RLE radicular symptoms and n/t in L foot post op.  Incisional pain rated 3-4/10.  LSO donned with all OOB activity, ed on post op spinal precautions.  Pt owns and is using an old LSO he has from previous surgery many years ago which seems to be fitting well, also has issued LSO from orthotics in room if needed.  Amb with CGA in hallways, some unsteadiness noted that appears to be improving with practice.  BP stable during session, although reporting lightheadedness, RN updated.    Addendum PM Session:  Progressing well, improving gait stability. Amb hallways without UE support or AD, tolerating stairs well.  Will decrease PT to 1x/day tomorrow, anticipate need for only 1 more PT session, then will meet all goals.  Barriers to return to prior living situation: none anticipated  Recommendations for discharge: home with spouse  Rationale for recommendations: Anticipate continued progress with mobilization program, therapy, and time given his age and indep PLOF.       Entered by: Dhaval Cevallos 01/30/2018 11:18 AM

## 2018-01-30 NOTE — CONSULTS
[facility]  Hospitalist consult note  January 30, 2018  Name: Ambrocio Vázquez    MRN: [id number]  YOB: 1950    Age: 67 year old  Date of admission: 1/29/2018  Primary care provider: Joelle Randle      Summary:  Patient is a 67-year-old gentleman with a history of hypertension, hyperlipidemia, BPH, and coronary artery disease who we were asked to consult for postoperative medical management.    Problem list/Plan:  1. Status post  bilateral L3-S1 decompression with facetectomies and trans-foraminal lumbar interbody fusion: Will defer pain control and DVT prophylaxis to neurosurgery.  Postoperative PT/OT consultation.  2. History of hypertension: Controlled and actually mildly hypertensive at this time.  May resume Toprol-XL tomorrow with parameters.  3. History of BPH: Resume finasteride  4. Hyperlipidemia: Resume simvastatin      All lab work and imaging data independently reviewed by myself    Prophylaxis;  DVT prophylaxis per neurosurgery     Code status: Full code    Discharge: Per neurosurgery    Requesting physician: Dr. Velez   Reason for consultation: Postoperative medical management   HPI  Patient is a 67-year-old gentleman with a history of hypertension, hyperlipidemia, BPH, and coronary artery disease who we were asked to consult for postoperative medical management.  Past medical history significant for hypertension, hypercholesterolemia, and BPH.  Patient underwent a bilateral L3-S1 decompression with facetectomies and trans-foraminal lumbar interbody fusion.  Postoperatively, patient's pain is controlled at this point in time.  He denies any chest pain or shortness of breath.  No specific complaint or evidence of nausea, vomiting, diarrhea, or abdominal pain.     Past Medical History:     Past Medical History:   Diagnosis Date     Arthritis      BPH (benign prostatic hyperplasia)      Cardiomyopathy (H)      Carnitine deficiency (H)      Colon polyp      tubular adenoma     Coronary artery disease     ischemic heart dz.LMA-nl,   stenting of Lad, diag small 80%, Lcx30%, RCA50%     ED (erectile dysfunction)      H/O angina pectoris      Hyperlipidaemia      Hypertension     Cardilogist last visit several yeara ago now follewd by Primary DR.      Impaired fasting glucose      Intervertebral lumbar disc disorder with myelopathy, lumbar region (aka DISK)     lumbar disk disease     Other chronic pain     Back pain for many years.     PFO (patent foramen ovale)      Sciatica      Stented coronary artery     Pt will call for cardiac clearance prior to surgery. [facility]     Past Surgical History:     Past Surgical History:   Procedure Laterality Date     C LAMINECTOMY,>2 SGMT,LUMBAR  2001     COLONOSCOPY       COLONOSCOPY  5/3/2013    Procedure: COLONOSCOPY;  Colonoscopy;  Surgeon: Genaro Krishnan MD;  Location: [facility]     CORONARY ANGIOGRAPHY ADULT ORDER  6/7/2014    No restenosis     HEART CATH, ANGIOPLASTY  5/2010    complex  angioplasty/thrombectomy SHAZIA stenting proximal and ostial LAD;LMA-nl,   stenting of Lad, diag small 80%, Lcx30%, RCA50%     [facility]     HERNIORRHAPHY INGUINAL  1/11/2013    Procedure: HERNIORRHAPHY INGUINAL;  LEFT INGUINAL HERNIA REPAIR WITH MESH;  Surgeon: Santos Coleman MD;  Location:  OR     HERNIORRHAPHY INGUINAL Right 12/29/2015    Procedure: HERNIORRHAPHY INGUINAL;  Surgeon: Santos Coleman MD;  Location:  OR     OPTICAL TRACKING SYSTEM FUSION SPINE POSTERIOR LUMBAR THREE+ LEVELS N/A 1/29/2018    Procedure: OPTICAL TRACKING SYSTEM FUSION SPINE POSTERIOR LUMBAR THREE+ LEVELS;  L3, L4 and L5 Berg-Maloney osteotomies with L3-4, L4-5 and L5-S1 transforaminal lumbar interbody fusion ;  Surgeon: Gabino Velez MD;  Location:  OR     PAST SURGICAL HISTORY  2002    hernia repair, right     PAST SURGICAL HISTORY      pilonidal cyst and sebaceous cyst surg     PE TUBES  2/10    left  ear PE for persistant ZINA     SOFT TISSUE SURGERY      right axilla lipoma removed     Social History:     Social History   Substance Use Topics     Smoking status: Former Smoker     Packs/day: 0.50     Years: 30.00     Types: Cigarettes     Quit date: 1/15/2010     Smokeless tobacco: Never Used     Alcohol use 0.0 oz/week     0 Standard drinks or equivalent per week      Comment: 1-2 a week     Family History:  Family history reviewed. NO pertinent family history     Allergies:     Allergies   Allergen Reactions     Augmentin Nausea and Vomiting     Sulfa Drugs Nausea     Medications:     Prescriptions Prior to Admission   Medication Sig Dispense Refill Last Dose     LORazepam (ATIVAN) 0.5 MG tablet Take 1 tablet (0.5 mg) by mouth every 8 hours as needed for anxiety 10 tablet 0 1/29/2018 at Unknown time     simvastatin (ZOCOR) 40 MG tablet Take 1 tablet (40 mg) by mouth At Bedtime 90 tablet 3 1/28/2018 at Unknown time     lisinopril (PRINIVIL/ZESTRIL) 40 MG tablet Take 1 tablet (40 mg) by mouth daily 90 tablet 1 1/29/2018 at Unknown time     metoprolol (TOPROL-XL) 25 MG 24 hr tablet Take 1 tablet (25 mg) by mouth daily 90 tablet 1 1/29/2018 at 0430     sildenafil (VIAGRA) 100 MG cap/tab Take 1 tablet (100 mg) by mouth daily as needed for erectile dysfunction Take 30 min to 4 hours before intercourse.  Never use with nitroglycerin, terazosin or doxazosin. 10 tablet 3 1/18/2018     finasteride (PROSCAR) 5 MG tablet Take 1 tablet (5 mg) by mouth daily Patient said he takes one tablet daily 90 tablet 3 1/29/2018 at Unknown time     fish oil-omega-3 fatty acids (FISH OIL) 1000 MG capsule Take 1 g by mouth daily    1/25/2018     ASPIRIN 81 MG OR TABS ONE DAILY   1/21/2018     oxyCODONE-acetaminophen (PERCOCET) 5-325 MG per tablet Take 1-2 tablets by mouth every 4 hours as needed for pain 50 tablet 0 More than a month at Unknown time     nitroglycerin (NITROSTAT) 0.4 MG sublingual tablet Place 1 tablet (0.4 mg) under the  "tongue See Admin Instructions for chest pain 25 tablet 3 Unknown at Unknown time       Review of Systems:   A Comprehensive greater than 10 system review of systems was carried out.  Pertinent positives and negatives are noted above.  Otherwise negative for contributory information.        Physical Exam:  Blood pressure 114/68, pulse 62, temperature 97.3  F (36.3  C), temperature source Oral, resp. rate 16, height 1.778 m (5' 10\"), weight 74.8 kg (165 lb), SpO2 100 %.  Gen: Pleasant in no acute distress.  HEENT: NCAT. EOMI. PERRL.  Neck: Normal inspection. No bruit, JVD or thyromegaly.  Lungs: Normal respiratory effort. Clear to auscultation bilaterally with no crackles or wheezes.  Card: N s1s2. RRR. No M/R/G.  Peripheral pulses present and symmetric.   Skin: No rash. Warm to the touch  Extr: No edema. CMS intact  Psychiatric: Patient alert oriented ×3.  Normal affect  Neurologic: Cranial nerves II-XII are intact.  Sensation normal.  Motor strength 5/5    Data:       Recent Labs  Lab 01/30/18  0659   WBC 9.7   HGB 12.7*   HCT 38.5*   MCV 98   *       Recent Labs  Lab 01/30/18  0659   *       Imaging:   No results found for this or any previous visit (from the past 24 hour(s)).    Cody Ruiz MD Pager [phone]      "

## 2018-01-30 NOTE — PLAN OF CARE
Problem: Patient Care Overview  Goal: Plan of Care/Patient Progress Review  Outcome: Improving  Pt up SBA, allowed to walk in hallway with wife ( she is a nurse). PCA dc'd, oxycodone available at anytime. Minimal pain. CMS intact. Some bouts of dizziness, hypotension, nausea. Just changed to reg diet, please monitor how pt tolerates. 2 JPs both draining, ancef, worthington pulled at 1030-pt voided 250 in urinal. Will continue to monitor.

## 2018-01-30 NOTE — PLAN OF CARE
"Problem: Patient Care Overview  Goal: Plan of Care/Patient Progress Review  Outcome: Improving  Vitals:/60 (BP Location: Right arm)  Pulse 62  Temp 97.1  F (36.2  C) (Oral)  Resp 17  Ht 1.778 m (5' 10\")  Wt 74.8 kg (165 lb)  SpO2 97%  BMI 23.68 kg/m2   Oxygen/Capnography: on 2 LPM-97%. ETCO2: 41, w/ 10 IPI  Pain: 3/10 pain. Pt on PCA pump: used 1 dose this shift 0.2mg. Scheduled Tylenol given 975 mg this am.  Ambulation: In bed through night. Pt did not want to dangle his feet this am, wanted to try to sit up higher in bed first to see how he tolerated. Will try dangling and ambulating after breakfast.  Neuro: Alert and O x4  Cardiac: On Tele: SR 60-70's  Lungs: Bilateral Lungs Sounds clear/ Educated Pt on Spirometer use, had him do x6, and encouraged him to use x10 an hour while awake.  GI/: BS active x4. Pt states not passing gas, no BM, has worthington w/urine output of 300 ml  Skin: CMS intact, skin warm, no discioloration.  Dressings: Lumbar dressing CDI  Lines/drains:SHAYLA drains x 2:output L 90, R 80  Plan: Hospitalist to vist in am, OT/PT to visit, has consult for lumbar sacral corset today. Moved diet to Full liquid this am. Pt ordering Full liquid for breakfast. No Nausea this shift. Continuous NS with KCL running. On ABX IV Ancef.         "

## 2018-01-31 ENCOUNTER — APPOINTMENT (OUTPATIENT)
Dept: PHYSICAL THERAPY | Facility: CLINIC | Age: 68
DRG: 454 | End: 2018-01-31
Attending: NEUROLOGICAL SURGERY
Payer: COMMERCIAL

## 2018-01-31 ENCOUNTER — APPOINTMENT (OUTPATIENT)
Dept: OCCUPATIONAL THERAPY | Facility: CLINIC | Age: 68
DRG: 454 | End: 2018-01-31
Attending: NEUROLOGICAL SURGERY
Payer: COMMERCIAL

## 2018-01-31 LAB — GLUCOSE BLDC GLUCOMTR-MCNC: 117 MG/DL (ref 70–99)

## 2018-01-31 PROCEDURE — 00000146 ZZHCL STATISTIC GLUCOSE BY METER IP

## 2018-01-31 PROCEDURE — 97116 GAIT TRAINING THERAPY: CPT | Mod: GP | Performed by: PHYSICAL THERAPY ASSISTANT

## 2018-01-31 PROCEDURE — 25000128 H RX IP 250 OP 636: Performed by: NEUROLOGICAL SURGERY

## 2018-01-31 PROCEDURE — 40000193 ZZH STATISTIC PT WARD VISIT: Performed by: PHYSICAL THERAPY ASSISTANT

## 2018-01-31 PROCEDURE — 12000007 ZZH R&B INTERMEDIATE

## 2018-01-31 PROCEDURE — 99233 SBSQ HOSP IP/OBS HIGH 50: CPT | Performed by: INTERNAL MEDICINE

## 2018-01-31 PROCEDURE — A9270 NON-COVERED ITEM OR SERVICE: HCPCS | Mod: GY | Performed by: NEUROLOGICAL SURGERY

## 2018-01-31 PROCEDURE — 97535 SELF CARE MNGMENT TRAINING: CPT | Mod: GO | Performed by: REHABILITATION PRACTITIONER

## 2018-01-31 PROCEDURE — 25000132 ZZH RX MED GY IP 250 OP 250 PS 637: Mod: GY | Performed by: NEUROLOGICAL SURGERY

## 2018-01-31 PROCEDURE — 40000133 ZZH STATISTIC OT WARD VISIT: Performed by: REHABILITATION PRACTITIONER

## 2018-01-31 RX ADMIN — CEFAZOLIN SODIUM 2 G: 2 INJECTION, SOLUTION INTRAVENOUS at 23:59

## 2018-01-31 RX ADMIN — ACETAMINOPHEN 650 MG: 325 TABLET, FILM COATED ORAL at 21:22

## 2018-01-31 RX ADMIN — CEFAZOLIN SODIUM 2 G: 2 INJECTION, SOLUTION INTRAVENOUS at 09:14

## 2018-01-31 RX ADMIN — CEFAZOLIN SODIUM 2 G: 2 INJECTION, SOLUTION INTRAVENOUS at 00:58

## 2018-01-31 RX ADMIN — DOCUSATE SODIUM 100 MG: 100 CAPSULE, LIQUID FILLED ORAL at 21:21

## 2018-01-31 RX ADMIN — FINASTERIDE 5 MG: 5 TABLET, FILM COATED ORAL at 09:14

## 2018-01-31 RX ADMIN — ACETAMINOPHEN 650 MG: 325 TABLET, FILM COATED ORAL at 14:38

## 2018-01-31 RX ADMIN — OXYCODONE HYDROCHLORIDE AND ACETAMINOPHEN 1 TABLET: 5; 325 TABLET ORAL at 21:24

## 2018-01-31 RX ADMIN — CEFAZOLIN SODIUM 2 G: 2 INJECTION, SOLUTION INTRAVENOUS at 17:02

## 2018-01-31 RX ADMIN — ACETAMINOPHEN 975 MG: 325 TABLET, FILM COATED ORAL at 06:23

## 2018-01-31 RX ADMIN — DOCUSATE SODIUM 100 MG: 100 CAPSULE, LIQUID FILLED ORAL at 09:14

## 2018-01-31 RX ADMIN — OXYCODONE HYDROCHLORIDE AND ACETAMINOPHEN 1 TABLET: 5; 325 TABLET ORAL at 14:38

## 2018-01-31 RX ADMIN — SIMVASTATIN 40 MG: 40 TABLET, FILM COATED ORAL at 21:22

## 2018-01-31 NOTE — PLAN OF CARE
Problem: Patient Care Overview  Goal: Plan of Care/Patient Progress Review  Discharge Planner OT   Patient plan for discharge: home, hopefully tomorrow  Current status: SBA for sit<>stand, functional transfers and mobility in room, barker and satellite. SBA with standard height toilet transfer x2 with 2nd attempt focus on standing upright from toilet to minimize trunk flexion. After instruction, patient was I with donning/doffing socks, declined further practice of TB dressing and walk in shower transfers, OT educated in techniques, patient verbalized, does demonstrate skills needed to meet goals, however declined offer. Recommended long handle sponge and educated in how to obtain in community. Spouse present throughout session, all questions answered. Will dc from OT services.   Barriers to return to prior living situation: none from OT standpoint  Recommendations for discharge: home  Rationale for recommendations: goals met, will dc OT       Entered by: Michelle Ferrara 01/31/2018 9:10 AM       Occupational Therapy Discharge Summary    Reason for therapy discharge:    All goals and outcomes met, no further needs identified.    Progress towards therapy goal(s). See goals on Care Plan in [facility] electronic health record for goal details.  Goals met    Therapy recommendation(s):    No further therapy is recommended.

## 2018-01-31 NOTE — PLAN OF CARE
Problem: Patient Care Overview  Goal: Plan of Care/Patient Progress Review  Outcome: No Change  [id number]: Pt resting comfortably. With wife at the bedside. VSS. A&O. Transfers with SBA. Pain controlled with scheduled tylenol. Tele reads SR. Two SHAYLA drains patent. BS positive, no flatus. On reg diet. Has own corset brace. Will continue to monitor.

## 2018-01-31 NOTE — PLAN OF CARE
Problem: Patient Care Overview  Goal: Plan of Care/Patient Progress Review  Outcome: Improving  Pt. up in room and hallway independently with wife present. Steady on feet. Denied any pain. Dressing to back is clean, dry, and intact. SHAYLA's drained 50/55. Tolerated regular diet. Voiding. Last BM yesterday prior to surgery, active bowel sounds, no flatus.

## 2018-01-31 NOTE — PLAN OF CARE
Problem: Patient Care Overview  Goal: Plan of Care/Patient Progress Review    Discharge Planner PT   Patient plan for discharge: home with spouse  Current status: Pt amb 400' without AD independently with = step length. Pt performed 1 flight of stairs with 1 rail independently with reciprocal pattern. Pt is indep with all transfers. goal met  Barriers to return to prior living situation: none anticipated  Recommendations for discharge: home with spouse  Rationale for recommendations: Anticipate continued progress with mobilization program, therapy, and time given his age and indep PLOF.    PT - Pt discharged from PT with all goals met. Please refer to discharge summary.        Entered by: Tawny Amaro 01/31/2018 8:33 AM

## 2018-01-31 NOTE — PROGRESS NOTES
[facility]    Neurosurgery Progress Note    Date of Service (when I saw the patient): 01/31/2018     Assessment & Plan   Ambrocio Vázquez is a 67 year old male who was admitted on 1/29/2018. Pt presented with lumbar radicular pain.  Pt had severe foraminal stenosis and severe DDD From L3-S1 with loss of lordosis and compression of the exiting roots with flat back syndrome.  The pt underwent a L3-S1 transforaminal lumbar interbody fusion with Dr. Gabino Velez on 1-.  Today pt is up walking in the PT room.  He was seen in conjunction with Dr. Gabino Velez.  Pt is only needing Tylenol for his pain.  He is very happy with the results of the surgery. He only notes incisional pain and some residual numbness to his right foot.  We will continue monitor. Most likely DC home tomorrow if drains are able to be pulled.         Active Problems:    S/P lumbar fusion    Assessment: stable     Plan: PT/OT and ambulation  Brace when out of bed   Pain control  Encourage use of IS and deep breathing   Keep drains in until 30 cc or less output in one shift  Suture/Staple removal in 10-14 days         I have discussed the following assessment and plan Dr. Gabino ricks who is in agreement with initial plan and will follow up with further consultation recommendations.    [facility]  [facility]  [place] [id number]    Tel [phone]  Pager [phone]      Interval History   Stable     Physical Exam   Temp: 97.3  F (36.3  C) Temp src: Oral BP: 124/68   Heart Rate: 57 Resp: 16 SpO2: 97 % O2 Device: None (Room air) Oxygen Delivery: 2 LPM  Vitals:    01/15/18 1447 01/29/18 0606   Weight: 165 lb (74.8 kg) 165 lb (74.8 kg)     Vital Signs with Ranges  Temp:  [97.2  F (36.2  C)-98.7  F (37.1  C)] 97.3  F (36.3  C)  Heart Rate:  [54-96] 57  Resp:  [16-18] 16  BP: ()/(59-69) 124/68  SpO2:  [96 %-100 %] 97 %  I/O last 3  "completed shifts:  In: 1260 [P.O.:1160; I.V.:100]  Out: 2370 [Urine:1950; Drains:420]    Heart Rate: 57, Blood pressure 124/68, pulse 62, temperature 97.3  F (36.3  C), temperature source Oral, resp. rate 16, height 5' 10\" (1.778 m), weight 165 lb (74.8 kg), SpO2 97 %.  165 lbs 0 oz  HEENT:  Normocephalic, atraumatic.  PERRLA.  EOM s intact.    Neck:  Supple, non-tender, without lymphadenopathy.  Heart:  No peripheral edema  Lungs:  No SOB  Abdomen:  Soft, non-tender, non-distended.   Skin:  Warm and dry, good capillary refill.  Extremities:  Good radial and dorsalis pedis pulses bilaterally, no edema, cyanosis or clubbing.    NEUROLOGICAL EXAMINATION:     Mental status:  Alert and Oriented x 3, speech is fluent.  Cranial nerves:  II-XII intact.   Motor:  Strength is 5/5 throughout the upper and lower extremities  Shoulder Abduction:  Right:  5/5   Left:  5/5  Biceps:                      Right:  5/5   Left:  5/5  Triceps:                     Right:  5/5   Left:  5/5  Wrist Extensors:       Right:  5/5   Left:  5/5  Wrist Flexors:           Right:  5/5   Left:  5/5  interosseus :            Right:  5/5   Left:  5/5   Hip Flexor:                Right: 5/5  Left:  5/5  Hip Adductor:             Right:  5/5  Left:  5/5  Hip Abductor:             Right:  5/5  Left:  5/5  Gastroc Soleus:        Right:  5/5  Left:  5/5  Tib/Ant:                      Right:  5/5  Left:  5/5  EHL:                     Right:  5/5  Left:  5/5  Sensation:  intact  Gait:  Walking independently with corset brace on.         Medications       metoprolol succinate  25 mg Oral Daily     finasteride  5 mg Oral Daily     simvastatin  40 mg Oral At Bedtime     sodium chloride (PF)  3 mL Intracatheter Q8H     ceFAZolin  2 g Intravenous Q8H     acetaminophen  975 mg Oral Q8H     docusate sodium  100 mg Oral BID       Data     All new lab and imaging data was personally reviewed by me.    CBC RESULTS:   Recent Labs   Lab Test  01/30/18   0659   WBC  9.7 "   RBC  3.93*   HGB  12.7*   HCT  38.5*   MCV  98   MCH  32.3   MCHC  33.0   RDW  12.2   PLT  138*     Basic Metabolic Panel:  Lab Results   Component Value Date     01/16/2018      Lab Results   Component Value Date    POTASSIUM 4.4 01/16/2018     Lab Results   Component Value Date    CHLORIDE 106 01/16/2018     Lab Results   Component Value Date    KENY 9.4 01/16/2018     Lab Results   Component Value Date    CO2 31 01/16/2018     Lab Results   Component Value Date    BUN 23 01/16/2018     Lab Results   Component Value Date    CR 0.90 01/16/2018     Lab Results   Component Value Date     01/30/2018     INR:  Lab Results   Component Value Date    INR 1.08 06/07/2010    INR 1.06 05/03/2010    INR 1.06 01/03/2008

## 2018-01-31 NOTE — PLAN OF CARE
Problem: Patient Care Overview  Goal: Plan of Care/Patient Progress Review  Outcome: Improving  Day RN  Sanjana, CMS+ besides baseline numbness on both feet. Up with SBA and brace moving so well. Likes to sit in chair and reposition frequently. Ate some drank well. No nausea starting to pass gas. Minimal pain taking tylenol will take oxycodone if needed. voiding  2 Dennys's  Dc to home Thursday.

## 2018-02-01 VITALS
BODY MASS INDEX: 23.62 KG/M2 | WEIGHT: 165 LBS | HEART RATE: 62 BPM | SYSTOLIC BLOOD PRESSURE: 119 MMHG | TEMPERATURE: 97.5 F | DIASTOLIC BLOOD PRESSURE: 60 MMHG | OXYGEN SATURATION: 95 % | HEIGHT: 70 IN | RESPIRATION RATE: 14 BRPM

## 2018-02-01 PROCEDURE — 25000128 H RX IP 250 OP 636: Performed by: NEUROLOGICAL SURGERY

## 2018-02-01 PROCEDURE — A9270 NON-COVERED ITEM OR SERVICE: HCPCS | Mod: GY | Performed by: NEUROLOGICAL SURGERY

## 2018-02-01 PROCEDURE — 25000132 ZZH RX MED GY IP 250 OP 250 PS 637: Mod: GY | Performed by: NEUROLOGICAL SURGERY

## 2018-02-01 RX ORDER — CYCLOBENZAPRINE HCL 5 MG
5 TABLET ORAL 3 TIMES DAILY PRN
Qty: 90 TABLET | Refills: 1 | Status: SHIPPED | OUTPATIENT
Start: 2018-02-01 | End: 2018-04-25

## 2018-02-01 RX ORDER — OXYCODONE HYDROCHLORIDE 5 MG/1
5 TABLET ORAL EVERY 4 HOURS PRN
Qty: 60 TABLET | Refills: 0 | Status: SHIPPED | OUTPATIENT
Start: 2018-02-01 | End: 2018-03-14

## 2018-02-01 RX ADMIN — ACETAMINOPHEN 650 MG: 325 TABLET, FILM COATED ORAL at 05:41

## 2018-02-01 RX ADMIN — FINASTERIDE 5 MG: 5 TABLET, FILM COATED ORAL at 07:53

## 2018-02-01 RX ADMIN — CEFAZOLIN SODIUM 2 G: 2 INJECTION, SOLUTION INTRAVENOUS at 07:53

## 2018-02-01 RX ADMIN — OXYCODONE HYDROCHLORIDE AND ACETAMINOPHEN 1 TABLET: 5; 325 TABLET ORAL at 05:43

## 2018-02-01 RX ADMIN — DOCUSATE SODIUM 100 MG: 100 CAPSULE, LIQUID FILLED ORAL at 07:53

## 2018-02-01 NOTE — PLAN OF CARE
Problem: Patient Care Overview  Goal: Plan of Care/Patient Progress Review  Outcome: Adequate for Discharge Date Met: 02/01/18  Day RN  Vss, BP meds held per parameter.   CMS+ baseline tightness on R ankle and numbness. Up indep with brace passed pt yesterday. Drank well eating some passing more gas no nausea. Voiding well. Minimal pain taking prn percocet and scheduled tylenol. Drain dc. Incision is WDL. Dc instructions follow up care and meds were all reviewed prior to d.c with son and wife. Filled flexeril and oxycodone given to patient at dc. home with wife.

## 2018-02-01 NOTE — PLAN OF CARE
Problem: Patient Care Overview  Goal: Plan of Care/Patient Progress Review  Outcome: Improving  Vss. Low grade temp 99.1 to 96.9 for shift. Lungs clr-room air mid 90s. Is done. +bs/+gas, no nausea. Tolerating diet. Last bm 01/29/18. Voiding. Saline locked. Drsg-dried drainage. Cms wnl except for baseline tingling. Dennys-patent. Pain managed with Tylenol/Percocet. Tolerating ice. Repositioning and ambulating per self. No other significant issues noted overnight.

## 2018-02-01 NOTE — DISCHARGE INSTRUCTIONS
May restart Aspirin on 2-2-2018  Please follow up with your primary care doctor regarding your blood pressure and plus within 7 days take blood pressure and pulse in am each day,record the numbers and bring to your primary care appointment.      Call Dr Velez if any of these issues occur:  1) Increased/persistent redness,bleeding, localized warmth and swelling,drainage (yellow/clear/odorous) or tenderness at or incision site. Or incision pulling apart.  2) Increased pain not controlled with oral pain medications.sedation  3) Persistent headache, dizziness, lightheaded   4)Persistent constipation despite taking OTC stool softners as directed   5) calf pain/swollen/hard/warm area,swelling chest pain or shortness of breath  6)increased/persistent numbness or tingling in arms or legs, weakness in extremities or falls  7) Generalized feeling of illness.  8) persistent fever, chills, sweats temp 101 or greater  9) trouble voiding, incontinence of bowel and/or bladder  10) too sleepy-could be amount of pain medication.  11)  If unable to wake call 911  Other instructions:  1) change dressing daily or more often for moist drainage, cover with seran wrap for one week when showering and then change dressing. Wash hands before and after changing dressing. Avoid touching incision  2) take teds off 3 times a day for 20 minutes  3)no heavy lifting, nothing more then 6-10lbs. No bending, lifiting or twisting, no sitting for long periods of time. Follow physical therapy restrictions and exercises-Slowly increase  activity  4) avoid sitting or laying in one position too long, walk as tolerated, log roll  5) wear brace when up per physical therapy, inspect skin under brace daily and call md if sore area starts.  6) take an over the counter stool softener as directed while on narcotics to prevent constipation or to stay regular. Take a suppository or a laxative if no bowel movement in 2 days despite taking stool softener    Follow up  with Dr Velez as previously scheduled    You will need to purchase an over counter stool softener (senna or mirilax/use as directed)    Thank you for using [facility]!!

## 2018-02-01 NOTE — PLAN OF CARE
Problem: Patient Care Overview  Goal: Plan of Care/Patient Progress Review  Outcome: Improving  Pt. independent in room/hallway with wife present during shift. Minimal pain managed with scheduled tylenol and PRN analgesic at bedtime. Wears corset when OOB. Dressing to back is clean, dry, and intact. Plans to dc when SHAYLA's are <30.

## 2018-02-01 NOTE — DISCHARGE SUMMARY
[facility]    Discharge Summary  Neurosurgery    Date of Admission:  1/29/2018  Date of Discharge:  2/1/2018  Discharging Provider: Anh Lynne  Date of Service (when I saw the patient): 02/01/18    Discharge Diagnoses   Active Problems:    S/P lumbar fusion      History of Present Illness   Ambrocio Vázquez is a 67 year old male who was admitted on 1/29/2018. Pt presented with lumbar radicular pain.  Pt had severe foraminal stenosis and severe DDD From L3-S1 with loss of lordosis and compression of the exiting roots with flat back syndrome.  The pt underwent a L3-S1 transforaminal lumbar interbody fusion with Dr. Gabino Velez on 1-.  Pt progressed well post op and was cleared by PT and OT. He is up ambulating independently with minimal pain.  He is ready to DC home with his wife who is a RN.  Pt also recommended to follow up with his PCP regarding pulse and BP within 7 days.    Pts hospital stay was uneventful.  The pt was also being followed by the hospital service during their stay.  They recommendations were greatly appreciated.  Their summary is as follows:      Assessment & Plan      Patient is a 67-year-old gentleman with a history of hypertension, hyperlipidemia, BPH, and coronary artery disease who we were asked to consult for postoperative medical management.      Problem list/Plan:  Status post  bilateral L3-S1 decompression with facetectomies and trans-foraminal lumbar interbody fusion: Will defer pain control and DVT prophylaxis to neurosurgery. Postoperative PT/OT consultation.     1. History of hypertension: BP controlled.   Will hold Toprol-XL for now due to mild bradycardia      2. History of BPH: Continue finasteride     3. Hyperlipidemia: Continue simvastatin     DVT Prophylaxis: Pneumatic Compression Devices     Code Status: Full code     Disposition: Expected discharge: Per primary team              Hospital Course   Ambrocio Vázquez was admitted on 1/29/2018.  The  following problems were addressed during his hospitalization:    Active Problems:    S/P lumbar fusion    Assessment: stable     Plan: DC home         I have discussed the following assessment and plan with Dr. Gabino Velez  who is in agreement with initial plan and will follow up with further consultation recommendations.    Anh Lynne [facility]  [facility]  [address]  [address] [place] [id number]    Tel [phone]  Pager [phone]        Significant Results and Procedures   Post lumbar fusion     Pending Results     Unresulted Labs Ordered in the Past 30 Days of this Admission     No orders found from 11/30/2017 to 1/30/2018.          Code Status   Full Code    Primary Care Physician   Joelle Randle    Physical Exam   Temp: 97.5  F (36.4  C) Temp src: Oral BP: 119/60   Heart Rate: 56 Resp: 14 SpO2: 95 % O2 Device: None (Room air)    Vitals:    01/15/18 1447 01/29/18 0606   Weight: 165 lb (74.8 kg) 165 lb (74.8 kg)     Vital Signs with Ranges  Temp:  [96.9  F (36.1  C)-99.1  F (37.3  C)] 97.5  F (36.4  C)  Heart Rate:  [56-93] 56  Resp:  [14-16] 14  BP: (104-120)/(55-61) 119/60  SpO2:  [94 %-95 %] 95 %  I/O last 3 completed shifts:  In: 1706 [P.O.:1556; I.V.:150]  Out: 2435 [Urine:2200; Drains:235]    Constitutional: Awake, alert, cooperative, no apparent distress, and appears stated age.  Eyes: Lids and lashes normal, pupils equal, round and reactive to light, extra ocular muscles intact  ENT: Normocephalic, without obvious abnormality, atraumatic  Respiratory: No increased work of breathing  Skin: No bruising or bleeding, normal skin color, texture, turgor, no redness, warmth, or swelling.  Incision with staples intact, minimal drainage, open to air.  Musculoskeletal: There is no redness, warmth, or swelling of the joints.  Full range of motion noted.  Motor strength is 5 out of 5 all extremities bilaterally.  Tone is normal.  Neurologic: Awake,  alert, oriented to name, place and time.  Cranial nerves II-XII are grossly intact.  Motor is 5 out of 5 bilaterally.     Neuropsychiatric: Calm, normal eye contact, alert, normal affect, oriented to self, place, time and situation, memory for past and recent events intact and thought process normal.       Time Spent on this Encounter   I, Anh Lynne, personally saw the patient today and spent greater than 30 minutes discharging this patient.    Discharge Disposition   Discharged to home  Condition at discharge: Stable    Consultations This Hospital Stay   OCCUPATIONAL THERAPY ADULT IP CONSULT  PHYSICAL THERAPY ADULT IP CONSULT  ORTHOSIS SPINAL IP CONSULT  HOSPITALIST IP CONSULT  HOSPITALIST IP CONSULT    Discharge Orders     XR Lumbar Spine 2/3 Views     Reason for your hospital stay   You were in the hospital for a L3-S1 transforaminal lumbar interbody fusion     Follow-up and recommended labs and tests    Please follow up at the [facility] in 10-14 days for staple removal and in 6 weeks with a lumbar xray prior.  Please call the clinic at [phone] to schedule your appointment with Anh Lynne CNP or Stacy Winkler CNP     Activity   Your activity upon discharge: Discharge instructions:  No lifting of more than 10 pounds, no bending, no twisting until follow up visit.  Wear brace when out of bed.    Ok to shampoo hair, shower or bathe, but, do not scrub or submerge incision under water until evaluated post-operatively in clinic.    Ok to walk as tolerated, avoid bed rest and prolonged sitting.    No contact sports until after follow up visit  No high impact activities such as; running/jogging, snowmobile or 4 elena riding or any other recreational vehicles.    Do not take NSAIDS (ibuprofen, advil, aleve, naproxen, etc) for pain control.    Do NOT drive while taking narcotic pain medication.    Call the [facility] at [phone] for increasing redness, swelling or pus  draining from the incision, increased pain or any other questions and concerns.     Wound care and dressings   Instructions to care for your wound at home: Keep your incision clean and dry at all times.  OK to remove dressing on postop day 2.  OK to shower on postop day 3 and allow water to run over incision, pat dry after shower.  No bathing swimming or submerging in water.  Call immediately or come to ED if any drainage occurs, or you develop new pain, redness, or swelling.     Full Code     Diet   Follow this diet upon discharge: Orders Placed This Encounter     Advance Diet as Tolerated: Regular Diet Adult       Discharge Medications   Current Discharge Medication List      START taking these medications    Details   oxyCODONE IR (ROXICODONE) 5 MG tablet Take 1 tablet (5 mg) by mouth every 4 hours as needed for moderate to severe pain  Qty: 60 tablet, Refills: 0    Associated Diagnoses: S/P lumbar fusion      cyclobenzaprine (FLEXERIL) 5 MG tablet Take 1 tablet (5 mg) by mouth 3 times daily as needed for muscle spasms  Qty: 90 tablet, Refills: 1    Associated Diagnoses: S/P lumbar fusion         CONTINUE these medications which have NOT CHANGED    Details   LORazepam (ATIVAN) 0.5 MG tablet Take 1 tablet (0.5 mg) by mouth every 8 hours as needed for anxiety  Qty: 10 tablet, Refills: 0    Associated Diagnoses: Anxiety      simvastatin (ZOCOR) 40 MG tablet Take 1 tablet (40 mg) by mouth At Bedtime  Qty: 90 tablet, Refills: 3    Associated Diagnoses: Other hyperlipidemia; Coronary arteriosclerosis      lisinopril (PRINIVIL/ZESTRIL) 40 MG tablet Take 1 tablet (40 mg) by mouth daily  Qty: 90 tablet, Refills: 1    Associated Diagnoses: Essential hypertension, benign      metoprolol (TOPROL-XL) 25 MG 24 hr tablet Take 1 tablet (25 mg) by mouth daily  Qty: 90 tablet, Refills: 1    Associated Diagnoses: Coronary artery disease involving native heart without angina pectoris, unspecified vessel or lesion type; Essential  hypertension      sildenafil (VIAGRA) 100 MG cap/tab Take 1 tablet (100 mg) by mouth daily as needed for erectile dysfunction Take 30 min to 4 hours before intercourse.  Never use with nitroglycerin, terazosin or doxazosin.  Qty: 10 tablet, Refills: 3    Comments: Brand name viagra only please  Associated Diagnoses: Erectile dysfunction due to arterial insufficiency      finasteride (PROSCAR) 5 MG tablet Take 1 tablet (5 mg) by mouth daily Patient said he takes one tablet daily  Qty: 90 tablet, Refills: 3    Associated Diagnoses: Disorder of prostate, unspecified      fish oil-omega-3 fatty acids (FISH OIL) 1000 MG capsule Take 1 g by mouth daily       nitroglycerin (NITROSTAT) 0.4 MG sublingual tablet Place 1 tablet (0.4 mg) under the tongue See Admin Instructions for chest pain  Qty: 25 tablet, Refills: 3    Associated Diagnoses: Coronary artery disease involving native heart without angina pectoris, unspecified vessel or lesion type         STOP taking these medications       oxyCODONE-acetaminophen (PERCOCET) 5-325 MG per tablet Comments:   Reason for Stopping:         ASPIRIN 81 MG OR TABS Comments:   Reason for Stopping:             Allergies   Allergies   Allergen Reactions     Augmentin Nausea and Vomiting     Sulfa Drugs Nausea

## 2018-02-01 NOTE — PROVIDER NOTIFICATION
Web  based paged: Hi, I removed wrong telemetry from patient. Let me know if we can formally dc this one or if we need to reapply. Readings have been WNL. Thanks!

## 2018-02-02 ENCOUNTER — TELEPHONE (OUTPATIENT)
Dept: FAMILY MEDICINE | Facility: CLINIC | Age: 68
End: 2018-02-02

## 2018-02-02 NOTE — TELEPHONE ENCOUNTER
"ED/Discharge Protocol    \"Hi, my name is Karen Salas, a registered nurse, and I am calling on behalf of Dr. Randle's office at [facility].  I am calling to follow up and see how things are going for you after your recent visit.\"    \"I see that you were in the (ER/UC/IP) on 1/29/18.    How are you doing now that you are home?\" Patient reports that he is doing ok since going home. He has follow up appointments made.  Karen Salas RN        "

## 2018-02-02 NOTE — TELEPHONE ENCOUNTER
.ED/UC/IP follow up phone call:  dos:  2/1/18  S/P Lumbar fusion, severe stenosis, severe Ddd from L3 to S1 with loss of lordosis and compression of the exiting roots    RN please call to follow up.    Number of ED visits in past 12 months = 1/1

## 2018-02-05 ENCOUNTER — DOCUMENTATION ONLY (OUTPATIENT)
Dept: NEUROSURGERY | Facility: CLINIC | Age: 68
End: 2018-02-05

## 2018-02-05 NOTE — PROGRESS NOTES
2/5/2018    [employer] Forms: yes    Faxed to [phone]     Type of form certification of health care provider for family member's serious health condition  ( family and medical leave)    Placed a copy in the bin and sent the original to medical records

## 2018-02-06 ENCOUNTER — TELEPHONE (OUTPATIENT)
Dept: NEUROSURGERY | Facility: CLINIC | Age: 68
End: 2018-02-06

## 2018-02-06 NOTE — TELEPHONE ENCOUNTER
Contacted patient for post-op follow up call. Patient is s/p  L3-S1 transforaminal lumbar interbody fusion with Dr. Gabino Velez on 1-.. After surgery patient is doing well overall, but reports moderate incisional pain. Pain is controlled by oxycodone, at times he alternates with percocet. Patient has been eating and drinking well. He has had some issues with constipation, however did have successful bowel movement on Sunday after enema.  He is passing gas and feels that this is moving in the right direction, advised to increase fluid, fiber intake, and use OTC stool softener if needed. No issues or concerns with incision. Reminded patient to watch for s/sx of infection, use proper incision care, and follow activity restrictions. Patient will follow up on 2/9 as scheduled for staple removal.  Subsequent follow up is scheduled for 6 week post with xray prior.  All questions were answered to the best of my ability and the patient's satisfaction. Patient verbalized understanding and advised to call with any additional questions or concerns.

## 2018-02-08 ENCOUNTER — OFFICE VISIT (OUTPATIENT)
Dept: FAMILY MEDICINE | Facility: CLINIC | Age: 68
End: 2018-02-08
Payer: COMMERCIAL

## 2018-02-08 VITALS
HEART RATE: 118 BPM | DIASTOLIC BLOOD PRESSURE: 97 MMHG | WEIGHT: 162.7 LBS | SYSTOLIC BLOOD PRESSURE: 137 MMHG | HEIGHT: 71 IN | BODY MASS INDEX: 22.78 KG/M2 | TEMPERATURE: 98.8 F

## 2018-02-08 DIAGNOSIS — Z98.1 S/P LUMBAR FUSION: Primary | ICD-10-CM

## 2018-02-08 DIAGNOSIS — R59.9 ENLARGED LYMPH NODES: ICD-10-CM

## 2018-02-08 DIAGNOSIS — K59.03 DRUG-INDUCED CONSTIPATION: ICD-10-CM

## 2018-02-08 DIAGNOSIS — Z23 NEED FOR VACCINATION: ICD-10-CM

## 2018-02-08 DIAGNOSIS — I10 ESSENTIAL HYPERTENSION, BENIGN: ICD-10-CM

## 2018-02-08 PROCEDURE — 99214 OFFICE O/P EST MOD 30 MIN: CPT | Mod: 25 | Performed by: FAMILY MEDICINE

## 2018-02-08 PROCEDURE — 90732 PPSV23 VACC 2 YRS+ SUBQ/IM: CPT | Performed by: FAMILY MEDICINE

## 2018-02-08 PROCEDURE — G0009 ADMIN PNEUMOCOCCAL VACCINE: HCPCS | Performed by: FAMILY MEDICINE

## 2018-02-08 NOTE — PROGRESS NOTES
SUBJECTIVE:   Ambrocio Vázquez is a 67 year old male who presents to clinic today for the following health issues:      Patient is here today for follow up on surgery.  Surgery on 1/31/18 - L3-S1 lumbar fusion   Pain is getting better, using less pain meds  Has appointment for staple removal tomorrow      Constipation - severe since surgery but finally had a BM this a.m.   Taking colace and miralax, used an enema recently and milk of mag     On narcotics post op - taking percocet rarely over last 2 days - trying to taper down     Sleep is difficult  Staples in the back   Wearing a girdle    Hypertension  - on toprol 25 and lisinopril 40  Says blood pressure was low pre and post op and blood pressure meds were stopped while in the hospital.      Home blood pressures since surgery - 120-138/70-84     Cardiology note pre op reviewed in epic       BP Readings from Last 6 Encounters:   02/08/18 (!) 137/97   02/01/18 119/60   01/23/18 146/80   01/16/18 (!) 137/97   01/09/18 (!) 172/99   12/12/17 141/82     Wt Readings from Last 10 Encounters:   02/08/18 162 lb 11.2 oz (73.8 kg)   01/29/18 165 lb (74.8 kg)   01/23/18 166 lb (75.3 kg)   01/16/18 163 lb 8 oz (74.2 kg)   01/09/18 172 lb (78 kg)   12/12/17 170 lb (77.1 kg)   11/14/17 162 lb 11.2 oz (73.8 kg)   03/28/17 185 lb (83.9 kg)   02/09/17 170 lb 11.2 oz (77.4 kg)   01/26/17 174 lb (78.9 kg)   deliberate weight loss over the last year     New issue -   Lump in groin on the left scrotum - just found it a few days ago   Feels firm to the touch  Non tender     Review of systems:  Normal appetite   No fever   No abd pain  Eating ok. Appetite is returning  No d/c       Problem list and histories reviewed & adjusted, as indicated.  Additional history: as documented    Patient Active Problem List   Diagnosis     Primary carnitine deficiency (H)     Sciatica     Essential hypertension, benign     ERECTILE DYSFUNCTION     Disorder of prostate     Colon polyp     Abnormal  "echocardiogram     CAD (coronary artery disease)     Cardiomyopathy (H)     CARDIOVASCULAR SCREENING; LDL GOAL LESS THAN 100     Advanced directives, counseling/discussion     Former smoker     Hyperlipidemia     History of angina     Coronary arteriosclerosis     Family history of aortic aneurysm     PFO (patent foramen ovale)     Essential hypertension     Aneurysm of aortic root (H)     S/P lumbar fusion     Current Outpatient Prescriptions   Medication     Oxycodone-Acetaminophen (PERCOCET PO)     oxyCODONE IR (ROXICODONE) 5 MG tablet     simvastatin (ZOCOR) 40 MG tablet     finasteride (PROSCAR) 5 MG tablet     fish oil-omega-3 fatty acids (FISH OIL) 1000 MG capsule     cyclobenzaprine (FLEXERIL) 5 MG tablet     LORazepam (ATIVAN) 0.5 MG tablet     lisinopril (PRINIVIL/ZESTRIL) 40 MG tablet     metoprolol (TOPROL-XL) 25 MG 24 hr tablet     sildenafil (VIAGRA) 100 MG cap/tab     nitroglycerin (NITROSTAT) 0.4 MG sublingual tablet     No current facility-administered medications for this visit.         Allergies   Allergen Reactions     Augmentin Nausea and Vomiting     Sulfa Drugs Nausea       BP (!) 137/97 (BP Location: Right arm, Patient Position: Sitting, Cuff Size: Adult Regular)  Pulse 118  Temp 98.8  F (37.1  C) (Tympanic)  Ht 5' 11\" (1.803 m)  Wt 162 lb 11.2 oz (73.8 kg)  BMI 22.69 kg/m2  GENERAL - Pt is alert and oriented in no acute distress.  Affect is appropriate. Good eye contact.  Moving slowly in room  Normal gait   At the base of the left tesicular sac, there is a palpable soft mobile lesion that is approximately 1cm. It is not attached to the testicle  No inguinal nodes are palpated       Assessment/Plan -  (Z98.1) S/P lumbar fusion  (primary encounter diagnosis)  Comment: 3+ level lumbar fusion 1/2018  Plan: pain is getting better. Working on using less pain meds. Has planned f/u     (K59.03) Drug-induced constipation  Comment: discussed continuing colace and miralax prn. Constipation should " godwin as he uses less narcotics   Plan:     (I10) Essential hypertension, benign  Comment: I wanted to restart his meds but he is very hesitant. We agreed he would collect a few more weeks of data and send to me via [employer].  If high, would restart toprol and lisinopril and consider addition of diuretic or amlodipine.  The patient indicates understanding of these issues and agrees with the plan.   Plan:     (R59.9) Enlarged lymph nodes  Comment: this feels like a lymph node and he just had major surgery in the lower back. This is not connected to the testicle so is unlikely to be testicular mass.  He will monitor for 3 weeks and if it is persisting/enlarging, we'll study it with an ultrasound. The patient indicates understanding of these issues and agrees with the plan.   Plan:     (Z23) Need for vaccination  Comment:   Plan: Pneumococcal vaccine 23 valent PPSV23          (Pneumovax) [[id number]], ADMIN MEDICARE:         Pneumococcal Vaccine ()              NEDA Randle MD

## 2018-02-08 NOTE — NURSING NOTE
"Chief Complaint   Patient presents with     Surgical Followup       Initial BP (!) 137/97 (BP Location: Right arm, Patient Position: Sitting, Cuff Size: Adult Regular)  Pulse 118  Temp 98.8  F (37.1  C) (Tympanic)  Ht 5' 11\" (1.803 m)  Wt 162 lb 11.2 oz (73.8 kg)  BMI 22.69 kg/m2 Estimated body mass index is 22.69 kg/(m^2) as calculated from the following:    Height as of this encounter: 5' 11\" (1.803 m).    Weight as of this encounter: 162 lb 11.2 oz (73.8 kg).  Medication Reconciliation: complete   Kristin Damian LPN    "

## 2018-02-08 NOTE — NURSING NOTE
Screening Questionnaire for Adult Immunization    Are you sick today?   No   Do you have allergies to medications, food, a vaccine component or latex?   Yes   Have you ever had a serious reaction after receiving a vaccination?   No   Do you have a long-term health problem with heart disease, lung disease, asthma, kidney disease, metabolic disease (e.g. diabetes), anemia, or other blood disorder?   No   Do you have cancer, leukemia, HIV/AIDS, or any other immune system problem?   No   In the past 3 months, have you taken medications that affect  your immune system, such as prednisone, other steroids, or anticancer drugs; drugs for the treatment of rheumatoid arthritis, Crohn s disease, or psoriasis; or have you had radiation treatments?   Yes   Have you had a seizure, or a brain or other nervous system problem?   No   During the past year, have you received a transfusion of blood or blood     products, or been given immune (gamma) globulin or antiviral drug?   No   For women: Are you pregnant or is there a chance you could become        pregnant during the next month?   No   Have you received any vaccinations in the past 4 weeks?   No     Immunization questionnaire was positive for at least one answer.  Notified Dr. Franko Mckeon MD.        Per orders of Dr. Joelle Randle, injection of Pneumo 23  given by Kristin THORNTON LPN. Patient instructed to remain in clinic for 15 minutes afterwards, and to report any adverse reaction to me immediately.     Prior to injection verified patient identity using patient's name and date of birth.    Screening performed by Kristin Damian on 2/8/2018 at 11:21 AM.

## 2018-02-08 NOTE — MR AVS SNAPSHOT
After Visit Summary   2/8/2018    Ambrocio Vázquez    MRN: [id number]           Patient Information     Date Of Birth          1950        Visit Information        Provider Department      2/8/2018 10:00 AM Joelle Randle MD [facility]        Today's Diagnoses     S/P lumbar fusion    -  1    Drug-induced constipation        Essential hypertension, benign        Enlarged lymph nodes        Need for vaccination           Follow-ups after your visit        Your next 10 appointments already scheduled     Feb 09, 2018  1:20 PM CST   Return Visit with LILI Mckinley CNP   [facility] ([facility])    [id number] [place] [address]   [phone]            Mar 14, 2018 10:00 AM CDT   (Arrive by 9:45 AM)   XR LUMBAR SPINE 2/3 VIEWS with [id number]   [facility] ([facility])    [id number] [place] [address]   [phone]           Please bring a list of your current medicines to your exam. (Include vitamins, minerals and over-thecounter medicines.) Leave your valuables at home.  Tell your doctor if there is a chance you may be pregnant.  You do not need to do anything special for this exam.            Mar 14, 2018 11:00 AM CDT   Return Visit with LILI Mckinley CNP   [facility] ([facility])    [address] [place] [id number]   [phone]              Who to contact     Normal or non-critical lab and imaging results will be communicated to you by MyChart, letter or phone within 4 business days after the clinic has received the results. If you do not hear from us within 7 days, please contact the clinic through MyChart or phone. If you have a critical or abnormal lab result, we will notify you by phone as soon as possible.  Submit refill requests through [employer] or call  "your pharmacy and they will forward the refill request to us. Please allow 3 business days for your refill to be completed.          If you need to speak with a  for additional information , please call: [phone]             Additional Information About Your Visit        MyChart Information     [employer] gives you secure access to your electronic health record. If you see a primary care provider, you can also send messages to your care team and make appointments. If you have questions, please call your primary care clinic.  If you do not have a primary care provider, please call [phone] and they will assist you.        Care EveryWhere ID     This is your Care EveryWhere ID. This could be used by other organizations to access your [facility] medical records  [id number]        Your Vitals Were     Pulse Temperature Height BMI (Body Mass Index)          118 98.8  F (37.1  C) (Tympanic) 5' 11\" (1.803 m) 22.69 kg/m2         Blood Pressure from Last 3 Encounters:   02/08/18 (!) 137/97   02/01/18 119/60   01/23/18 146/80    Weight from Last 3 Encounters:   02/08/18 162 lb 11.2 oz (73.8 kg)   01/29/18 165 lb (74.8 kg)   01/23/18 166 lb (75.3 kg)              We Performed the Following     ADMIN MEDICARE: Pneumococcal Vaccine ()     Pneumococcal vaccine 23 valent PPSV23  (Pneumovax) [89526]        Primary Care Provider Office Phone # Fax #    Jeolle Aniyah Randle [phone]  [facility] [address]        Equal Access to Services     [facility] HospitalDULCE : Hadii aad ku hadasho Soomaali, waaxda luqadaha, qaybta kaalmada manuel guardado . So [facility] [phone].    ATENCIÓN: Si habla español, tiene a armenta disposición servicios gratuitos de asistencia lingüística. Llame al [phone].    We comply with applicable federal civil rights laws and [place] laws. We do not discriminate on the basis of race, color, national origin, age, " disability, sex, sexual orientation, or gender identity.            Thank you!     Thank you for choosing [facility]  for your care. Our goal is always to provide you with excellent care. Hearing back from our patients is one way we can continue to improve our services. Please take a few minutes to complete the written survey that you may receive in the mail after your visit with us. Thank you!             Your Updated Medication List - Protect others around you: Learn how to safely use, store and throw away your medicines at www.disposemymeds.org.          This list is accurate as of 2/8/18  1:14 PM.  Always use your most recent med list.                   Brand Name Dispense Instructions for use Diagnosis    cyclobenzaprine 5 MG tablet    FLEXERIL    90 tablet    Take 1 tablet (5 mg) by mouth 3 times daily as needed for muscle spasms    S/P lumbar fusion       finasteride 5 MG tablet    PROSCAR    90 tablet    Take 1 tablet (5 mg) by mouth daily Patient said he takes one tablet daily    Disorder of prostate, unspecified       fish oil-omega-3 fatty acids 1000 MG capsule      Take 1 g by mouth daily        lisinopril 40 MG tablet    PRINIVIL/ZESTRIL    90 tablet    Take 1 tablet (40 mg) by mouth daily    Essential hypertension, benign       LORazepam 0.5 MG tablet    ATIVAN    10 tablet    Take 1 tablet (0.5 mg) by mouth every 8 hours as needed for anxiety    Anxiety       metoprolol succinate 25 MG 24 hr tablet    TOPROL-XL    90 tablet    Take 1 tablet (25 mg) by mouth daily    Coronary artery disease involving native heart without angina pectoris, unspecified vessel or lesion type, Essential hypertension       nitroGLYcerin 0.4 MG sublingual tablet    NITROSTAT    25 tablet    Place 1 tablet (0.4 mg) under the tongue See Admin Instructions for chest pain    Coronary artery disease involving native heart without angina pectoris, unspecified vessel or lesion type       oxyCODONE IR 5 MG tablet     ROXICODONE    60 tablet    Take 1 tablet (5 mg) by mouth every 4 hours as needed for moderate to severe pain    S/P lumbar fusion       PERCOCET PO           sildenafil 100 MG tablet    VIAGRA    10 tablet    Take 1 tablet (100 mg) by mouth daily as needed for erectile dysfunction Take 30 min to 4 hours before intercourse.  Never use with nitroglycerin, terazosin or doxazosin.    Erectile dysfunction due to arterial insufficiency       simvastatin 40 MG tablet    ZOCOR    90 tablet    Take 1 tablet (40 mg) by mouth At Bedtime    Other hyperlipidemia, Coronary arteriosclerosis

## 2018-02-09 ENCOUNTER — OFFICE VISIT (OUTPATIENT)
Dept: NEUROSURGERY | Facility: CLINIC | Age: 68
End: 2018-02-09
Attending: NURSE PRACTITIONER
Payer: COMMERCIAL

## 2018-02-09 VITALS
SYSTOLIC BLOOD PRESSURE: 149 MMHG | HEIGHT: 71 IN | OXYGEN SATURATION: 98 % | HEART RATE: 87 BPM | DIASTOLIC BLOOD PRESSURE: 89 MMHG | BODY MASS INDEX: 22.68 KG/M2 | WEIGHT: 162 LBS

## 2018-02-09 DIAGNOSIS — Z98.1 STATUS POST LUMBAR SPINAL FUSION: Primary | ICD-10-CM

## 2018-02-09 PROCEDURE — G0463 HOSPITAL OUTPT CLINIC VISIT: HCPCS | Performed by: NURSE PRACTITIONER

## 2018-02-09 PROCEDURE — 99024 POSTOP FOLLOW-UP VISIT: CPT | Performed by: NURSE PRACTITIONER

## 2018-02-09 RX ORDER — METHYLPREDNISOLONE 4 MG
TABLET, DOSE PACK ORAL
Qty: 21 TABLET | Refills: 0 | Status: SHIPPED | OUTPATIENT
Start: 2018-02-09 | End: 2018-03-14

## 2018-02-09 ASSESSMENT — PAIN SCALES - GENERAL: PAINLEVEL: MODERATE PAIN (4)

## 2018-02-09 NOTE — PROGRESS NOTES
Suture/Staple removal visit note:  S:  Patient has minimal pain, but continue to have right leg radicular pain.  O:  Chaffee removed by Pat Mak MA , wound is healing well without erythema, fluctuation, or induration.  A: Mr. Vázquez returned to clinic post-op for staple removal.  Patient tolerated procedure without incident.  He feels that he is having more right leg radicular pain. We will have him try a medrol dose pack. If the pain persists we may want to start Neurontin.  He is aware that it may take up to a year to get better and may not fully resolve.    P:        Patient Instructions   - Keep your incision clean and dry at all times. No bathing swimming or submerging in water.  Call immediately or come to ED if any drainage occurs, or you develop new pain, redness, or swelling.    - Return in 4 weeks for follow up appointment and xray    - Brace on when out of bed. No lifting greater than 10-15 pounds. No bending, twisting, or overhead reaching.          Anh Lynne [facility]  [facility]  [address] [address]    Tel [phone]  Pager [phone]

## 2018-02-09 NOTE — MR AVS SNAPSHOT
After Visit Summary   2/9/2018    Ambrocio Vázquez    MRN: [id number]           Patient Information     Date Of Birth          1950        Visit Information        Provider Department      2/9/2018 1:20 PM Anh Lynne APRN CNP [facility]        Today's Diagnoses     Status post lumbar spinal fusion    -  1      Care Instructions    - Keep your incision clean and dry at all times. No bathing swimming or submerging in water.  Call immediately or come to ED if any drainage occurs, or you develop new pain, redness, or swelling.    - Return in 4 weeks for follow up appointment and xray    - Brace on when out of bed. No lifting greater than 10 pounds. No bending, twisting, or overhead reaching.     - Trial medrol dose pack             Follow-ups after your visit        Your next 10 appointments already scheduled     Mar 14, 2018 10:00 AM CDT   (Arrive by 9:45 AM)   XR LUMBAR SPINE 2/3 VIEWS with BUFSOCXR1   [facility] ([facility])    [address]  [address]  [facility] [address]   [phone]           Please bring a list of your current medicines to your exam. (Include vitamins, minerals and over-thecounter medicines.) Leave your valuables at home.  Tell your doctor if there is a chance you may be pregnant.  You do not need to do anything special for this exam.            Mar 14, 2018 11:00 AM CDT   Return Visit with LILI Mckinley CNP   [facility] ([facility] [place] [facility])    [address]  [facility] [id number] [phone]              Who to contact     If you have questions or need follow up information about today's clinic visit or your schedule please contact [facility] directly at [phone].  Normal or non-critical lab and imaging results will be communicated to you by MyChart, letter or phone within 4 business days after the clinic has  "received the results. If you do not hear from us within 7 days, please contact the clinic through [employer] or phone. If you have a critical or abnormal lab result, we will notify you by phone as soon as possible.  Submit refill requests through [employer] or call your pharmacy and they will forward the refill request to us. Please allow 3 business days for your refill to be completed.          Additional Information About Your Visit        [employer] Information     [employer] gives you secure access to your electronic health record. If you see a primary care provider, you can also send messages to your care team and make appointments. If you have questions, please call your primary care clinic.  If you do not have a primary care provider, please call [phone] and they will assist you.        Care EveryWhere ID     This is your Care EveryWhere ID. This could be used by other organizations to access your [facility] medical records  [id number]        Your Vitals Were     Pulse Height Pulse Oximetry BMI (Body Mass Index)          87 5' 11\" (1.803 m) 98% 22.59 kg/m2         Blood Pressure from Last 3 Encounters:   02/09/18 149/89   02/08/18 (!) 137/97   02/01/18 119/60    Weight from Last 3 Encounters:   02/09/18 162 lb (73.5 kg)   02/08/18 162 lb 11.2 oz (73.8 kg)   01/29/18 165 lb (74.8 kg)              Today, you had the following     No orders found for display         Today's Medication Changes          These changes are accurate as of 2/9/18  1:26 PM.  If you have any questions, ask your nurse or doctor.               Start taking these medicines.        Dose/Directions    methylPREDNISolone 4 MG tablet   Commonly known as:  MEDROL DOSEPAK   Used for:  Status post lumbar spinal fusion   Started by:  Anh Lynne APRN CNP        Follow package instructions   Quantity:  21 tablet   Refills:  0            Where to get your medicines      These medications were sent to [facility], " [place] - [address]  [address] [address] [address]     Phone:  [phone]     methylPREDNISolone 4 MG tablet                Primary Care Provider Office Phone # Fax #    Joelle Randle [phone] [facility] [facility] [address] [address]        Equal Access to Services     KENDALLGLENDY HAO : Hadii aad ku hadasho Soomaali, waaxda luqadaha, qaybta kaalmada adeegyada, waxay idiin hayaan adeeg khcatarinosh la'karln . So [facility] [phone].    ATENCIÓN: Si habla español, tiene a armenta disposición servicios gratuitos de asistencia lingüística. [facility] [phone].    We comply with applicable federal civil rights laws and [place] laws. We do not discriminate on the basis of race, color, national origin, age, disability, sex, sexual orientation, or gender identity.            Thank you!     Thank you for choosing [facility]  for your care. Our goal is always to provide you with excellent care. Hearing back from our patients is one way we can continue to improve our services. Please take a few minutes to complete the written survey that you may receive in the mail after your visit with us. Thank you!             Your Updated Medication List - Protect others around you: Learn how to safely use, store and throw away your medicines at www.disposemymeds.org.          This list is accurate as of 2/9/18  1:26 PM.  Always use your most recent med list.                   Brand Name Dispense Instructions for use Diagnosis    cyclobenzaprine 5 MG tablet    FLEXERIL    90 tablet    Take 1 tablet (5 mg) by mouth 3 times daily as needed for muscle spasms    S/P lumbar fusion       finasteride 5 MG tablet    PROSCAR    90 tablet    Take 1 tablet (5 mg) by mouth daily Patient said he takes one tablet daily    Disorder of prostate, unspecified       fish oil-omega-3 fatty acids 1000 MG capsule      Take 1 g by mouth daily        lisinopril 40 MG tablet    PRINIVIL/ZESTRIL    90  tablet    Take 1 tablet (40 mg) by mouth daily    Essential hypertension, benign       LORazepam 0.5 MG tablet    ATIVAN    10 tablet    Take 1 tablet (0.5 mg) by mouth every 8 hours as needed for anxiety    Anxiety       methylPREDNISolone 4 MG tablet    MEDROL DOSEPAK    21 tablet    Follow package instructions    Status post lumbar spinal fusion       metoprolol succinate 25 MG 24 hr tablet    TOPROL-XL    90 tablet    Take 1 tablet (25 mg) by mouth daily    Coronary artery disease involving native heart without angina pectoris, unspecified vessel or lesion type, Essential hypertension       nitroGLYcerin 0.4 MG sublingual tablet    NITROSTAT    25 tablet    Place 1 tablet (0.4 mg) under the tongue See Admin Instructions for chest pain    Coronary artery disease involving native heart without angina pectoris, unspecified vessel or lesion type       oxyCODONE IR 5 MG tablet    ROXICODONE    60 tablet    Take 1 tablet (5 mg) by mouth every 4 hours as needed for moderate to severe pain    S/P lumbar fusion       PERCOCET PO           sildenafil 100 MG tablet    VIAGRA    10 tablet    Take 1 tablet (100 mg) by mouth daily as needed for erectile dysfunction Take 30 min to 4 hours before intercourse.  Never use with nitroglycerin, terazosin or doxazosin.    Erectile dysfunction due to arterial insufficiency       simvastatin 40 MG tablet    ZOCOR    90 tablet    Take 1 tablet (40 mg) by mouth At Bedtime    Other hyperlipidemia, Coronary arteriosclerosis

## 2018-02-09 NOTE — LETTER
2/9/2018         RE: Ambrocio Vázquez  [address] [address]        Dear Colleague,    Thank you for referring your patient, Ambrocio Vázquez, to the [facility]. Please see a copy of my visit note below.    Suture/Staple removal visit note:  S:  Patient has minimal pain, but continue to have right leg radicular pain.  O:  Meddybemps removed by Pat Mak MA , wound is healing well without erythema, fluctuation, or induration.  A: Mr. Vázquez returned to clinic post-op for staple removal.  Patient tolerated procedure without incident.  He feels that he is having more right leg radicular pain. We will have him try a medrol dose pack. If the pain persists we may want to start Neurontin.  He is aware that it may take up to a year to get better and may not fully resolve.    P:        Patient Instructions   - Keep your incision clean and dry at all times. No bathing swimming or submerging in water.  Call immediately or come to ED if any drainage occurs, or you develop new pain, redness, or swelling.    - Return in 4 weeks for follow up appointment and xray    - Brace on when out of bed. No lifting greater than 10-15 pounds. No bending, twisting, or overhead reaching.          Anh Lynne CNP  [facility]  [address] [address]    Tel [phone]  Pager [phone]        Again, thank you for allowing me to participate in the care of your patient.        Sincerely,        LILI Mckinley CNP

## 2018-02-09 NOTE — PATIENT INSTRUCTIONS
- Keep your incision clean and dry at all times. No bathing swimming or submerging in water.  Call immediately or come to ED if any drainage occurs, or you develop new pain, redness, or swelling.    - Return in 4 weeks for follow up appointment and xray    - Brace on when out of bed. No lifting greater than 10 pounds. No bending, twisting, or overhead reaching.     - Trial medrol dose pack

## 2018-02-09 NOTE — NURSING NOTE
"Ambrocio Vázquez is a 67 year old male who presents for:  Chief Complaint   Patient presents with     Neurologic Problem     Staple removal status post lumbar fusion DOS 1/29/18        Initial Vitals:  /89 (BP Location: Right arm, Patient Position: Sitting, Cuff Size: Adult Regular)  Pulse 87  Ht 5' 11\" (1.803 m)  Wt 162 lb (73.5 kg)  SpO2 98%  BMI 22.59 kg/m2 Estimated body mass index is 22.59 kg/(m^2) as calculated from the following:    Height as of this encounter: 5' 11\" (1.803 m).    Weight as of this encounter: 162 lb (73.5 kg).. Body surface area is 1.92 meters squared. BP completed using cuff size: regular  Moderate Pain (4)    Do you feel safe in your environment?  Yes  Do you need any refills today? No    Nursing Comments: Staple removal status post lumbar fusion DOS 1/29/18.        5 min. nursing intake time  La Mak MA       Discharge plan: - Keep your incision clean and dry at all times. No bathing swimming or submerging in water.  Call immediately or come to ED if any drainage occurs, or you develop new pain, redness, or swelling.    - Return in 4 weeks for follow up appointment and xray    - Brace on when out of bed. No lifting greater than 10 pounds. No bending, twisting, or overhead reaching.     - Trial medrol dose pack     2 min. nursing discharge time  La Mak MA        "

## 2018-02-14 ENCOUNTER — TELEPHONE (OUTPATIENT)
Dept: NEUROSURGERY | Facility: CLINIC | Age: 68
End: 2018-02-14

## 2018-02-14 DIAGNOSIS — Z98.1 S/P LUMBAR FUSION: Primary | ICD-10-CM

## 2018-02-14 RX ORDER — GABAPENTIN 300 MG/1
CAPSULE ORAL
Qty: 90 CAPSULE | Refills: 3 | Status: SHIPPED | OUTPATIENT
Start: 2018-02-14 | End: 2018-03-12

## 2018-02-27 ENCOUNTER — TELEPHONE (OUTPATIENT)
Dept: NEUROSURGERY | Facility: CLINIC | Age: 68
End: 2018-02-27

## 2018-02-27 NOTE — TELEPHONE ENCOUNTER
REASON FOR CALL:  Still in a lot of pain even after surgery. Has tried other meds and wants to know if he can increase his dose of gabapentin again? Not sleeping at night due to the pain.    Detailed message can be left:  NO (Recommended)

## 2018-02-28 NOTE — TELEPHONE ENCOUNTER
Spoke to Ambrocio via phone.  He completed Medrol pack over 2 weeks ago.  He did not note a change in level of pain.  Currently he is taking his Neurontin (which was ordered on 2/14), 300 mg 4 times daily (every 6 hours) at the direction of his pharmacist. (ordered TID).      Ambrocio denies incision pain or back pain.  His primary concern is right leg pain.  He states this is less noticeable when he is up standing/moving.  The pain is particularly problematic for him at night when he is trying to sleep.     He did discontinue narcotic medications.  He felt that this made him drowsy/sleepy but did not help with pain level.  He continues to hold Ibuprofen/Naproxen.  He does take tylenol occasionally with little relief.  He did not find the Flexeril to be beneficial, nor the valium prior to that.  He has tried heating pad but is unsure how much this helps his pain.      Ambrocio denies weakness of lower extremities, denies incontinence of bowl or bladder.      He is scheduled for follow up visit in Mid-March with xray prior.      Will route to care team for review.

## 2018-03-01 NOTE — TELEPHONE ENCOUNTER
Per Stacy Winkler, KIRIT, Dr Velez is recommending PT for patient and he should continue with pain meds.     Called and discussed with patient. Patient is only taking his pain meds at night. Advised to take during day as well to manage his pain better and refrain from driving while taking the pain meds. Patient thought he had enough but knows to call for a refill If needed. Advised to alternate ice/heat too. Patient denies any back pain, only right leg pain. Patient would like to hold off on the PT order for now but will call us if he wants it ordered. Patient wants to give the gabapentin a little more time to work and take pain meds during day to see if this will help first. Advised to call back if any further questions or concerns.

## 2018-03-12 ENCOUNTER — TELEPHONE (OUTPATIENT)
Dept: NEUROSURGERY | Facility: CLINIC | Age: 68
End: 2018-03-12

## 2018-03-12 DIAGNOSIS — Z98.1 S/P LUMBAR FUSION: ICD-10-CM

## 2018-03-12 RX ORDER — GABAPENTIN 300 MG/1
300 CAPSULE ORAL 4 TIMES DAILY
Qty: 120 CAPSULE | Refills: 3 | Status: SHIPPED | OUTPATIENT
Start: 2018-03-12 | End: 2018-06-06

## 2018-03-12 NOTE — TELEPHONE ENCOUNTER
Spoke to patient. He is need of mediation refills: Gabapentin and oxycodone. He has enough oxycodone until he sees Anh Lynne CNP in clinic on Wed 3/14 in [place].He will ask at appt for refill. Patient is s/p lumbar fusion 1/29/18. Patient was prescribed gabapentin 300 mg TID and is taking 300 mg four times daily as directed by his pharmacist. Patient states he feels this dose is really helping the nerve pain. Updated Anh Lynne CNP on his dose increase. Refill for gabapentin has been sent to his pharmacy.

## 2018-03-14 ENCOUNTER — OFFICE VISIT (OUTPATIENT)
Dept: NEUROSURGERY | Facility: CLINIC | Age: 68
End: 2018-03-14
Attending: NURSE PRACTITIONER
Payer: COMMERCIAL

## 2018-03-14 ENCOUNTER — RADIANT APPOINTMENT (OUTPATIENT)
Dept: GENERAL RADIOLOGY | Facility: CLINIC | Age: 68
End: 2018-03-14
Attending: NURSE PRACTITIONER
Payer: COMMERCIAL

## 2018-03-14 VITALS
HEIGHT: 71 IN | BODY MASS INDEX: 22.68 KG/M2 | SYSTOLIC BLOOD PRESSURE: 160 MMHG | OXYGEN SATURATION: 99 % | DIASTOLIC BLOOD PRESSURE: 106 MMHG | HEART RATE: 85 BPM | WEIGHT: 162 LBS

## 2018-03-14 DIAGNOSIS — Z98.1 S/P LUMBAR FUSION: ICD-10-CM

## 2018-03-14 PROCEDURE — 72100 X-RAY EXAM L-S SPINE 2/3 VWS: CPT

## 2018-03-14 PROCEDURE — 99024 POSTOP FOLLOW-UP VISIT: CPT | Performed by: NURSE PRACTITIONER

## 2018-03-14 PROCEDURE — G0463 HOSPITAL OUTPT CLINIC VISIT: HCPCS | Performed by: NURSE PRACTITIONER

## 2018-03-14 RX ORDER — OXYCODONE HYDROCHLORIDE 5 MG/1
5 TABLET ORAL EVERY 4 HOURS PRN
Qty: 60 TABLET | Refills: 0 | Status: SHIPPED | OUTPATIENT
Start: 2018-03-14 | End: 2018-06-06

## 2018-03-14 ASSESSMENT — PAIN SCALES - GENERAL: PAINLEVEL: MODERATE PAIN (4)

## 2018-03-14 NOTE — LETTER
3/14/2018         RE: Ambrocio Vázquez  [address] [address]        Dear Colleague,    Thank you for referring your patient, Ambrocio Vázquez, to the [facility]. Please see a copy of my visit note below.    [facility]  Neurosurgery followup:    HPI: Mr. Vázquez is a 67 year old male that return about 6 weeks post L3-S1 fusion.  He notes improved back pain and improved.  He still notes some numbness to his right leg but this has also improved.      Exam:  Constitutional:  Alert, well nourished, NAD.  HEENT: Normocephalic, atraumatic.   Pulm:  Without shortness of breath   CV:  No pitting edema of BLE.      Neurological:  Awake  Alert  Oriented x 3  Motor exam:        IP Q DF PF EHL  R   5  5   5   5    5  L   5  5   5   5    5     Able to spontaneously move L/E bilaterally  Sensation intact throughout all L/E dermatomes     Incisions:  Lumbar incision healed   Imaging: lumbar xray shows fusion intact     A/P: Mr. Vázquez is a 67 year old male that return about 6 weeks post L3-S1 fusion.  He notes improved back pain and improved.  He still notes some numbness to his right leg but this has also improved.  We discussed the effects of constipation and opioid use. It was recommend that he continue to use Miralax and other OTC stool softeners.  The pt asked if he had some nerve damage due to surgery due to the constipation, urinary retention and erectile dysfunction.  It was explained in detail that it is not uncommon for pts to have difficulty urinating post op due to the catheter and anesthesia. He is now able to urinate without difficulty.  He also was educated again about constipation and opioids. He did have a large clearing out of his bowels and stopped taking stool softeners. It was explained that he should continue daily stool softeners while on opioids. He also has a history of ED and takes Viagra prior to surgery. It was explained that opioids can  effect this as well. If he continues to have issues he should see his PCP.  The pts questions were all answered at this time.          Patient Instructions   - Wean from brace per patient comfort   - May increase lifting restriction to 20 pounds   - followup in 6 weeks with xray prior   - Call the clinic at [phone] for increased pain or any other questions and concerns.             Anh Lynne CNP  [facility]  [address] [address]    Tel [phone]  Pager [phone]        Again, thank you for allowing me to participate in the care of your patient.        Sincerely,        LILI Mckinley CNP

## 2018-03-14 NOTE — PATIENT INSTRUCTIONS
- Wean from brace per patient comfort   - May increase lifting restriction to 20 pounds   - followup in 6 weeks with xray prior   - Call the clinic at [phone] for increased pain or any other questions and concerns.

## 2018-03-14 NOTE — PROGRESS NOTES
[facility]  Neurosurgery followup:    HPI: Mr. Vázquez is a 67 year old male that return about 6 weeks post L3-S1 fusion.  He notes improved back pain and improved.  He still notes some numbness to his right leg but this has also improved.      Exam:  Constitutional:  Alert, well nourished, NAD.  HEENT: Normocephalic, atraumatic.   Pulm:  Without shortness of breath   CV:  No pitting edema of BLE.      Neurological:  Awake  Alert  Oriented x 3  Motor exam:        IP Q DF PF EHL  R   5  5   5   5    5  L   5  5   5   5    5     Able to spontaneously move L/E bilaterally  Sensation intact throughout all L/E dermatomes     Incisions:  Lumbar incision healed   Imaging: lumbar xray shows fusion intact     A/P: Mr. Vázquez is a 67 year old male that return about 6 weeks post L3-S1 fusion.  He notes improved back pain and improved.  He still notes some numbness to his right leg but this has also improved.  We discussed the effects of constipation and opioid use. It was recommend that he continue to use Miralax and other OTC stool softeners.  The pt asked if he had some nerve damage due to surgery due to the constipation, urinary retention and erectile dysfunction.  It was explained in detail that it is not uncommon for pts to have difficulty urinating post op due to the catheter and anesthesia. He is now able to urinate without difficulty.  He also was educated again about constipation and opioids. He did have a large clearing out of his bowels and stopped taking stool softeners. It was explained that he should continue daily stool softeners while on opioids. He also has a history of ED and takes Viagra prior to surgery. It was explained that opioids can effect this as well. If he continues to have issues he should see his PCP.  The pts questions were all answered at this time.          Patient Instructions   - Wean from brace per patient comfort   - May increase lifting restriction to 20 pounds   - followup in  6 weeks with xray prior   - Call the clinic at [phone] for increased pain or any other questions and concerns.             Anh Lynne [facility]  [facility]  [address] [address]    Tel [phone]  Pager [phone]

## 2018-03-14 NOTE — MR AVS SNAPSHOT
After Visit Summary   3/14/2018    Ambrocio Vázquez    MRN: [id number]           Patient Information     Date Of Birth          1950        Visit Information        Provider Department      3/14/2018 11:00 AM Anh Lynne APRN CNP [facility]        Today's Diagnoses     S/P lumbar fusion          Care Instructions    - Wean from brace per patient comfort   - May increase lifting restriction to 20 pounds   - followup in 6 weeks with xray prior   - Call the clinic at [phone] for increased pain or any other questions and concerns.          Follow-ups after your visit        Your next 10 appointments already scheduled     Mar 14, 2018 11:00 AM CDT   Return Visit with LILI Mckinley CNP   [facility] ([facility])    [address] [address] [address] [phone]              Future tests that were ordered for you today     Open Future Orders        Priority Expected Expires Ordered    XR Lumbar Spine 2/3 Views Routine 4/25/2018 3/14/2019 3/14/2018            Who to contact     If you have questions or need follow up information about today's clinic visit or your schedule please contact [facility] directly at [phone].  Normal or non-critical lab and imaging results will be communicated to you by MyChart, letter or phone within 4 business days after the clinic has received the results. If you do not hear from us within 7 days, please contact the clinic through [employer] or phone. If you have a critical or abnormal lab result, we will notify you by phone as soon as possible.  Submit refill requests through [employer] or call your pharmacy and they will forward the refill request to us. Please allow 3 business days for your refill to be completed.          Additional Information About Your Visit        [employer] Information     [employer] gives you secure access to your  "electronic health record. If you see a primary care provider, you can also send messages to your care team and make appointments. If you have questions, please call your primary care clinic.  If you do not have a primary care provider, please call [phone] and they will assist you.        Care EveryWhere ID     This is your Care EveryWhere ID. This could be used by other organizations to access your [place] medical records  [id number]        Your Vitals Were     Pulse Height Pulse Oximetry BMI (Body Mass Index)          85 5' 11\" (1.803 m) 99% 22.59 kg/m2         Blood Pressure from Last 3 Encounters:   03/14/18 (!) 160/106   02/09/18 149/89   02/08/18 (!) 137/97    Weight from Last 3 Encounters:   03/14/18 162 lb (73.5 kg)   02/09/18 162 lb (73.5 kg)   02/08/18 162 lb 11.2 oz (73.8 kg)                 Where to get your medicines      Some of these will need a paper prescription and others can be bought over the counter.  Ask your nurse if you have questions.     Bring a paper prescription for each of these medications     oxyCODONE IR 5 MG tablet          Primary Care Provider Office Phone # Fax #    Joelle Randle [phone] [id number] [facility] [address]        Equal Access to Services     GLENDY BUI AH: Hadii nevin ku hadasho Soomaali, waaxda luqadaha, qaybta kaalmada adeegyada, manuel miguel. So [facility] [phone].    ATENCIÓN: Si habla español, tiene a armenta disposición servicios gratuitos de asistencia lingüística. Llame al [phone].    We comply with applicable federal civil rights laws and [place] laws. We do not discriminate on the basis of race, color, national origin, age, disability, sex, sexual orientation, or gender identity.            Thank you!     Thank you for choosing [facility]  for your care. Our goal is always to provide you with excellent care. Hearing back from our patients is one way we can " continue to improve our services. Please take a few minutes to complete the written survey that you may receive in the mail after your visit with us. Thank you!             Your Updated Medication List - Protect others around you: Learn how to safely use, store and throw away your medicines at www.disposemymeds.org.          This list is accurate as of 3/14/18 10:15 AM.  Always use your most recent med list.                   Brand Name Dispense Instructions for use Diagnosis    cyclobenzaprine 5 MG tablet    FLEXERIL    90 tablet    Take 1 tablet (5 mg) by mouth 3 times daily as needed for muscle spasms    S/P lumbar fusion       finasteride 5 MG tablet    PROSCAR    90 tablet    Take 1 tablet (5 mg) by mouth daily Patient said he takes one tablet daily    Disorder of prostate, unspecified       fish oil-omega-3 fatty acids 1000 MG capsule      Take 1 g by mouth daily        gabapentin 300 MG capsule    NEURONTIN    120 capsule    Take 1 capsule (300 mg) by mouth 4 times daily    S/P lumbar fusion       LORazepam 0.5 MG tablet    ATIVAN    10 tablet    Take 1 tablet (0.5 mg) by mouth every 8 hours as needed for anxiety    Anxiety       nitroGLYcerin 0.4 MG sublingual tablet    NITROSTAT    25 tablet    Place 1 tablet (0.4 mg) under the tongue See Admin Instructions for chest pain    Coronary artery disease involving native heart without angina pectoris, unspecified vessel or lesion type       oxyCODONE IR 5 MG tablet    ROXICODONE    60 tablet    Take 1 tablet (5 mg) by mouth every 4 hours as needed for moderate to severe pain    S/P lumbar fusion       PERCOCET PO           sildenafil 100 MG tablet    VIAGRA    10 tablet    Take 1 tablet (100 mg) by mouth daily as needed for erectile dysfunction Take 30 min to 4 hours before intercourse.  Never use with nitroglycerin, terazosin or doxazosin.    Erectile dysfunction due to arterial insufficiency       simvastatin 40 MG tablet    ZOCOR    90 tablet    Take 1 tablet  (40 mg) by mouth At Bedtime    Other hyperlipidemia, Coronary arteriosclerosis

## 2018-03-14 NOTE — NURSING NOTE
"Ambrocio Vázquez is a 67 year old male who presents for:  Chief Complaint   Patient presents with     Neurologic Problem     6 week follow up status post lumbar fusion DOS 1/29/18        Initial Vitals:  BP (!) 160/106 (BP Location: Right arm, Patient Position: Sitting, Cuff Size: Adult Regular)  Pulse 85  Ht 5' 11\" (1.803 m)  Wt 162 lb (73.5 kg)  SpO2 99%  BMI 22.59 kg/m2 Estimated body mass index is 22.59 kg/(m^2) as calculated from the following:    Height as of this encounter: 5' 11\" (1.803 m).    Weight as of this encounter: 162 lb (73.5 kg).. Body surface area is 1.92 meters squared. BP completed using cuff size: regular  Moderate Pain (4)    Do you feel safe in your environment?  Yes  Do you need any refills today? Yes, Oxycodone    Nursing Comments: 6 week follow up status post lumbar fusion DOS 1/29/18.        5 min. nursing intake time  La Mak MA       Discharge plan: - Wean from brace per patient comfort   - May increase lifting restriction to 20 pounds   - followup in 6 weeks with xray prior   - Call the clinic at [phone] for increased pain or any other questions and concerns.  2 min. nursing discharge time  La Mak MA        "

## 2018-03-22 DIAGNOSIS — I25.10 CORONARY ARTERY DISEASE INVOLVING NATIVE HEART WITHOUT ANGINA PECTORIS, UNSPECIFIED VESSEL OR LESION TYPE: ICD-10-CM

## 2018-03-22 DIAGNOSIS — I10 ESSENTIAL HYPERTENSION: ICD-10-CM

## 2018-03-22 RX ORDER — METOPROLOL SUCCINATE 25 MG/1
12.5 TABLET, EXTENDED RELEASE ORAL DAILY
Qty: 15 TABLET | Refills: 1 | Status: CANCELLED | OUTPATIENT
Start: 2018-03-22

## 2018-03-22 NOTE — TELEPHONE ENCOUNTER
Ambrocio sent in blood pressure data for me.   Blood pressure  Running between 120-130 / 70-90, pulse 60-90    He is not taking any meds for blood pressure at this time.      I think, given his history of CAD and stents, he should be on a beta blocker and an ace inhibitor, but I don't want to make him dizzy.     Would he be willing to restart a low dose of metoprolol and continue to monitor blood pressure/pulse?     NEDA Randle MD         Current Outpatient Prescriptions   Medication     oxyCODONE IR (ROXICODONE) 5 MG tablet     gabapentin (NEURONTIN) 300 MG capsule     Oxycodone-Acetaminophen (PERCOCET PO)     cyclobenzaprine (FLEXERIL) 5 MG tablet     LORazepam (ATIVAN) 0.5 MG tablet     simvastatin (ZOCOR) 40 MG tablet     sildenafil (VIAGRA) 100 MG cap/tab     finasteride (PROSCAR) 5 MG tablet     nitroglycerin (NITROSTAT) 0.4 MG sublingual tablet     fish oil-omega-3 fatty acids (FISH OIL) 1000 MG capsule     No current facility-administered medications for this visit.

## 2018-03-22 NOTE — TELEPHONE ENCOUNTER
He can take half the pill he has for a dose of 12.5mg. If he tolerates that ( no low blood pressures or low pulses, not dizzy), he can increase to full tab.    C. Aniyah Randle MD

## 2018-03-22 NOTE — TELEPHONE ENCOUNTER
Patient notified of recommendations. He is willing to take the metoprolol again. He does have some left over from before. He has metoprolol 25 mg. He is wondering if he could use this or cut it in half. Otherwise he would like new script sent to the [facility] mail order. Please advise for dosing.  Karen Salas RN

## 2018-03-26 RX ORDER — METOPROLOL SUCCINATE 25 MG/1
TABLET, EXTENDED RELEASE ORAL
Qty: 90 TABLET | Refills: 0 | COMMUNITY
Start: 2018-03-26 | End: 2018-08-06

## 2018-03-26 NOTE — TELEPHONE ENCOUNTER
Patient notified of Dr. Randle's instructions as noted below. He agrees with plans and says that he has enough medication at home  Lonnie Mckeon RN

## 2018-04-11 DIAGNOSIS — I25.10 CORONARY ARTERY DISEASE INVOLVING NATIVE HEART WITHOUT ANGINA PECTORIS, UNSPECIFIED VESSEL OR LESION TYPE: ICD-10-CM

## 2018-04-11 NOTE — TELEPHONE ENCOUNTER
"NITROGLYCERIN 0.4MG SUBL        Last Written Prescription Date:  2/9/17  Last Fill Quantity: 25,   # refills: 3  Last Office Visit: 2/8/18  Future Office visit:    Next 5 appointments (look out 90 days)     Apr 25, 2018  9:50 AM CDT   Return Visit with Gabino Velez MD   [facility] ([facility])    [address] [address] [address] [phone]                   Requested Prescriptions   Pending Prescriptions Disp Refills     nitroGLYcerin (NITROSTAT) 0.4 MG sublingual tablet [Pharmacy Med Name: NITROGLYCERIN 0.4MG SUBL] 25 tablet 3     Sig: FOR CHEST PAIN PLACE 1 TABLET UNDER THE TONGUE EVERY 5 MINUTES FOR 3 DOSES. IF SYMPTOMS PERSIST 5 MINUTES AFTER FIRST DOSE CALL 911.    Nitrates Failed    4/11/2018 12:16 PM       Failed - Blood pressure under 140/90 in past 12 months    BP Readings from Last 3 Encounters:   03/14/18 (!) 160/106   02/09/18 149/89   02/08/18 (!) 137/97                Failed - Pt is not on erectile dysfunction medications       Failed - Sublingual nitro order needs review    If refill exceeds 1 bottle per month, please forward request to provider.          Passed - Recent (12 mo) or future (30 days) visit within the authorizing provider's specialty    Patient had office visit in the last 12 months or has a visit in the next 30 days with authorizing provider or within the authorizing provider's specialty.  See \"Patient Info\" tab in inbasket, or \"Choose Columns\" in Meds & Orders section of the refill encounter.           Passed - Patient is age 18 or older          "

## 2018-04-12 RX ORDER — NITROGLYCERIN 0.4 MG/1
TABLET SUBLINGUAL
Qty: 25 TABLET | Refills: 3 | Status: SHIPPED | OUTPATIENT
Start: 2018-04-12 | End: 2019-01-08

## 2018-04-24 ENCOUNTER — TELEPHONE (OUTPATIENT)
Dept: FAMILY MEDICINE | Facility: CLINIC | Age: 68
End: 2018-04-24

## 2018-04-24 DIAGNOSIS — E78.5 HYPERLIPIDEMIA LDL GOAL <100: ICD-10-CM

## 2018-04-24 DIAGNOSIS — Z12.11 SPECIAL SCREENING FOR MALIGNANT NEOPLASMS, COLON: Primary | ICD-10-CM

## 2018-04-24 DIAGNOSIS — Z12.5 SPECIAL SCREENING FOR MALIGNANT NEOPLASM OF PROSTATE: ICD-10-CM

## 2018-04-24 DIAGNOSIS — I25.10 CORONARY ARTERY DISEASE INVOLVING NATIVE CORONARY ARTERY OF NATIVE HEART WITHOUT ANGINA PECTORIS: ICD-10-CM

## 2018-04-24 NOTE — TELEPHONE ENCOUNTER
Reason for Call: Request for an order or referral:    Order or referral being requested: PSA lab and colonoscopy.  Ambrocio's wife is retiring and was hoping to get some medical things done before her insurance changes.  He would like a blood draw for PSA and also referral for colonoscopy.  Please review and place orders if appropriate.  Thank you..Jessica Mckeon    Date needed: as soon as possible    Has the patient been seen by the PCP for this problem? YES    Phone number Patient can be reached at:  Home number on file [phone] (home)    Best Time:  Any time    Can we leave a detailed message on this number?  YES    Call taken on 4/24/2018 at 2:02 PM by Jessica Mckeon

## 2018-04-25 ENCOUNTER — RADIANT APPOINTMENT (OUTPATIENT)
Dept: GENERAL RADIOLOGY | Facility: CLINIC | Age: 68
End: 2018-04-25
Attending: NURSE PRACTITIONER
Payer: COMMERCIAL

## 2018-04-25 ENCOUNTER — OFFICE VISIT (OUTPATIENT)
Dept: NEUROSURGERY | Facility: CLINIC | Age: 68
End: 2018-04-25
Attending: NEUROLOGICAL SURGERY
Payer: COMMERCIAL

## 2018-04-25 VITALS
WEIGHT: 162 LBS | DIASTOLIC BLOOD PRESSURE: 91 MMHG | BODY MASS INDEX: 22.68 KG/M2 | SYSTOLIC BLOOD PRESSURE: 142 MMHG | OXYGEN SATURATION: 96 % | TEMPERATURE: 98.1 F | HEIGHT: 71 IN | HEART RATE: 60 BPM

## 2018-04-25 DIAGNOSIS — Z98.1 S/P LUMBAR FUSION: ICD-10-CM

## 2018-04-25 DIAGNOSIS — Z98.1 STATUS POST LUMBAR SPINAL FUSION: Primary | ICD-10-CM

## 2018-04-25 PROCEDURE — 99024 POSTOP FOLLOW-UP VISIT: CPT | Performed by: NURSE PRACTITIONER

## 2018-04-25 PROCEDURE — G0463 HOSPITAL OUTPT CLINIC VISIT: HCPCS | Performed by: NURSE PRACTITIONER

## 2018-04-25 PROCEDURE — 72100 X-RAY EXAM L-S SPINE 2/3 VWS: CPT

## 2018-04-25 RX ORDER — CYCLOBENZAPRINE HCL 5 MG
5 TABLET ORAL 3 TIMES DAILY PRN
Qty: 90 TABLET | Refills: 3 | Status: SHIPPED | OUTPATIENT
Start: 2018-04-25 | End: 2018-06-06

## 2018-04-25 ASSESSMENT — PAIN SCALES - GENERAL: PAINLEVEL: MILD PAIN (2)

## 2018-04-25 NOTE — NURSING NOTE
"Ambrocio Vázquez is a 68 year old male who presents for:  Chief Complaint   Patient presents with     Neurologic Problem     12 week follow up , s/p lumbar fusion        Initial Vitals:  BP (!) 142/91 (BP Location: Right arm, Patient Position: Chair, Cuff Size: Adult Large)  Pulse 60  Temp 98.1  F (36.7  C) (Oral)  Ht 5' 11\" (1.803 m)  Wt 162 lb (73.5 kg)  SpO2 96%  BMI 22.59 kg/m2 Estimated body mass index is 22.59 kg/(m^2) as calculated from the following:    Height as of this encounter: 5' 11\" (1.803 m).    Weight as of this encounter: 162 lb (73.5 kg).. Body surface area is 1.92 meters squared. BP completed using cuff size: large  Mild Pain (2)    Do you feel safe in your environment?  Yes  Do you need any refills today? No    Nursing Comments: 12 week follow up, lumbar fusion.  Patient rates his pain today as 2      5 min. nursing intake time  Phyllis Qureshi CMA      Discharge plan: - May increase lifting restriction to 30 pounds and increase as tolerated  - followup in 3 months with xray prior   - Call the clinic at [phone] for increased pain or any other questions and concerns.    - If you would like to taper off of Neurontin decrease by 1 tab every 3 days.    2 min. nursing discharge time  Phyllis Qureshi CMA       "

## 2018-04-25 NOTE — PATIENT INSTRUCTIONS
- May increase lifting restriction to 30 pounds and increase as tolerated  - followup in 3 months with xray prior   - Call the clinic at [phone] for increased pain or any other questions and concerns.    - If you would like to taper off of Neurontin decrease by 1 tab every 3 days.

## 2018-04-25 NOTE — PROGRESS NOTES
[facility]  Neurosurgery followup:    HPI: Mr. Vázquez is a 67 year old male that return about 12 weeks post L3-S1 fusion. The pt notes that he is doing well at this time.      Exam:  Constitutional:  Alert, well nourished, NAD.  HEENT: Normocephalic, atraumatic.   Pulm:  Without shortness of breath   CV:  No pitting edema of BLE.      Neurological:  Awake  Alert  Oriented x 3  Motor exam:        IP Q DF PF EHL  R   5  5   5   5    5  L   5  5   5   5    5     Able to spontaneously move L/E bilaterally  Sensation intact throughout all L/E dermatomes     Incisions:  Lumbar incision healed   Imaging: lumbar xray shows fusion intact     A/P:  Mr. Vázquez is a 67 year old male that return about 12 weeks post L3-S1 fusion. The pt notes that he is doing well at this time.  He feels that his numbness is getting better.  His restrictions and how to ease into activity was discussed with him in detail.  We will refill his Flexeril as well at this time.      Patient Instructions   - May increase lifting restriction to 30 pounds and increase as tolerated  - followup in 3 months with xray prior   - Call the clinic at [phone] for increased pain or any other questions and concerns.    - If you would like to taper off of Neurontin decrease by 1 tab every 3 days.           Anh Lynne [facility]  [facility]  [address] [address]    Tel [phone]  Pager [phone]

## 2018-04-25 NOTE — LETTER
4/25/2018         RE: Ambrocio Vázquez  [address] [address]        Dear Colleague,    Thank you for referring your patient, Ambrocio Vázquez, to the [facility]. Please see a copy of my visit note below.    [facility]  Neurosurgery followup:    HPI: Mr. Vázquez is a 67 year old male that return about 12 weeks post L3-S1 fusion. The pt notes that he is doing well at this time.      Exam:  Constitutional:  Alert, well nourished, NAD.  HEENT: Normocephalic, atraumatic.   Pulm:  Without shortness of breath   CV:  No pitting edema of BLE.      Neurological:  Awake  Alert  Oriented x 3  Motor exam:        IP Q DF PF EHL  R   5  5   5   5    5  L   5  5   5   5    5     Able to spontaneously move L/E bilaterally  Sensation intact throughout all L/E dermatomes     Incisions:  Lumbar incision healed   Imaging: lumbar xray shows fusion intact     A/P:  Mr. Vázquez is a 67 year old male that return about 12 weeks post L3-S1 fusion. The pt notes that he is doing well at this time.  He feels that his numbness is getting better.  His restrictions and how to ease into activity was discussed with him in detail.  We will refill his Flexeril as well at this time.      Patient Instructions   - May increase lifting restriction to 30 pounds and increase as tolerated  - followup in 3 months with xray prior   - Call the clinic at [phone] for increased pain or any other questions and concerns.    - If you would like to taper off of Neurontin decrease by 1 tab every 3 days.           Anh Lynne CNP  [facility]  [address] [address]    Tel [phone]  Pager [phone]        Again, thank you for allowing me to participate in the care of your patient.        Sincerely,        LILI Mckinley CNP

## 2018-04-25 NOTE — MR AVS SNAPSHOT
After Visit Summary   4/25/2018    Ambrocio Vázquez    MRN: [id number]           Patient Information     Date Of Birth          1950        Visit Information        Provider Department      4/25/2018 9:50 AM Anh Lynne APRN CNP [facility]        Today's Diagnoses     Status post lumbar spinal fusion    -  1    S/P lumbar fusion          Care Instructions    - May increase lifting restriction to 30 pounds and increase as tolerated  - followup in 3 months with xray prior   - Call the clinic at [phone] for increased pain or any other questions and concerns.    - If you would like to taper off of Neurontin decrease by 1 tab every 3 days.            Follow-ups after your visit        Your next 10 appointments already scheduled     Jun 08, 2018 10:20 AM CDT   [facility]   [facility]    [address] [place] [address] [phone]              Future tests that were ordered for you today     Open Future Orders        Priority Expected Expires Ordered    XR Lumbar Spine 2/3 Views Routine 7/25/2018 4/25/2019 4/25/2018    PSA, screen Routine  4/25/2019 4/25/2018    Lipid panel reflex to direct LDL Fasting Routine 4/25/2018 5/9/2018 4/25/2018            Who to contact     If you have questions or need follow up information about today's clinic visit or your schedule please contact [facility] directly at [phone].  Normal or non-critical lab and imaging results will be communicated to you by MyChart, letter or phone within 4 business days after the clinic has received the results. If you do not hear from us within 7 days, please contact the clinic through MyChart or phone. If you have a critical or abnormal lab result, we will notify you by phone as soon as possible.  Submit refill requests through "[facility] or call your pharmacy and they will forward the refill request to  "us. Please allow 3 business days for your refill to be completed.          Additional Information About Your Visit        MyChart Information     [employer] gives you secure access to your electronic health record. If you see a primary care provider, you can also send messages to your care team and make appointments. If you have questions, please call your primary care clinic.  If you do not have a primary care provider, please call [phone] and they will assist you.        Care EveryWhere ID     This is your Care EveryWhere ID. This could be used by other organizations to access your [facility] medical records  [id number]        Your Vitals Were     Pulse Temperature Height Pulse Oximetry BMI (Body Mass Index)       60 98.1  F (36.7  C) (Oral) 5' 11\" (1.803 m) 96% 22.59 kg/m2        Blood Pressure from Last 3 Encounters:   04/25/18 147/89   03/14/18 (!) 160/106   02/09/18 149/89    Weight from Last 3 Encounters:   04/25/18 162 lb (73.5 kg)   03/14/18 162 lb (73.5 kg)   02/09/18 162 lb (73.5 kg)                 Where to get your medicines      These medications were sent to [facility] [address] [address]     Phone:  [phone]     cyclobenzaprine 5 MG tablet          Primary Care Provider Office Phone # Fax #    Joelle Aniyah Randle [phone] [address] [facility] [address]        Equal Access to Services     [facility]: Hadii aad ku hadasho Soomaali, waaxda luqadaha, qaybta kaalmada adeegyada, manuel miguel. So [facility] [phone].    ATENCIÓN: Si timla gino, tiene a armenta disposición servicios gratuitos de asistencia lingüística. Llame al [phone].    We comply with applicable federal civil rights laws and [place] laws. We do not discriminate on the basis of race, color, national origin, age, disability, sex, sexual orientation, or gender identity.          "   Thank you!     Thank you for choosing [facility]  for your care. Our goal is always to provide you with excellent care. Hearing back from our patients is one way we can continue to improve our services. Please take a few minutes to complete the written survey that you may receive in the mail after your visit with us. Thank you!             Your Updated Medication List - Protect others around you: Learn how to safely use, store and throw away your medicines at www.disposemymeds.org.          This list is accurate as of 4/25/18  9:56 AM.  Always use your most recent med list.                   Brand Name Dispense Instructions for use Diagnosis    aspirin 81 MG tablet     30 tablet    Take 1 tablet (81 mg) by mouth daily    Coronary artery disease involving native coronary artery of native heart without angina pectoris       cyclobenzaprine 5 MG tablet    FLEXERIL    90 tablet    Take 1 tablet (5 mg) by mouth 3 times daily as needed for muscle spasms    S/P lumbar fusion       finasteride 5 MG tablet    PROSCAR    90 tablet    Take 1 tablet (5 mg) by mouth daily Patient said he takes one tablet daily    Disorder of prostate, unspecified       fish oil-omega-3 fatty acids 1000 MG capsule      Take 1 g by mouth daily        gabapentin 300 MG capsule    NEURONTIN    120 capsule    Take 1 capsule (300 mg) by mouth 4 times daily    S/P lumbar fusion       LORazepam 0.5 MG tablet    ATIVAN    10 tablet    Take 1 tablet (0.5 mg) by mouth every 8 hours as needed for anxiety    Anxiety       metoprolol succinate 25 MG 24 hr tablet    TOPROL-XL    90 tablet    Take 1/2 tab for 2 weeks; if ok then increase to full tab daily    Coronary artery disease involving native heart without angina pectoris, unspecified vessel or lesion type, Essential hypertension       nitroGLYcerin 0.4 MG sublingual tablet    NITROSTAT    25 tablet    FOR CHEST PAIN PLACE 1 TABLET UNDER THE TONGUE EVERY 5 MINUTES FOR 3 DOSES. IF  SYMPTOMS PERSIST 5 MINUTES AFTER FIRST DOSE CALL 911.    Coronary artery disease involving native heart without angina pectoris, unspecified vessel or lesion type       oxyCODONE IR 5 MG tablet    ROXICODONE    60 tablet    Take 1 tablet (5 mg) by mouth every 4 hours as needed for moderate to severe pain    S/P lumbar fusion       PERCOCET PO           sildenafil 100 MG tablet    VIAGRA    10 tablet    Take 1 tablet (100 mg) by mouth daily as needed for erectile dysfunction Take 30 min to 4 hours before intercourse.  Never use with nitroglycerin, terazosin or doxazosin.    Erectile dysfunction due to arterial insufficiency       simvastatin 40 MG tablet    ZOCOR    90 tablet    Take 1 tablet (40 mg) by mouth At Bedtime    Other hyperlipidemia, Coronary arteriosclerosis

## 2018-04-26 DIAGNOSIS — Z12.5 SPECIAL SCREENING FOR MALIGNANT NEOPLASM OF PROSTATE: ICD-10-CM

## 2018-04-26 DIAGNOSIS — E78.5 HYPERLIPIDEMIA LDL GOAL <100: ICD-10-CM

## 2018-04-26 PROCEDURE — G0103 PSA SCREENING: HCPCS | Performed by: FAMILY MEDICINE

## 2018-04-26 PROCEDURE — 36415 COLL VENOUS BLD VENIPUNCTURE: CPT | Performed by: FAMILY MEDICINE

## 2018-04-26 PROCEDURE — 80061 LIPID PANEL: CPT | Performed by: FAMILY MEDICINE

## 2018-04-27 DIAGNOSIS — N42.9 DISORDER OF PROSTATE: ICD-10-CM

## 2018-04-27 LAB
CHOLEST SERPL-MCNC: 140 MG/DL
HDLC SERPL-MCNC: 39 MG/DL
LDLC SERPL CALC-MCNC: 73 MG/DL
NONHDLC SERPL-MCNC: 101 MG/DL
PSA SERPL-ACNC: 1.36 UG/L (ref 0–4)
TRIGL SERPL-MCNC: 141 MG/DL

## 2018-04-30 NOTE — TELEPHONE ENCOUNTER
"FINASTERIDE 5MG TABS        Last Written Prescription Date:  2/9/17  Last Fill Quantity: 90,   # refills: 3  Last Office Visit: 2/8/18  Future Office visit:       Requested Prescriptions   Pending Prescriptions Disp Refills     finasteride (PROSCAR) 5 MG tablet [Pharmacy Med Name: FINASTERIDE 5MG TABS] 90 tablet 3     Sig: TAKE ONE TABLET BY MOUTH EVERY DAY    Alpha Blockers Failed    4/27/2018  6:13 PM       Failed - Blood pressure under 140/90 in past 12 months    BP Readings from Last 3 Encounters:   04/25/18 (!) 142/91   03/14/18 (!) 160/106   02/09/18 149/89                Failed - Patient does not have Tadalafil, Vardenafil, or Sildenafil on their medication list       Passed - Recent (12 mo) or future (30 days) visit within the authorizing provider's specialty    Patient had office visit in the last 12 months or has a visit in the next 30 days with authorizing provider or within the authorizing provider's specialty.  See \"Patient Info\" tab in inbasket, or \"Choose Columns\" in Meds & Orders section of the refill encounter.           Passed - Patient is 18 years of age or older          "

## 2018-05-01 RX ORDER — FINASTERIDE 5 MG/1
TABLET, FILM COATED ORAL
Qty: 90 TABLET | Refills: 2 | Status: SHIPPED | OUTPATIENT
Start: 2018-05-01 | End: 2018-06-05

## 2018-06-05 ENCOUNTER — OFFICE VISIT (OUTPATIENT)
Dept: UROLOGY | Facility: CLINIC | Age: 68
End: 2018-06-05
Payer: COMMERCIAL

## 2018-06-05 VITALS
TEMPERATURE: 97 F | BODY MASS INDEX: 23.01 KG/M2 | SYSTOLIC BLOOD PRESSURE: 178 MMHG | DIASTOLIC BLOOD PRESSURE: 102 MMHG | HEART RATE: 63 BPM | WEIGHT: 165 LBS

## 2018-06-05 DIAGNOSIS — N42.9 DISORDER OF PROSTATE: ICD-10-CM

## 2018-06-05 DIAGNOSIS — N52.9 VASCULOGENIC ERECTILE DYSFUNCTION, UNSPECIFIED VASCULOGENIC ERECTILE DYSFUNCTION TYPE: Primary | ICD-10-CM

## 2018-06-05 PROCEDURE — 99214 OFFICE O/P EST MOD 30 MIN: CPT | Performed by: UROLOGY

## 2018-06-05 RX ORDER — FINASTERIDE 5 MG/1
1 TABLET, FILM COATED ORAL DAILY
Qty: 90 TABLET | Refills: 3 | Status: SHIPPED | OUTPATIENT
Start: 2018-06-05 | End: 2018-07-26

## 2018-06-05 RX ORDER — SILDENAFIL CITRATE 20 MG/1
TABLET ORAL
Qty: 50 TABLET | Refills: 3 | Status: SHIPPED | OUTPATIENT
Start: 2018-06-05 | End: 2019-01-29

## 2018-06-05 NOTE — MR AVS SNAPSHOT
After Visit Summary   6/5/2018    Ambrocio Vázquez    MRN: [id number]           Patient Information     Date Of Birth          1950        Visit Information        Provider Department      6/5/2018 10:30 AM RETA Iglesias MD [facility]        Today's Diagnoses     Vasculogenic erectile dysfunction, unspecified vasculogenic erectile dysfunction type    -  1    Disorder of prostate          Care Instructions    Per Physician's instructions                    Follow-ups after your visit        Your next 10 appointments already scheduled     Jun 08, 2018 10:20 AM CDT   New Visit with Divya Friedman OD   [facility]    [address] [place] [id number] [phone]            Jun 12, 2018   Procedure with Wilbur De Oliveira MD   [facility] ([facility])    [address] [place] [address] [phone]           The medical center is located at [address] (between [address] and [address] in [place], [place] north of [place]).              Who to contact     If you have questions or need follow up information about today's clinic visit or your schedule please contact [facility] directly at [phone].  Normal or non-critical lab and imaging results will be communicated to you by MyChart, letter or phone within 4 business days after the clinic has received the results. If you do not hear from us within 7 days, please contact the clinic through MyChart or phone. If you have a critical or abnormal lab result, we will notify you by phone as soon as possible.  Submit refill requests through [employer] [facility] or call your pharmacy and they will forward the refill request to us. Please allow 3 business days for your refill to be completed.          Additional Information About Your Visit        [employer] M Information     [employer] gives you secure access to your electronic health record. If you  see a primary care provider, you can also send messages to your care team and make appointments. If you have questions, please call your primary care clinic.  If you do not have a primary care provider, please call [phone] and they will assist you.        Care EveryWhere ID     This is your Care EveryWhere ID. This could be used by other organizations to access your [facility] medical records  [id number]        Your Vitals Were     Pulse Temperature BMI (Body Mass Index)             63 97  F (36.1  C) (Tympanic) 23.01 kg/m2          Blood Pressure from Last 3 Encounters:   06/05/18 (!) 178/102   04/25/18 (!) 142/91   03/14/18 (!) 160/106    Weight from Last 3 Encounters:   06/05/18 74.8 kg (165 lb)   04/25/18 73.5 kg (162 lb)   03/14/18 73.5 kg (162 lb)              Today, you had the following     No orders found for display         Today's Medication Changes          These changes are accurate as of 6/5/18 11:59 PM.  If you have any questions, ask your nurse or doctor.               Start taking these medicines.        Dose/Directions    sildenafil 20 MG tablet   Commonly known as:  REVATIO   Used for:  Vasculogenic erectile dysfunction, unspecified vasculogenic erectile dysfunction type   Started by:  RETA Iglesias MD        Take 1 tablet (20 mg) by mouth three times daily for pulmonary hypertension.  Never use with nitroglycerin, terazosin or doxazosin.   Quantity:  50 tablet   Refills:  3         These medicines have changed or have updated prescriptions.        Dose/Directions    finasteride 5 MG tablet   Commonly known as:  PROSCAR   This may have changed:  See the new instructions.   Used for:  Disorder of prostate   Changed by:  RETA Iglesias MD        Dose:  1 tablet   Take 1 tablet (5 mg) by mouth daily   Quantity:  90 tablet   Refills:  3            Where to get your medicines      These medications were sent to [facility] - [facility] [address]   [address] , [facility] [address]    Hours:  Mon-Fri 8:30am-5:00pm Toll Free [phone] Phone:  [phone]     finasteride 5 MG tablet         Some of these will need a paper prescription and others can be bought over the counter.  Ask your nurse if you have questions.     Bring a paper prescription for each of these medications     sildenafil 20 MG tablet                Primary Care Provider Office Phone # Fax #    Joelle Randle [phone] [address]  [facility] [address]        Equal Access to Services     [facility]: Hadii aad ku hadasho Soomaali, waaxda luqadaha, qaybta kaalmada geo, manuel argueta . So [facility] [phone].    ATENCIÓN: Si habla español, tiene a armenta disposición servicios gratuitos de asistencia lingüística. [facility] [phone].    We comply with applicable federal civil rights laws and [place] laws. We do not discriminate on the basis of race, color, national origin, age, disability, sex, sexual orientation, or gender identity.            Thank you!     Thank you for choosing [facility]  for your care. Our goal is always to provide you with excellent care. Hearing back from our patients is one way we can continue to improve our services. Please take a few minutes to complete the written survey that you may receive in the mail after your visit with us. Thank you!             Your Updated Medication List - Protect others around you: Learn how to safely use, store and throw away your medicines at www.disposemymeds.org.          This list is accurate as of 6/5/18 11:59 PM.  Always use your most recent med list.                   Brand Name Dispense Instructions for use Diagnosis    aspirin 81 MG tablet     30 tablet    Take 1 tablet (81 mg) by mouth daily    Coronary artery disease involving native coronary artery of native heart without angina pectoris       cyclobenzaprine 5 MG tablet    FLEXERIL     90 tablet    Take 1 tablet (5 mg) by mouth 3 times daily as needed for muscle spasms    S/P lumbar fusion       finasteride 5 MG tablet    PROSCAR    90 tablet    Take 1 tablet (5 mg) by mouth daily    Disorder of prostate       fish oil-omega-3 fatty acids 1000 MG capsule      Take 1 g by mouth daily        gabapentin 300 MG capsule    NEURONTIN    120 capsule    Take 1 capsule (300 mg) by mouth 4 times daily    S/P lumbar fusion       LORazepam 0.5 MG tablet    ATIVAN    10 tablet    Take 1 tablet (0.5 mg) by mouth every 8 hours as needed for anxiety    Anxiety       metoprolol succinate 25 MG 24 hr tablet    TOPROL-XL    90 tablet    Take 1/2 tab for 2 weeks; if ok then increase to full tab daily    Coronary artery disease involving native heart without angina pectoris, unspecified vessel or lesion type, Essential hypertension       nitroGLYcerin 0.4 MG sublingual tablet    NITROSTAT    25 tablet    FOR CHEST PAIN PLACE 1 TABLET UNDER THE TONGUE EVERY 5 MINUTES FOR 3 DOSES. IF SYMPTOMS PERSIST 5 MINUTES AFTER FIRST DOSE CALL 911.    Coronary artery disease involving native heart without angina pectoris, unspecified vessel or lesion type       oxyCODONE IR 5 MG tablet    ROXICODONE    60 tablet    Take 1 tablet (5 mg) by mouth every 4 hours as needed for moderate to severe pain    S/P lumbar fusion       PERCOCET PO           sildenafil 100 MG tablet    VIAGRA    10 tablet    Take 1 tablet (100 mg) by mouth daily as needed for erectile dysfunction Take 30 min to 4 hours before intercourse.  Never use with nitroglycerin, terazosin or doxazosin.    Erectile dysfunction due to arterial insufficiency       sildenafil 20 MG tablet    REVATIO    50 tablet    Take 1 tablet (20 mg) by mouth three times daily for pulmonary hypertension.  Never use with nitroglycerin, terazosin or doxazosin.    Vasculogenic erectile dysfunction, unspecified vasculogenic erectile dysfunction type       simvastatin 40 MG tablet    ZOCOR    90  tablet    Take 1 tablet (40 mg) by mouth At Bedtime    Other hyperlipidemia, Coronary arteriosclerosis

## 2018-06-06 NOTE — PROGRESS NOTES
Appointment source: Established Patient  Patient name: Ambrocio Vázquez  Urology Staff: Shon Iglesias MD    Subjective: This is a 68 year old year old male returning for follow up of elevated PSA and erectile dysfunction.    Objective:  Feeling well with no significant voiding symptoms on finasteride.    PSA is trending lower.    Erectile dysfunction is managed well with sildanafil.    Prostate is benign to palpation.    Assessment:  Doing well.    Plan:  Continue finasteride and refill sildenafil. Return in one year.    Total time 25 minutes, counseling 15 minutes discussing erectile dysfunction and PSA.

## 2018-06-07 ENCOUNTER — ANESTHESIA EVENT (OUTPATIENT)
Dept: GASTROENTEROLOGY | Facility: CLINIC | Age: 68
End: 2018-06-07
Payer: COMMERCIAL

## 2018-06-07 ASSESSMENT — LIFESTYLE VARIABLES: TOBACCO_USE: 1

## 2018-06-07 NOTE — ANESTHESIA PREPROCEDURE EVALUATION
Anesthesia Evaluation     . Pt has had prior anesthetic. Type: General    No history of anesthetic complications          ROS/MED HX    ENT/Pulmonary:     (+)tobacco use, Past use , . .    Neurologic:  - neg neurologic ROS     Cardiovascular: Comment: PFO  Cardiomyopathy   Aneurysm of aortic root    (+) Dyslipidemia, hypertension--CAD, angina--stent,. : . . . :. . Previous cardiac testing Echodate:18results:Results     Echocardiogram Complete (Order [id number])      External Result Report     External Result Report     PACS Images     Show images for Echocardiogram Complete     Echocardiogram Complete   Status: Edited Result - FINAL   Visible to patient: Yes (MyChart) Next appt: 2018 at 10:20 AM in Optometry (Divya Friedman, OD) Dx:  CAD (coronary artery disease) Order: [id number]    Notes Recorded by Sebastian Dowd RN on 2018 at 3:32 PM  Reviewed with Dr. Linares. Dr. Linares will review results with patient at  on 18. No further testing need prior to apt.      Details     Reading Physician Reading Date Result Priority    Maty Child MD 2018        Narrative          [id number]  [id number]  [id number]  [id number]^NEGRO^ALONDRA^M        [facility]  Echocardiography Laboratory  [address] [place] [address] [address]  [place] [address]  Phone [phone]  Fax [phone]        Name: JUDY DOVE  MRN: [id number]  : 1950  Study Date: 2018 10:32 AM  Age: 67 yrs  Gender: Male  Patient Location: [facility]  Reason For Study: CAD  Ordering Physician: ALONDRA TOM  Referring Physician: CALE EL  Performed By: Marleni Pradhan     BSA: 1.9 m2  Height: 71 in  Weight: 163 lb  HR: 66  BP: 128/72 mmHg  _____________________________________________________________________________  __     Procedure  Complete Echo  Adult.     _____________________________________________________________________________  __        Interpretation Summary     Mild aortic root dilatation.  The interatrial septum is aneurysmal with a probable left-to-right interatrial  shunt associated (not optimally visualized for quantitation).  The right ventricle is mildly dilated. This appears roughly similar to prior  study of 5/13/2015 on [id number]-[id number]-ulmb comparison.  Left ventricular systolic function is normal.  No regional wall motion abnormalities noted.  _____________________________________________________________________________  __        Left Ventricle  The left ventricle is normal in size. There is mild concentric left  ventricular hypertrophy. Left ventricular systolic function is normal. The  visual ejection fraction is estimated at 60-65%. Normal left ventricular  diastolic function. E by E prime ratio is less than 8, that likely suggests  normal left ventricular filling pressures. No regional wall motion  abnormalities noted. A false chord is noted (normal variant).     Right Ventricle  The right ventricle is mildly dilated. The right ventricular systolic function  is normal.     Atria  Normal left atrial size. The right atrium is mildly dilated. The interatrial  septum is aneurysmal with a probable left-to-right interatrial shunt  associated (not optimally visualized for quantitation).     Mitral Valve  The mitral valve leaflets appear normal. There is no evidence of stenosis,  fluttering, or prolapse. There is physiologic mitral regurgitation.     Tricuspid Valve  Normal tricuspid valve. There is physiologic tricuspid regurgitation. Right  ventricular systolic pressure could not be approximated due to inadequate  tricuspid regurgitation.        Aortic Valve  The aortic valve is trileaflet. No aortic regurgitation is present. No aortic  stenosis is present.     Pulmonic Valve  The pulmonic valve is not well seen, but is grossly normal. There is  no  pulmonic valvular regurgitation. Normal pulmonic valve velocity.     Vessels  Mild aortic root dilatation. Normal size ascending aorta. The IVC is normal in  size and reactivity with respiration, suggesting normal central venous  pressure.     Pericardium  There is no pericardial effusion.     Rhythm  Sinus rhythm was noted.     _____________________________________________________________________________  __  MMode/2D Measurements & Calculations  RVDd: 3.8 cm  IVSd: 1.1 cm  LVIDd: 4.2 cm  LVIDs: 2.8 cm  LVPWd: 1.2 cm  FS: 34.0 %  EDV(Teich): 78.9 ml  ESV(Teich): 29.0 ml  LV mass(C)d: 169.3 grams  LV mass(C)dI: 87.6 grams/m2  Ao root diam: 3.9 cm  LA dimension: 3.3 cm     asc Aorta Diam: 3.5 cm  LA/Ao: 0.83  LVOT diam: 2.5 cm  LVOT area: 4.8 cm2  LA Volume (BP): 46.5 ml  LA Volume Index (BP): 24.1 ml/m2  RWT: 0.57        Doppler Measurements & Calculations  MV E max keyshawn: 57.6 cm/sec  MV A max keyshawn: 75.2 cm/sec  MV E/A: 0.77  MV dec time: 0.26 sec  PA V2 max: 72.6 cm/sec  PA max P.1 mmHg  PA acc time: 0.11 sec     E/E' av.5  Lateral E/e': 6.0  Medial E/e': 7.0           _____________________________________________________________________________  __           Report approved by: Hitesh Ng 2018 03:04 PM           Stress Testdate:1/19/18 results:Order     NM MPI Multi Rest Stress (SPK126) (Order [id number])      Exam Information     Exam Date Exam Time Accession # Performing Department Results     18  2:16 PM [id number] [facility]      [employer] Interactive Report and InfoRx     View the interactive report     PACS Images     Show images for NM MPI Multi Rest Stress     Study Result     GATED MYOCARDIAL PERFUSION SCINTIGRAPHY EXERCISE- ONE DAY STUDY      2018 2:16 PM  JUDY DOVE  67 years  Male  1950.     Indication/Clinical History: Coronary disease preop for back surgery     Impression  1.  Myocardial perfusion imaging using  single isotope technique  demonstrated likely normal perfusion on combination of prone and  supine images.   2. Gated images demonstrated normal wall motion and thickening.  The  left ventricular systolic function is 62% at rest and 52% post stress.  3. Compared to the prior study from 10/26/2012, prior study also  suggested no significant ischemia and normal ejection fraction .     Procedure  The patient performed treadmill exercise using a Jamal protocol,  completing 9 minutes and 0 seconds with an estimated workload of 10.1  METS.  The test was terminated due to fatigue. The heart rate was 63  beats per minute at baseline and increased to 148 beats at peak  exercise, which was 96% of the maximum predicted heart rate. The rest  blood pressure was 120/78 mm/Hg and peak blood pressure is 208/78mm/Hg  with rate pressure product of 30,784. This represents a hypertensive  response to exercise. The patient experienced no chest pain during the  test. The patient is not on a beta blocker.     Myocardial perfusion imaging was performed at rest, approximately 45  minutes after the injection intravenously of 3.5of Tc-99m Myoview. At  peak exercise, the patient was injected intravenously with 9.2 mCi of   Tc-99m Myoview and exercise continued for approximately 1 minute.  Gated post-stress tomographic imaging was performed approximately 30  minutes after stress     EKG Findings  The resting EKG demonstrated sinus rhythm . The stress EKG  demonstrated no ST-T changes diagnostic of ischemia or injury.     Tomographic Findings  Overall, the study quality is adequate . On the stress images, on  combination of prone and supine imaging, perfusion appears normal. On  the rest images, there is a decrease in uptake at the inferoseptal  base . Gated images demonstrated normal wall motion and thickening.  The left ventricular ejection fraction was calculated to be 52%. TID  was is not seen with an index of 1.14.     PERCY PABLO  MD    ECG reviewed date:1/29/18 results:29-JAN-2018 07:36:55 [facility] ROUTINE RECORD  Sinus bradycardia  ST elevation, consider early repolarization  Borderline ECG  No change since previous tracing date: results:          METS/Exercise Tolerance:  >4 METS   Hematologic:  - neg hematologic  ROS       Musculoskeletal:   (+) arthritis, , , other musculoskeletal- scatica , lumbar disc disorder ; s/p lumbar fusion       GI/Hepatic:     (+) Other GI/Hepatic hx of colon polyp      Renal/Genitourinary: Comment: ED    (+) chronic renal disease, BPH,       Endo:     (+) Other Endocrine Disorder IFG.      Psychiatric:  - neg psychiatric ROS       Infectious Disease:  - neg infectious disease ROS       Malignancy:      - no malignancy   Other:    (+) H/O Chronic Pain,                   Physical Exam  Normal systems: cardiovascular, pulmonary and dental    Airway   Mallampati: II  TM distance: >3 FB  Neck ROM: full    Dental     Cardiovascular       Pulmonary                     Anesthesia Plan      History & Physical Review      ASA Status:  3 .    NPO Status:  > 4 hours    Plan for MAC Reason for MAC:  Deep or markedly invasive procedure (G8)         Postoperative Care      Consents  Anesthetic plan, risks, benefits and alternatives discussed with:  Patient..                          .

## 2018-06-08 ENCOUNTER — OFFICE VISIT (OUTPATIENT)
Dept: OPTOMETRY | Facility: CLINIC | Age: 68
End: 2018-06-08
Payer: COMMERCIAL

## 2018-06-08 DIAGNOSIS — H52.11 MYOPIA OF RIGHT EYE: ICD-10-CM

## 2018-06-08 DIAGNOSIS — H52.202 HYPEROPIA OF LEFT EYE WITH ASTIGMATISM: ICD-10-CM

## 2018-06-08 DIAGNOSIS — H43.812 PVD (POSTERIOR VITREOUS DETACHMENT), LEFT EYE: ICD-10-CM

## 2018-06-08 DIAGNOSIS — H25.13 AGE-RELATED NUCLEAR CATARACT OF BOTH EYES: ICD-10-CM

## 2018-06-08 DIAGNOSIS — H52.4 PRESBYOPIA: Primary | ICD-10-CM

## 2018-06-08 DIAGNOSIS — H52.02 HYPEROPIA OF LEFT EYE WITH ASTIGMATISM: ICD-10-CM

## 2018-06-08 PROCEDURE — 92014 COMPRE OPH EXAM EST PT 1/>: CPT | Performed by: OPTOMETRIST

## 2018-06-08 PROCEDURE — 92015 DETERMINE REFRACTIVE STATE: CPT | Performed by: OPTOMETRIST

## 2018-06-08 ASSESSMENT — REFRACTION_WEARINGRX
OD_ADD: +2.50
OS_CYLINDER: +0.75
OS_SPHERE: -0.50
OD_SPHERE: -0.25
OS_AXIS: 165
OS_ADD: +2.50
OD_CYLINDER: SPHERE
SPECS_TYPE: PAL

## 2018-06-08 ASSESSMENT — SLIT LAMP EXAM - LIDS
COMMENTS: NORMAL
COMMENTS: NORMAL

## 2018-06-08 ASSESSMENT — REFRACTION_MANIFEST
OS_CYLINDER: +0.75
OS_ADD: +2.50
OD_SPHERE: -0.25
OS_SPHERE: -0.50
OD_ADD: +2.50
OS_AXIS: 165
OD_CYLINDER: SPHERE

## 2018-06-08 ASSESSMENT — VISUAL ACUITY
OD_SC: 20/20
METHOD: SNELLEN - LINEAR
OS_SC: 20/20
OS_CC: 20/20
OD_CC: 20/20
OS_CC: 20/25
CORRECTION_TYPE: GLASSES
OD_CC: 20/20
OS_SC+: -1

## 2018-06-08 ASSESSMENT — TONOMETRY
OS_IOP_MMHG: 16
IOP_METHOD: APPLANATION
OD_IOP_MMHG: 16

## 2018-06-08 ASSESSMENT — EXTERNAL EXAM - RIGHT EYE: OD_EXAM: NORMAL

## 2018-06-08 ASSESSMENT — CUP TO DISC RATIO
OS_RATIO: 0.3
OD_RATIO: 0.4

## 2018-06-08 ASSESSMENT — CONF VISUAL FIELD
OS_NORMAL: 1
OD_NORMAL: 1

## 2018-06-08 ASSESSMENT — EXTERNAL EXAM - LEFT EYE: OS_EXAM: NORMAL

## 2018-06-08 NOTE — MR AVS SNAPSHOT
After Visit Summary   6/8/2018    Ambrocio Vázquez    MRN: [id number]           Patient Information     Date Of Birth          1950        Visit Information        Provider Department      6/8/2018 10:20 AM Divya Friedman OD [facility]        Today's Diagnoses     Presbyopia    -  1    Myopia of right eye        Hyperopia of left eye with astigmatism        Age-related nuclear cataract of both eyes        PVD (posterior vitreous detachment), left eye          Care Instructions        A final glasses prescription was given. No change in the prescription.    Monitor cataracts by having yearly exams.  Wear sunglasses when outside.    A posterior vitreous detachment is nothing to worry about.  You have a jelly like substance that fills the inside of the eye, as you get older, that gel shrinks a little and when it shrinks, it pulls away from the back of the eye.  When it pulls away you can see a floater, as time goes on, the floater may shrink down out of your line of sight and you won't notice it so often.  There is a little greater risk of developing a retinal detachment since there's nothing pressing against the retina, so if you start to notice flashes of light or sudden vision changes, return to the clinic as soon as possible, otherwise return as needed.    Return to clinic 1 year for Comprehensive Vision Exam      Divya Friedman O.D  [address] [place]  [address]    [phone]                  Follow-ups after your visit        Follow-up notes from your care team     Return in about 1 year (around 6/8/2019) for Eye Exam.      Your next 10 appointments already scheduled     Jun 12, 2018   Procedure with Wilbur De Oliveira MD   [facility] ([facility])    [address] [id number] [phone]           The medical center is located at [address]. (between [address] in [place],  [place] miles north of [place]).              Who to contact     If you have questions or need follow up information about today's clinic visit or your schedule please contact [facility] directly at [phone].  Normal or non-critical lab and imaging results will be communicated to you by MyChart, letter or phone within 4 business days after the clinic has received the results. If you do not hear from us within 7 days, please contact the clinic through MyChart or phone. If you have a critical or abnormal lab result, we will notify you by phone as soon as possible.  Submit refill requests through [employer] or call your pharmacy and they will forward the refill request to us. Please allow 3 business days for your refill to be completed.          Additional Information About Your Visit        Dog [employer] Information     [employer] gives you secure access to your electronic health record. If you see a primary care provider, you can also send messages to your care team and make appointments. If you have questions, please call your primary care clinic.  If you do not have a primary care provider, please call [phone] and they will assist you.        Care EveryWhere ID     This is your Care EveryWhere ID. This could be used by other organizations to access your [facility] medical records  [id number]         Blood Pressure from Last 3 Encounters:   06/05/18 (!) 178/102   04/25/18 (!) 142/91   03/14/18 (!) 160/106    Weight from Last 3 Encounters:   06/05/18 74.8 kg (165 lb)   04/25/18 73.5 kg (162 lb)   03/14/18 73.5 kg (162 lb)              We Performed the Following     EYE EXAM (SIMPLE-NONBILLABLE)     REFRACTION        Primary Care Provider Office Phone # Fax #    Joelle Randle [phone] [address] [place] [address]        Equal Access to Services     ZULEYMA BUI AH: Keisha Grant, geovanni bolanos, manuel villanueva  luis felipe argueta ah. So [facility] [phone].    ATENCIÓN: Si vanessa christian, tiene a armenta disposición servicios gratuitos de asistencia lingüística. Cl peters [phone].    We comply with applicable federal civil rights laws and [place] laws. We do not discriminate on the basis of race, color, national origin, age, disability, sex, sexual orientation, or gender identity.            Thank you!     Thank you for choosing [facility]  for your care. Our goal is always to provide you with excellent care. Hearing back from our patients is one way we can continue to improve our services. Please take a few minutes to complete the written survey that you may receive in the mail after your visit with us. Thank you!             Your Updated Medication List - Protect others around you: Learn how to safely use, store and throw away your medicines at www.disposemymeds.org.          This list is accurate as of 6/8/18 11:48 AM.  Always use your most recent med list.                   Brand Name Dispense Instructions for use Diagnosis    aspirin 81 MG tablet     30 tablet    Take 1 tablet (81 mg) by mouth daily    Coronary artery disease involving native coronary artery of native heart without angina pectoris       finasteride 5 MG tablet    PROSCAR    90 tablet    Take 1 tablet (5 mg) by mouth daily    Disorder of prostate       fish oil-omega-3 fatty acids 1000 MG capsule      Take 1 g by mouth daily        metoprolol succinate 25 MG 24 hr tablet    TOPROL-XL    90 tablet    Take 1/2 tab for 2 weeks; if ok then increase to full tab daily    Coronary artery disease involving native heart without angina pectoris, unspecified vessel or lesion type, Essential hypertension       nitroGLYcerin 0.4 MG sublingual tablet    NITROSTAT    25 tablet    FOR CHEST PAIN PLACE 1 TABLET UNDER THE TONGUE EVERY 5 MINUTES FOR 3 DOSES. IF SYMPTOMS PERSIST 5 MINUTES AFTER FIRST DOSE CALL 911.    Coronary artery disease involving native heart  without angina pectoris, unspecified vessel or lesion type       sildenafil 100 MG tablet    VIAGRA    10 tablet    Take 1 tablet (100 mg) by mouth daily as needed for erectile dysfunction Take 30 min to 4 hours before intercourse.  Never use with nitroglycerin, terazosin or doxazosin.    Erectile dysfunction due to arterial insufficiency       sildenafil 20 MG tablet    REVATIO    50 tablet    Take 1 tablet (20 mg) by mouth three times daily for pulmonary hypertension.  Never use with nitroglycerin, terazosin or doxazosin.    Vasculogenic erectile dysfunction, unspecified vasculogenic erectile dysfunction type       simvastatin 40 MG tablet    ZOCOR    90 tablet    Take 1 tablet (40 mg) by mouth At Bedtime    Other hyperlipidemia, Coronary arteriosclerosis

## 2018-06-08 NOTE — PATIENT INSTRUCTIONS
A final glasses prescription was given. No change in the prescription.    Monitor cataracts by having yearly exams.  Wear sunglasses when outside.    A posterior vitreous detachment is nothing to worry about.  You have a jelly like substance that fills the inside of the eye, as you get older, that gel shrinks a little and when it shrinks, it pulls away from the back of the eye.  When it pulls away you can see a floater, as time goes on, the floater may shrink down out of your line of sight and you won't notice it so often.  There is a little greater risk of developing a retinal detachment since there's nothing pressing against the retina, so if you start to notice flashes of light or sudden vision changes, return to the clinic as soon as possible, otherwise return as needed.    Return to clinic 1 year for Comprehensive Vision Exam      Divya Friedman O.D  [address] [place]  [address]    [phone]

## 2018-06-08 NOTE — LETTER
6/8/2018         RE: Ambrocio Vázquez  [address] [address]        Dear Colleague,    Thank you for referring your patient, Ambrocio Vázquez, to the [facility]. Please see a copy of my visit note below.    Chief Complaint   Patient presents with     COMPREHENSIVE EYE EXAM      Accompanied by wife  Last Eye Exam: 6/7/17  Dilated Previously: Yes    What are you currently using to see?  glasses       Distance Vision Acuity: Satisfied with vision    Near Vision Acuity: Satisfied with vision while reading with glasses    Eye Comfort: good  Do you use eye drops? : No  Occupation or Hobbies: retired    Mary Sanchez  Optometric Assistant, [facility]            Medical, surgical and family histories reviewed and updated 6/8/2018.       OBJECTIVE: See Ophthalmology exam    ASSESSMENT:    ICD-10-CM    1. Presbyopia H52.4 EYE EXAM (SIMPLE-NONBILLABLE)     REFRACTION   2. Myopia of right eye H52.11 EYE EXAM (SIMPLE-NONBILLABLE)     REFRACTION   3. Hyperopia of left eye with astigmatism H52.02 EYE EXAM (SIMPLE-NONBILLABLE)    H52.202 REFRACTION   4. Age-related nuclear cataract of both eyes H25.13 EYE EXAM (SIMPLE-NONBILLABLE)   5. PVD (posterior vitreous detachment), left eye H43.812 EYE EXAM (SIMPLE-NONBILLABLE)      PLAN:   A final glasses prescription was given. No change in the prescription.    Monitor cataracts by having yearly exams.  Wear sunglasses when outside.    A posterior vitreous detachment is nothing to worry about.  You have a jelly like substance that fills the inside of the eye, as you get older, that gel shrinks a little and when it shrinks, it pulls away from the back of the eye.  When it pulls away you can see a floater, as time goes on, the floater may shrink down out of your line of sight and you won't notice it so often.  There is a little greater risk of developing a retinal detachment since there's nothing pressing against the retina, so if you start to notice  flashes of light or sudden vision changes, return to the clinic as soon as possible, otherwise return as needed.    Return to clinic 1 year for Comprehensive Vision Exam      Divya Friedman O.D  [address]  [place]  [address]    [phone]              Again, thank you for allowing me to participate in the care of your patient.        Sincerely,        Divya Friedman, OD

## 2018-06-08 NOTE — PROGRESS NOTES
Chief Complaint   Patient presents with     COMPREHENSIVE EYE EXAM      Accompanied by wife  Last Eye Exam: 6/7/17  Dilated Previously: Yes    What are you currently using to see?  glasses       Distance Vision Acuity: Satisfied with vision    Near Vision Acuity: Satisfied with vision while reading with glasses    Eye Comfort: good  Do you use eye drops? : No  Occupation or Hobbies: retired    Mary Sanchez  Optometric Assistant, [employer]            Medical, surgical and family histories reviewed and updated 6/8/2018.       OBJECTIVE: See Ophthalmology exam    ASSESSMENT:    ICD-10-CM    1. Presbyopia H52.4 EYE EXAM (SIMPLE-NONBILLABLE)     REFRACTION   2. Myopia of right eye H52.11 EYE EXAM (SIMPLE-NONBILLABLE)     REFRACTION   3. Hyperopia of left eye with astigmatism H52.02 EYE EXAM (SIMPLE-NONBILLABLE)    H52.202 REFRACTION   4. Age-related nuclear cataract of both eyes H25.13 EYE EXAM (SIMPLE-NONBILLABLE)   5. PVD (posterior vitreous detachment), left eye H43.812 EYE EXAM (SIMPLE-NONBILLABLE)      PLAN:   A final glasses prescription was given. No change in the prescription.    Monitor cataracts by having yearly exams.  Wear sunglasses when outside.    A posterior vitreous detachment is nothing to worry about.  You have a jelly like substance that fills the inside of the eye, as you get older, that gel shrinks a little and when it shrinks, it pulls away from the back of the eye.  When it pulls away you can see a floater, as time goes on, the floater may shrink down out of your line of sight and you won't notice it so often.  There is a little greater risk of developing a retinal detachment since there's nothing pressing against the retina, so if you start to notice flashes of light or sudden vision changes, return to the clinic as soon as possible, otherwise return as needed.    Return to clinic 1 year for Comprehensive Vision Exam      Divya Friedman O.D  [address] [place]  [address]    [phone]

## 2018-06-12 ENCOUNTER — SURGERY (OUTPATIENT)
Age: 68
End: 2018-06-12

## 2018-06-12 ENCOUNTER — HOSPITAL ENCOUNTER (OUTPATIENT)
Facility: CLINIC | Age: 68
Discharge: HOME | End: 2018-06-12
Attending: SURGERY | Admitting: SURGERY
Payer: COMMERCIAL

## 2018-06-12 ENCOUNTER — ANESTHESIA (OUTPATIENT)
Dept: GASTROENTEROLOGY | Facility: CLINIC | Age: 68
End: 2018-06-12
Payer: COMMERCIAL

## 2018-06-12 VITALS
RESPIRATION RATE: 16 BRPM | OXYGEN SATURATION: 97 % | TEMPERATURE: 98 F | HEIGHT: 71 IN | SYSTOLIC BLOOD PRESSURE: 97 MMHG | BODY MASS INDEX: 23.1 KG/M2 | DIASTOLIC BLOOD PRESSURE: 72 MMHG | WEIGHT: 165 LBS

## 2018-06-12 LAB — COLONOSCOPY: NORMAL

## 2018-06-12 PROCEDURE — 25000125 ZZHC RX 250: Performed by: NURSE ANESTHETIST, CERTIFIED REGISTERED

## 2018-06-12 PROCEDURE — 45385 COLONOSCOPY W/LESION REMOVAL: CPT | Mod: PT | Performed by: SURGERY

## 2018-06-12 PROCEDURE — 37000009 ZZH ANESTHESIA TECHNICAL FEE, EACH ADDTL 15 MIN: Performed by: SURGERY

## 2018-06-12 PROCEDURE — 37000008 ZZH ANESTHESIA TECHNICAL FEE, 1ST 30 MIN: Performed by: SURGERY

## 2018-06-12 PROCEDURE — 88305 TISSUE EXAM BY PATHOLOGIST: CPT | Performed by: SURGERY

## 2018-06-12 PROCEDURE — 25000128 H RX IP 250 OP 636: Performed by: SURGERY

## 2018-06-12 PROCEDURE — 88305 TISSUE EXAM BY PATHOLOGIST: CPT | Mod: 26 | Performed by: SURGERY

## 2018-06-12 PROCEDURE — 25000128 H RX IP 250 OP 636: Performed by: NURSE ANESTHETIST, CERTIFIED REGISTERED

## 2018-06-12 RX ORDER — ONDANSETRON 2 MG/ML
4 INJECTION INTRAMUSCULAR; INTRAVENOUS
Status: DISCONTINUED | OUTPATIENT
Start: 2018-06-12 | End: 2018-06-12 | Stop reason: HOSPADM

## 2018-06-12 RX ORDER — PROPOFOL 10 MG/ML
INJECTION, EMULSION INTRAVENOUS CONTINUOUS PRN
Status: DISCONTINUED | OUTPATIENT
Start: 2018-06-12 | End: 2018-06-12

## 2018-06-12 RX ORDER — LIDOCAINE 40 MG/G
CREAM TOPICAL
Status: DISCONTINUED | OUTPATIENT
Start: 2018-06-12 | End: 2018-06-12 | Stop reason: HOSPADM

## 2018-06-12 RX ORDER — GLYCOPYRROLATE 0.2 MG/ML
INJECTION, SOLUTION INTRAMUSCULAR; INTRAVENOUS PRN
Status: DISCONTINUED | OUTPATIENT
Start: 2018-06-12 | End: 2018-06-12

## 2018-06-12 RX ORDER — LIDOCAINE HYDROCHLORIDE 10 MG/ML
INJECTION, SOLUTION EPIDURAL; INFILTRATION; INTRACAUDAL; PERINEURAL PRN
Status: DISCONTINUED | OUTPATIENT
Start: 2018-06-12 | End: 2018-06-12

## 2018-06-12 RX ORDER — PROPOFOL 10 MG/ML
INJECTION, EMULSION INTRAVENOUS PRN
Status: DISCONTINUED | OUTPATIENT
Start: 2018-06-12 | End: 2018-06-12

## 2018-06-12 RX ORDER — SODIUM CHLORIDE, SODIUM LACTATE, POTASSIUM CHLORIDE, CALCIUM CHLORIDE 600; 310; 30; 20 MG/100ML; MG/100ML; MG/100ML; MG/100ML
INJECTION, SOLUTION INTRAVENOUS CONTINUOUS
Status: DISCONTINUED | OUTPATIENT
Start: 2018-06-12 | End: 2018-06-12 | Stop reason: HOSPADM

## 2018-06-12 RX ADMIN — LIDOCAINE HYDROCHLORIDE 50 MG: 10 INJECTION, SOLUTION EPIDURAL; INFILTRATION; INTRACAUDAL; PERINEURAL at 09:31

## 2018-06-12 RX ADMIN — PROPOFOL 100 MG: 10 INJECTION, EMULSION INTRAVENOUS at 09:32

## 2018-06-12 RX ADMIN — GLYCOPYRROLATE 0.2 MG: 0.2 INJECTION, SOLUTION INTRAMUSCULAR; INTRAVENOUS at 09:31

## 2018-06-12 RX ADMIN — PROPOFOL 200 MCG/KG/MIN: 10 INJECTION, EMULSION INTRAVENOUS at 09:32

## 2018-06-12 RX ADMIN — SODIUM CHLORIDE, POTASSIUM CHLORIDE, SODIUM LACTATE AND CALCIUM CHLORIDE: 600; 310; 30; 20 INJECTION, SOLUTION INTRAVENOUS at 08:32

## 2018-06-12 NOTE — H&P
68 year old year old male here for colonoscopy for screening.    Patient Active Problem List   Diagnosis     Primary carnitine deficiency (H)     Sciatica     Essential hypertension, benign     ERECTILE DYSFUNCTION     Disorder of prostate     Colon polyp     Abnormal echocardiogram     CAD (coronary artery disease)     Cardiomyopathy (H)     CARDIOVASCULAR SCREENING; LDL GOAL LESS THAN 100     Advanced directives, counseling/discussion     Former smoker     Hyperlipidemia     History of angina     Coronary arteriosclerosis     Family history of aortic aneurysm     PFO (patent foramen ovale)     Essential hypertension     Aneurysm of aortic root (H)     S/P lumbar fusion     Age-related nuclear cataract of both eyes     PVD (posterior vitreous detachment), left eye       Past Medical History:   Diagnosis Date     Arthritis      BPH (benign prostatic hyperplasia)      Cardiomyopathy (H)      Carnitine deficiency (H)      Colon polyp     tubular adenoma     Coronary artery disease     ischemic heart dz.LMA-nl,   stenting of Lad, diag small 80%, Lcx30%, RCA50%     ED (erectile dysfunction)      H/O angina pectoris      Hyperlipidaemia      Hypertension     Cardilogist last visit several yeara ago now follewd by Primary DRRogelio      Impaired fasting glucose      Intervertebral lumbar disc disorder with myelopathy, lumbar region (aka DISK)     lumbar disk disease     Other chronic pain     Back pain for many years.     PFO (patent foramen ovale)      Sciatica      Stented coronary artery     Pt will call for cardiac clearance prior to surgery. [facility]       Past Surgical History:   Procedure Laterality Date     C LAMINECTOMY,>2 SGMT,LUMBAR  2001     COLONOSCOPY       COLONOSCOPY  5/3/2013    Procedure: COLONOSCOPY;  Colonoscopy;  Surgeon: Genaro Krishnan MD;  Location: [facility]     CORONARY ANGIOGRAPHY ADULT ORDER  6/7/2014    No restenosis     HEART CATH, ANGIOPLASTY  5/2010    complex  angioplasty/thrombectomy  "SHAZIA stenting proximal and ostial LAD;LMA-nl,   stenting of Lad, diag small 80%, Lcx30%, RCA50%     HERNIA REPAIR      LIH RIH     HERNIORRHAPHY INGUINAL  1/11/2013    Procedure: HERNIORRHAPHY INGUINAL;  LEFT INGUINAL HERNIA REPAIR WITH MESH;  Surgeon: Santos Coleman MD;  Location: [facility]     HERNIORRHAPHY INGUINAL Right 12/29/2015    Procedure: HERNIORRHAPHY INGUINAL;  Surgeon: Santos Coleman MD;  Location:  OR     OPTICAL TRACKING SYSTEM FUSION SPINE POSTERIOR LUMBAR THREE+ LEVELS N/A 1/29/2018    Procedure: OPTICAL TRACKING SYSTEM FUSION SPINE POSTERIOR LUMBAR THREE+ LEVELS;  L3, L4 and L5 Berg-Maloney osteotomies with L3-4, L4-5 and L5-S1 transforaminal lumbar interbody fusion ;  Surgeon: Gabino Velez MD;  Location:  OR     PAST SURGICAL HISTORY  2002    hernia repair, right     PAST SURGICAL HISTORY      pilonidal cyst and sebaceous cyst surg     PE TUBES  2/10    left ear PE for persistant ZINA     SOFT TISSUE SURGERY      right axilla lipoma removed       @FMX@    No current outpatient prescriptions on file.       Allergies   Allergen Reactions     Augmentin Nausea and Vomiting     Sulfa Drugs Nausea       Pt reports that he quit smoking about 8 years ago. His smoking use included Cigarettes. He has a 15.00 pack-year smoking history. He has never used smokeless tobacco. He reports that he drinks alcohol. He reports that he does not use illicit drugs.    Exam:  /73  Temp 98  F (36.7  C) (Oral)  Resp 18  Ht 1.803 m (5' 11\")  Wt 74.8 kg (165 lb)  SpO2 97%  BMI 23.01 kg/m2    Awake, Alert OX3  Lungs - CTA bilaterally  CV - RRR, no murmurs, distal pulses intact  Abd - soft, non-distended, non-tender, +BS  Extr - No cyanosis or edema    A/P 68 year old year old male in need of colonoscopy for screening. Risks, benefits, alternatives, and complications were discussed including the possibility of perforation and the patient agreed to proceed    Wilbur De Oliveira MD   "

## 2018-06-12 NOTE — ANESTHESIA POSTPROCEDURE EVALUATION
Patient: Ambrocio Vázquez    Procedure(s):  Colonoscopy   - Wound Class: II-Clean Contaminated    Diagnosis:screening  Diagnosis Additional Information: No value filed.    Anesthesia Type:  MAC    Note:  Anesthesia Post Evaluation    Patient location during evaluation: Bedside  Patient participation: Able to fully participate in evaluation  Level of consciousness: awake and alert  Pain management: adequate  Airway patency: patent  Cardiovascular status: acceptable  Respiratory status: acceptable  Hydration status: acceptable  PONV: none     Anesthetic complications: None          Last vitals:  Vitals:    06/12/18 0817   BP: 111/73   Resp: 18   Temp: 36.7  C (98  F)   SpO2: 97%         Electronically Signed By: LLII Metz CRNA  June 12, 2018  10:07 AM

## 2018-06-12 NOTE — ANESTHESIA CARE TRANSFER NOTE
Patient: Ambrocio Vázquez    Procedure(s):  Colonoscopy   - Wound Class: II-Clean Contaminated    Diagnosis: screening  Diagnosis Additional Information: No value filed.    Anesthesia Type:   MAC     Note:  Airway :Room Air  Patient transferred to:Phase II  Handoff Report: Identifed the Patient, Identified the Reponsible Provider, Reviewed the pertinent medical history, Discussed the surgical course, Reviewed Intra-OP anesthesia mangement and issues during anesthesia, Set expectations for post-procedure period and Allowed opportunity for questions and acknowledgement of understanding      Vitals: (Last set prior to Anesthesia Care Transfer)    CRNA VITALS  6/12/2018 0937 - 6/12/2018 1007      6/12/2018             Pulse: 75    SpO2: 93 %                Electronically Signed By: LILI Metz CRNA  June 12, 2018  10:07 AM

## 2018-06-12 NOTE — BRIEF OP NOTE
[facility]   Brief Operative Note    Pre-operative diagnosis: screening   Post-operative diagnosis single polyp, otherwise normal   Procedure: Procedure(s):  Colonoscopy   - Wound Class: II-Clean Contaminated   Surgeon(s): Surgeon(s) and Role:     * Wilbur De Oliveira MD - Primary   Estimated blood loss: * No values recorded between 6/12/2018 12:00 AM and 6/12/2018  9:59 AM *    Specimens:   ID Type Source Tests Collected by Time Destination   A : 45 cm Polyp Colon SURGICAL PATHOLOGY EXAM Wilbur De Oliveira MD 6/12/2018  9:38 AM       Findings: 1. Single polyp at 45 cm - resected  2. Colon otherwise normal

## 2018-06-14 LAB — COPATH REPORT: NORMAL

## 2018-07-26 ENCOUNTER — OFFICE VISIT (OUTPATIENT)
Dept: NEUROSURGERY | Facility: CLINIC | Age: 68
End: 2018-07-26
Attending: NURSE PRACTITIONER
Payer: COMMERCIAL

## 2018-07-26 ENCOUNTER — TELEPHONE (OUTPATIENT)
Dept: FAMILY MEDICINE | Facility: CLINIC | Age: 68
End: 2018-07-26

## 2018-07-26 ENCOUNTER — RADIANT APPOINTMENT (OUTPATIENT)
Dept: GENERAL RADIOLOGY | Facility: CLINIC | Age: 68
End: 2018-07-26
Attending: NURSE PRACTITIONER
Payer: COMMERCIAL

## 2018-07-26 VITALS
BODY MASS INDEX: 24.69 KG/M2 | SYSTOLIC BLOOD PRESSURE: 133 MMHG | HEART RATE: 60 BPM | DIASTOLIC BLOOD PRESSURE: 83 MMHG | OXYGEN SATURATION: 98 % | WEIGHT: 177 LBS

## 2018-07-26 DIAGNOSIS — N42.9 DISORDER OF PROSTATE: ICD-10-CM

## 2018-07-26 DIAGNOSIS — Z98.1 STATUS POST LUMBAR SPINAL FUSION: ICD-10-CM

## 2018-07-26 DIAGNOSIS — Z98.1 STATUS POST LUMBAR SPINAL FUSION: Primary | ICD-10-CM

## 2018-07-26 DIAGNOSIS — I25.10 CORONARY ARTERIOSCLEROSIS: ICD-10-CM

## 2018-07-26 DIAGNOSIS — E78.49 OTHER HYPERLIPIDEMIA: ICD-10-CM

## 2018-07-26 PROCEDURE — 99213 OFFICE O/P EST LOW 20 MIN: CPT | Performed by: NURSE PRACTITIONER

## 2018-07-26 PROCEDURE — G0463 HOSPITAL OUTPT CLINIC VISIT: HCPCS

## 2018-07-26 PROCEDURE — 72100 X-RAY EXAM L-S SPINE 2/3 VWS: CPT

## 2018-07-26 ASSESSMENT — PAIN SCALES - GENERAL: PAINLEVEL: NO PAIN (0)

## 2018-07-26 NOTE — TELEPHONE ENCOUNTER
Spouse is calling stating that because of insurance reasons, Ambrocio has to switch pharmacy to [facility].  [facility] told [facility] that MD or RN has to call in any scripts to start out -  Will not accept [facility].  They must also be 90 day refills.    Number to call would be [phone].      Please review and fill if appropriate    finateride      Last Written Prescription Date:  6/5/18  Last Fill Quantity: 90,   # refills: 3  Last Office Visit: 2/8/18  Future Office visit:       Routing refill request to provider for review/approval because:  Drug not on the [facility], [facility] or [facility] refill protocol or controlled substance    simvastatin      Last Written Prescription Date:  11/14/17  Last Fill Quantity: 90,   # refills: 3  Last Office Visit: 2/8/18  Future Office visit:       Routing refill request to provider for review/approval because:  Drug not on the [facility], [facility] or [facility] refill protocol or controlled substance

## 2018-07-26 NOTE — LETTER
7/26/2018         RE: Ambrocio Vázquez  [address] [address]        Dear Colleague,    Thank you for referring your patient, Ambrocio Vázquez, to the [facility]. Please see a copy of my visit note below.    [facility]  Neurosurgery followup:    HPI: Mr. Vázquez is a 67 year old male that return about 6 months post L3-S1 fusion. The pt notes that he is doing well at this time.   He reports that his right radicular pain is about 95% better. He continues to have numbness to his feet at times but this is livable. He also notes that his low back is stiff at times but not painful.       Exam:  Constitutional:  Alert, well nourished, NAD.  HEENT: Normocephalic, atraumatic.   Pulm:  Without shortness of breath   CV:  No pitting edema of BLE.      Neurological:  Awake  Alert  Oriented x 3  Motor exam:        IP Q DF PF EHL  R   5  5   5   5    5  L   5  5   5   5    5     Able to spontaneously move L/E bilaterally  Sensation intact throughout all L/E dermatomes       Imaging: lumbar xray shows fusion intact     A/P: Mr. Vázquez is a 67 year old male that return about 6 months post L3-S1 fusion. The pt notes that he is doing well at this time.   He reports that his right radicular pain is about 95% better. He continues to have numbness to his feet at times but this is livable. He also notes that his low back is stiff at times but not painful.   He feels that moving is a little more difficult.  We will have him start PT.  He is open to this.  We will have return in 6 months with a repeat xray.            Patient Instructions   1. Activity as tolerated    2.  Return to clinic in 6 months with a xray prior.      3. Please schedule your physical therapy.              Anh Lynne [facility]  [facility]  [address]  [address] [place] [address]    Tel [phone]  Pager [phone]        Again, thank you for  allowing me to participate in the care of your patient.        Sincerely,        LILI Mckinley CNP

## 2018-07-26 NOTE — NURSING NOTE
"Ambrocio Vázquez is a 68 year old male who presents for:  Chief Complaint   Patient presents with     Neurologic Problem     6 month follow up status post lumbar fusion DOS 01/29/2018, continues to have numbness in the bottom right foot. Sciatic pain in the right leg is 90% on his right leg         Vitals:    Vitals:    07/26/18 1038   BP: 133/83   BP Location: Right arm   Patient Position: Sitting   Cuff Size: Adult Regular   Pulse: 60   SpO2: 98%   Weight: 177 lb (80.3 kg)       BMI:  Estimated body mass index is 24.69 kg/(m^2) as calculated from the following:    Height as of 6/12/18: 5' 11\" (1.803 m).    Weight as of this encounter: 177 lb (80.3 kg).    Pain Score:  No Pain (0)      Do you feel safe in your environment?  Yes      aL Mak"

## 2018-07-26 NOTE — PROGRESS NOTES
[facility]  Neurosurgery followup:    HPI: Mr. Vázquez is a 67 year old male that return about 6 months post L3-S1 fusion. The pt notes that he is doing well at this time.   He reports that his right radicular pain is about 95% better. He continues to have numbness to his feet at times but this is livable. He also notes that his low back is stiff at times but not painful.       Exam:  Constitutional:  Alert, well nourished, NAD.  HEENT: Normocephalic, atraumatic.   Pulm:  Without shortness of breath   CV:  No pitting edema of BLE.      Neurological:  Awake  Alert  Oriented x 3  Motor exam:        IP Q DF PF EHL  R   5  5   5   5    5  L   5  5   5   5    5     Able to spontaneously move L/E bilaterally  Sensation intact throughout all L/E dermatomes       Imaging: lumbar xray shows fusion intact     A/P: Mr. Vázquez is a 67 year old male that return about 6 months post L3-S1 fusion. The pt notes that he is doing well at this time.   He reports that his right radicular pain is about 95% better. He continues to have numbness to his feet at times but this is livable. He also notes that his low back is stiff at times but not painful.   He feels that moving is a little more difficult.  We will have him start PT.  He is open to this.  We will have return in 6 months with a repeat xray.            Patient Instructions   1. Activity as tolerated    2.  Return to clinic in 6 months with a xray prior.      3. Please schedule your physical therapy.              Anh Lynne [facility]  [facility]ine [facility]  [address] [address]    Tel [phone]  Pager [phone]

## 2018-07-26 NOTE — MR AVS SNAPSHOT
After Visit Summary   7/26/2018    Ambrocio Vázquez    MRN: [id number]           Patient Information     Date Of Birth          1950        Visit Information        Provider Department      7/26/2018 10:40 AM Anh Lynne APRN CNP [facility]        Today's Diagnoses     Status post lumbar spinal fusion    -  1      Care Instructions    1. Activity as tolerated    2.  Return to clinic in 6 months with a xray prior.      3. Please schedule your physical therapy.            Follow-ups after your visit        Additional Services     [facility] PT, HAND, AND CHIROPRACTIC REFERRAL       **This order will print in the [facility] Scheduling Office**    Physical Therapy, Hand Therapy and Chiropractic Care are available through:    *[facility]  *[facility] [place]  *[facility]    Call one number to schedule at any of the above locations: [phone].    Your provider has referred you to: Physical Therapy at [facility] or [facility]    Indication/Reason for Referral: post fusion stiffness/core work   Onset of Illness: chronic/ 6 months post surgery   Therapy Orders: Evaluate and Treat  Special Programs: None  Special Request: None    Ayesha Hodge      Additional Comments for the Therapist or Chiropractor:         Please be aware that coverage of these services is subject to the terms and limitations of your health insurance plan.  Call member services at your health plan with any benefit or coverage questions.      Please bring the following to your appointment:    *Your personal calendar for scheduling future appointments  *Comfortable clothing                  Future tests that were ordered for you today     Open Future Orders        Priority Expected Expires Ordered    XR Lumbar Spine 2/3 Views Routine 1/26/2019 7/26/2019 7/26/2018            Who to contact     If you have questions or need follow up information about today's clinic visit or your  schedule please contact [facility] directly at [phone].  Normal or non-critical lab and imaging results will be communicated to you by MyChart, letter or phone within 4 business days after the clinic has received the results. If you do not hear from us within 7 days, please contact the clinic through MyChart or phone. If you have a critical or abnormal lab result, we will notify you by phone as soon as possible.  Submit refill requests through [employer] or call your pharmacy and they will forward the refill request to us. Please allow 3 business days for your refill to be completed.          Additional Information About Your Visit        [employer] Information     [employer] gives you secure access to your electronic health record. If you see a primary care provider, you can also send messages to your care team and make appointments. If you have questions, please call your primary care clinic.  If you do not have a primary care provider, please call [phone] and they will assist you.        Care EveryWhere ID     This is your Care EveryWhere ID. This could be used by other organizations to access your [facility] medical records  [id number]        Your Vitals Were     Pulse Pulse Oximetry                60 98%           Blood Pressure from Last 3 Encounters:   07/26/18 133/83   06/12/18 97/72   06/05/18 (!) 178/102    Weight from Last 3 Encounters:   06/12/18 165 lb (74.8 kg)   06/05/18 165 lb (74.8 kg)   04/25/18 162 lb (73.5 kg)              We Performed the Following     [facility] PT, HAND, AND CHIROPRACTIC REFERRAL        Primary Care Provider Office Phone # Fax #    Joelle Randle [phone] [address] [facility] [address] [place] [address]        Equal Access to Services     [facility] CenterDULCE : Hadii nevin mauricio Sodarnell, waaxda luqadaha, qaybta kaalmanuel nino. So [facility] [phone].    ATENCIÓN: Si vianey nj armenta  disposición servicios gratuitos de asistencia lingüística. Cl peters [phone].    We comply with applicable federal civil rights laws and [place] laws. We do not discriminate on the basis of race, color, national origin, age, disability, sex, sexual orientation, or gender identity.            Thank you!     Thank you for choosing [facility]  for your care. Our goal is always to provide you with excellent care. Hearing back from our patients is one way we can continue to improve our services. Please take a few minutes to complete the written survey that you may receive in the mail after your visit with us. Thank you!             Your Updated Medication List - Protect others around you: Learn how to safely use, store and throw away your medicines at www.disposemymeds.org.          This list is accurate as of 7/26/18 10:52 AM.  Always use your most recent med list.                   Brand Name Dispense Instructions for use Diagnosis    aspirin 81 MG tablet     30 tablet    Take 1 tablet (81 mg) by mouth daily    Coronary artery disease involving native coronary artery of native heart without angina pectoris       finasteride 5 MG tablet    PROSCAR    90 tablet    Take 1 tablet (5 mg) by mouth daily    Disorder of prostate       fish oil-omega-3 fatty acids 1000 MG capsule      Take 1 g by mouth daily        metoprolol succinate 25 MG 24 hr tablet    TOPROL-XL    90 tablet    Take 1/2 tab for 2 weeks; if ok then increase to full tab daily    Coronary artery disease involving native heart without angina pectoris, unspecified vessel or lesion type, Essential hypertension       nitroGLYcerin 0.4 MG sublingual tablet    NITROSTAT    25 tablet    FOR CHEST PAIN PLACE 1 TABLET UNDER THE TONGUE EVERY 5 MINUTES FOR 3 DOSES. IF SYMPTOMS PERSIST 5 MINUTES AFTER FIRST DOSE CALL 911.    Coronary artery disease involving native heart without angina pectoris, unspecified vessel or lesion type        sildenafil 100 MG tablet    VIAGRA    10 tablet    Take 1 tablet (100 mg) by mouth daily as needed for erectile dysfunction Take 30 min to 4 hours before intercourse.  Never use with nitroglycerin, terazosin or doxazosin.    Erectile dysfunction due to arterial insufficiency       sildenafil 20 MG tablet    REVATIO    50 tablet    Take 1 tablet (20 mg) by mouth three times daily for pulmonary hypertension.  Never use with nitroglycerin, terazosin or doxazosin.    Vasculogenic erectile dysfunction, unspecified vasculogenic erectile dysfunction type       simvastatin 40 MG tablet    ZOCOR    90 tablet    Take 1 tablet (40 mg) by mouth At Bedtime    Other hyperlipidemia, Coronary arteriosclerosis

## 2018-07-26 NOTE — PATIENT INSTRUCTIONS
1. Activity as tolerated    2.  Return to clinic in 6 months with a xray prior.      3. Please schedule your physical therapy.

## 2018-07-27 RX ORDER — SIMVASTATIN 40 MG
40 TABLET ORAL AT BEDTIME
Qty: 90 TABLET | Refills: 1 | Status: SHIPPED | OUTPATIENT
Start: 2018-07-27 | End: 2019-01-08

## 2018-07-27 RX ORDER — FINASTERIDE 5 MG/1
1 TABLET, FILM COATED ORAL DAILY
Qty: 90 TABLET | Refills: 1 | Status: SHIPPED | OUTPATIENT
Start: 2018-07-27 | End: 2019-01-08

## 2018-07-27 NOTE — TELEPHONE ENCOUNTER
Pt requesting signed hard copy [facility].  Just starting mail order.  Last seen 2/8/18.  Last lipids on 4/26/18.  Orders pended.  Osorio Bonilla

## 2018-07-27 NOTE — TELEPHONE ENCOUNTER
Per note below, scripts can not be e-prescribed so printed,signed and placed at [facility] station  [facility]

## 2018-08-06 ENCOUNTER — TELEPHONE (OUTPATIENT)
Dept: FAMILY MEDICINE | Facility: CLINIC | Age: 68
End: 2018-08-06

## 2018-08-06 DIAGNOSIS — I25.10 CORONARY ARTERY DISEASE INVOLVING NATIVE HEART WITHOUT ANGINA PECTORIS, UNSPECIFIED VESSEL OR LESION TYPE: ICD-10-CM

## 2018-08-06 DIAGNOSIS — I10 ESSENTIAL HYPERTENSION: ICD-10-CM

## 2018-08-06 RX ORDER — METOPROLOL SUCCINATE 25 MG/1
TABLET, EXTENDED RELEASE ORAL
Qty: 90 TABLET | Refills: 0 | Status: SHIPPED | OUTPATIENT
Start: 2018-08-06 | End: 2019-01-08

## 2018-08-06 NOTE — TELEPHONE ENCOUNTER
Pt now has [facility] and [facility] needs provider to call in the script the first time. [phone].    Jasmin Mcguire, Station

## 2018-08-06 NOTE — TELEPHONE ENCOUNTER
Medication is being filled for 1 time refill only due to:  Patient needs to be seen because needs bp check.   Lonnie Mckeon RN

## 2019-01-04 DIAGNOSIS — I25.10 CORONARY ARTERIOSCLEROSIS: ICD-10-CM

## 2019-01-04 DIAGNOSIS — I25.10 CORONARY ARTERY DISEASE INVOLVING NATIVE HEART WITHOUT ANGINA PECTORIS, UNSPECIFIED VESSEL OR LESION TYPE: ICD-10-CM

## 2019-01-04 DIAGNOSIS — N42.9 DISORDER OF PROSTATE: ICD-10-CM

## 2019-01-04 DIAGNOSIS — I10 ESSENTIAL HYPERTENSION: ICD-10-CM

## 2019-01-04 DIAGNOSIS — E78.49 OTHER HYPERLIPIDEMIA: ICD-10-CM

## 2019-01-04 DIAGNOSIS — I10 ESSENTIAL HYPERTENSION, BENIGN: ICD-10-CM

## 2019-01-04 NOTE — TELEPHONE ENCOUNTER
"Requested Prescriptions   Pending Prescriptions Disp Refills     finasteride (PROSCAR) 5 MG tablet  Last Written Prescription Date:  7/27/18  Last Fill Quantity: 90,  # refills: 1   Last office visit: 2/8/2018 with prescribing provider:  Jer Shine Office Visit:   Next 5 appointments (look out 90 days)    Jan 28, 2019  1:40 PM CST  Return Visit with LILI Mckinley CNP  [facility]) [address] [address]  [facility] [id number] [phone]          90 tablet 1     Sig: Take 1 tablet (5 mg) by mouth daily    Alpha Blockers Failed - 1/4/2019  3:55 PM       Failed - Patient does not have Tadalafil, Vardenafil, or Sildenafil on their medication list       Passed - Blood pressure under 140/90 in past 12 months    BP Readings from Last 3 Encounters:   07/26/18 133/83   06/12/18 97/72   06/05/18 (!) 178/102                Passed - Recent (12 mo) or future (30 days) visit within the authorizing provider's specialty    Patient had office visit in the last 12 months or has a visit in the next 30 days with authorizing provider or within the authorizing provider's specialty.  See \"Patient Info\" tab in inbasket, or \"Choose Columns\" in Meds & Orders section of the refill encounter.             Passed - Medication is active on med list       Passed - Patient is 18 years of age or older        lisinopril (PRINIVIL/ZESTRIL) 40 MG tablet  Last Written Prescription Date:  1/30/18  Last Fill Quantity: 90,  # refills: 1   Last office visit: 2/8/2018 with prescribing provider:  eJr Shine Office Visit:   Next 5 appointments (look out 90 days)    Jan 28, 2019  1:40 PM CST  Return Visit with LILI Mckinley CNP  [facility] (Formerly [facility]) [address] [address]  [facility] [id number] [phone]          90 tablet 1     Sig: Take 1 tablet (40 mg) by mouth daily    ACE Inhibitors " "(Including Combos) Protocol Failed - 1/4/2019  3:55 PM       Failed - Medication is active on med list       Passed - Blood pressure under 140/90 in past 12 months    BP Readings from Last 3 Encounters:   07/26/18 133/83   06/12/18 97/72   06/05/18 (!) 178/102                Passed - Recent (12 mo) or future (30 days) visit within the authorizing provider's specialty    Patient had office visit in the last 12 months or has a visit in the next 30 days with authorizing provider or within the authorizing provider's specialty.  See \"Patient Info\" tab in inbasket, or \"Choose Columns\" in Meds & Orders section of the refill encounter.             Passed - Patient is age 18 or older       Passed - Normal serum creatinine on file in past 12 months    Recent Labs   Lab Test 01/16/18  1217   CR 0.90            Passed - Normal serum potassium on file in past 12 months    Recent Labs   Lab Test 01/16/18  1217   POTASSIUM 4.4             metoprolol succinate ER (TOPROL-XL) 25 MG 24 hr tablet  Last Written Prescription Date:  8/6/18  Last Fill Quantity: 90,  # refills: 0   Last office visit: 2/8/2018 with prescribing provider:  Jer   Future Office Visit:   Next 5 appointments (look out 90 days)    Jan 28, 2019  1:40 PM CST  Return Visit with LILI Mckinley CNP  [facility] ([facility]) [address] [address] [id number] [phone]          90 tablet 0     Sig: Tale 1 tab daily    Beta-Blockers Protocol Passed - 1/4/2019  3:55 PM       Passed - Blood pressure under 140/90 in past 12 months    BP Readings from Last 3 Encounters:   07/26/18 133/83   06/12/18 97/72   06/05/18 (!) 178/102                Passed - Patient is age 6 or older       Passed - Recent (12 mo) or future (30 days) visit within the authorizing provider's specialty    Patient had office visit in the last 12 months or has a visit in the next 30 days with authorizing provider or within " "the authorizing provider's specialty.  See \"Patient Info\" tab in inbasket, or \"Choose Columns\" in Meds & Orders section of the refill encounter.             Passed - Medication is active on med list        nitroGLYcerin (NITROSTAT) 0.4 MG sublingual tablet  Last Written Prescription Date:  4/12/18  Last Fill Quantity: 25,  # refills: 3   Last office visit: 2/8/2018 with prescribing provider:  Jer   Future Office Visit:   Next 5 appointments (look out 90 days)    Jan 28, 2019  1:40 PM CST  Return Visit with LILI Mckinley CNP  [facility] ([facility]) [address] [address]  [facility] [address] [phone]          25 tablet 3     Sig: For chest pain place 1 tablet under the tongue every 5 minutes for 3 doses. If symptoms persist 5 minutes after 1st dose call 911.    Nitrates Failed - 1/4/2019  3:55 PM       Failed - Pt is not on erectile dysfunction medications       Failed - Sublingual nitro order needs review    If refill exceeds 1 bottle per month, please forward request to provider.          Passed - Blood pressure under 140/90 in past 12 months    BP Readings from Last 3 Encounters:   07/26/18 133/83   06/12/18 97/72   06/05/18 (!) 178/102                Passed - Recent (12 mo) or future (30 days) visit within the authorizing provider's specialty    Patient had office visit in the last 12 months or has a visit in the next 30 days with authorizing provider or within the authorizing provider's specialty.  See \"Patient Info\" tab in inbasket, or \"Choose Columns\" in Meds & Orders section of the refill encounter.             Passed - Medication is active on med list       Passed - Patient is age 18 or older        simvastatin (ZOCOR) 40 MG tablet  Last Written Prescription Date:  7/27/18  Last Fill Quantity: 90,  # refills: 1   Last office visit: 2/8/2018 with prescribing provider:  Jer Shine Office Visit:   Next 5 appointments (look out 90 " "days)    Jan 28, 2019  1:40 PM CST  Return Visit with LILI Mckinley CNP  [facility] ([facility]) [address] [address] [address] [phone]          90 tablet 1     Sig: Take 1 tablet (40 mg) by mouth At Bedtime    Statins Protocol Passed - 1/4/2019  3:55 PM       Passed - LDL on file in past 12 months    Recent Labs   Lab Test 04/26/18  1346   LDL 73            Passed - No abnormal creatine kinase in past 12 months    No lab results found.            Passed - Recent (12 mo) or future (30 days) visit within the authorizing provider's specialty    Patient had office visit in the last 12 months or has a visit in the next 30 days with authorizing provider or within the authorizing provider's specialty.  See \"Patient Info\" tab in inbasket, or \"Choose Columns\" in Meds & Orders section of the refill encounter.             Passed - Medication is active on med list       Passed - Patient is age 18 or older          "

## 2019-01-08 RX ORDER — LISINOPRIL 40 MG/1
40 TABLET ORAL DAILY
Qty: 90 TABLET | Refills: 0 | Status: SHIPPED | OUTPATIENT
Start: 2019-01-08 | End: 2019-01-29

## 2019-01-08 RX ORDER — FINASTERIDE 5 MG/1
1 TABLET, FILM COATED ORAL DAILY
Qty: 90 TABLET | Refills: 0 | Status: SHIPPED | OUTPATIENT
Start: 2019-01-08 | End: 2019-03-13

## 2019-01-08 RX ORDER — METOPROLOL SUCCINATE 25 MG/1
TABLET, EXTENDED RELEASE ORAL
Qty: 90 TABLET | Refills: 0 | Status: SHIPPED | OUTPATIENT
Start: 2019-01-08 | End: 2019-01-29

## 2019-01-08 RX ORDER — NITROGLYCERIN 0.4 MG/1
TABLET SUBLINGUAL
Qty: 25 TABLET | Refills: 3 | Status: SHIPPED | OUTPATIENT
Start: 2019-01-08 | End: 2021-01-07

## 2019-01-08 RX ORDER — SIMVASTATIN 40 MG
40 TABLET ORAL AT BEDTIME
Qty: 90 TABLET | Refills: 0 | Status: SHIPPED | OUTPATIENT
Start: 2019-01-08 | End: 2019-01-29

## 2019-01-08 NOTE — TELEPHONE ENCOUNTER
Routing refill request to provider for review/approval because:  Unclear if patient is due for office visit follow up, last 3 BPs not within range.    Yamileth Newell RN

## 2019-01-08 NOTE — TELEPHONE ENCOUNTER
PAtient notified of med orders and to make an appointment. He agreed with plan.  Lonnie Mckeon RN

## 2019-01-08 NOTE — TELEPHONE ENCOUNTER
Last seen in clinic 2/2018.  Due for an OV in the next month or so.   meds ordered for 3 months.  NEDA Randle MD

## 2019-01-28 ENCOUNTER — ANCILLARY PROCEDURE (OUTPATIENT)
Dept: GENERAL RADIOLOGY | Facility: CLINIC | Age: 69
End: 2019-01-28
Attending: NURSE PRACTITIONER
Payer: COMMERCIAL

## 2019-01-28 ENCOUNTER — OFFICE VISIT (OUTPATIENT)
Dept: NEUROSURGERY | Facility: CLINIC | Age: 69
End: 2019-01-28
Attending: NURSE PRACTITIONER
Payer: COMMERCIAL

## 2019-01-28 VITALS
SYSTOLIC BLOOD PRESSURE: 124 MMHG | HEART RATE: 76 BPM | BODY MASS INDEX: 22.35 KG/M2 | DIASTOLIC BLOOD PRESSURE: 78 MMHG | HEIGHT: 72 IN | TEMPERATURE: 97.5 F | OXYGEN SATURATION: 96 % | WEIGHT: 165 LBS

## 2019-01-28 DIAGNOSIS — Z98.1 S/P LUMBAR FUSION: Primary | ICD-10-CM

## 2019-01-28 DIAGNOSIS — Z98.1 STATUS POST LUMBAR SPINAL FUSION: ICD-10-CM

## 2019-01-28 PROCEDURE — 99213 OFFICE O/P EST LOW 20 MIN: CPT | Performed by: NURSE PRACTITIONER

## 2019-01-28 PROCEDURE — 72100 X-RAY EXAM L-S SPINE 2/3 VWS: CPT

## 2019-01-28 PROCEDURE — G0463 HOSPITAL OUTPT CLINIC VISIT: HCPCS

## 2019-01-28 ASSESSMENT — PAIN SCALES - GENERAL: PAINLEVEL: NO PAIN (0)

## 2019-01-28 ASSESSMENT — MIFFLIN-ST. JEOR: SCORE: 1553.5

## 2019-01-28 NOTE — NURSING NOTE
"Ambrocio Vázquez is a 68 year old male who presents for:  Chief Complaint   Patient presents with     Surgical Followup        Initial Vitals:  /78   Pulse 76   Temp 97.5  F (36.4  C) (Oral)   Ht 5' 11.5\" (1.816 m)   Wt 165 lb (74.8 kg)   SpO2 96%   BMI 22.69 kg/m   Estimated body mass index is 22.69 kg/m  as calculated from the following:    Height as of this encounter: 5' 11.5\" (1.816 m).    Weight as of this encounter: 165 lb (74.8 kg).. Body surface area is 1.94 meters squared. BP completed using cuff size: regular  No Pain (0)        Nursing Comments: Patient is being seen for 1 year post op visit.         Deepika Fernandez RN     "

## 2019-01-28 NOTE — PROGRESS NOTES
[facility]  Neurosurgery followup:    HPI:  Mr. Vázquez is a 67 year old male that return about 1 year post L3-S1 fusion with Dr. Gabino Velez. He reports his pain is 0/10. He states that he does not have radicular pain or weakness. He only notes numbness to his right foot if he is standing to long.       Exam:  Constitutional:  Alert, well nourished, NAD.  HEENT: Normocephalic, atraumatic.   Pulm:  Without shortness of breath   CV:  No pitting edema of BLE.      Neurological:  Awake  Alert  Oriented x 3  Motor exam:        IP Q DF PF EHL  R   5  5   5   5    5  L   5  5   5   5    5     Able to spontaneously move L/E bilaterally  Sensation intact throughout all L/E dermatomes       Imaging: lumbar fusion intact per xray     A/P: Mr. Vázquez is a 67 year old male that return about 1 year post L3-S1 fusion with Dr. Gabino Velez. He reports his pain is 0/10. He states that he does not have radicular pain or weakness. He only notes numbness to his right foot if he is standing to long.  His lumbar fusion hardware is intact. He knows that the numbness may or may not fully resolve. However, it is not debilitating.  We discussed increasing his activity. He will return as needed.       Patient Instructions   - continue activity     - followup as needed     - Call the clinic at [phone] for increased pain or any other questions and concerns.    -Patient to follow up with Primary Care provider regarding elevated blood pressure.            Anh Lynne [facility]  [facility]  [address] [address]    Tel [phone]  Pager [phone]

## 2019-01-28 NOTE — LETTER
1/28/2019         RE: Ambrocio Vázquez  [address] [address]        Dear Colleague,    Thank you for referring your patient, Ambrocio Vázquez, to the [facility]. Please see a copy of my visit note below.    [facility]  Neurosurgery followup:    HPI:  Mr. Vázquez is a 67 year old male that return about  1 year post L3-S1 fusion with Dr. Gabino Velez. He reports his pain is 0/10. He states that he does not have radicular pain or weakness. He only notes numbness to his right foot if he is standing to long.       Exam:  Constitutional:  Alert, well nourished, NAD.  HEENT: Normocephalic, atraumatic.   Pulm:  Without shortness of breath   CV:  No pitting edema of BLE.      Neurological:  Awake  Alert  Oriented x 3  Motor exam:        IP Q DF PF EHL  R   5  5   5   5    5  L   5  5   5   5    5     Able to spontaneously move L/E bilaterally  Sensation intact throughout all L/E dermatomes       Imaging: lumbar fusion intact per xray     A/P: Mr. Vázquez is a 67 year old male that return about  1 year post L3-S1 fusion with Dr. Gabino Velez. He reports his pain is 0/10. He states that he does not have radicular pain or weakness. He only notes numbness to his right foot if he is standing to long.  His lumbar fusion hardware is intact. He knows that the numbness may or may not fully resolve. However, it is not debilitating.  We discussed increasing his activity. He will return as needed.       Patient Instructions   - continue activity     - followup as needed     - Call the clinic at [phone] for increased pain or any other questions and concerns.    -Patient to follow up with Primary Care provider regarding elevated blood pressure.            Anh Lynne [facility]  [facility]  [address]  [address] [place] [address]    Tel [phone]  Pager [phone]        Again, thank you for allowing me  to participate in the care of your patient.        Sincerely,        LILI Mckinley CNP

## 2019-01-29 ENCOUNTER — OFFICE VISIT (OUTPATIENT)
Dept: FAMILY MEDICINE | Facility: CLINIC | Age: 69
End: 2019-01-29
Payer: COMMERCIAL

## 2019-01-29 VITALS
TEMPERATURE: 97.1 F | HEIGHT: 72 IN | SYSTOLIC BLOOD PRESSURE: 152 MMHG | BODY MASS INDEX: 23.39 KG/M2 | DIASTOLIC BLOOD PRESSURE: 94 MMHG | WEIGHT: 172.7 LBS | HEART RATE: 52 BPM

## 2019-01-29 DIAGNOSIS — I10 ESSENTIAL HYPERTENSION: ICD-10-CM

## 2019-01-29 DIAGNOSIS — I25.10 CORONARY ARTERIOSCLEROSIS: ICD-10-CM

## 2019-01-29 DIAGNOSIS — I10 ESSENTIAL HYPERTENSION, BENIGN: Primary | ICD-10-CM

## 2019-01-29 DIAGNOSIS — N52.01 ERECTILE DYSFUNCTION DUE TO ARTERIAL INSUFFICIENCY: ICD-10-CM

## 2019-01-29 DIAGNOSIS — E78.49 OTHER HYPERLIPIDEMIA: ICD-10-CM

## 2019-01-29 DIAGNOSIS — L98.9 SKIN LESION: ICD-10-CM

## 2019-01-29 DIAGNOSIS — I25.10 CORONARY ARTERY DISEASE INVOLVING NATIVE HEART WITHOUT ANGINA PECTORIS, UNSPECIFIED VESSEL OR LESION TYPE: ICD-10-CM

## 2019-01-29 PROCEDURE — 99214 OFFICE O/P EST MOD 30 MIN: CPT | Performed by: FAMILY MEDICINE

## 2019-01-29 RX ORDER — LISINOPRIL 40 MG/1
20 TABLET ORAL DAILY
Qty: 45 TABLET | Refills: 3 | Status: SHIPPED | OUTPATIENT
Start: 2019-01-29 | End: 2019-03-19

## 2019-01-29 RX ORDER — METOPROLOL SUCCINATE 25 MG/1
12.5 TABLET, EXTENDED RELEASE ORAL DAILY
Qty: 45 TABLET | Refills: 3 | Status: SHIPPED | OUTPATIENT
Start: 2019-01-29 | End: 2019-03-13

## 2019-01-29 RX ORDER — SILDENAFIL 100 MG/1
100 TABLET, FILM COATED ORAL DAILY PRN
Qty: 10 TABLET | Refills: 3 | Status: SHIPPED | OUTPATIENT
Start: 2019-01-29 | End: 2020-12-07

## 2019-01-29 RX ORDER — SIMVASTATIN 40 MG
40 TABLET ORAL AT BEDTIME
Qty: 90 TABLET | Refills: 0 | Status: SHIPPED | OUTPATIENT
Start: 2019-01-29 | End: 2019-08-20

## 2019-01-29 ASSESSMENT — MIFFLIN-ST. JEOR: SCORE: 1588.42

## 2019-01-29 NOTE — PROGRESS NOTES
SUBJECTIVE:   Ambrocio Vázquez is a 68 year old male who presents to clinic today for the following health issues:      Hypertension   Blood pressure was running 130-140/90s at home   Currently taking only half toprol 25mg  Used to be on lisinopril 40mg but it was stopped after surgery a year ago.       S/p lumbar fusion one year ago - some residual numbness in the foot but NO pain    Right forearm dorsum   Scabby lesion, thick, bigger, not itchy, sometimes bleeds    Review of systems:  No headache  No cp/sob/cough       Problem list and histories reviewed & adjusted, as indicated.  Additional history: as documented      Patient Active Problem List   Diagnosis     Primary carnitine deficiency (H)     Sciatica     Essential hypertension, benign     ERECTILE DYSFUNCTION     Disorder of prostate     Colon polyp     Abnormal echocardiogram     CAD (coronary artery disease)     Cardiomyopathy (H)     CARDIOVASCULAR SCREENING; LDL GOAL LESS THAN 100     Advanced directives, counseling/discussion     Former smoker     Hyperlipidemia     History of angina     Coronary arteriosclerosis     Family history of aortic aneurysm     PFO (patent foramen ovale)     Essential hypertension     Aneurysm of aortic root (H)     S/P lumbar fusion     Age-related nuclear cataract of both eyes     PVD (posterior vitreous detachment), left eye     Current Outpatient Medications   Medication     aspirin 81 MG tablet     finasteride (PROSCAR) 5 MG tablet     fish oil-omega-3 fatty acids (FISH OIL) 1000 MG capsule     metoprolol succinate ER (TOPROL-XL) 25 MG 24 hr tablet     simvastatin (ZOCOR) 40 MG tablet     lisinopril (PRINIVIL/ZESTRIL) 40 MG tablet     nitroGLYcerin (NITROSTAT) 0.4 MG sublingual tablet     sildenafil (VIAGRA) 100 MG cap/tab     No current facility-administered medications for this visit.         Allergies   Allergen Reactions     Augmentin Nausea and Vomiting     Sulfa Drugs Nausea     BP (!) 152/94 (BP Location: Right  "arm, Patient Position: Sitting, Cuff Size: Adult Regular)   Pulse 52   Temp 97.1  F (36.2  C) (Tympanic)   Ht 1.816 m (5' 11.5\")   Wt 78.3 kg (172 lb 11.2 oz)   BMI 23.75 kg/m    GENERAL - Pt is alert and oriented in no acute distress.  Affect is appropriate. Good eye contact.  HEET - Head is normocephalic, atraumatic.    PERRLA,EEMI. Conjunctiva are free of icterus or erythema.    Oropharynx free of masses and lesions, no tonsillar exudate or petechiae.    NECK - Neck is supple w/o LA or thyromegaly  RESPIRATORY - Clear to auscultation bilaterally.  No wheezing noted  CV - RRR, no murmurs, rubs, gallops.   EXTREM - No edema.  SKIN - on the dorsal right forearm, there is a lesion that is macular but slightly thickened, scaly grey in appearance but some pinkish tones at the base, approximately 2cm in diameter     Assessment/Plan -    (I10) Essential hypertension, benign  (primary encounter diagnosis)  Comment: restart the lisinopril at 20mg/day. Continue toprol at 12.5mg. Check blood pressure and lytes in 2 weeks. The patient indicates understanding of these issues and agrees with the plan.   Plan: lisinopril (PRINIVIL/ZESTRIL) 40 MG tablet            (I25.10) Coronary artery disease involving native heart without angina pectoris, unspecified vessel or lesion type  Comment:   Plan: metoprolol succinate ER (TOPROL-XL) 25 MG 24 hr        tablet           (E78.49) Other hyperlipidemia  Comment: med refilled.check fasting lipids in a couple weeks   Plan: simvastatin (ZOCOR) 40 MG tablet, **Basic         metabolic panel FUTURE 1yr            (I25.10) Coronary arteriosclerosis  Comment: 5/4/10 - 100% occlusion of proximal LAD with 2 [place] stents.   6/28/11 - seen in f/u. Adenosine stress 6/23/11 - normal LV perfusion, some atypical chest qamar  Plan: simvastatin (ZOCOR) 40 MG tablet, Lipid panel         reflex to direct LDL Fasting            (N52.01) Erectile dysfunction due to arterial insufficiency  Comment: med " refilled   Plan: sildenafil (VIAGRA) 100 MG tablet            (L98.9) Skin lesion  Comment: given larger size, bleeding, and pinkish tone, I recommend punch biopsy. He will make an appointment for this. The patient indicates understanding of these issues and agrees with the plan.   Plan:       NEDA Randle MD

## 2019-01-30 ENCOUNTER — TELEPHONE (OUTPATIENT)
Dept: FAMILY MEDICINE | Facility: CLINIC | Age: 69
End: 2019-01-30

## 2019-01-30 NOTE — TELEPHONE ENCOUNTER
"Nava reports that \"He is shaking so bad that he is curled in a ball; trying to get warm. No diarrhea. Nauseated but has not thrown up . Ate breakfast OK. Headache pain of 1-2. Nava reports oral temp of 99.7  Lonnie Mckeon RN    "

## 2019-01-30 NOTE — TELEPHONE ENCOUNTER
Reason for call:  Patient reporting a symptom    Symptom or request: Ambrocio received the shingles injection yesterday (1/29/19) around 9:00 at the pharmacy.  Today now he is shaky, cold, has upset stomach and a slight headache.  Could this be from the injection?  Please call and assess. Thank you..Jessica Mckeon    Duration (how long have symptoms been present): this morning    Have you been treated for this before? No    Phone Number patient can be reached at:  Home number on file [phone] (Nava's phone)    Best Time:  Any time    Can we leave a detailed message on this number:  YES    Call taken on 1/30/2019 at 11:05 AM by Jessica Mckeon

## 2019-01-30 NOTE — TELEPHONE ENCOUNTER
I'm sorry he is not feeling well. I would call this a side effect, an immune response to the virus in the vaccine. He still can get the booster vaccine.  These reactions can last 24-48 hours. It is also possible that he coincidentally got a virus  That his body is reacting to.  Would recommend fluids, tylenol.  Karen Nguyen PA-C

## 2019-01-30 NOTE — TELEPHONE ENCOUNTER
Patient's wife notified of all of Radha's instructions as noted below. She agreed with plan.  Lonnie Mckeon RN

## 2019-02-14 ENCOUNTER — OFFICE VISIT (OUTPATIENT)
Dept: FAMILY MEDICINE | Facility: CLINIC | Age: 69
End: 2019-02-14
Payer: COMMERCIAL

## 2019-02-14 ENCOUNTER — TELEPHONE (OUTPATIENT)
Dept: FAMILY MEDICINE | Facility: CLINIC | Age: 69
End: 2019-02-14

## 2019-02-14 VITALS
TEMPERATURE: 97.1 F | BODY MASS INDEX: 23.38 KG/M2 | WEIGHT: 170 LBS | SYSTOLIC BLOOD PRESSURE: 139 MMHG | HEART RATE: 56 BPM | RESPIRATION RATE: 14 BRPM | DIASTOLIC BLOOD PRESSURE: 89 MMHG

## 2019-02-14 DIAGNOSIS — D58.2 ELEVATED HEMOGLOBIN (H): ICD-10-CM

## 2019-02-14 DIAGNOSIS — E71.41 PRIMARY CARNITINE DEFICIENCY (H): ICD-10-CM

## 2019-02-14 DIAGNOSIS — R68.83 CHILLS: ICD-10-CM

## 2019-02-14 DIAGNOSIS — I25.10 CORONARY ARTERIOSCLEROSIS: ICD-10-CM

## 2019-02-14 DIAGNOSIS — G56.03 BILATERAL CARPAL TUNNEL SYNDROME: ICD-10-CM

## 2019-02-14 DIAGNOSIS — L98.9 CHANGING SKIN LESION: Primary | ICD-10-CM

## 2019-02-14 DIAGNOSIS — Q25.43 ANEURYSM OF AORTIC ROOT: ICD-10-CM

## 2019-02-14 DIAGNOSIS — I42.9 CARDIOMYOPATHY, UNSPECIFIED TYPE (H): ICD-10-CM

## 2019-02-14 DIAGNOSIS — E78.49 OTHER HYPERLIPIDEMIA: ICD-10-CM

## 2019-02-14 LAB
ANION GAP SERPL CALCULATED.3IONS-SCNC: 3 MMOL/L (ref 3–14)
BUN SERPL-MCNC: 16 MG/DL (ref 7–30)
CALCIUM SERPL-MCNC: 8.9 MG/DL (ref 8.5–10.1)
CHLORIDE SERPL-SCNC: 103 MMOL/L (ref 94–109)
CHOLEST SERPL-MCNC: 167 MG/DL
CO2 SERPL-SCNC: 31 MMOL/L (ref 20–32)
CREAT SERPL-MCNC: 0.9 MG/DL (ref 0.66–1.25)
GFR SERPL CREATININE-BSD FRML MDRD: 88 ML/MIN/{1.73_M2}
GLUCOSE SERPL-MCNC: 98 MG/DL (ref 70–99)
HBA1C MFR BLD: 6.2 % (ref 0–5.6)
HDLC SERPL-MCNC: 48 MG/DL
LDLC SERPL CALC-MCNC: 102 MG/DL
NONHDLC SERPL-MCNC: 119 MG/DL
POTASSIUM SERPL-SCNC: 4 MMOL/L (ref 3.4–5.3)
SODIUM SERPL-SCNC: 137 MMOL/L (ref 133–144)
TRIGL SERPL-MCNC: 86 MG/DL

## 2019-02-14 PROCEDURE — 36415 COLL VENOUS BLD VENIPUNCTURE: CPT | Performed by: FAMILY MEDICINE

## 2019-02-14 PROCEDURE — 80048 BASIC METABOLIC PNL TOTAL CA: CPT | Performed by: FAMILY MEDICINE

## 2019-02-14 PROCEDURE — 11104 PUNCH BX SKIN SINGLE LESION: CPT | Performed by: FAMILY MEDICINE

## 2019-02-14 PROCEDURE — 80061 LIPID PANEL: CPT | Performed by: FAMILY MEDICINE

## 2019-02-14 PROCEDURE — 88305 TISSUE EXAM BY PATHOLOGIST: CPT | Performed by: FAMILY MEDICINE

## 2019-02-14 PROCEDURE — 88312 SPECIAL STAINS GROUP 1: CPT | Mod: 26 | Performed by: FAMILY MEDICINE

## 2019-02-14 PROCEDURE — 83036 HEMOGLOBIN GLYCOSYLATED A1C: CPT | Performed by: FAMILY MEDICINE

## 2019-02-14 PROCEDURE — 99214 OFFICE O/P EST MOD 30 MIN: CPT | Mod: 25 | Performed by: FAMILY MEDICINE

## 2019-02-14 PROCEDURE — 88305 TISSUE EXAM BY PATHOLOGIST: CPT | Mod: 26 | Performed by: FAMILY MEDICINE

## 2019-02-14 PROCEDURE — 88312 SPECIAL STAINS GROUP 1: CPT | Performed by: FAMILY MEDICINE

## 2019-02-14 NOTE — TELEPHONE ENCOUNTER
Please call Ambrocio. I did run his story by our allergist who reported that shivering is one of the main side effects.   He said he would give Ambrocio the booster shot. He may have shivering again.    Thanks, NEDA Randle MD

## 2019-02-14 NOTE — TELEPHONE ENCOUNTER
----- Message from Mono Mcconnell MD sent at 2/14/2019  1:43 PM CST -----  I know, 24 hours symptoms are confusing all the time. Hard to prove or confirm the causality.  It doesn't seem to be IgE mediated. It's not GBS or some encephalopathy.   I guess I would give it a try.  Mono      ----- Message -----  From: Joelle Randle MD  Sent: 2/14/2019  12:44 PM  To: Mono Mcconnell MD    Even 24hours later?   I assume it is ok to give the booster shot?   c  ----- Message -----  From: Mono Mcconnell MD  Sent: 2/14/2019  12:36 PM  To: Joelle Randle MD    Per uptodate shivering is one of the most common side effects.    Mono Mcconnell    ----- Message -----  From: Joelle Randle MD  Sent: 2/14/2019  11:59 AM  To: Mono Mcconnell MD    Hi Dr Mcconnell,    My patient reported have a severe episode of shaking chills the day after he got the shingrix shot. He says it lasted 2.5 hours before it slowly resolved. Have you heard of any other reactions like this? Would you recommend that he have the booster?     Thanks for your help,   Gabriella Randle   [facility]

## 2019-02-14 NOTE — PROGRESS NOTES
SUBJECTIVE:   Ambrocio Vázquez is a 68 year old male who presents to clinic today for the following health issues:    Biopsy lesion on right forearm    Bleeding/scabs  Here for biopsy of this lesion     Numbness in hands -   X one year   Tried night time braces - wears them nightly and they help   If he doesn't wear them, he often wakes with numb hands  White knuckle driving and using mouse - make symptoms worse   No weakness or atrophy     Shaking reaction, severe, the day after his shingrix vax - couldn't talk to wife due to shaking  2.5 hours - then slowly let up  No residual symptoms   Got in bed while it was happening   Happened the day after he got the shingles vaccination - he feels sure that it was due to the shot   Doesn't want to do second shot      Cardiology - dariela - shobha segura  Aortic root aneurysm, possible PFO - due for cards f/u this year.   He plans to make appointment     Review of systems:  No rashes  No joint pain  No f/c       Problem list and histories reviewed & adjusted, as indicated.  Additional history: as documented    Patient Active Problem List   Diagnosis     Primary carnitine deficiency (H)     Sciatica     Essential hypertension, benign     ERECTILE DYSFUNCTION     Disorder of prostate     Colon polyp     Abnormal echocardiogram     CAD (coronary artery disease)     Cardiomyopathy (H)     CARDIOVASCULAR SCREENING; LDL GOAL LESS THAN 100     Advanced directives, counseling/discussion     Former smoker     Hyperlipidemia     History of angina     Coronary arteriosclerosis     Family history of aortic aneurysm     PFO (patent foramen ovale)     Essential hypertension     Aneurysm of aortic root (H)     S/P lumbar fusion     Age-related nuclear cataract of both eyes     PVD (posterior vitreous detachment), left eye     Current Outpatient Medications   Medication     aspirin 81 MG tablet     finasteride (PROSCAR) 5 MG tablet     fish oil-omega-3 fatty acids (FISH OIL) 1000 MG  capsule     lisinopril (PRINIVIL/ZESTRIL) 40 MG tablet     metoprolol succinate ER (TOPROL-XL) 25 MG 24 hr tablet     simvastatin (ZOCOR) 40 MG tablet     nitroGLYcerin (NITROSTAT) 0.4 MG sublingual tablet     sildenafil (VIAGRA) 100 MG tablet     No current facility-administered medications for this visit.         Allergies   Allergen Reactions     Augmentin Nausea and Vomiting     Sulfa Drugs Nausea       /89 (BP Location: Right arm, Patient Position: Sitting, Cuff Size: Adult Regular)   Pulse 56   Temp 97.1  F (36.2  C) (Tympanic)   Resp 14   Wt 77.1 kg (170 lb)   BMI 23.38 kg/m      GENERAL - Pt is alert and oriented in no acute distress.  Affect is appropriate. Good eye contact.  SKIN -SKIN - on the dorsal right forearm, there is a lesion that is macular but slightly thickened, scaly grey in appearance but some pinkish tones at the base, approximately 2cm in diameter       With the patient's permission, the area was cleaned with alcohol and numbed with 1% lidocaine with epi.  A 6mm punch biopsy was taken from the right dorsal forearm from a portion of the skin lesion. Hemostatis was achieved with cautery.  There were no complications. The patient tolerated the procedure well.  Sent for path       Assessment/Plan -  (L98.9) Changing skin lesion  (primary encounter diagnosis)  Comment: biopsy done. Wound care discussed. Sent for path. The patient indicates understanding of these issues and agrees with the plan.   Plan: Surgical pathology exam, EXC BENIGN SKIN LESION        TRUNK/ARM/LEG 0.6-1.0 CM            (E71.41) Primary carnitine deficiency (H)  Comment:   Plan:     (G56.03) Bilateral carpal tunnel syndrome  Comment: discussed symptoms and surgical release. He will make an appointment with surgery to discuss further.   Plan: GENERAL SURG ADULT REFERRAL            (D58.2) Elevated hemoglobin (H)  Comment:   Plan:     (I71.9) Aneurysm of aortic root (H)  Comment: per cards - he is due for a visit    Plan:     (I42.9) Cardiomyopathy, unspecified type (H)  Comment: per cards - he is due for a visit   Plan:       (R68.83) Chills  Comment: he believe in reaction to #1 shingrix shot. I will touch base with allergy about this and get back to him.   Plan:         NEDA Randle MD

## 2019-02-15 NOTE — TELEPHONE ENCOUNTER
"Call placed to patient.  Relayed Dr Randle's recommendation and input from allergist.    Patient states the violent shaking was traumatic. He is concerned if he has the booster, the shaking may be worse and he is not sure he can take the risk.  Stated he was ready to go to ED with how severe the shaking was.  At this point, patient states he is \"on the fence\" regarding the booster.  Jacy Claros RN     "

## 2019-02-19 LAB — COPATH REPORT: NORMAL

## 2019-02-20 ENCOUNTER — TRANSFERRED RECORDS (OUTPATIENT)
Dept: HEALTH INFORMATION MANAGEMENT | Facility: CLINIC | Age: 69
End: 2019-02-20

## 2019-02-25 ENCOUNTER — MYC MEDICAL ADVICE (OUTPATIENT)
Dept: FAMILY MEDICINE | Facility: CLINIC | Age: 69
End: 2019-02-25

## 2019-03-12 ENCOUNTER — OFFICE VISIT (OUTPATIENT)
Dept: FAMILY MEDICINE | Facility: CLINIC | Age: 69
End: 2019-03-12
Payer: COMMERCIAL

## 2019-03-12 VITALS
HEIGHT: 72 IN | WEIGHT: 172.4 LBS | DIASTOLIC BLOOD PRESSURE: 85 MMHG | HEART RATE: 65 BPM | BODY MASS INDEX: 23.35 KG/M2 | RESPIRATION RATE: 16 BRPM | SYSTOLIC BLOOD PRESSURE: 135 MMHG | TEMPERATURE: 98.4 F

## 2019-03-12 DIAGNOSIS — H61.21 CERUMINOSIS, RIGHT: ICD-10-CM

## 2019-03-12 DIAGNOSIS — L72.3 INFECTED SEBACEOUS CYST: Primary | ICD-10-CM

## 2019-03-12 DIAGNOSIS — L08.9 INFECTED SEBACEOUS CYST: Primary | ICD-10-CM

## 2019-03-12 PROCEDURE — 69209 REMOVE IMPACTED EAR WAX UNI: CPT | Mod: RT | Performed by: FAMILY MEDICINE

## 2019-03-12 PROCEDURE — 87070 CULTURE OTHR SPECIMN AEROBIC: CPT | Performed by: FAMILY MEDICINE

## 2019-03-12 PROCEDURE — 10060 I&D ABSCESS SIMPLE/SINGLE: CPT | Performed by: FAMILY MEDICINE

## 2019-03-12 ASSESSMENT — MIFFLIN-ST. JEOR: SCORE: 1587.06

## 2019-03-12 NOTE — PROGRESS NOTES
SUBJECTIVE:   Ambrocio Vázquez is a 68 year old male who presents to clinic today for the following health issues:      Patient has a painful cyst behind his right ear. Patient said the lump has been getting bigger.     Cyst x 2 years, occasionally pops it  Tried hard to squeeze it recently   Increased size and pain x couple weeks     Has upcoming bilateral carpal tunnel surgery  And dentist appointment tomorrow     Right ear wax - using syringe with water at home  Would like me to take a look at it     Review of systems:   No f/c   No neck pain   No fatigue or malaise   No trouble chewing/talking/swallowing    Problem list and histories reviewed & adjusted, as indicated.  Additional history: as documented    Patient Active Problem List   Diagnosis     Primary carnitine deficiency (H)     Sciatica     Essential hypertension, benign     ERECTILE DYSFUNCTION     Disorder of prostate     Colon polyp     Abnormal echocardiogram     CAD (coronary artery disease)     Cardiomyopathy (H)     CARDIOVASCULAR SCREENING; LDL GOAL LESS THAN 100     Advanced directives, counseling/discussion     Former smoker     Hyperlipidemia     History of angina     Coronary arteriosclerosis     Family history of aortic aneurysm     PFO (patent foramen ovale)     Essential hypertension     Aneurysm of aortic root (H)     S/P lumbar fusion     Age-related nuclear cataract of both eyes     PVD (posterior vitreous detachment), left eye     Elevated hemoglobin (H)     Current Outpatient Medications   Medication     aspirin 81 MG tablet     finasteride (PROSCAR) 5 MG tablet     fish oil-omega-3 fatty acids (FISH OIL) 1000 MG capsule     lisinopril (PRINIVIL/ZESTRIL) 40 MG tablet     metoprolol succinate ER (TOPROL-XL) 25 MG 24 hr tablet     simvastatin (ZOCOR) 40 MG tablet     nitroGLYcerin (NITROSTAT) 0.4 MG sublingual tablet     sildenafil (VIAGRA) 100 MG tablet     No current facility-administered medications for this visit.         Allergies  "  Allergen Reactions     Augmentin Nausea and Vomiting     Sulfa Drugs Nausea       /85 (BP Location: Left arm, Patient Position: Sitting, Cuff Size: Adult Regular)   Pulse 65   Temp 98.4  F (36.9  C) (Tympanic)   Resp 16   Ht 1.816 m (5' 11.5\")   Wt 78.2 kg (172 lb 6.4 oz)   BMI 23.71 kg/m    GENERAL - Pt is alert and oriented in no acute distress.  Affect is appropriate. Good eye contact.  HEET - Head is normocephalic, atraumatic.    PERRLA,EEMI. Conjunctiva are free of icterus or erythema.    Right ear canal - occluded with hard wax deep in the canal  Left ear canal - normal. Perforated drum  Behind the right ear, there is a large erythematous  fluctuant lump. It sits in the space where the posterior ear meets the skull. It is approximately 2cm in height and 1cm in width with an elevation of 1cm    Oropharynx free of masses and lesions, no tonsillar exudate or petechiae.    NECK - Neck is supple w/o LA or thyromegaly      Procedure:    After obtaining written and verbal consent and following discussion of risks and benefits, the area was cleansed with Chloraprep.. Using an 11 blade, a several mm incision was made over the top of the lesion. Pus immediately started to ooze out of the lesion.  The pus was cultured. Wound was dressed with antibiotic ointment and covered with gauze and a band aid.      Assessment/Plan -  (L72.3,  L08.9) Infected sebaceous cyst  (primary encounter diagnosis)  Comment: warm pack and re dress tonight. Sent for culture.  Return to clinic if symptoms worsen/change   Plan: Wound Culture Aerobic Bacterial    (H61.21) Ceruminosis, right  Comment: washed for him and all cerumen removed   Plan:     NEDA Randle MD         "

## 2019-03-13 ENCOUNTER — TRANSFERRED RECORDS (OUTPATIENT)
Dept: HEALTH INFORMATION MANAGEMENT | Facility: CLINIC | Age: 69
End: 2019-03-13

## 2019-03-13 DIAGNOSIS — I10 ESSENTIAL HYPERTENSION: ICD-10-CM

## 2019-03-13 DIAGNOSIS — N42.9 DISORDER OF PROSTATE: ICD-10-CM

## 2019-03-13 DIAGNOSIS — I25.10 CORONARY ARTERY DISEASE INVOLVING NATIVE HEART WITHOUT ANGINA PECTORIS, UNSPECIFIED VESSEL OR LESION TYPE: ICD-10-CM

## 2019-03-13 NOTE — TELEPHONE ENCOUNTER
"METOPROLOL SUCCINATE ER 25 MG Tablet Extended Release 24 Hour      Last Written Prescription Date:  1/29/19  Last Fill Quantity: 45,   # refills: 3  Last Office Visit: 3/12/19  Future Office visit:       finasteride (PROSCAR) 5 MG tablet      Last Written Prescription Date:  1/8/19  Last Fill Quantity: 90,   # refills: 0  Last Office Visit: 3/12/19  Future Office visit:       Requested Prescriptions   Pending Prescriptions Disp Refills     metoprolol succinate ER (TOPROL-XL) 25 MG 24 hr tablet [Pharmacy Med Name: METOPROLOL SUCCINATE ER 25 MG Tablet Extended Release 24 Hour] 90 tablet 0     Sig: TAKE 1 TABLET EVERY DAY    Beta-Blockers Protocol Passed - 3/13/2019  3:45 PM       Passed - Blood pressure under 140/90 in past 12 months    BP Readings from Last 3 Encounters:   03/12/19 135/85   02/14/19 139/89   01/29/19 (!) 152/94                Passed - Patient is age 6 or older       Passed - Recent (12 mo) or future (30 days) visit within the authorizing provider's specialty    Patient had office visit in the last 12 months or has a visit in the next 30 days with authorizing provider or within the authorizing provider's specialty.  See \"Patient Info\" tab in inbasket, or \"Choose Columns\" in Meds & Orders section of the refill encounter.             Passed - Medication is active on med list        finasteride (PROSCAR) 5 MG tablet [Pharmacy Med Name: FINASTERIDE 5 MG Tablet] 90 tablet 0     Sig: TAKE 1 TABLET EVERY DAY    Alpha Blockers Failed - 3/13/2019  3:45 PM       Failed - Patient does not have Tadalafil, Vardenafil, or Sildenafil on their medication list       Passed - Blood pressure under 140/90 in past 12 months    BP Readings from Last 3 Encounters:   03/12/19 135/85   02/14/19 139/89   01/29/19 (!) 152/94                Passed - Recent (12 mo) or future (30 days) visit within the authorizing provider's specialty    Patient had office visit in the last 12 months or has a visit in the next 30 days with " "authorizing provider or within the authorizing provider's specialty.  See \"Patient Info\" tab in inbasket, or \"Choose Columns\" in Meds & Orders section of the refill encounter.             Passed - Medication is active on med list       Passed - Patient is 18 years of age or older          "

## 2019-03-14 LAB
BACTERIA SPEC CULT: NO GROWTH
Lab: NORMAL
SPECIMEN SOURCE: NORMAL

## 2019-03-15 ENCOUNTER — MYC MEDICAL ADVICE (OUTPATIENT)
Dept: FAMILY MEDICINE | Facility: CLINIC | Age: 69
End: 2019-03-15

## 2019-03-15 DIAGNOSIS — E78.49 OTHER HYPERLIPIDEMIA: ICD-10-CM

## 2019-03-15 DIAGNOSIS — I25.10 CORONARY ARTERIOSCLEROSIS: ICD-10-CM

## 2019-03-15 DIAGNOSIS — I10 ESSENTIAL HYPERTENSION: ICD-10-CM

## 2019-03-15 RX ORDER — METOPROLOL SUCCINATE 25 MG/1
TABLET, EXTENDED RELEASE ORAL
Qty: 90 TABLET | Refills: 3 | Status: SHIPPED | OUTPATIENT
Start: 2019-03-15 | End: 2019-03-19

## 2019-03-15 RX ORDER — FINASTERIDE 5 MG/1
TABLET, FILM COATED ORAL
Qty: 90 TABLET | Refills: 1 | Status: SHIPPED | OUTPATIENT
Start: 2019-03-15 | End: 2019-07-25

## 2019-03-15 NOTE — TELEPHONE ENCOUNTER
Routing refill request for metoprolol succinate ER to provider for review/approval because: Please clarify/confirm dose, 12.5mg or 25mg daily?? Do not see notation 1/29/19 OV. Pt requesting old dose/order?     Prescription for finasteride approved per [facility] Refill Protocol.  Angy PRABHAKAR RN

## 2019-03-15 NOTE — TELEPHONE ENCOUNTER
My note in 1/29/19 says 12.5mg    Can you give him a call and see what he is actually taking?     I just sent in the 25mg dose by mistake so let me know if we need to change that to 12.5     C. Aniyah Randle MD

## 2019-03-19 RX ORDER — LISINOPRIL 20 MG/1
20 TABLET ORAL DAILY
Qty: 90 TABLET | Refills: 3 | Status: SHIPPED | OUTPATIENT
Start: 2019-03-19 | End: 2019-10-22

## 2019-03-19 RX ORDER — METOPROLOL SUCCINATE 25 MG/1
12.5 TABLET, EXTENDED RELEASE ORAL DAILY
Qty: 45 TABLET | Refills: 3 | Status: SHIPPED | OUTPATIENT
Start: 2019-03-19 | End: 2019-10-22

## 2019-03-19 RX ORDER — LISINOPRIL 20 MG/1
20 TABLET ORAL DAILY
Qty: 90 TABLET | Refills: 3 | Status: SHIPPED | OUTPATIENT
Start: 2019-03-19 | End: 2019-03-19

## 2019-03-19 NOTE — TELEPHONE ENCOUNTER
"FYI to Dr. Randle:   1. Lisinopril 20 mg and metoprolol 12.5 mg medications ordered to [employer]  2. FYI regarding cyst. He said that it filled up again with fluid. Yesterday it drained,\"it looked like blood\". \"Now it is closed up and seems like it is healing.\"  Lonnie Mckeon RN    "

## 2019-03-19 NOTE — TELEPHONE ENCOUNTER
Patient reports that he takes 1/2 of the 25 mg metoprolol daily. He said that the pill is easy to split.   Lonnie Mckeon RN

## 2019-03-20 ENCOUNTER — TRANSFERRED RECORDS (OUTPATIENT)
Dept: HEALTH INFORMATION MANAGEMENT | Facility: CLINIC | Age: 69
End: 2019-03-20

## 2019-05-14 ENCOUNTER — TELEPHONE (OUTPATIENT)
Dept: FAMILY MEDICINE | Facility: CLINIC | Age: 69
End: 2019-05-14

## 2019-05-14 DIAGNOSIS — H60.393 INFECTIVE OTITIS EXTERNA, BILATERAL: Primary | ICD-10-CM

## 2019-05-14 NOTE — TELEPHONE ENCOUNTER
Reason for call:  Patient reporting a symptom    Symptom or request: Ambrocio has appointment with Dr. Randle on 5/21/19, but has run out of his ear drops.  He usually sees a ENT for this, however called his office and found out that he retired.  He will speak with Dr. Randle about this ongoing script that he needs but in the meantime doesn't want to be without.  He uses ofloxacin 0.3%  ophl solution 5ml.  He uses [facility] [address] [place] [address].  Please call and assess. Thank you..Jessica Mckeon    Duration (how long have symptoms been present): on going script that he uses    Have you been treated for this before? Yes by ENT, not [facility].     Phone Number patient can be reached at:  Home number on file [phone] (home)    Best Time:  Any time    Can we leave a detailed message on this number:  YES    Call taken on 5/14/2019 at 10:30 AM by Jessica Mckeon

## 2019-05-15 RX ORDER — OFLOXACIN 3 MG/ML
5 SOLUTION AURICULAR (OTIC) DAILY
Qty: 2 ML | Refills: 0 | Status: SHIPPED | OUTPATIENT
Start: 2019-05-15 | End: 2019-05-22

## 2019-05-15 NOTE — TELEPHONE ENCOUNTER
Ambrocio is calling today wondering if Dr. Randle reviewed his request.  She had not.  Could you please review and advise / order if appropriate.  Thank you..Jessica Mckeon

## 2019-05-16 NOTE — TELEPHONE ENCOUNTER
Patient returned call.  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  Has seen ENT at [facility] when concerns.  Uses this chronically.  Has appointment with PCP next week.  Jacy Claros RN

## 2019-05-16 NOTE — TELEPHONE ENCOUNTER
Call placed to patient. Voicemail message left relayed Dr Mckeon's message. Call back number given for clinic RN.  Jacy Claros RN

## 2019-05-21 ENCOUNTER — OFFICE VISIT (OUTPATIENT)
Dept: FAMILY MEDICINE | Facility: CLINIC | Age: 69
End: 2019-05-21
Payer: COMMERCIAL

## 2019-05-21 VITALS
WEIGHT: 171 LBS | HEIGHT: 72 IN | HEART RATE: 58 BPM | TEMPERATURE: 96.6 F | DIASTOLIC BLOOD PRESSURE: 100 MMHG | BODY MASS INDEX: 23.16 KG/M2 | SYSTOLIC BLOOD PRESSURE: 164 MMHG

## 2019-05-21 DIAGNOSIS — I10 ESSENTIAL HYPERTENSION: ICD-10-CM

## 2019-05-21 DIAGNOSIS — H72.92 PERFORATED EAR DRUM, LEFT: Primary | ICD-10-CM

## 2019-05-21 DIAGNOSIS — G56.03 BILATERAL CARPAL TUNNEL SYNDROME: ICD-10-CM

## 2019-05-21 PROCEDURE — 99213 OFFICE O/P EST LOW 20 MIN: CPT | Performed by: FAMILY MEDICINE

## 2019-05-21 ASSESSMENT — MIFFLIN-ST. JEOR: SCORE: 1575.71

## 2019-05-21 NOTE — PROGRESS NOTES
"SUBJECTIVE:                                                    Ambrocio Vázquez is a 69 year old male who presents to clinic today for the following health issues:    Chief Complaint   Patient presents with     Ear Problem     discuss hearing aids     Recheck Medication     Perforated drum in the left ear x 20 years ago while diving, left ear   Used to follow with Eh Hensley at the [facility] ENT dept   Has used the ofloxacin off and on for 20years.   Just got a refill last week   Just got some hearing aids and ear feels infected now.    Still having tinnitus - chronic    Blood pressure this a.m.   Not sure if he took the lisinopril last night  Took metoprolol this a.m.     Cyst behind ear has resolved       Ten weeks since bilateral carpal surgery - diagnosed confirmed by EMG   Symptoms are worsening   Left hand is numb often  Has an appointment with the surgeon in a few days     Review of systems:  No headache  No f/c   No cp/sob/cough      Problem list and histories reviewed & adjusted, as indicated.  Additional history: as documented       OBJECTIVE:                                                    BP (!) 142/100   Pulse 58   Temp 96.6  F (35.9  C) (Tympanic)   Ht 1.816 m (5' 11.5\")   Wt 77.6 kg (171 lb)   BMI 23.52 kg/m   Body mass index is 23.52 kg/m .   GENERAL - Pt is alert and oriented in no acute distress.  Affect is appropriate. Good eye contact.  HEET - Head is normocephalic, atraumatic.    PERRLA,EEMI. Conjunctiva are free of icterus or erythema.    Right TM - normal   Left TM - perforation present. Canal is damp in appearance but no obvious redness, swelling, or pus.    NECK - Neck is supple w/o LA or thyromegaly  RESPIRATORY - Clear to auscultation bilaterally.  No wheezing noted  CV - RRR, no murmurs, rubs, gallops.            ASSESSMENT/PLAN:                                                      (H72.92) Perforated ear drum, left  (primary encounter diagnosis)  Comment: referral for ENT at [facility] " given.   Plan: OTOLARYNGOLOGY REFERRAL            (G56.03) Bilateral carpal tunnel syndrome  Comment: discussed recurrent symptoms. He will see surgeon later this week   Plan:     (I10) Essential hypertension  Comment: didn't take lisinopril last night. Needs to take meds regularly and come for blood pressure recheck. The patient indicates understanding of these issues and agrees with the plan.   Plan:        reports that he quit smoking about 9 years ago. His smoking use included cigarettes. He has a 15.00 pack-year smoking history. He has never used smokeless tobacco.          NEDA Randle MD  [facility]

## 2019-05-23 ENCOUNTER — TRANSFERRED RECORDS (OUTPATIENT)
Dept: HEALTH INFORMATION MANAGEMENT | Facility: CLINIC | Age: 69
End: 2019-05-23

## 2019-06-25 ENCOUNTER — OFFICE VISIT (OUTPATIENT)
Dept: OPTOMETRY | Facility: CLINIC | Age: 69
End: 2019-06-25
Payer: COMMERCIAL

## 2019-06-25 DIAGNOSIS — H25.13 AGE-RELATED NUCLEAR CATARACT OF BOTH EYES: ICD-10-CM

## 2019-06-25 DIAGNOSIS — Z01.01 ENCOUNTER FOR EXAMINATION OF EYES AND VISION WITH ABNORMAL FINDINGS: Primary | ICD-10-CM

## 2019-06-25 DIAGNOSIS — H52.222 REGULAR ASTIGMATISM OF LEFT EYE: ICD-10-CM

## 2019-06-25 DIAGNOSIS — H43.812 PVD (POSTERIOR VITREOUS DETACHMENT), LEFT EYE: ICD-10-CM

## 2019-06-25 DIAGNOSIS — H52.13 MYOPIA OF BOTH EYES: ICD-10-CM

## 2019-06-25 DIAGNOSIS — H52.4 PRESBYOPIA: ICD-10-CM

## 2019-06-25 PROCEDURE — 92015 DETERMINE REFRACTIVE STATE: CPT | Performed by: OPTOMETRIST

## 2019-06-25 PROCEDURE — 92014 COMPRE OPH EXAM EST PT 1/>: CPT | Performed by: OPTOMETRIST

## 2019-06-25 ASSESSMENT — REFRACTION_WEARINGRX
OS_CYLINDER: +1.25
SPECS_TYPE: PAL
OS_ADD: +2.50
OS_SPHERE: -0.25
OD_SPHERE: PLANO
OD_CYLINDER: SPHERE
OS_ADD: +2.50
OD_SPHERE: -0.50
OS_CYLINDER: +0.25
OS_SPHERE: -0.25
OS_AXIS: 155
OD_ADD: +2.50
OD_CYLINDER: +0.50
OS_AXIS: 147
OD_AXIS: 070
OD_ADD: +2.50
SPECS_TYPE: PAL

## 2019-06-25 ASSESSMENT — SLIT LAMP EXAM - LIDS
COMMENTS: NORMAL
COMMENTS: NORMAL

## 2019-06-25 ASSESSMENT — VISUAL ACUITY
OD_CC: 20/20
OD_CC: 20/30
OD_SC: 20/20
OS_CC: 20/20 -1
CORRECTION_TYPE: GLASSES
OS_SC: 20/25
METHOD: SNELLEN - LINEAR
OS_CC: 20/20
OS_SC: 20/80 -1
OD_SC: 20/80

## 2019-06-25 ASSESSMENT — CUP TO DISC RATIO
OD_RATIO: 0.4
OS_RATIO: 0.3

## 2019-06-25 ASSESSMENT — REFRACTION_MANIFEST
OD_SPHERE: -0.25
OD_CYLINDER: SPHERE
OS_AXIS: 155
OS_CYLINDER: +0.75
OS_SPHERE: -0.75
OS_ADD: +2.50
OD_ADD: +2.50

## 2019-06-25 ASSESSMENT — EXTERNAL EXAM - RIGHT EYE: OD_EXAM: NORMAL

## 2019-06-25 ASSESSMENT — TONOMETRY
OD_IOP_MMHG: 15
IOP_METHOD: APPLANATION
OS_IOP_MMHG: 15

## 2019-06-25 ASSESSMENT — CONF VISUAL FIELD
OD_NORMAL: 1
OS_NORMAL: 1

## 2019-06-25 ASSESSMENT — EXTERNAL EXAM - LEFT EYE: OS_EXAM: NORMAL

## 2019-06-25 NOTE — PROGRESS NOTES
Chief Complaint   Patient presents with     Annual Eye Exam      Accompanied by self  Last Eye Exam: 1 year ago  Dilated Previously: Yes    What are you currently using to see?  Glasses-1 year old, rx 2 are the sunglasses, patient likes that rx better than the rx 1 glasses.       Distance Vision Acuity: Satisfied with vision    Near Vision Acuity: Not satisfied     Eye Comfort: good  Do you use eye drops? : No  Occupation or Hobbies: retired    Joelle Sandhu, Optometric Tech          Medical, surgical and family histories reviewed and updated 6/25/2019.       OBJECTIVE: See Ophthalmology exam    ASSESSMENT:    ICD-10-CM    1. Encounter for examination of eyes and vision with abnormal findings Z01.01 EYE EXAM (SIMPLE-NONBILLABLE)   2. Age-related nuclear cataract of both eyes H25.13 EYE EXAM (SIMPLE-NONBILLABLE)   3. PVD (posterior vitreous detachment), left eye H43.812 EYE EXAM (SIMPLE-NONBILLABLE)   4. Myopia of both eyes H52.13 EYE EXAM (SIMPLE-NONBILLABLE)     REFRACTION   5. Regular astigmatism of left eye H52.222 EYE EXAM (SIMPLE-NONBILLABLE)     REFRACTION   6. Presbyopia H52.4 EYE EXAM (SIMPLE-NONBILLABLE)     REFRACTION      PLAN:     Patient Instructions   You have the start of mild cataracts.  You may notice some blurred vision or glare with night driving.  It is important that you wear good sunglasses to protect your eyes from the ultraviolet light from the sun. I recommend that you return in 1 year for an eye exam unless there are any sudden changes in your vision.       You have a PVD- posterior vitreous detachment which is due to the gel of the eye shrinking and clumping together.  This can sometimes cause holes or tears in the retina.  The signs of a retinal detachment are flashes of light or a curtain coming over the vision. If you notice any of these changes please let me know right away.  If I am not available this is an emergency type situation that you would need to be seen.    Ambrocio was advised  of today's exam findings.  Optional to fill new glasses prescription, minimal change  Copy of glasses Rx provided today.  Return in 1 year for eye exam, or sooner if needed.    The effects of the dilating drops last for 4- 6 hours.  You will be more sensitive to light and vision will be blurry up close.  Mydriatic sunglasses were given if needed.    Greyson Joy O.D.  [facility]  [place]  [address]    [phone]

## 2019-06-25 NOTE — LETTER
6/25/2019         RE: Ambrocio Vázquez  [address] [facility] [address]        Dear Colleague,    Thank you for referring your patient, Ambrocio Vázquez, to the [facility]. Please see a copy of my visit note below.    Chief Complaint   Patient presents with     Annual Eye Exam      Accompanied by self  Last Eye Exam: 1 year ago  Dilated Previously: Yes    What are you currently using to see?  Glasses-1 year old, rx 2 are the sunglasses, patient likes that rx better than the rx 1 glasses.       Distance Vision Acuity: Satisfied with vision    Near Vision Acuity: Not satisfied     Eye Comfort: good  Do you use eye drops? : No  Occupation or Hobbies: retired    Joelle Sandhu, [employer] Tech          Medical, surgical and family histories reviewed and updated 6/25/2019.       OBJECTIVE: See Ophthalmology exam    ASSESSMENT:    ICD-10-CM    1. Encounter for examination of eyes and vision with abnormal findings Z01.01 EYE EXAM (SIMPLE-NONBILLABLE)   2. Age-related nuclear cataract of both eyes H25.13 EYE EXAM (SIMPLE-NONBILLABLE)   3. PVD (posterior vitreous detachment), left eye H43.812 EYE EXAM (SIMPLE-NONBILLABLE)   4. Myopia of both eyes H52.13 EYE EXAM (SIMPLE-NONBILLABLE)     REFRACTION   5. Regular astigmatism of left eye H52.222 EYE EXAM (SIMPLE-NONBILLABLE)     REFRACTION   6. Presbyopia H52.4 EYE EXAM (SIMPLE-NONBILLABLE)     REFRACTION      PLAN:     Patient Instructions   You have the start of mild cataracts.  You may notice some blurred vision or glare with night driving.  It is important that you wear good sunglasses to protect your eyes from the ultraviolet light from the sun. I recommend that you return in 1 year for an eye exam unless there are any sudden changes in your vision.       You have a PVD- posterior vitreous detachment which is due to the gel of the eye shrinking and clumping together.  This can sometimes cause holes or tears in the retina.  The signs of a  retinal detachment are flashes of light or a curtain coming over the vision. If you notice any of these changes please let me know right away.  If I am not available this is an emergency type situation that you would need to be seen.    Ambrocio was advised of today's exam findings.  Optional to fill new glasses prescription, minimal change  Copy of glasses Rx provided today.  Return in 1 year for eye exam, or sooner if needed.    The effects of the dilating drops last for 4- 6 hours.  You will be more sensitive to light and vision will be blurry up close.  Mydriatic sunglasses were given if needed.    Greyson Joy O.D.  [address]  [place]  [address]    [phone]           Again, thank you for allowing me to participate in the care of your patient.        Sincerely,        Greyson Joy, OD

## 2019-06-25 NOTE — PATIENT INSTRUCTIONS
You have the start of mild cataracts.  You may notice some blurred vision or glare with night driving.  It is important that you wear good sunglasses to protect your eyes from the ultraviolet light from the sun. I recommend that you return in 1 year for an eye exam unless there are any sudden changes in your vision.       You have a PVD- posterior vitreous detachment which is due to the gel of the eye shrinking and clumping together.  This can sometimes cause holes or tears in the retina.  The signs of a retinal detachment are flashes of light or a curtain coming over the vision. If you notice any of these changes please let me know right away.  If I am not available this is an emergency type situation that you would need to be seen.    Ambrocio was advised of today's exam findings.  Optional to fill new glasses prescription, minimal change  Copy of glasses Rx provided today.  Return in 1 year for eye exam, or sooner if needed.    The effects of the dilating drops last for 4- 6 hours.  You will be more sensitive to light and vision will be blurry up close.  Mydriatic sunglasses were given if needed.    Greyson Joy O.D.  [address] [place]  [address]    [phone]

## 2019-08-20 DIAGNOSIS — E78.49 OTHER HYPERLIPIDEMIA: ICD-10-CM

## 2019-08-20 DIAGNOSIS — I25.10 CORONARY ARTERIOSCLEROSIS: ICD-10-CM

## 2019-08-21 RX ORDER — SIMVASTATIN 40 MG
40 TABLET ORAL AT BEDTIME
Qty: 90 TABLET | Refills: 1 | Status: SHIPPED | OUTPATIENT
Start: 2019-08-21 | End: 2020-01-07

## 2019-08-21 NOTE — TELEPHONE ENCOUNTER
"SIMVASTATIN 40 MG Tablet      Last Written Prescription Date:  1/29/19  Last Fill Quantity: 90,   # refills: 0  Last Office Visit: 5/21/19  Future Office visit:       Requested Prescriptions   Pending Prescriptions Disp Refills     simvastatin (ZOCOR) 40 MG tablet [Pharmacy Med Name: SIMVASTATIN 40 MG Tablet] 90 tablet 0     Sig: TAKE 1 TABLET (40 MG) BY MOUTH AT BEDTIME       Statins Protocol Passed - 8/20/2019  7:43 PM        Passed - LDL on file in past 12 months     Recent Labs   Lab Test 02/14/19  0908   *             Passed - No abnormal creatine kinase in past 12 months     No lab results found.             Passed - Recent (12 mo) or future (30 days) visit within the authorizing provider's specialty     Patient had office visit in the last 12 months or has a visit in the next 30 days with authorizing provider or within the authorizing provider's specialty.  See \"Patient Info\" tab in inbasket, or \"Choose Columns\" in Meds & Orders section of the refill encounter.              Passed - Medication is active on med list        Passed - Patient is age 18 or older        "

## 2019-08-21 NOTE — TELEPHONE ENCOUNTER
Prescription approved per [facility] Refill Protocol.  Lonnie Mckeon RN

## 2019-09-03 ENCOUNTER — TELEPHONE (OUTPATIENT)
Dept: FAMILY MEDICINE | Facility: CLINIC | Age: 69
End: 2019-09-03
Payer: COMMERCIAL

## 2019-09-03 NOTE — TELEPHONE ENCOUNTER
Panel Management Review      Patient has the following on his problem list:     Hypertension   Last three blood pressure readings:  BP Readings from Last 3 Encounters:   05/21/19 (!) 164/100   03/12/19 135/85   02/14/19 139/89     Blood pressure: FAILED    HTN Guidelines:  Less than 140/90      Composite cancer screening  Chart review shows that this patient is due/due soon for the following None  Summary:    Patient is due/failing the following:   Labs and PHYSICAL    Action needed:   Patient needs office visit for medicare visit. and Patient needs non-fasting lab only appointment    Type of outreach:    Phone, left message for patient to call back.     Questions for provider review:    None                                                                                                                                    Mindi Mitchell CMA       Chart routed to Care Team .

## 2019-09-03 NOTE — TELEPHONE ENCOUNTER
Traveling to [place] - leaving tomorrow.  Will be gone about 2-3 weeks.  Will call back and schedule when they return to [place]..Jessica Mckeon

## 2019-09-19 ENCOUNTER — TELEPHONE (OUTPATIENT)
Dept: FAMILY MEDICINE | Facility: CLINIC | Age: 69
End: 2019-09-19

## 2019-09-19 DIAGNOSIS — E71.41 PRIMARY CARNITINE DEFICIENCY (H): ICD-10-CM

## 2019-09-19 DIAGNOSIS — I10 ESSENTIAL HYPERTENSION, BENIGN: Primary | ICD-10-CM

## 2019-09-19 DIAGNOSIS — D58.2 ELEVATED HEMOGLOBIN (H): ICD-10-CM

## 2019-09-19 NOTE — TELEPHONE ENCOUNTER
Reason for Call: Request for an order or referral:    Order or referral being requested: Ambrocio is going to schedule labs and yearly PE with you but no orders for labs are in chart.  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  Had been taking supplement for carnitine, but has not know for quite some time.  Snapshot states that he's due for Alt, CBC and BMP.  Please review and place orders if appropriate.  Thank you..Jessica Mckeon    Date needed: within one week    Has the patient been seen by the PCP for this problem? YES will be making future appointment.      Phone number Patient can be reached at:  Home number on file [phone] (home)    Best Time:  Any time if needed    Can we leave a detailed message on this number?  YES    Call taken on 9/19/2019 at 1:37 PM by Jessica Mckeon

## 2019-09-28 ENCOUNTER — HEALTH MAINTENANCE LETTER (OUTPATIENT)
Age: 69
End: 2019-09-28

## 2019-10-04 ENCOUNTER — OFFICE VISIT (OUTPATIENT)
Dept: FAMILY MEDICINE | Facility: CLINIC | Age: 69
End: 2019-10-04
Payer: COMMERCIAL

## 2019-10-04 VITALS
TEMPERATURE: 97.1 F | SYSTOLIC BLOOD PRESSURE: 146 MMHG | WEIGHT: 160.1 LBS | HEART RATE: 56 BPM | BODY MASS INDEX: 22.41 KG/M2 | DIASTOLIC BLOOD PRESSURE: 96 MMHG | HEIGHT: 71 IN

## 2019-10-04 DIAGNOSIS — Z00.00 ENCOUNTER FOR MEDICARE ANNUAL WELLNESS EXAM: Primary | ICD-10-CM

## 2019-10-04 DIAGNOSIS — Z23 NEED FOR PROPHYLACTIC VACCINATION AND INOCULATION AGAINST INFLUENZA: ICD-10-CM

## 2019-10-04 DIAGNOSIS — D58.2 ELEVATED HEMOGLOBIN (H): ICD-10-CM

## 2019-10-04 DIAGNOSIS — I10 ESSENTIAL HYPERTENSION, BENIGN: ICD-10-CM

## 2019-10-04 DIAGNOSIS — N42.9 DISORDER OF PROSTATE: ICD-10-CM

## 2019-10-04 DIAGNOSIS — E71.41 PRIMARY CARNITINE DEFICIENCY (H): ICD-10-CM

## 2019-10-04 LAB
ALBUMIN SERPL-MCNC: 4 G/DL (ref 3.4–5)
ALP SERPL-CCNC: 59 U/L (ref 40–150)
ALT SERPL W P-5'-P-CCNC: 17 U/L (ref 0–70)
ANION GAP SERPL CALCULATED.3IONS-SCNC: 4 MMOL/L (ref 3–14)
AST SERPL W P-5'-P-CCNC: 12 U/L (ref 0–45)
BILIRUB SERPL-MCNC: 1.3 MG/DL (ref 0.2–1.3)
BUN SERPL-MCNC: 15 MG/DL (ref 7–30)
CALCIUM SERPL-MCNC: 8.8 MG/DL (ref 8.5–10.1)
CHLORIDE SERPL-SCNC: 101 MMOL/L (ref 94–109)
CO2 SERPL-SCNC: 31 MMOL/L (ref 20–32)
CREAT SERPL-MCNC: 0.82 MG/DL (ref 0.66–1.25)
ERYTHROCYTE [DISTWIDTH] IN BLOOD BY AUTOMATED COUNT: 12.5 % (ref 10–15)
GFR SERPL CREATININE-BSD FRML MDRD: >90 ML/MIN/{1.73_M2}
GLUCOSE SERPL-MCNC: 91 MG/DL (ref 70–99)
HBA1C MFR BLD: 5.7 % (ref 0–5.6)
HCT VFR BLD AUTO: 50 % (ref 40–53)
HGB BLD-MCNC: 17 G/DL (ref 13.3–17.7)
MCH RBC QN AUTO: 32.4 PG (ref 26.5–33)
MCHC RBC AUTO-ENTMCNC: 34 G/DL (ref 31.5–36.5)
MCV RBC AUTO: 95 FL (ref 78–100)
PLATELET # BLD AUTO: 176 10E9/L (ref 150–450)
POTASSIUM SERPL-SCNC: 4 MMOL/L (ref 3.4–5.3)
PROT SERPL-MCNC: 7.6 G/DL (ref 6.8–8.8)
PSA SERPL-ACNC: 1.2 UG/L (ref 0–4)
RBC # BLD AUTO: 5.24 10E12/L (ref 4.4–5.9)
SODIUM SERPL-SCNC: 136 MMOL/L (ref 133–144)
WBC # BLD AUTO: 6.9 10E9/L (ref 4–11)

## 2019-10-04 PROCEDURE — 99000 SPECIMEN HANDLING OFFICE-LAB: CPT | Performed by: FAMILY MEDICINE

## 2019-10-04 PROCEDURE — 90662 IIV NO PRSV INCREASED AG IM: CPT | Performed by: FAMILY MEDICINE

## 2019-10-04 PROCEDURE — 36415 COLL VENOUS BLD VENIPUNCTURE: CPT | Performed by: FAMILY MEDICINE

## 2019-10-04 PROCEDURE — 99397 PER PM REEVAL EST PAT 65+ YR: CPT | Mod: 25 | Performed by: FAMILY MEDICINE

## 2019-10-04 PROCEDURE — 85027 COMPLETE CBC AUTOMATED: CPT | Performed by: FAMILY MEDICINE

## 2019-10-04 PROCEDURE — 83036 HEMOGLOBIN GLYCOSYLATED A1C: CPT | Performed by: FAMILY MEDICINE

## 2019-10-04 PROCEDURE — G0103 PSA SCREENING: HCPCS | Performed by: FAMILY MEDICINE

## 2019-10-04 PROCEDURE — 80053 COMPREHEN METABOLIC PANEL: CPT | Performed by: FAMILY MEDICINE

## 2019-10-04 PROCEDURE — 99213 OFFICE O/P EST LOW 20 MIN: CPT | Mod: 25 | Performed by: FAMILY MEDICINE

## 2019-10-04 PROCEDURE — 90471 IMMUNIZATION ADMIN: CPT | Performed by: FAMILY MEDICINE

## 2019-10-04 ASSESSMENT — MIFFLIN-ST. JEOR: SCORE: 1518.34

## 2019-10-04 NOTE — PATIENT INSTRUCTIONS
Increase lisinopril to 40mg/day - send me a [employer] message with data in a couple weeks     Make a f/u appointment with Dr Iglesias     Patient Education   Personalized Prevention Plan  You are due for the preventive services outlined below.  Your care team is available to assist you in scheduling these services.  If you have already completed any of these items, please share that information with your care team to update in your medical record.  Health Maintenance Due   Topic Date Due     Discuss Advance Care Planning  03/13/2018     Liver Monitoring Lab  03/28/2018     Annual Wellness Visit  11/14/2018     Complete Blood Count  01/30/2019     Diptheria Tetanus Pertussis (DTAP/TDAP/TD) Vaccine (3 - Td) 04/27/2019     Basic Metabolic Panel  08/14/2019     Flu Vaccine (1) 09/01/2019     Zoster (Shingles) Vaccine (2 of 2) 03/26/2019

## 2019-10-04 NOTE — PROGRESS NOTES
"  SUBJECTIVE:   Ambrocio Vázquez is a 69 year old male who presents for Preventive Visit.  Are you in the first 12 months of your Medicare Part B coverage?  No    Physical Health:    In general, how would you rate your overall physical health? good    Outside of work, how many days during the week do you exercise? With in daily activities and yard work     Outside of work, approximately how many minutes a day do you exercise?greater than 60 minutes    If you drink alcohol do you typically have >3 drinks per day or >7 drinks per week? No    Do you usually eat at least 4 servings of fruit and vegetables a day, include whole grains & fiber and avoid regularly eating high fat or \"junk\" foods? Yes    Do you have any problems taking medications regularly?  No    Do you have any side effects from medications? none    Needs assistance for the following daily activities: no assistance needed. Hard to get off of ground if sitting on floor.     Which of the following safety concerns are present in your home?  none identified     Hearing impairment: Yes    In the past 6 months, have you been bothered by leaking of urine? 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 fair  PHQ-2 Score:  0    Do you feel safe in your environment? Yes    Do you have a Health Care Directive? Yes: Patient states has Advance Directive and will bring in a copy to clinic.    Additional concerns to address?  YES- Labs - would like to get labwork done     Fall risk:  Fallen 2 or more times in the past year?: No  Any fall with injury in the past year?: No    Cognitive Screenin) Repeat 3 items (Leader, Season, Table)    2) Clock draw: NORMAL  3) 3 item recall: " Recalls 3 objects  Results: 3 items recalled: COGNITIVE IMPAIRMENT LESS LIKELY    Mini-CogTM Copyright DISHA Coleman. Licensed by the author for use in [facility]; reprinted with permission (ubaldo@[facility]). All rights reserved.      Do you have sleep apnea, excessive snoring or daytime drowsiness?: no      Reviewed and updated as needed this visit by clinical staff         Reviewed and updated as needed this visit by Provider        Social History     Tobacco Use     Smoking status: Former Smoker     Packs/day: 0.50     Years: 30.00     Pack years: 15.00     Types: Cigarettes     Last attempt to quit: 1/15/2010     Years since quittin.7     Smokeless tobacco: Never Used   Substance Use Topics     Alcohol use: Yes     Alcohol/week: 0.0 standard drinks     Comment: 1-2 a week                           Current providers sharing in care for this patient include:   Patient Care Team:  Joelle Randle MD as PCP - General (Family Practice)  Daniel Villa MD as MD (Otolaryngology)  Mary Garcia MD as MD (Pediatric Metabolism)  Joelle Randle MD as Assigned PCP    The following health maintenance items are reviewed in Epic and correct as of today:  Health Maintenance   Topic Date Due     ADVANCE CARE PLANNING  2018     ALT  2018     MEDICARE ANNUAL WELLNESS VISIT  2018     CBC  2019     DTAP/TDAP/TD IMMUNIZATION (3 - Td) 2019     BMP  2019     INFLUENZA VACCINE (1) 2019     ZOSTER IMMUNIZATION (2 of 2) 2019     HF ACTION PLAN  2020     LIPID  2020     FALL RISK ASSESSMENT  2020     EYE EXAM  2020     COLONOSCOPY  2023     HEPATITIS C SCREENING  Completed     PHQ-2  Completed     PNEUMOCOCCAL IMMUNIZATION 65+ LOW/MEDIUM RISK  Completed     AORTIC ANEURYSM SCREENING (SYSTEM ASSIGNED)  Completed     IPV IMMUNIZATION  Aged Out     MENINGITIS IMMUNIZATION  Aged Out     Labs reviewed in EPIC      ROS:  Constitutional,  "HEENT, cardiovascular, pulmonary, GI, , musculoskeletal, neuro, skin, endocrine and psych systems are negative, except as otherwise noted.    OBJECTIVE:   BP (!) 146/96 (BP Location: Right arm, Patient Position: Sitting, Cuff Size: Adult Regular)   Pulse 56   Temp 97.1  F (36.2  C) (Tympanic)   Ht 1.803 m (5' 11\")   Wt 72.6 kg (160 lb 1.6 oz)   BMI 22.33 kg/m   Estimated body mass index is 23.52 kg/m  as calculated from the following:    Height as of 5/21/19: 1.816 m (5' 11.5\").    Weight as of 5/21/19: 77.6 kg (171 lb).   BP Readings from Last 6 Encounters:   10/04/19 (!) 146/96   05/21/19 (!) 164/100   03/12/19 135/85   02/14/19 139/89   01/29/19 (!) 152/94   01/28/19 124/78       EXAM:   GENERAL: healthy, alert and no distress  EYES: Eyes grossly normal to inspection, PERRL and conjunctivae and sclerae normal  HENT: ear canals and TM's normal, nose and mouth without ulcers or lesions  NECK: no adenopathy, no asymmetry, masses, or scars and thyroid normal to palpation  RESP: lungs clear to auscultation - no rales, rhonchi or wheezes  CV: regular rate and rhythm, normal S1 S2, no S3 or S4, no murmur, click or rub, no peripheral edema and peripheral pulses strong  ABDOMEN: soft, nontender, no hepatosplenomegaly, no masses and bowel sounds normal  MS: no gross musculoskeletal defects noted, no edema  NEURO: Normal strength and tone, mentation intact and speech normal  PSYCH: mentation appears normal, affect normal/bright    Diagnostic Test Results:  Labs reviewed in Epic    ASSESSMENT / PLAN:   (Z00.00) Encounter for Medicare annual wellness exam  (primary encounter diagnosis)  Comment: We discussed self testicular exams, exercise 30mins/day, and calcium with vitamin D at 1200mg/day, preferably from dietary sources.  Diet, Weight loss, and Exercise were discussed as well .       Plan:     (Z23) Need for prophylactic vaccination and inoculation against influenza  Comment:   Plan: INFLUENZA (HIGH DOSE) 3 VALENT " "VACCINE [39341],        ADMIN INFLUENZA (For MEDICARE Patients ONLY)         []            (D58.2) Elevated hemoglobin (H)  Comment: recheck   Plan:     (I10) Essential hypertension, benign  Comment: increase lisinopril from 20 to 40 today. Monitor and send me data in a couple weeks. 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 He plans to make f/u appointment with urology. May not need to continue to follow with urology   Plan: PSA, screen              End of Life Planning:  Patient currently has an advanced directive: Yes.  Practitioner is supportive of decision.    COUNSELING:  Reviewed preventive health counseling, as reflected in patient instructions       Regular exercise       Healthy diet/nutrition    Estimated body mass index is 23.52 kg/m  as calculated from the following:    Height as of 5/21/19: 1.816 m (5' 11.5\").    Weight as of 5/21/19: 77.6 kg (171 lb).         reports that he quit smoking about 9 years ago. His smoking use included cigarettes. He has a 15.00 pack-year smoking history. He has never used smokeless tobacco.      Appropriate preventive services were discussed with this patient, including applicable screening as appropriate for cardiovascular disease, diabetes, osteopenia/osteoporosis, and glaucoma.  As appropriate for age/gender, discussed screening for colorectal cancer, prostate cancer, breast cancer, and cervical cancer. Checklist reviewing preventive services available has been given to the patient.    Reviewed patients plan of care and provided an AVS. The Basic Care Plan (routine screening as documented in Health Maintenance) for Ambrocio meets the Care Plan requirement. This Care Plan has been established and reviewed with the Patient.    Counseling Resources:  ATP IV Guidelines  Pooled Cohorts Equation Calculator  Breast Cancer Risk " Calculator  FRAX Risk Assessment  ICSI Preventive Guidelines  Dietary Guidelines for Americans, 2010  USDA's MyPlate  ASA Prophylaxis  Lung CA Screening    Joelle Randle MD  [facility] ([facility]3]

## 2019-10-09 ENCOUNTER — TELEPHONE (OUTPATIENT)
Dept: FAMILY MEDICINE | Facility: CLINIC | Age: 69
End: 2019-10-09

## 2019-10-09 LAB
ACYLCARNITINE SERPL-SCNC: 16 UMOL/L (ref 5–29)
CARN ESTERS/C0 SERPL-SRTO: 1 {RATIO} (ref 0.1–1)
CARNITINE FREE SERPL-SCNC: 16 UMOL/L (ref 25–60)
CARNITINE SERPL-SCNC: 32 UMOL/L (ref 34–86)

## 2019-10-09 NOTE — TELEPHONE ENCOUNTER
Spoke with Michelle in Pharmacy and was told that they cannot give the Td shot. Michelle said that they can give the Tdap in some situations.   Ambrocio reports that he was here for a physical on 10/4/19 and was given a flu shot.   Spoke with Mindi AMARAL and she said that Medicare did not cover the Td shot if given in the clnic.  Advised [facility] and see if he can get the shot there.  Advised that they can call [facility] and see if they will cover it here in the clinic.   Lonnie Mckeon, RN

## 2019-10-09 NOTE — TELEPHONE ENCOUNTER
Patient's wife called the pharmacy and asked where she could get a Td shot (tetanus) from [place].      We cannot give Td shots in the pharmacy, only Tdap.    Patient asked for phone call to get see how he could get this shot from the clinic.      Please contact him at [phone].    Thank you,  Babita Coppola, PharmD  [facility]  [phone]

## 2019-10-22 ENCOUNTER — TELEPHONE (OUTPATIENT)
Dept: FAMILY MEDICINE | Facility: CLINIC | Age: 69
End: 2019-10-22

## 2019-10-22 ENCOUNTER — ALLIED HEALTH/NURSE VISIT (OUTPATIENT)
Dept: FAMILY MEDICINE | Facility: CLINIC | Age: 69
End: 2019-10-22
Payer: COMMERCIAL

## 2019-10-22 DIAGNOSIS — I10 ESSENTIAL HYPERTENSION: ICD-10-CM

## 2019-10-22 DIAGNOSIS — Z23 NEED FOR VACCINATION: Primary | ICD-10-CM

## 2019-10-22 DIAGNOSIS — I25.10 CORONARY ARTERY DISEASE INVOLVING NATIVE HEART WITHOUT ANGINA PECTORIS, UNSPECIFIED VESSEL OR LESION TYPE: ICD-10-CM

## 2019-10-22 PROCEDURE — 99207 ZZC NO CHARGE NURSE ONLY: CPT

## 2019-10-22 PROCEDURE — 90714 TD VACC NO PRESV 7 YRS+ IM: CPT

## 2019-10-22 PROCEDURE — 90471 IMMUNIZATION ADMIN: CPT

## 2019-10-22 RX ORDER — LISINOPRIL 20 MG/1
40 TABLET ORAL DAILY
Qty: 90 TABLET | Refills: 3 | COMMUNITY
Start: 2019-10-22 | End: 2019-11-06

## 2019-10-22 RX ORDER — METOPROLOL SUCCINATE 25 MG/1
50 TABLET, EXTENDED RELEASE ORAL DAILY
Qty: 45 TABLET | Refills: 3 | COMMUNITY
Start: 2019-10-22 | End: 2019-10-24

## 2019-10-22 NOTE — TELEPHONE ENCOUNTER
Patient reports that he had already increased lisinopril to 40 mg on 10/4/19 per your instructions on that day at his office visit.    He said he had been taking 40 mg lisinopril and 12.5 mg metoprolol every day from  10/4/19 until 10/19/19.   He stopped taking both because he said that he did not notice any difference in his blood pressures and he said he has no symptoms.  How do you advise?  Lonnie Mckeon RN

## 2019-10-22 NOTE — TELEPHONE ENCOUNTER
He is taking 20 of lisinopril and 12.5 metoprolol      I would like him to increase both to : 50 of metoprolol and 40 of lisinopril.      Recheck blood pressure and lytes in 2 weeks.     NEDA Randle MD

## 2019-10-22 NOTE — PROGRESS NOTES
Prior to immunization administration, verified patients identity using patient s name and date of birth. Please see Immunization Activity for additional information.     Screening Questionnaire for Adult Immunization    Are you sick today?   No   Do you have allergies to medications, food, a vaccine component or latex?   No   Have you ever had a serious reaction after receiving a vaccination?   No   Do you have a long-term health problem with heart disease, lung disease, asthma, kidney disease, metabolic disease (e.g. diabetes), anemia, or other blood disorder?   No   Do you have cancer, leukemia, HIV/AIDS, or any other immune system problem?   No   In the past 3 months, have you taken medications that affect  your immune system, such as prednisone, other steroids, or anticancer drugs; drugs for the treatment of rheumatoid arthritis, Crohn s disease, or psoriasis; or have you had radiation treatments?   No   Have you had a seizure, or a brain or other nervous system problem?   No   During the past year, have you received a transfusion of blood or blood     products, or been given immune (gamma) globulin or antiviral drug?   No   For women: Are you pregnant or is there a chance you could become        pregnant during the next month?   No   Have you received any vaccinations in the past 4 weeks?   No     Immunization questionnaire answers were all negative.        Per orders of Dr. Joelle Randle, injection of Td given by Mindi Mitchell. Patient instructed to remain in clinic for 15 minutes afterwards, and to report any adverse reaction to me immediately.       Screening performed by Mindi Mitchell on 10/22/2019 at 8:54 AM.

## 2019-10-22 NOTE — TELEPHONE ENCOUNTER
Ok - sorry. I guess I didn't change that on his med list.     Can he remind us whether he's tried a diuretic for his blood pressure?     Hypertension is silent. Unless it is very high, he won't feel anything.  So I wouldn't use that to determine whether to stay on the medication in general     NEDA Randle MD         I

## 2019-10-22 NOTE — TELEPHONE ENCOUNTER
Patient was in today 10/22/219 for a blood pressure check. He brought in a list of his recent blood pressures that he has been taking at home.     They are:    10/06/2019- 160/85 (2 days of metoprolol)  10/07/2019- 144/81  10/08/2019- 161/88  10/10/2019- 170/86, 161/86  10/10/2019- 151/90 in the pm  10/12/2019- 146/91 in the afternoon  10/12/2019- 152/91 in the evening  10/20/2019- 143/89 in the pm  10/21/2019-144/82 in the pm    For the past three days he has not taken his metoprolol or lisinopril and his blood pressure has not changed.    Mindi Mitchell, CMA

## 2019-10-23 NOTE — TELEPHONE ENCOUNTER
"Looking through the chart; I saw lisinopril-hydrochlorothiazide listed on Dr. Mathew's 3/31/08 office visit as \"historical\". Ambrocio does not remember ever taking a diuretic; he denied specifically lasix,furosemide, hydrochlorothiazide.   He said that he restarted taking 40 mg lisinopril and 12.5 mg metoprolol last evening.   Ambrocio would like to know the next step in blood pressure management. He said that until the right combination of medications is found; he wants pills ordered for quantity of #30. After regime is settled upon, then he would like medications ordered for #90 at [facility].  Thank you.  Lonnie Mckeon RN    "

## 2019-10-24 RX ORDER — METOPROLOL SUCCINATE 25 MG/1
25 TABLET, EXTENDED RELEASE ORAL DAILY
Qty: 45 TABLET | Refills: 3 | COMMUNITY
Start: 2019-10-24 | End: 2020-03-13

## 2019-10-24 NOTE — TELEPHONE ENCOUNTER
Ok - he can increase the metoprolol to 25 and monitor. Or, keep it at 12.5 and I'll add in a small dose of diuretic. Does he have a preference?     NEDA Randle MD

## 2019-10-24 NOTE — TELEPHONE ENCOUNTER
Call placed to patient.  Relayed message from Dr Randle.  Patient will increase Metoprolol to 25 mg daily and update us  Next week with BP values.  BP today 150/82.  Patient verbalizes understanding and agrees with plan.  Jacy Claros RN

## 2019-11-04 ENCOUNTER — MYC MEDICAL ADVICE (OUTPATIENT)
Dept: FAMILY MEDICINE | Facility: CLINIC | Age: 69
End: 2019-11-04

## 2019-11-06 ENCOUNTER — MYC MEDICAL ADVICE (OUTPATIENT)
Dept: FAMILY MEDICINE | Facility: CLINIC | Age: 69
End: 2019-11-06

## 2019-11-06 DIAGNOSIS — I10 ESSENTIAL HYPERTENSION, BENIGN: Primary | ICD-10-CM

## 2019-11-06 RX ORDER — LISINOPRIL 40 MG/1
40 TABLET ORAL DAILY
Qty: 90 TABLET | Refills: 0 | Status: SHIPPED | OUTPATIENT
Start: 2019-11-06 | End: 2020-03-13

## 2019-12-03 ENCOUNTER — OFFICE VISIT (OUTPATIENT)
Dept: UROLOGY | Facility: CLINIC | Age: 69
End: 2019-12-03
Payer: COMMERCIAL

## 2019-12-03 VITALS — DIASTOLIC BLOOD PRESSURE: 88 MMHG | RESPIRATION RATE: 18 BRPM | HEART RATE: 60 BPM | SYSTOLIC BLOOD PRESSURE: 140 MMHG

## 2019-12-03 DIAGNOSIS — N42.9 DISORDER OF PROSTATE: ICD-10-CM

## 2019-12-03 LAB
ALBUMIN UR-MCNC: NEGATIVE MG/DL
APPEARANCE UR: CLEAR
BACTERIA #/AREA URNS HPF: ABNORMAL /HPF
BILIRUB UR QL STRIP: NEGATIVE
COLOR UR AUTO: YELLOW
GLUCOSE UR STRIP-MCNC: NEGATIVE MG/DL
HGB UR QL STRIP: ABNORMAL
KETONES UR STRIP-MCNC: NEGATIVE MG/DL
LEUKOCYTE ESTERASE UR QL STRIP: NEGATIVE
MUCOUS THREADS #/AREA URNS LPF: PRESENT /LPF
NITRATE UR QL: NEGATIVE
NON-SQ EPI CELLS #/AREA URNS LPF: ABNORMAL /LPF
PH UR STRIP: 5.5 PH (ref 5–7)
RBC #/AREA URNS AUTO: ABNORMAL /HPF
SOURCE: ABNORMAL
SP GR UR STRIP: >1.03 (ref 1–1.03)
UROBILINOGEN UR STRIP-ACNC: 0.2 EU/DL (ref 0.2–1)
WBC #/AREA URNS AUTO: ABNORMAL /HPF

## 2019-12-03 PROCEDURE — 51798 US URINE CAPACITY MEASURE: CPT | Performed by: UROLOGY

## 2019-12-03 PROCEDURE — 99213 OFFICE O/P EST LOW 20 MIN: CPT | Mod: 25 | Performed by: UROLOGY

## 2019-12-03 PROCEDURE — 81001 URINALYSIS AUTO W/SCOPE: CPT | Performed by: UROLOGY

## 2019-12-03 PROCEDURE — 87086 URINE CULTURE/COLONY COUNT: CPT | Performed by: UROLOGY

## 2019-12-03 RX ORDER — SILDENAFIL 100 MG/1
100 TABLET, FILM COATED ORAL DAILY PRN
Qty: 20 TABLET | Refills: 3 | Status: SHIPPED | OUTPATIENT
Start: 2019-12-03 | End: 2020-03-13

## 2019-12-03 RX ORDER — FINASTERIDE 5 MG/1
TABLET, FILM COATED ORAL
Qty: 90 TABLET | Refills: 3 | Status: SHIPPED | OUTPATIENT
Start: 2019-12-03 | End: 2020-12-07

## 2019-12-03 NOTE — NURSING NOTE
Active order to obtain bladder scan? Yes   Name of ordering provider:  [facility]   Bladder scan preformed post void Yes.  Bladder scan reveled 20ML  Provider notified?  Yes    Mindi FELIX CMA

## 2019-12-03 NOTE — NURSING NOTE
"Chief Complaint   Patient presents with     RECHECK     Finasteride, Urgency, frequency      Results     PSA        Initial BP (!) 140/88 (BP Location: Right arm, Patient Position: Chair, Cuff Size: Adult Regular)   Pulse 60   Resp 18  Estimated body mass index is 22.33 kg/m  as calculated from the following:    Height as of 10/4/19: 1.803 m (5' 11\").    Weight as of 10/4/19: 72.6 kg (160 lb 1.6 oz).  BP completed using cuff size: regular   Medications and allergies reviewed.      Mindi FELIX CMA     "

## 2019-12-03 NOTE — PATIENT INSTRUCTIONS
Per physician instructions.    If you have questions or concerns on any instructions given to you by your provider today or if you need to schedule an appointment, you can reach us at [phone].    Thank you!

## 2019-12-04 LAB
BACTERIA SPEC CULT: NO GROWTH
Lab: NORMAL
SPECIMEN SOURCE: NORMAL

## 2019-12-04 NOTE — PROGRESS NOTES
Appointment source: Established Patient  Patient name: Ambrocio Vázquez  Urology Staff: Shon Iglesias MD    Subjective: This is a 69 year old year old male returning for follow up of elevated PSA and erectile dysfunction.    Continues taking finasteride for reduction of prostate cancer risk.    Continues to respond well to sildenafil.    Objective:  Doing well. PSA 1.20 ng/mL.    Prostate benign to palpation.    Assessment:  History of elevated PSA without biopsy evidence of prostate cancer.    Erectile dysfunction managed with sildenafil.    Continue finasteride.    Plan:  Return in one year.    Total time 20 minutes, counseling 15 minutes discussing prostate cancer prevention.

## 2020-01-07 DIAGNOSIS — E78.49 OTHER HYPERLIPIDEMIA: ICD-10-CM

## 2020-01-07 DIAGNOSIS — I25.10 CORONARY ARTERIOSCLEROSIS: ICD-10-CM

## 2020-01-07 RX ORDER — SIMVASTATIN 40 MG
TABLET ORAL
Qty: 90 TABLET | Refills: 0 | Status: SHIPPED | OUTPATIENT
Start: 2020-01-07 | End: 2020-03-13

## 2020-01-07 NOTE — TELEPHONE ENCOUNTER
Prescription approved per [facility] Refill Protocol.  Lonnie Mckeon RN

## 2020-01-21 ENCOUNTER — TRANSFERRED RECORDS (OUTPATIENT)
Dept: HEALTH INFORMATION MANAGEMENT | Facility: CLINIC | Age: 70
End: 2020-01-21

## 2020-03-13 ENCOUNTER — OFFICE VISIT (OUTPATIENT)
Dept: FAMILY MEDICINE | Facility: CLINIC | Age: 70
End: 2020-03-13
Payer: COMMERCIAL

## 2020-03-13 VITALS
TEMPERATURE: 96.3 F | OXYGEN SATURATION: 96 % | HEIGHT: 71 IN | DIASTOLIC BLOOD PRESSURE: 90 MMHG | WEIGHT: 164 LBS | HEART RATE: 54 BPM | SYSTOLIC BLOOD PRESSURE: 155 MMHG | BODY MASS INDEX: 22.96 KG/M2

## 2020-03-13 DIAGNOSIS — F43.9 STRESS: ICD-10-CM

## 2020-03-13 DIAGNOSIS — I25.10 CORONARY ARTERIOSCLEROSIS: ICD-10-CM

## 2020-03-13 DIAGNOSIS — E78.49 OTHER HYPERLIPIDEMIA: ICD-10-CM

## 2020-03-13 DIAGNOSIS — G47.09 OTHER INSOMNIA: ICD-10-CM

## 2020-03-13 DIAGNOSIS — Z82.49 FAMILY HISTORY OF AORTIC ANEURYSM: ICD-10-CM

## 2020-03-13 DIAGNOSIS — I10 ESSENTIAL HYPERTENSION: ICD-10-CM

## 2020-03-13 DIAGNOSIS — E71.41 PRIMARY CARNITINE DEFICIENCY (H): ICD-10-CM

## 2020-03-13 DIAGNOSIS — F41.9 ANXIETY: Primary | ICD-10-CM

## 2020-03-13 DIAGNOSIS — I10 ESSENTIAL HYPERTENSION, BENIGN: ICD-10-CM

## 2020-03-13 DIAGNOSIS — R73.9 ELEVATED BLOOD SUGAR: ICD-10-CM

## 2020-03-13 DIAGNOSIS — I25.10 CORONARY ARTERY DISEASE INVOLVING NATIVE HEART WITHOUT ANGINA PECTORIS, UNSPECIFIED VESSEL OR LESION TYPE: ICD-10-CM

## 2020-03-13 DIAGNOSIS — Q25.43 ANEURYSM OF AORTIC ROOT: ICD-10-CM

## 2020-03-13 LAB
ANION GAP SERPL CALCULATED.3IONS-SCNC: 1 MMOL/L (ref 3–14)
BUN SERPL-MCNC: 21 MG/DL (ref 7–30)
CALCIUM SERPL-MCNC: 8.7 MG/DL (ref 8.5–10.1)
CHLORIDE SERPL-SCNC: 105 MMOL/L (ref 94–109)
CHOLEST SERPL-MCNC: 140 MG/DL
CO2 SERPL-SCNC: 31 MMOL/L (ref 20–32)
CREAT SERPL-MCNC: 0.99 MG/DL (ref 0.66–1.25)
GFR SERPL CREATININE-BSD FRML MDRD: 77 ML/MIN/{1.73_M2}
GLUCOSE SERPL-MCNC: 116 MG/DL (ref 70–99)
HDLC SERPL-MCNC: 48 MG/DL
LDLC SERPL CALC-MCNC: 77 MG/DL
NONHDLC SERPL-MCNC: 92 MG/DL
POTASSIUM SERPL-SCNC: 4.1 MMOL/L (ref 3.4–5.3)
SODIUM SERPL-SCNC: 137 MMOL/L (ref 133–144)
TRIGL SERPL-MCNC: 75 MG/DL

## 2020-03-13 PROCEDURE — 80061 LIPID PANEL: CPT | Performed by: FAMILY MEDICINE

## 2020-03-13 PROCEDURE — 36415 COLL VENOUS BLD VENIPUNCTURE: CPT | Performed by: FAMILY MEDICINE

## 2020-03-13 PROCEDURE — 80048 BASIC METABOLIC PNL TOTAL CA: CPT | Performed by: FAMILY MEDICINE

## 2020-03-13 PROCEDURE — 99214 OFFICE O/P EST MOD 30 MIN: CPT | Performed by: FAMILY MEDICINE

## 2020-03-13 RX ORDER — METOPROLOL SUCCINATE 25 MG/1
50 TABLET, EXTENDED RELEASE ORAL DAILY
Qty: 180 TABLET | Refills: 1 | Status: SHIPPED | OUTPATIENT
Start: 2020-03-13 | End: 2020-07-22

## 2020-03-13 RX ORDER — LISINOPRIL 40 MG/1
40 TABLET ORAL DAILY
Qty: 90 TABLET | Refills: 3 | Status: SHIPPED | OUTPATIENT
Start: 2020-03-13 | End: 2021-01-07

## 2020-03-13 RX ORDER — SIMVASTATIN 40 MG
40 TABLET ORAL AT BEDTIME
Qty: 90 TABLET | Refills: 3 | Status: SHIPPED | OUTPATIENT
Start: 2020-03-13 | End: 2021-01-07

## 2020-03-13 ASSESSMENT — MIFFLIN-ST. JEOR: SCORE: 1536.03

## 2020-03-13 NOTE — PROGRESS NOTES
SUBJECTIVE:                                                    Ambrocio Vázquez is a 69 year old male who presents to clinic today for the following health issues:    Hypertension Follow-up      Do you check your blood pressure regularly outside of the clinic? Yes     Are you following a low salt diet? Yes    Are your blood pressures ever more than 140 on the top number (systolic) OR more   than 90 on the bottom number (diastolic), for example 140/90? Yes    - Joined new gym   Has a nutritionist and    - lost weight    - Blood pressure went down when staring new dosage of med but now has been back up again       Currently on lisinopril 40 and metoprolol 25 x 3-4 months  That seemed to work for awhile    But - Last month, blood pressure started running higher 140-160/70-90  He isn't sure why   Is asymptomatic    Started working with a  a couple months ago and did some nutrition work   Doing classes and boot camp    Wt Readings from Last 4 Encounters:   03/13/20 74.4 kg (164 lb)   10/04/19 72.6 kg (160 lb 1.6 oz)   05/21/19 77.6 kg (171 lb)   03/12/19 78.2 kg (172 lb 6.4 oz)     Sleep  - not great  Thinking about things that scare him - things that make him anxious  Sleep ranges from 5-7 hours/night    He is working a lot, high stress    Family stress - trying to help their daughter who is having marriage struggles  Son is unemployed       EMG - left hand - carpal tunnel  Had a carpal tunnel release. EMG showed mild neuropathy    Also has a history of aortic root dilation  Wonders what the next step is     1/2018 :  1.  Ambrocio Vázquez is a pleasant 67-year-old male who presents for cardiology preoperative evaluation.  The patient has no evidence of ischemia or infarction.  His echocardiogram is normal other than an aortic root enlargement probably on the basis of ongoing hypertension.  Additionally the patient has a patent foramen ovale which is likely mild.  The patient's cardiac risk for his pending  surgery is low with a morbidity and mortality rate of under 1%.   2.  Systolic hypertension.  The patient has had both systolic and diastolic hypertension over the last several months according to our records.  His primary care physician needs to attend to this sometime around, before or after his surgery.  Perhaps the addition of amlodipine would be helpful.   3.  The patient's PFO probably represents trivial shunting.  I have asked the patient to return to see Dr. Mckinnon in 1 year to discuss whether a followup echocardiography or even transesophageal echocardiogram should be considered.   4.  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  Lowering his blood pressure and avoiding heavy lifting is recommended.     Review of systems:  No headache  No cp/sob/cough  No n/v   No diarrhea/constipation no rash       Problem list and histories reviewed & adjusted, as indicated.  Additional history: as documented   Patient Active Problem List   Diagnosis     Primary carnitine deficiency (H)     Sciatica     Essential hypertension, benign     ERECTILE DYSFUNCTION     Disorder of prostate     Colon polyp     Abnormal echocardiogram     CAD (coronary artery disease)     Cardiomyopathy (H)     CARDIOVASCULAR SCREENING; LDL GOAL LESS THAN 100     Advanced directives, counseling/discussion     Former smoker     Hyperlipidemia     History of angina     Coronary arteriosclerosis     Family history of aortic aneurysm     PFO (patent foramen ovale)     Aneurysm of aortic root (H)     S/P lumbar fusion     Age-related nuclear cataract of both eyes     PVD (posterior vitreous detachment), left eye     Elevated hemoglobin (H)     Perforated ear drum, left     Current Outpatient Medications   Medication     aspirin 81 MG tablet     finasteride (PROSCAR) 5 MG tablet     fish oil-omega-3 fatty acids (FISH OIL) 1000 MG capsule     lisinopril (PRINIVIL/ZESTRIL) 40 MG tablet     metoprolol succinate ER (TOPROL-XL) 25 MG 24 hr  "tablet     simvastatin (ZOCOR) 40 MG tablet     nitroGLYcerin (NITROSTAT) 0.4 MG sublingual tablet     sildenafil (VIAGRA) 100 MG tablet     No current facility-administered medications for this visit.         Allergies   Allergen Reactions     Augmentin Nausea and Vomiting     Sulfa Drugs Nausea       OBJECTIVE:                                                    BP (!) 155/90   Pulse 54   Temp 96.3  F (35.7  C) (Tympanic)   Ht 1.803 m (5' 11\")   Wt 74.4 kg (164 lb)   SpO2 96%   BMI 22.87 kg/m   Body mass index is 22.87 kg/m .   GENERAL - Pt is alert and oriented in no acute distress.  Affect is appropriate. Good eye contact.  HEET - Head is normocephalic, atraumatic.    PERRLA,EEMI. Conjunctiva are free of icterus or erythema.    TMs bilaterally normal.   Oropharynx free of masses and lesions, no tonsillar exudate or petechiae.    NECK - Neck is supple w/o LA or thyromegaly  RESPIRATORY - Clear to auscultation bilaterally.  No wheezing noted  CV - RRR, no murmurs, rubs, gallops.   EXTREM - No edema.         ASSESSMENT/PLAN:                                                      (F41.9) Anxiety  (primary encounter diagnosis)  Comment: discussed his anxiety. He doesn't want a med. He doesn't want to see a therapist.  Will discuss with me again if symptoms change/worsen The patient indicates understanding of these issues and agrees with the plan.   Plan:     (I10) Essential hypertension, benign  Comment: continue current meds and increase metoprolol from 25 to 50mg/day. Check data at home and send me some numbers in a few weeks.   Plan: lisinopril (ZESTRIL) 40 MG tablet            (I25.10) Coronary artery disease involving native heart without angina pectoris, unspecified vessel or lesion type  Comment: he is due for cardiology f/u for PFO, history of MI with stent and aortic root dilation  Plan: metoprolol succinate ER (TOPROL-XL) 25 MG 24 hr        tablet, CARDIOLOGY EVAL ADULT REFERRAL            (I10) " Essential hypertension  Comment:   Plan: metoprolol succinate ER (TOPROL-XL) 25 MG 24 hr        tablet            (E78.49) Other hyperlipidemia  Comment: refilled check. labs  Plan: simvastatin (ZOCOR) 40 MG tablet, Lipid panel         reflex to direct LDL Fasting            (I25.10) Coronary arteriosclerosis  Comment: 5/4/10 - 100% occlusion of proximal LAD with 2 [place] stents.   6/28/11 - seen in f/u. Adenosine stress 6/23/11 - normal LV perfusion, some atypical chest qamar  Plan: simvastatin (ZOCOR) 40 MG tablet, CARDIOLOGY         EVAL ADULT REFERRAL            (F43.9) Stress  Comment: discussed exercise, mindfulness, etc   Plan:     (Z82.49) Family history of aortic aneurysm  Comment: he is due for cardiology f/u for PFO, history of MI with stent and aortic root dilation. 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 Referral placed   Plan:     (G47.09) Other insomnia  Comment: Discussed sleep hygiene      NEDA Dill MD   [facility]

## 2020-03-16 DIAGNOSIS — R73.9 ELEVATED BLOOD SUGAR: ICD-10-CM

## 2020-03-16 LAB — HBA1C MFR BLD: 6 % (ref 0–5.6)

## 2020-03-16 PROCEDURE — 36415 COLL VENOUS BLD VENIPUNCTURE: CPT | Performed by: FAMILY MEDICINE

## 2020-03-16 PROCEDURE — 83036 HEMOGLOBIN GLYCOSYLATED A1C: CPT | Performed by: FAMILY MEDICINE

## 2020-05-05 ENCOUNTER — MYC MEDICAL ADVICE (OUTPATIENT)
Dept: FAMILY MEDICINE | Facility: CLINIC | Age: 70
End: 2020-05-05

## 2020-05-05 NOTE — TELEPHONE ENCOUNTER
Current Outpatient Medications   Medication     aspirin 81 MG tablet     finasteride (PROSCAR) 5 MG tablet     fish oil-omega-3 fatty acids (FISH OIL) 1000 MG capsule     lisinopril (ZESTRIL) 40 MG tablet     metoprolol succinate ER (TOPROL-XL) 25 MG 24 hr tablet     nitroGLYcerin (NITROSTAT) 0.4 MG sublingual tablet     sildenafil (VIAGRA) 100 MG tablet     simvastatin (ZOCOR) 40 MG tablet     No current facility-administered medications for this visit.

## 2020-05-27 ENCOUNTER — VIRTUAL VISIT (OUTPATIENT)
Dept: CARDIOLOGY | Facility: CLINIC | Age: 70
End: 2020-05-27
Attending: FAMILY MEDICINE
Payer: COMMERCIAL

## 2020-05-27 VITALS
SYSTOLIC BLOOD PRESSURE: 121 MMHG | HEART RATE: 55 BPM | WEIGHT: 158 LBS | BODY MASS INDEX: 21.4 KG/M2 | HEIGHT: 72 IN | DIASTOLIC BLOOD PRESSURE: 79 MMHG

## 2020-05-27 DIAGNOSIS — I25.10 CORONARY ARTERY DISEASE INVOLVING NATIVE HEART WITHOUT ANGINA PECTORIS, UNSPECIFIED VESSEL OR LESION TYPE: ICD-10-CM

## 2020-05-27 DIAGNOSIS — I71.20 THORACIC AORTIC ANEURYSM WITHOUT RUPTURE (H): ICD-10-CM

## 2020-05-27 DIAGNOSIS — Q21.11 OSTIUM SECUNDUM TYPE ATRIAL SEPTAL DEFECT: Primary | ICD-10-CM

## 2020-05-27 DIAGNOSIS — I25.10 CORONARY ARTERIOSCLEROSIS: ICD-10-CM

## 2020-05-27 PROCEDURE — 99214 OFFICE O/P EST MOD 30 MIN: CPT | Mod: 95 | Performed by: INTERNAL MEDICINE

## 2020-05-27 ASSESSMENT — MIFFLIN-ST. JEOR: SCORE: 1511.74

## 2020-05-27 NOTE — PROGRESS NOTES
"Review Of Systems  Skin:  Eyes:Ears/Nose/Throat:   Respiratory: NEGATIVE  Cardiovascular:NEGATIVE  Gastrointestinal: NEGATIVE  Genitourinary:NEGATIVE   Musculoskeletal: NEGATIVE  Neurologic: NEGATIVE  Psychiatric: NEGATIVE  Hematologic/Lymphatic/Immunologic:   Endocrine:      Reviewed:  ESTER Guerrier  05/27/20    Ambrocio Vázquez is a 70 year old male who is being evaluated via a billable video visit.      The patient has been notified of following:     \"This video visit will be conducted via a call between you and your physician/provider. We have found that certain health care needs can be provided without the need for an in-person physical exam.  This service lets us provide the care you need with a video conversation.  If a prescription is necessary we can send it directly to your pharmacy.  If lab work is needed we can place an order for that and you can then stop by our lab to have the test done at a later time.    Video visits are billed at different rates depending on your insurance coverage.  Please reach out to your insurance provider with any questions.    If during the course of the call the physician/provider feels a video visit is not appropriate, you will not be charged for this service.\"    Patient has given verbal consent for Video visit? Yes  05/27/20  - Writer spoke w/pt via phone.  He is expecting the video visit w/Dr. Yeager  - BP:  121/79  - P: 55  - Wt.:  158.0Lbs    ESTER Guerrier    How would you like to obtain your AVS? MyChart    Patient would like the video invitation sent by: Text to cell phone: [phone]    Will anyone else be joining your video visit? No        Video-Visit Details    Type of service:  Video Visit    Video Start Time: 1049  Video End Time: 1114  Originating Location (pt. Location)home  Distant Location (provider location):  [facility]     Platform used for Video Visit:yobany eyager          "

## 2020-05-27 NOTE — PROGRESS NOTES
Service Date: 05/27/2020      CARDIOLOGY [employer] VIDEO CONSULTATION       HISTORY OF PRESENT ILLNESS:  This is a [employer] video office visit from 10:49 a.m. to 11:14 a.m. for a 25-minute visit.  This patient has been a patient of mine for years.  In 2010, he had a totally occluded LAD which we were able to open with stenting.  There was a 30% disease elsewhere in the LAD, 30% in the left circumflex, 50%-60% in the right coronary artery and there was a small diagonal vessel off the LAD that was 70%-80% narrowed but was too small to intervene on, as it was less than 2 mm.  At that time, ejection fraction was about 45%.  Subsequently, echocardiograms have shown return to normal LV function and no further anterior wall motion abnormality since the LAD was open.  The patient has a PFO or possibly an atrial septal defect more likely and he has mild aortic root enlargement.  I last saw him in 2014 and asked him to come back in a year for followup of his coronary disease and his atrial defect but he was not seen until 01/2018 when my partner, Dr. Linares, saw him for preoperative clearance for back surgery.  Dr. Linares wanted the patient to come back and see me in 01/2019, again to follow up on the atrial septal defect, the aortic root and the coronary disease but the patient did not until now.  Dr. Aniyah Randle, I believe, noted that the patient was behind on his visits.  In talking to the patient, I initially assumed he was seeing another cardiologist.  In fact, he did not.  He states he just had difficulty getting an office visit with me.  Clinically, he states he feels well with rare twinges of chest pain, nothing like his old angina.  No heart failure symptoms, palpitations, dizziness, etc.  He is recessive for carnitine deficiency.  His wife is also, so his child unfortunately has developed that.  As I mentioned, his LV ejection fraction is normal, so there is no muscle issue in this patient.  I did review his  2020 labs in [facility] through Dr. Aniyah Randle.  His LDL cholesterol is minimally higher than it should be.  Our goal is 70 or less.  He is very close to that.  He has impaired fasting glucose.  He stopped smoking 10 years ago.  He had a nuclear stress test and echo, both in 2018.  The nuclear test was negative for ischemia and the echo, as I mentioned, showed probable left-to-right shunt across the atrial septum.  Aortic root mildly enlarged, normal function but the right ventricle was enlarged which is worrisome unless there is either emphysema or some lung problem.  The concern is if the atrial septal defect or PFO might be larger than we assume and it is shunting, causing right heart enlargement.      I talked to the patient about these.  He wants to wait until later because of the COVID.  I think that is fine.  I ideally I think he should get a transesophageal echo so we get a better handle on if this is a small PFO or a significant atrial septal defect and he wants to wait and do a regular echo.  So, in 6 months, we will do a standard echocardiogram looking for aortic root dimension, LV function and try to assess the size of the ASD, look for pulmonary hypertension as a cause of right heart enlargement versus volume overload from an ASD and we will also do a nuclear stress test.      Greyson Tom MD       cc:   Joelle Randle MD    [address] [address]         GREYSON TOM MD             D: 2020   T: 2020   MT: DW      Name:     JUDY DOVE   MRN:      [id number]        Account:      [id number]   :      1950           Service Date: 2020      Document: [id number]

## 2020-05-27 NOTE — LETTER
5/27/2020    Joelle Dill MD  [address] [facility] [address]    RE: Ambrocio Vázquez       Dear Colleague,    I had the pleasure of seeing Ambrocio Vázquez in the [facility].    HISTORY OF PRESENT ILLNESS:  This is a [employer] video office visit from 10:49 a.m. to 11:14 a.m. for a 25-minute visit.  This patient has been a patient of mine for years.  In 2010, he had a totally occluded LAD which we were able to open with stenting.  There was a 30% disease elsewhere in the LAD, 30% in the left circumflex, 50%-60% in the right coronary artery and there was a small diagonal vessel off the LAD that was 70%-80% narrowed but was too small to intervene on, as it was less than 2 mm.  At that time, ejection fraction was about 45%.  Subsequently, echocardiograms have shown return to normal LV function and no further anterior wall motion abnormality since the LAD was open.  The patient has a PFO or possibly an atrial septal defect more likely and he has mild aortic root enlargement.  I last saw him in 2014 and asked him to come back in a year for followup of his coronary disease and his atrial defect but he was not seen until 01/2018 when my partner, Dr. Linares, saw him for preoperative clearance for back surgery.  Dr. Linares wanted the patient to come back and see me in 01/2019, again to follow up on the atrial septal defect, the aortic root and the coronary disease but the patient did not until now.  Dr. Aniyah Randle, I believe, noted that the patient was behind on his visits.  In talking to the patient, I initially assumed he was seeing another cardiologist.  In fact, he did not.  He states he just had difficulty getting an office visit with me.  Clinically, he states he feels well with rare twinges of chest pain, nothing like his old angina.  No heart failure symptoms, palpitations, dizziness, etc.  He is recessive for carnitine deficiency.  His wife is also, so his child unfortunately  has developed that.  As I mentioned, his LV ejection fraction is normal, so there is no muscle issue in this patient.  I did review his 03/2020 labs in [facility] through Dr. Aniyah Randle.  His LDL cholesterol is minimally higher than it should be.  Our goal is 70 or less.  He is very close to that.  He has impaired fasting glucose.  He stopped smoking 10 years ago.  He had a nuclear stress test and echo, both in 01/2018.  The nuclear test was negative for ischemia and the echo, as I mentioned, showed probable left-to-right shunt across the atrial septum.  Aortic root mildly enlarged, normal function but the right ventricle was enlarged which is worrisome unless there is either emphysema or some lung problem.  The concern is if the atrial septal defect or PFO might be larger than we assume and it is shunting, causing right heart enlargement.      I talked to the patient about these.  He wants to wait until later because of the COVID.  I think that is fine.  I ideally I think he should get a transesophageal echo so we get a better handle on if this is a small PFO or a significant atrial septal defect and he wants to wait and do a regular echo.  So, in 6 months, we will do a standard echocardiogram looking for aortic root dimension, LV function and try to assess the size of the ASD, look for pulmonary hypertension as a cause of right heart enlargement versus volume overload from an ASD and we will also do a nuclear stress test.       Thank you for allowing me to participate in the care of your patient.    Sincerely,     Greyson Mckinnon MD     [facility]

## 2020-06-17 ENCOUNTER — MYC MEDICAL ADVICE (OUTPATIENT)
Dept: FAMILY MEDICINE | Facility: CLINIC | Age: 70
End: 2020-06-17

## 2020-07-21 DIAGNOSIS — I25.10 CORONARY ARTERY DISEASE INVOLVING NATIVE HEART WITHOUT ANGINA PECTORIS, UNSPECIFIED VESSEL OR LESION TYPE: ICD-10-CM

## 2020-07-21 DIAGNOSIS — I10 ESSENTIAL HYPERTENSION: ICD-10-CM

## 2020-07-22 RX ORDER — METOPROLOL SUCCINATE 25 MG/1
TABLET, EXTENDED RELEASE ORAL
Qty: 180 TABLET | Refills: 1 | Status: SHIPPED | OUTPATIENT
Start: 2020-07-22 | End: 2021-01-05

## 2020-08-10 ENCOUNTER — TELEPHONE (OUTPATIENT)
Dept: FAMILY MEDICINE | Facility: CLINIC | Age: 70
End: 2020-08-10

## 2020-08-10 NOTE — TELEPHONE ENCOUNTER
Reason for call:  Patient reporting a symptom    Symptom or request: Ambrocio has a sebaceous cyst behind his ear that has flare again.  Dr. Dill had opened it previous, but it has flared again.  He Is leaving town tomorrow x 1 week and wanted to be seen today.  I offered him back to scheduling line to see what was available at other clinics, but he wants to be seen only at [facility].  Please call and assess.  Thank you..Jessica Mckeon    Duration (how long have symptoms been present): didn't say    Have you been treated for this before? Yes not sure when however    Phone Number patient can be reached at:  Home number on file [phone] (home)    Best Time:  Any time    Can we leave a detailed message on this number:  YES    Call taken on 8/10/2020 at 9:05 AM by Jessica Mckeon

## 2020-08-11 ENCOUNTER — OFFICE VISIT (OUTPATIENT)
Dept: FAMILY MEDICINE | Facility: CLINIC | Age: 70
End: 2020-08-11
Payer: COMMERCIAL

## 2020-08-11 ENCOUNTER — TELEPHONE (OUTPATIENT)
Dept: FAMILY MEDICINE | Facility: CLINIC | Age: 70
End: 2020-08-11

## 2020-08-11 VITALS
SYSTOLIC BLOOD PRESSURE: 145 MMHG | DIASTOLIC BLOOD PRESSURE: 81 MMHG | TEMPERATURE: 97.2 F | BODY MASS INDEX: 21.26 KG/M2 | HEART RATE: 62 BPM | HEIGHT: 72 IN | WEIGHT: 157 LBS

## 2020-08-11 DIAGNOSIS — L72.3 SEBACEOUS CYST: Primary | ICD-10-CM

## 2020-08-11 PROCEDURE — 10060 I&D ABSCESS SIMPLE/SINGLE: CPT | Performed by: PHYSICIAN ASSISTANT

## 2020-08-11 ASSESSMENT — MIFFLIN-ST. JEOR: SCORE: 1507.21

## 2020-08-11 ASSESSMENT — PAIN SCALES - GENERAL: PAINLEVEL: MILD PAIN (3)

## 2020-08-11 NOTE — PROGRESS NOTES
"Subjective     Ambrocio Vázquez is a 70 year old male who presents to clinic today for the following health issues:    HPI       Concern - Cyst   Onset: Has been off and on for 2-3 years    Description:   Behind his right ear, getting bigger, mild discomfort     Intensity: moderate    Progression of Symptoms:  worsening    Accompanying Signs & Symptoms:  Nonr    Previous history of similar problem:   Yes    Precipitating factors:   Worsened by: None    Alleviating factors:  Improved by: None    Therapies Tried and outcome: None      BP Readings from Last 3 Encounters:   08/11/20 (!) 145/81   05/27/20 121/79   03/13/20 (!) 155/90    Wt Readings from Last 3 Encounters:   08/11/20 71.2 kg (157 lb)   05/27/20 71.7 kg (158 lb)   03/13/20 74.4 kg (164 lb)                    Reviewed and updated as needed this visit by Provider         Review of Systems   Remainder of ROS obtained and found to be negative other than that which was documented above        Objective    BP (!) 145/81   Pulse 62   Temp 97.2  F (36.2  C) (Tympanic)   Ht 1.816 m (5' 11.5\")   Wt 71.2 kg (157 lb)   BMI 21.59 kg/m    Body mass index is 21.59 kg/m .  Physical Exam   GENERAL: healthy, alert and no distress  EAR, right:   [place] grape sized cyst located behind right ear. Soft, fluctuant, no surrounding erythema    Diagnostic Test Results:  none         Assessment & Plan     (L72.3) Sebaceous cyst  (primary encounter diagnosis)  Comment: see procedure note below. Patient has had for awhile, continues to recur but period between episodes several months/years. Last drained over a year ago  Plan: DRAIN SKIN ABSCESS SIMPLE/SINGLE          PROCEDURE:Incision and Drainage  PERFORMED BY: Lenka Coronel PA-C  CONSENT: discussed risks/benefits of procedures. Patient agreed and signed consent  DESCRIPTION: area cleaned and prepped in a sterile fashion. Using a #11 blade, a 3-4mm incision was made in the center of the cyst and immediately copious amounts of " thick creamy white/yelllow pus was evacuated. Once entire area was drained, a bandage was put in place over the area  COMPLICATIONS: none. Patient tolerated the procedure well     Return in about 1 week (around 8/18/2020) for Routine Visit. - already scheduled    Lenka Coronel PA-C  [facility]

## 2020-08-15 ENCOUNTER — TRANSFERRED RECORDS (OUTPATIENT)
Dept: HEALTH INFORMATION MANAGEMENT | Facility: CLINIC | Age: 70
End: 2020-08-15

## 2020-08-25 ENCOUNTER — OFFICE VISIT (OUTPATIENT)
Dept: FAMILY MEDICINE | Facility: CLINIC | Age: 70
End: 2020-08-25
Payer: COMMERCIAL

## 2020-08-25 VITALS
DIASTOLIC BLOOD PRESSURE: 75 MMHG | WEIGHT: 157.4 LBS | HEIGHT: 72 IN | HEART RATE: 61 BPM | BODY MASS INDEX: 21.32 KG/M2 | SYSTOLIC BLOOD PRESSURE: 137 MMHG | TEMPERATURE: 98.1 F

## 2020-08-25 DIAGNOSIS — M25.511 ACUTE PAIN OF BOTH SHOULDERS: ICD-10-CM

## 2020-08-25 DIAGNOSIS — M25.512 ACUTE PAIN OF BOTH SHOULDERS: ICD-10-CM

## 2020-08-25 DIAGNOSIS — I10 ESSENTIAL HYPERTENSION: ICD-10-CM

## 2020-08-25 DIAGNOSIS — G56.03 BILATERAL CARPAL TUNNEL SYNDROME: ICD-10-CM

## 2020-08-25 DIAGNOSIS — M54.30 SCIATICA, UNSPECIFIED LATERALITY: ICD-10-CM

## 2020-08-25 DIAGNOSIS — H72.92 PERFORATED EAR DRUM, LEFT: ICD-10-CM

## 2020-08-25 DIAGNOSIS — R63.4 WEIGHT LOSS: Primary | ICD-10-CM

## 2020-08-25 DIAGNOSIS — I10 ESSENTIAL HYPERTENSION, BENIGN: ICD-10-CM

## 2020-08-25 LAB
BASOPHILS # BLD AUTO: 0 10E9/L (ref 0–0.2)
BASOPHILS NFR BLD AUTO: 0.4 %
DIFFERENTIAL METHOD BLD: NORMAL
EOSINOPHIL # BLD AUTO: 0.3 10E9/L (ref 0–0.7)
EOSINOPHIL NFR BLD AUTO: 3.5 %
ERYTHROCYTE [DISTWIDTH] IN BLOOD BY AUTOMATED COUNT: 12.6 % (ref 10–15)
ERYTHROCYTE [SEDIMENTATION RATE] IN BLOOD BY WESTERGREN METHOD: 4 MM/H (ref 0–20)
HCT VFR BLD AUTO: 50.2 % (ref 40–53)
HGB BLD-MCNC: 16.9 G/DL (ref 13.3–17.7)
LYMPHOCYTES # BLD AUTO: 2.4 10E9/L (ref 0.8–5.3)
LYMPHOCYTES NFR BLD AUTO: 29.7 %
MCH RBC QN AUTO: 31.8 PG (ref 26.5–33)
MCHC RBC AUTO-ENTMCNC: 33.7 G/DL (ref 31.5–36.5)
MCV RBC AUTO: 94 FL (ref 78–100)
MONOCYTES # BLD AUTO: 0.7 10E9/L (ref 0–1.3)
MONOCYTES NFR BLD AUTO: 8.7 %
NEUTROPHILS # BLD AUTO: 4.7 10E9/L (ref 1.6–8.3)
NEUTROPHILS NFR BLD AUTO: 57.7 %
PLATELET # BLD AUTO: 188 10E9/L (ref 150–450)
RBC # BLD AUTO: 5.32 10E12/L (ref 4.4–5.9)
WBC # BLD AUTO: 8.1 10E9/L (ref 4–11)

## 2020-08-25 PROCEDURE — 86618 LYME DISEASE ANTIBODY: CPT | Performed by: FAMILY MEDICINE

## 2020-08-25 PROCEDURE — 85652 RBC SED RATE AUTOMATED: CPT | Performed by: FAMILY MEDICINE

## 2020-08-25 PROCEDURE — 85025 COMPLETE CBC W/AUTO DIFF WBC: CPT | Performed by: FAMILY MEDICINE

## 2020-08-25 PROCEDURE — 36415 COLL VENOUS BLD VENIPUNCTURE: CPT | Performed by: FAMILY MEDICINE

## 2020-08-25 PROCEDURE — 99214 OFFICE O/P EST MOD 30 MIN: CPT | Performed by: FAMILY MEDICINE

## 2020-08-25 PROCEDURE — 86140 C-REACTIVE PROTEIN: CPT | Performed by: FAMILY MEDICINE

## 2020-08-25 PROCEDURE — 84443 ASSAY THYROID STIM HORMONE: CPT | Performed by: FAMILY MEDICINE

## 2020-08-25 PROCEDURE — 80053 COMPREHEN METABOLIC PANEL: CPT | Performed by: FAMILY MEDICINE

## 2020-08-25 RX ORDER — OFLOXACIN 3 MG/ML
5 SOLUTION AURICULAR (OTIC) DAILY
Qty: 10 ML | Refills: 1 | Status: SHIPPED | OUTPATIENT
Start: 2020-08-25 | End: 2021-09-09

## 2020-08-25 ASSESSMENT — MIFFLIN-ST. JEOR: SCORE: 1509.02

## 2020-08-25 NOTE — PROGRESS NOTES
SUBJECTIVE:                                                    Ambrocio Vázquez is a 70 year old male who presents to clinic today for the following health issues:    1. Left ear- look at it, was infected. He used his drops.   Ear infection - ofloxacin drops  Every two years he gets an ear infection  Perforated TM chronic    2. Cyst behind the right ear    Lanced again recently, no pain or swelling there now     3. 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      4. Carpel tunnel  - release was done about 1 1/2 ago   Ongoing issues post op in both wrists   Trouble gripping with the left hand   Arm numbness sometimes   - seeing Dr. Buzz Hernandez Tomorrow (left hand)    5. Shoulder pain bilateral     Painful a.m.   No injury or dysfunction just sometimes gets pain and soreness in the shoulders   No redness/swellling/rash     6. Anxiety? He sees it in the chart - wonders why it is in there   Daughter going through a divorce - very difficult   High stress     7. Back pain    [facility]; dr gomez  Weak back , cannot get stronger   Gym membership - was stronger and felt better   But hasn't been able to go to the gym due to covid     ROS -   No fever/chills  No weight change  Normal appetite and energy level  No malaise or fatigue  No runny nose, sore throa  No neck pain or stiffness  No SOB, wheezing, cough  No chest pain or pressure  No dysphagia, nausea, vomiting  No diarrhea, constipation  No joint swelling   No myalgias  No  "easy bleeding or bruising   No neuro sxs  No rashes  No depression/anxiety       Problem list and histories reviewed & adjusted, as indicated.  Additional history: as documented     Patient Active Problem List   Diagnosis     Primary carnitine deficiency (H)     Sciatica     Essential hypertension, benign     ERECTILE DYSFUNCTION     Disorder of prostate     Colon polyp     Abnormal echocardiogram     CAD (coronary artery disease)     Cardiomyopathy (H)     CARDIOVASCULAR SCREENING; LDL GOAL LESS THAN 100     Advanced directives, counseling/discussion     Former smoker     Hyperlipidemia     History of angina     Coronary arteriosclerosis     Family history of aortic aneurysm     PFO (patent foramen ovale)     Essential hypertension     Aneurysm of aortic root (H)     S/P lumbar fusion     Age-related nuclear cataract of both eyes     PVD (posterior vitreous detachment), left eye     Elevated hemoglobin (H)     Perforated ear drum, left     Anxiety     Other insomnia     Current Outpatient Medications   Medication     aspirin 81 MG tablet     finasteride (PROSCAR) 5 MG tablet     fish oil-omega-3 fatty acids (FISH OIL) 1000 MG capsule     lisinopril (ZESTRIL) 40 MG tablet     metoprolol succinate ER (TOPROL-XL) 25 MG 24 hr tablet     simvastatin (ZOCOR) 40 MG tablet     nitroGLYcerin (NITROSTAT) 0.4 MG sublingual tablet     sildenafil (VIAGRA) 100 MG tablet     No current facility-administered medications for this visit.         Allergies   Allergen Reactions     Augmentin Nausea and Vomiting     Sulfa Drugs Nausea           OBJECTIVE:                                                    /75   Pulse 61   Temp 98.1  F (36.7  C) (Tympanic)   Ht 1.816 m (5' 11.5\")   Wt 71.4 kg (157 lb 6.4 oz)   BMI 21.65 kg/m   Body mass index is 21.65 kg/m .   .GENERAL - Pt is alert and oriented in no acute distress.  Affect is appropriate. Good eye contact.  HEET - Head is normocephalic, atraumatic.    PERRLA,EEMI. " Conjunctiva are free of icterus or erythema.    Oropharynx free of masses and lesions, no tonsillar exudate or petechiae.    NECK - Neck is supple w/o LA or thyromegaly  RESPIRATORY - Clear to auscultation bilaterally.  No wheezing noted  CV - RRR, no murmurs, rubs, gallops.   ABD - +BS, soft, nontender, no rebound, no guarding. No palpable organomegaly.  EXTREM - No edema.  NEURO- Reflexes 2+ and equal. Sensation intact. CN II-XII intact. Rapidly alt. movements and finger-nose-finger intact. Negative Romberg.  Back shows full extension, flexion, and lateral bending.  Neck shows full ROM. UE strength 5/5, full ROM. Both shoulders: Normal abduction to 180 degrees. External rotation to 90 degrees and internal rotation reaching T9/7 bilaterally. Rotator cuff integrity appears intact with normal external rotation, internal rotation, and supraspinatus testing. No pain over AC joint and negative cross arm test.  No tenderness over biceps tendon.  No redness, swelling, warmth.     ASSESSMENT/PLAN:                                                      (R63.4) Weight loss  (primary encounter diagnosis)  Comment: and night sweats but otherwise feeling good with normal appetite and no pain. 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 Will check labs and make sure everything looks normal. He will increase daily calorie intake, if he loses more weight, will do further w/u. The patient indicates understanding of these issues and agrees with the plan.   Plan: CBC with platelets and differential,         Comprehensive metabolic panel (BMP + Alb, Alk         Phos, ALT, AST, Total. Bili, TP), **Lyme         Disease Mariely with reflex to WB Serum FUTURE 14d,        TSH with free T4 reflex, ESR: Erythrocyte         sedimentation rate, CRP, inflammation            (M25.511,  M25.512) Acute pain of both shoulders  Comment: likely some rotator cuff dysfunction. Refer to PT - The patient indicates  understanding of these issues and agrees with the plan.   Plan: CBC with platelets and differential,         Comprehensive metabolic panel (BMP + Alb, Alk         Phos, ALT, AST, Total. Bili, TP), **Lyme         Disease Mariely with reflex to WB Serum FUTURE 14d,        TSH with free T4 reflex, ESR: Erythrocyte         sedimentation rate, CRP, inflammation, ALEKSEY PT,         HAND, AND CHIROPRACTIC REFERRAL            (H72.92) Perforated ear drum, left  Comment: uses antibiotics very rarely   Plan: ofloxacin (FLOXIN) 0.3 % otic solution            (I10) Essential hypertension  Comment:  Brings in data. Blood pressure looks good   Plan:       (M54.30) Sciatica, unspecified laterality  Comment:  Needs to stay active. Try biking and walking until it is safe to go back to the gym   Plan:     (G56.03) Bilateral carpal tunnel syndrome  Comment: is seeing ortho to discuss   Plan:       NEDA Dill MD ( formerly Jer)   [facility]

## 2020-08-26 ENCOUNTER — TRANSFERRED RECORDS (OUTPATIENT)
Dept: HEALTH INFORMATION MANAGEMENT | Facility: CLINIC | Age: 70
End: 2020-08-26

## 2020-08-26 LAB
ALBUMIN SERPL-MCNC: 3.8 G/DL (ref 3.4–5)
ALP SERPL-CCNC: 69 U/L (ref 40–150)
ALT SERPL W P-5'-P-CCNC: 18 U/L (ref 0–70)
ANION GAP SERPL CALCULATED.3IONS-SCNC: 6 MMOL/L (ref 3–14)
AST SERPL W P-5'-P-CCNC: 14 U/L (ref 0–45)
B BURGDOR IGG+IGM SER QL: 0.09 (ref 0–0.89)
BILIRUB SERPL-MCNC: 0.8 MG/DL (ref 0.2–1.3)
BUN SERPL-MCNC: 15 MG/DL (ref 7–30)
CALCIUM SERPL-MCNC: 9 MG/DL (ref 8.5–10.1)
CHLORIDE SERPL-SCNC: 108 MMOL/L (ref 94–109)
CO2 SERPL-SCNC: 25 MMOL/L (ref 20–32)
CREAT SERPL-MCNC: 0.85 MG/DL (ref 0.66–1.25)
CRP SERPL-MCNC: <2.9 MG/L (ref 0–8)
GFR SERPL CREATININE-BSD FRML MDRD: 89 ML/MIN/{1.73_M2}
GLUCOSE SERPL-MCNC: 117 MG/DL (ref 70–99)
POTASSIUM SERPL-SCNC: 3.8 MMOL/L (ref 3.4–5.3)
PROT SERPL-MCNC: 7.6 G/DL (ref 6.8–8.8)
SODIUM SERPL-SCNC: 139 MMOL/L (ref 133–144)
TSH SERPL DL<=0.005 MIU/L-ACNC: 1.15 MU/L (ref 0.4–4)

## 2020-08-27 PROBLEM — G56.03 BILATERAL CARPAL TUNNEL SYNDROME: Status: ACTIVE | Noted: 2020-08-27

## 2020-08-27 PROBLEM — D58.2 ELEVATED HEMOGLOBIN (H): Status: RESOLVED | Noted: 2019-02-14 | Resolved: 2020-08-27

## 2020-09-02 ENCOUNTER — IMMUNIZATION (OUTPATIENT)
Dept: FAMILY MEDICINE | Facility: CLINIC | Age: 70
End: 2020-09-02
Payer: COMMERCIAL

## 2020-09-02 DIAGNOSIS — Z23 NEED FOR PROPHYLACTIC VACCINATION AND INOCULATION AGAINST INFLUENZA: Primary | ICD-10-CM

## 2020-09-02 PROCEDURE — 90471 IMMUNIZATION ADMIN: CPT

## 2020-09-02 PROCEDURE — 90662 IIV NO PRSV INCREASED AG IM: CPT

## 2020-09-02 PROCEDURE — 99207 ZZC NO CHARGE NURSE ONLY: CPT

## 2020-09-02 NOTE — NURSING NOTE
"Injectable Influenza Immunization Documentation    1.  Has the patient received the information for the injectable influenza vaccine? YES     2. Is the patient 6 months of age or older? YES     3. Does the patient have any of the following contraindications?         Severe allergy to eggs? No     Severe allergic reaction to previous influenza vaccines? No   Severe allergy to latex? No       History of Guillain-Phoenix syndrome? No     Currently have a temperature greater than 100.4F? No        4.  Severely egg allergic patients should have flu vaccine eligibility assessed by an MD, RN, or pharmacist, and those who received flu vaccine should be observed for 15 min by an MD, RN, Pharmacist, Medical Technician, or member of clinic staff.\": YES    5. Latex-allergic patients should be given latex-free influenza vaccine Yes. Please reference the Vaccine latex table to determine if your clinic s product is latex-containing.       Vaccination given by Fidelina Kraus M.A.          "

## 2020-12-04 DIAGNOSIS — N42.9 DISORDER OF PROSTATE: ICD-10-CM

## 2020-12-04 DIAGNOSIS — N52.01 ERECTILE DYSFUNCTION DUE TO ARTERIAL INSUFFICIENCY: ICD-10-CM

## 2020-12-04 NOTE — LETTER
[facility]  [address] [address]  Phone: [phone]       December 7, 2020         Ambrocio Vázquez  [address] [address]            Dear Ambrocio:    We are concerned about your health care.  We recently provided you with medication refills.  Many medications require routine follow-up with your doctor.    Your prescription(s) have been refilled one time so you may have time for the above noted follow-up. Please call to schedule soon so we can assure you have an appointment before your next refills are needed.    Thank you,      Mitchell Iglesias MD (Paul)/ tr

## 2020-12-04 NOTE — TELEPHONE ENCOUNTER
Requested Prescriptions   Pending Prescriptions Disp Refills     sildenafil (VIAGRA) 100 MG tablet 10 tablet 3     Sig: Take 1 tablet (100 mg) by mouth daily as needed Take 30 min to 4 hours before intercourse.  Never use with nitroglycerin, terazosin or doxazosin.       There is no refill protocol information for this order        finasteride (PROSCAR) 5 MG tablet 90 tablet 3     Sig: TAKE 1 TABLET EVERY DAY       There is no refill protocol information for this order        Last office visit: 12/3/2019 with prescribing provider:  Dr. Iglesias   Future Office Visit:          Denise Behrendt  Specialty CSS

## 2020-12-07 ENCOUNTER — TRANSFERRED RECORDS (OUTPATIENT)
Dept: HEALTH INFORMATION MANAGEMENT | Facility: CLINIC | Age: 70
End: 2020-12-07

## 2020-12-07 RX ORDER — FINASTERIDE 5 MG/1
TABLET, FILM COATED ORAL
Qty: 90 TABLET | Refills: 0 | Status: SHIPPED | OUTPATIENT
Start: 2020-12-07 | End: 2021-01-07

## 2020-12-07 RX ORDER — SILDENAFIL 100 MG/1
100 TABLET, FILM COATED ORAL DAILY PRN
Qty: 10 TABLET | Refills: 1 | Status: SHIPPED | OUTPATIENT
Start: 2020-12-07 | End: 2021-01-12

## 2020-12-07 NOTE — TELEPHONE ENCOUNTER
Medication is being filled for 1 time refill only due to:  Patient needs to be seen because it has been more than one year since last visit. Letter and mychart sent to make tutut.   Tisha CUENCA RN BSN PHN  [facility]

## 2020-12-11 ENCOUNTER — TELEPHONE (OUTPATIENT)
Dept: UROLOGY | Facility: CLINIC | Age: 70
End: 2020-12-11

## 2020-12-11 DIAGNOSIS — Z12.5 SCREENING FOR PROSTATE CANCER: Primary | ICD-10-CM

## 2020-12-11 NOTE — TELEPHONE ENCOUNTER
Reason for Call: Request for an order or referral:    Order or referral being requested: lab    Date needed: as soon as possible    Has the patient been seen by the PCP for this problem? Not Applicable    Additional comments: pt calling stating he received letter to schedule an appt and PSA levels. Would like to get PSA orders to do labs. Please call once this is done so he can call lab    Phone number Patient can be reached at:  Home number on file [phone] (home)    Best Time:  Any     Can we leave a detailed message on this number?  YES    Call taken on 12/11/2020 at 9:40 AM by Pallavi Garcia

## 2020-12-18 DIAGNOSIS — Z12.5 SCREENING FOR PROSTATE CANCER: ICD-10-CM

## 2020-12-18 LAB — PSA SERPL-MCNC: 1.12 UG/L (ref 0–4)

## 2020-12-18 PROCEDURE — 36415 COLL VENOUS BLD VENIPUNCTURE: CPT | Performed by: UROLOGY

## 2020-12-18 PROCEDURE — 84153 ASSAY OF PSA TOTAL: CPT | Performed by: UROLOGY

## 2021-01-05 DIAGNOSIS — E78.49 OTHER HYPERLIPIDEMIA: ICD-10-CM

## 2021-01-05 DIAGNOSIS — I10 ESSENTIAL HYPERTENSION, BENIGN: ICD-10-CM

## 2021-01-05 DIAGNOSIS — I25.10 CORONARY ARTERY DISEASE INVOLVING NATIVE HEART WITHOUT ANGINA PECTORIS, UNSPECIFIED VESSEL OR LESION TYPE: ICD-10-CM

## 2021-01-05 DIAGNOSIS — N42.9 DISORDER OF PROSTATE: ICD-10-CM

## 2021-01-05 DIAGNOSIS — I25.10 CORONARY ARTERIOSCLEROSIS: ICD-10-CM

## 2021-01-05 DIAGNOSIS — I10 ESSENTIAL HYPERTENSION: ICD-10-CM

## 2021-01-06 NOTE — TELEPHONE ENCOUNTER
"Requested Prescriptions   Pending Prescriptions Disp Refills     metoprolol succinate ER (TOPROL-XL) 25 MG 24 hr tablet 180 tablet 1     Sig: Take 2 tablets (50 mg) by mouth daily       Beta-Blockers Protocol Passed - 1/6/2021  2:24 PM        Passed - Blood pressure under 140/90 in past 12 months     BP Readings from Last 3 Encounters:   08/25/20 137/75   08/11/20 (!) 145/81   05/27/20 121/79                 Passed - Patient is age 6 or older        Passed - Recent (12 mo) or future (30 days) visit within the authorizing provider's specialty     Patient has had an office visit with the authorizing provider or a provider within the authorizing providers department within the previous 12 mos or has a future within next 30 days. See \"Patient Info\" tab in inbasket, or \"Choose Columns\" in Meds & Orders section of the refill encounter.              Passed - Medication is active on med list           simvastatin (ZOCOR) 40 MG tablet 90 tablet 3     Sig: Take 1 tablet (40 mg) by mouth At Bedtime       Statins Protocol Passed - 1/6/2021  2:24 PM        Passed - LDL on file in past 12 months     Recent Labs   Lab Test 03/13/20  0812   LDL 77             Passed - No abnormal creatine kinase in past 12 months     No lab results found.             Passed - Recent (12 mo) or future (30 days) visit within the authorizing provider's specialty     Patient has had an office visit with the authorizing provider or a provider within the authorizing providers department within the previous 12 mos or has a future within next 30 days. See \"Patient Info\" tab in inbasket, or \"Choose Columns\" in Meds & Orders section of the refill encounter.              Passed - Medication is active on med list        Passed - Patient is age 18 or older           lisinopril (ZESTRIL) 40 MG tablet 90 tablet 3     Sig: Take 1 tablet (40 mg) by mouth daily       ACE Inhibitors (Including Combos) Protocol Passed - 1/6/2021  2:24 PM        Passed - Blood pressure " "under 140/90 in past 12 months     BP Readings from Last 3 Encounters:   08/25/20 137/75   08/11/20 (!) 145/81   05/27/20 121/79                 Passed - Recent (12 mo) or future (30 days) visit within the authorizing provider's specialty     Patient has had an office visit with the authorizing provider or a provider within the authorizing providers department within the previous 12 mos or has a future within next 30 days. See \"Patient Info\" tab in inbasket, or \"Choose Columns\" in Meds & Orders section of the refill encounter.              Passed - Medication is active on med list        Passed - Patient is age 18 or older        Passed - Normal serum creatinine on file in past 12 months     Recent Labs   Lab Test 08/25/20  1628   CR 0.85       Ok to refill medication if creatinine is low          Passed - Normal serum potassium on file in past 12 months     Recent Labs   Lab Test 08/25/20  1628   POTASSIUM 3.8                finasteride (PROSCAR) 5 MG tablet 90 tablet 0     Sig: TAKE 1 TABLET EVERY DAY       BPH Agents Passed - 1/6/2021  2:24 PM        Passed - Recent (12 mo) or future (30 days) visit within the authorizing provider's department     Patient has had an office visit with the authorizing provider or a provider within the authorizing providers department within the previous 12 mos or has a future within next 30 days. See \"Patient Info\" tab in inbasket, or \"Choose Columns\" in Meds & Orders section of the refill encounter.              Passed - Medication is active on med list        Passed - Patient is 18 years of age or older           nitroGLYcerin (NITROSTAT) 0.4 MG sublingual tablet 25 tablet 3     Sig: FOR CHEST PAIN PLACE 1 TABLET UNDER THE TONGUE EVERY 5 MINUTES FOR 3 DOSES. IF SYMPTOMS PERSIST 5 MINUTES AFTER FIRST DOSE CALL 911.       Nitrates Failed - 1/6/2021  2:24 PM        Failed - Pt is not on erectile dysfunction medications        Failed - Sublingual nitro order needs review     If " "refill exceeds 1 bottle per month, please forward request to provider.           Passed - Blood pressure under 140/90 in past 12 months     BP Readings from Last 3 Encounters:   08/25/20 137/75   08/11/20 (!) 145/81   05/27/20 121/79                 Passed - Recent (12 mo) or future (30 days) visit within the authorizing provider's specialty     Patient has had an office visit with the authorizing provider or a provider within the authorizing providers department within the previous 12 mos or has a future within next 30 days. See \"Patient Info\" tab in inbasket, or \"Choose Columns\" in Meds & Orders section of the refill encounter.              Passed - Medication is active on med list        Passed - Patient is age 18 or older             "

## 2021-01-07 RX ORDER — SIMVASTATIN 40 MG
40 TABLET ORAL AT BEDTIME
Qty: 90 TABLET | Refills: 3 | Status: SHIPPED | OUTPATIENT
Start: 2021-01-07 | End: 2021-09-09

## 2021-01-07 RX ORDER — LISINOPRIL 40 MG/1
40 TABLET ORAL DAILY
Qty: 90 TABLET | Refills: 3 | Status: SHIPPED | OUTPATIENT
Start: 2021-01-07 | End: 2021-09-09

## 2021-01-07 RX ORDER — METOPROLOL SUCCINATE 25 MG/1
50 TABLET, EXTENDED RELEASE ORAL DAILY
Qty: 180 TABLET | Refills: 1 | Status: SHIPPED | OUTPATIENT
Start: 2021-01-07 | End: 2021-06-01

## 2021-01-07 RX ORDER — FINASTERIDE 5 MG/1
TABLET, FILM COATED ORAL
Qty: 90 TABLET | Refills: 0 | Status: SHIPPED | OUTPATIENT
Start: 2021-01-07 | End: 2021-04-09

## 2021-01-07 RX ORDER — NITROGLYCERIN 0.4 MG/1
TABLET SUBLINGUAL
Qty: 25 TABLET | Refills: 3 | Status: SHIPPED | OUTPATIENT
Start: 2021-01-07 | End: 2023-10-06

## 2021-01-10 ENCOUNTER — HEALTH MAINTENANCE LETTER (OUTPATIENT)
Age: 71
End: 2021-01-10

## 2021-01-12 ENCOUNTER — OFFICE VISIT (OUTPATIENT)
Dept: UROLOGY | Facility: CLINIC | Age: 71
End: 2021-01-12
Payer: COMMERCIAL

## 2021-01-12 VITALS
TEMPERATURE: 97.5 F | DIASTOLIC BLOOD PRESSURE: 80 MMHG | RESPIRATION RATE: 18 BRPM | HEART RATE: 59 BPM | SYSTOLIC BLOOD PRESSURE: 127 MMHG

## 2021-01-12 DIAGNOSIS — N52.01 ERECTILE DYSFUNCTION DUE TO ARTERIAL INSUFFICIENCY: ICD-10-CM

## 2021-01-12 DIAGNOSIS — N30.00 ACUTE CYSTITIS WITHOUT HEMATURIA: ICD-10-CM

## 2021-01-12 LAB
ALBUMIN UR-MCNC: NEGATIVE MG/DL
APPEARANCE UR: CLEAR
BACTERIA #/AREA URNS HPF: ABNORMAL /HPF
BILIRUB UR QL STRIP: NEGATIVE
COLOR UR AUTO: YELLOW
GLUCOSE UR STRIP-MCNC: 250 MG/DL
HGB UR QL STRIP: ABNORMAL
KETONES UR STRIP-MCNC: NEGATIVE MG/DL
LEUKOCYTE ESTERASE UR QL STRIP: NEGATIVE
MUCOUS THREADS #/AREA URNS LPF: PRESENT /LPF
NITRATE UR QL: NEGATIVE
NON-SQ EPI CELLS #/AREA URNS LPF: ABNORMAL /LPF
PH UR STRIP: 5 PH (ref 5–7)
RBC #/AREA URNS AUTO: ABNORMAL /HPF
SOURCE: ABNORMAL
SP GR UR STRIP: >1.03 (ref 1–1.03)
UROBILINOGEN UR STRIP-ACNC: 0.2 EU/DL (ref 0.2–1)
WBC #/AREA URNS AUTO: ABNORMAL /HPF

## 2021-01-12 PROCEDURE — 87086 URINE CULTURE/COLONY COUNT: CPT | Performed by: UROLOGY

## 2021-01-12 PROCEDURE — 51798 US URINE CAPACITY MEASURE: CPT | Performed by: UROLOGY

## 2021-01-12 PROCEDURE — 81001 URINALYSIS AUTO W/SCOPE: CPT | Performed by: UROLOGY

## 2021-01-12 PROCEDURE — 99213 OFFICE O/P EST LOW 20 MIN: CPT | Mod: 25 | Performed by: UROLOGY

## 2021-01-12 RX ORDER — SILDENAFIL 100 MG/1
100 TABLET, FILM COATED ORAL DAILY PRN
Qty: 10 TABLET | Refills: 1 | Status: SHIPPED | OUTPATIENT
Start: 2021-01-12 | End: 2021-04-09

## 2021-01-12 NOTE — PROGRESS NOTES
Appointment source: Established Patient  Patient name: Ambrocio Vázquez  Urology Staff: Shon Iglesias MD    Subjective: This is a 70 year old year old male returning for follow up of erectile dysfunction and prostate cancer screening.    Denies any voiding symptoms at this time.  Continues to rely on sildenafil for erectile function.    Continues taking finasteride 5 mg daily both for symptoms of bladder outlet obstruction and the believe that it might reduce his likelihood of prostate cancer.    Objective: Postvoid residual is 22 mL.    Rectal examination reveals a benign feeling prostate.    Assessment: No evidence of prostate cancer and adequate function of his bladder with minimal voiding symptoms on finasteride 5 mg daily.    Plan: Continue finasteride and will repeat his PSA study today.  Sildenafil prescription and finasteride renewed.    Return in 1 year for follow-up.    Total time 15 minutes

## 2021-01-12 NOTE — PATIENT INSTRUCTIONS
Per physician instructions.    If you have questions or concerns on any instructions given to you by your provider today or if you need to schedule an appointment, you can reach us at [phone].    Thank you!

## 2021-01-12 NOTE — NURSING NOTE
"Chief Complaint   Patient presents with     RECHECK     PSA      Erectile Dysfunction     Refill Request     Sildenafil, finasteride        Initial /80 (BP Location: Right arm, Patient Position: Chair, Cuff Size: Adult Regular)   Pulse 59   Temp 97.5  F (36.4  C) (Tympanic)   Resp 18  Estimated body mass index is 21.65 kg/m  as calculated from the following:    Height as of 8/25/20: 1.816 m (5' 11.5\").    Weight as of 8/25/20: 71.4 kg (157 lb 6.4 oz).  BP completed using cuff size: regular   Medications and allergies reviewed.    Active order to obtain bladder scan? Yes   Name of ordering provider:  [facility]  Bladder scan preformed post void Yes.  Bladder scan reveled 22ML  Provider notified?  Yes    Mindi FELIX CMA                 "

## 2021-01-13 LAB
BACTERIA SPEC CULT: NO GROWTH
Lab: NORMAL
SPECIMEN SOURCE: NORMAL

## 2021-02-16 ENCOUNTER — MEDICAL CORRESPONDENCE (OUTPATIENT)
Dept: HEALTH INFORMATION MANAGEMENT | Facility: CLINIC | Age: 71
End: 2021-02-16

## 2021-02-17 DIAGNOSIS — E71.40: Primary | ICD-10-CM

## 2021-04-09 DIAGNOSIS — N42.9 DISORDER OF PROSTATE: ICD-10-CM

## 2021-04-09 DIAGNOSIS — N52.01 ERECTILE DYSFUNCTION DUE TO ARTERIAL INSUFFICIENCY: ICD-10-CM

## 2021-04-09 RX ORDER — FINASTERIDE 5 MG/1
TABLET, FILM COATED ORAL
Qty: 90 TABLET | Refills: 1 | Status: SHIPPED | OUTPATIENT
Start: 2021-04-09 | End: 2021-08-24

## 2021-04-09 RX ORDER — SILDENAFIL 100 MG/1
100 TABLET, FILM COATED ORAL DAILY PRN
Qty: 10 TABLET | Refills: 1 | Status: SHIPPED | OUTPATIENT
Start: 2021-04-09 | End: 2021-09-09

## 2021-04-09 NOTE — TELEPHONE ENCOUNTER
Requested Prescriptions   Pending Prescriptions Disp Refills     sildenafil (VIAGRA) 100 MG tablet 10 tablet 1     Sig: Take 1 tablet (100 mg) by mouth daily as needed (erectile dysfunction) Take 30 min to 4 hours before intercourse.  Never use with nitroglycerin, terazosin or doxazosin.       There is no refill protocol information for this order        Last office visit: 1/12/2021 with prescribing provider:  Dr. Iglesias   Future Office Visit:          Denise Behrendt  Specialty CSS

## 2021-04-09 NOTE — TELEPHONE ENCOUNTER
Patient is on Nitrostat. Dr Iglesias, is it okay to refill. You just saw Ambrocio in January of this year. Saige EDWARDS Rn

## 2021-05-30 DIAGNOSIS — I25.10 CORONARY ARTERY DISEASE INVOLVING NATIVE HEART WITHOUT ANGINA PECTORIS, UNSPECIFIED VESSEL OR LESION TYPE: ICD-10-CM

## 2021-05-30 DIAGNOSIS — I10 ESSENTIAL HYPERTENSION: ICD-10-CM

## 2021-06-01 RX ORDER — METOPROLOL SUCCINATE 25 MG/1
TABLET, EXTENDED RELEASE ORAL
Qty: 180 TABLET | Refills: 3 | Status: SHIPPED | OUTPATIENT
Start: 2021-06-01 | End: 2021-09-09

## 2021-09-09 ENCOUNTER — OFFICE VISIT (OUTPATIENT)
Dept: FAMILY MEDICINE | Facility: CLINIC | Age: 71
End: 2021-09-09
Payer: COMMERCIAL

## 2021-09-09 VITALS
TEMPERATURE: 96.7 F | DIASTOLIC BLOOD PRESSURE: 107 MMHG | BODY MASS INDEX: 21.96 KG/M2 | SYSTOLIC BLOOD PRESSURE: 168 MMHG | HEIGHT: 72 IN | WEIGHT: 162.1 LBS | HEART RATE: 52 BPM

## 2021-09-09 DIAGNOSIS — I71.20 THORACIC AORTIC ANEURYSM WITHOUT RUPTURE (H): ICD-10-CM

## 2021-09-09 DIAGNOSIS — Z87.891 STOPPED SMOKING WITH GREATER THAN 30 PACK YEAR HISTORY: ICD-10-CM

## 2021-09-09 DIAGNOSIS — Z23 NEED FOR PROPHYLACTIC VACCINATION AND INOCULATION AGAINST INFLUENZA: ICD-10-CM

## 2021-09-09 DIAGNOSIS — I42.9 CARDIOMYOPATHY, UNSPECIFIED TYPE (H): ICD-10-CM

## 2021-09-09 DIAGNOSIS — I25.10 CORONARY ARTERY DISEASE INVOLVING NATIVE HEART WITHOUT ANGINA PECTORIS, UNSPECIFIED VESSEL OR LESION TYPE: ICD-10-CM

## 2021-09-09 DIAGNOSIS — Q21.12 PFO (PATENT FORAMEN OVALE): ICD-10-CM

## 2021-09-09 DIAGNOSIS — Z00.00 ENCOUNTER FOR MEDICARE ANNUAL WELLNESS EXAM: Primary | ICD-10-CM

## 2021-09-09 DIAGNOSIS — N52.01 ERECTILE DYSFUNCTION DUE TO ARTERIAL INSUFFICIENCY: ICD-10-CM

## 2021-09-09 DIAGNOSIS — I10 ESSENTIAL HYPERTENSION: ICD-10-CM

## 2021-09-09 DIAGNOSIS — Z87.891 FORMER SMOKER: ICD-10-CM

## 2021-09-09 DIAGNOSIS — Z23 ENCOUNTER FOR IMMUNIZATION: ICD-10-CM

## 2021-09-09 DIAGNOSIS — N42.9 DISORDER OF PROSTATE: ICD-10-CM

## 2021-09-09 DIAGNOSIS — E71.41 PRIMARY CARNITINE DEFICIENCY (H): ICD-10-CM

## 2021-09-09 DIAGNOSIS — E78.49 OTHER HYPERLIPIDEMIA: ICD-10-CM

## 2021-09-09 DIAGNOSIS — I25.10 CORONARY ARTERIOSCLEROSIS: ICD-10-CM

## 2021-09-09 LAB
ANION GAP SERPL CALCULATED.3IONS-SCNC: 3 MMOL/L (ref 3–14)
BUN SERPL-MCNC: 18 MG/DL (ref 7–30)
CALCIUM SERPL-MCNC: 8.6 MG/DL (ref 8.5–10.1)
CHLORIDE BLD-SCNC: 107 MMOL/L (ref 94–109)
CHOLEST SERPL-MCNC: 137 MG/DL
CO2 SERPL-SCNC: 29 MMOL/L (ref 20–32)
CREAT SERPL-MCNC: 0.94 MG/DL (ref 0.66–1.25)
FASTING STATUS PATIENT QL REPORTED: YES
GFR SERPL CREATININE-BSD FRML MDRD: 82 ML/MIN/1.73M2
GLUCOSE BLD-MCNC: 105 MG/DL (ref 70–99)
HDLC SERPL-MCNC: 51 MG/DL
LDLC SERPL CALC-MCNC: 75 MG/DL
NONHDLC SERPL-MCNC: 86 MG/DL
POTASSIUM BLD-SCNC: 4.2 MMOL/L (ref 3.4–5.3)
SODIUM SERPL-SCNC: 139 MMOL/L (ref 133–144)
TRIGL SERPL-MCNC: 56 MG/DL

## 2021-09-09 PROCEDURE — 99397 PER PM REEVAL EST PAT 65+ YR: CPT | Mod: 25 | Performed by: FAMILY MEDICINE

## 2021-09-09 PROCEDURE — G0008 ADMIN INFLUENZA VIRUS VAC: HCPCS | Performed by: FAMILY MEDICINE

## 2021-09-09 PROCEDURE — 36415 COLL VENOUS BLD VENIPUNCTURE: CPT | Performed by: FAMILY MEDICINE

## 2021-09-09 PROCEDURE — 80048 BASIC METABOLIC PNL TOTAL CA: CPT | Performed by: FAMILY MEDICINE

## 2021-09-09 PROCEDURE — 90662 IIV NO PRSV INCREASED AG IM: CPT | Performed by: FAMILY MEDICINE

## 2021-09-09 PROCEDURE — 80061 LIPID PANEL: CPT | Performed by: FAMILY MEDICINE

## 2021-09-09 PROCEDURE — 99214 OFFICE O/P EST MOD 30 MIN: CPT | Mod: 25 | Performed by: FAMILY MEDICINE

## 2021-09-09 RX ORDER — LISINOPRIL 40 MG/1
40 TABLET ORAL DAILY
Qty: 90 TABLET | Refills: 3 | Status: SHIPPED | OUTPATIENT
Start: 2021-09-09 | End: 2022-04-08

## 2021-09-09 RX ORDER — SIMVASTATIN 40 MG
40 TABLET ORAL AT BEDTIME
Qty: 90 TABLET | Refills: 3 | Status: SHIPPED | OUTPATIENT
Start: 2021-09-09 | End: 2022-04-08

## 2021-09-09 RX ORDER — FINASTERIDE 5 MG/1
1 TABLET, FILM COATED ORAL DAILY
Qty: 90 TABLET | Refills: 3 | Status: SHIPPED | OUTPATIENT
Start: 2021-09-09 | End: 2022-04-08

## 2021-09-09 RX ORDER — METOPROLOL SUCCINATE 50 MG/1
50 TABLET, EXTENDED RELEASE ORAL DAILY
Qty: 90 TABLET | Refills: 3 | Status: SHIPPED | OUTPATIENT
Start: 2021-09-09 | End: 2022-04-08

## 2021-09-09 RX ORDER — SILDENAFIL 100 MG/1
100 TABLET, FILM COATED ORAL DAILY PRN
Qty: 10 TABLET | Refills: 1 | Status: SHIPPED | OUTPATIENT
Start: 2021-09-09 | End: 2022-09-27

## 2021-09-09 RX ORDER — METOPROLOL SUCCINATE 25 MG/1
50 TABLET, EXTENDED RELEASE ORAL DAILY
Qty: 180 TABLET | Refills: 3 | Status: SHIPPED | OUTPATIENT
Start: 2021-09-09 | End: 2021-09-16

## 2021-09-09 ASSESSMENT — ENCOUNTER SYMPTOMS
NERVOUS/ANXIOUS: 0
CONSTIPATION: 0
DIZZINESS: 0
FREQUENCY: 0
SORE THROAT: 0
PARESTHESIAS: 0
HEMATURIA: 0
NAUSEA: 0
HEARTBURN: 0
JOINT SWELLING: 0
MYALGIAS: 1
SHORTNESS OF BREATH: 0
DIARRHEA: 0
HEADACHES: 0
WEAKNESS: 0
HEMATOCHEZIA: 0
ABDOMINAL PAIN: 0
FEVER: 0
ARTHRALGIAS: 0
COUGH: 0
EYE PAIN: 0
PALPITATIONS: 0
DYSURIA: 0
CHILLS: 0

## 2021-09-09 ASSESSMENT — ACTIVITIES OF DAILY LIVING (ADL): CURRENT_FUNCTION: NO ASSISTANCE NEEDED

## 2021-09-09 ASSESSMENT — MIFFLIN-ST. JEOR: SCORE: 1525.34

## 2021-09-09 NOTE — PATIENT INSTRUCTIONS
Patient Education   Personalized Prevention Plan  You are due for the preventive services outlined below.  Your care team is available to assist you in scheduling these services.  If you have already completed any of these items, please share that information with your care team to update in your medical record.  Health Maintenance Due   Topic Date Due     ANNUAL REVIEW OF HM ORDERS  Never done     Discuss Advance Care Planning  03/13/2018     Zoster (Shingles) Vaccine (2 of 2) 03/26/2019     Eye Exam  06/25/2020     Annual Wellness Visit  10/04/2020     FALL RISK ASSESSMENT  10/04/2020     Basic Metabolic Panel  02/25/2021     Cholesterol Lab  03/13/2021     Flu Vaccine (1) 09/01/2021

## 2021-09-09 NOTE — PROGRESS NOTES
"  SUBJECTIVE:   Ambrocio Vázquez is a 71 year old male who presents for Preventive Visit.      Patient has been advised of split billing requirements and indicates understanding: Yes  Are you in the first 12 months of your Medicare Part B coverage?  No    Answers for HPI/ROS submitted by the patient on 9/9/2021  In general, how would you rate your overall physical health?: excellent  Frequency of exercise:: 2-3 days/week  Do you usually eat at least 4 servings of fruit and vegetables a day, include whole grains & fiber, and avoid regularly eating high fat or \"junk\" foods? : Yes  Taking medications regularly:: Yes  Medication side effects:: None  Activities of Daily Living: no assistance needed  Home safety: no safety concerns identified  Hearing Impairment:: difficulty following a conversation in a noisy restaurant or crowded room  In the past 6 months, have you been bothered by leaking of urine?: No  abdominal pain: No  Blood in stool: No  Blood in urine: No  chest pain: No  chills: No  congestion: No  constipation: No  cough: No  diarrhea: No  dizziness: No  ear pain: No  eye pain: No  nervous/anxious: No  fever: No  frequency: No  genital sores: No  headaches: No  hearing loss: Yes  heartburn: No  arthralgias: No  joint swelling: No  peripheral edema: No  mood changes: No  myalgias: Yes  nausea: No  dysuria: No  palpitations: No  Skin sensation changes: No  sore throat: No  urgency: No  rash: No  shortness of breath: No  visual disturbance: No  weakness: No  impotence: Yes  penile discharge: No  In general, how would you rate your overall mental or emotional health?: good  Additional concerns today:: No  Duration of exercise:: 15-30 minutes    Mental Health:    In general, how would you rate your overall mental or emotional health? good  PHQ-2 Score: (P) 0    Do you feel safe in your environment? Yes    Have you ever done Advance Care Planning? (For example, a Health Directive, POLST, or a discussion with a medical " provider or your loved ones about your wishes): Yes, patient states has an Advance Care Planning document and will bring a copy to the clinic.    Additional concerns to address?      Ongoing back pain post-surgical. Some persistent numbness in feet but not weakness. Generally just stiff. Declines chronic pain meds. May return to surgeon to discuss    Fall risk:  Fallen 2 or more times in the past year?: No  Any fall with injury in the past year?: No    Cognitive Screenin) Repeat 3 items (Leader, Season, Table)    2) Clock draw: NORMAL  3) 3 item recall: Recalls 3 objects  Results: 3 items recalled: COGNITIVE IMPAIRMENT LESS LIKELY    Mini-CogTM Copyright [employer] [place]. Licensed by the author for use in [facility]; reprinted with permission (ubaldo@[facility]). All rights reserved.      Do you have sleep apnea, excessive snoring or daytime drowsiness?: no     Cards at [place]: conclusion 3/2021   Firstly, I would note that he maintains home blood pressure readings a suggest apt control. He alludes to a presumptive element of white coat hypertension as predicated upon the discrepancy noted here, and also noted in his primary care office evaluation with primary care provider.    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 I think ideally he would have a transesophageal study to better characterize this. I discussed this in detail. Realistically, given travel, I suspect he may pursue this with his primary cardiac team in the [facility] which I think is very reasonable. I would suggest that at some point in the future a visit with [facility] may be helpful predicated upon the results of his transesophageal study. Alternatively, if he wanted to pursue this here, we could certainly do so but he would need Covid testing beforehand.    His current medical regime appears rational and well thought out.     If he would like to pursue his transesophageal study here he will contact advise and we can schedule.  This will consist of a transesophageal study followed by visit in [facility]. Alternatively, he may opt to do this under the direction of his cardiologist in the [facility]      Is due for cards appointment in town     He insists this is white coat syndrome   BP Readings from Last 6 Encounters:   21 (!) 168/107   21 127/80   20 137/75   20 (!) 145/81   20 121/79   20 (!) 155/90     Smoker : 40years   Quit in    40 pack year history     Counseled around risks/benefits of screening   Deferred for now     Reviewed and updated as needed this visit by clinical staff                 Reviewed and updated as needed this visit by Provider                Social History     Tobacco Use     Smoking status: Former Smoker     Packs/day: 0.50     Years: 30.00     Pack years: 15.00     Types: Cigarettes     Quit date: 1/15/2010     Years since quittin.6     Smokeless tobacco: Never Used   Substance Use Topics     Alcohol use: Yes     Alcohol/week: 0.0 standard drinks     Comment: 1-2 a week                           Current providers sharing in care for this patient include:   Patient Care Team:  Joelle Dill MD as PCP - General (Family Practice)  Daniel Villa MD as MD (Otolaryngology)  Mary Garcia MD as MD (Pediatric Metabolism)  Joelle Dill MD as Assigned PCP  Greyson Mckinnon MD as Assigned Heart and Vascular Provider  RETA Iglesias MD as Assigned Surgical Provider    The following health maintenance items are reviewed in Epic and correct as of today:  Health Maintenance   Topic Date Due     ANNUAL REVIEW OF HM ORDERS  Never done     ADVANCE CARE PLANNING  2018     ZOSTER IMMUNIZATION (2 of 2) 2019     EYE EXAM  2020     MEDICARE ANNUAL WELLNESS VISIT  10/04/2020     FALL RISK ASSESSMENT  10/04/2020     BMP  2021     LIPID  2021     INFLUENZA VACCINE (1) 2021     COLORECTAL CANCER SCREENING  2023      "DTAP/TDAP/TD IMMUNIZATION (5 - Td or Tdap) 10/22/2029     HEPATITIS C SCREENING  Completed     PHQ-2  Completed     Pneumococcal Vaccine: 65+ Years  Completed     AORTIC ANEURYSM SCREENING (SYSTEM ASSIGNED)  Completed     COVID-19 Vaccine  Completed     IPV IMMUNIZATION  Aged Out     MENINGITIS IMMUNIZATION  Aged Out     HEPATITIS B IMMUNIZATION  Aged Out     Lab work is in process      ROS:  Constitutional, HEENT, cardiovascular, pulmonary, GI, , musculoskeletal, neuro, skin, endocrine and psych systems are negative, except as otherwise noted.    OBJECTIVE:   BP (!) 168/107   Pulse 52   Temp (!) 96.7  F (35.9  C) (Tympanic)   Ht 1.816 m (5' 11.5\")   Wt 73.5 kg (162 lb 1.6 oz)   BMI 22.29 kg/m   Estimated body mass index is 22.29 kg/m  as calculated from the following:    Height as of this encounter: 1.816 m (5' 11.5\").    Weight as of this encounter: 73.5 kg (162 lb 1.6 oz).  EXAM:   GENERAL: healthy, alert and no distress  EYES: Eyes grossly normal to inspection, PERRL and conjunctivae and sclerae normal  HENT: ear canals and TM's normal, nose and mouth without ulcers or lesions  HENT: left ear drum with chronic perforation  NECK: no adenopathy, no asymmetry, masses, or scars and thyroid normal to palpation  RESP: lungs clear to auscultation - no rales, rhonchi or wheezes  CV: regular rate and rhythm, normal S1 S2, no S3 or S4, no murmur, click or rub, no peripheral edema and peripheral pulses strong  ABDOMEN: soft, nontender, no hepatosplenomegaly, no masses and bowel sounds normal  MS: no gross musculoskeletal defects noted, no edema  SKIN: no suspicious lesions or rashes  NEURO: Normal strength and tone, mentation intact and speech normal  PSYCH: mentation appears normal, affect normal/bright    Diagnostic Test Results:  Labs reviewed in Epic    ASSESSMENT / PLAN:   (Z00.00) Encounter for Medicare annual wellness exam  (primary encounter diagnosis)  Comment: We discusse exercise 30mins/day, and calcium " with vitamin D at 1200mg/day, preferably from dietary sources.  Diet, Weight loss, and Exercise were discussed as well.       Plan: INFLUENZA, QUAD, HIGH DOSE, PF, 65YR + (FLUZONE        HD)            (I42.9) Cardiomyopathy, unspecified type (H)  Comment: per cards. On appropriate meds. 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 He will consider. Declines for today.   Plan:     (N42.9) Disorder of prostate  Comment: med refilled   Plan: finasteride (PROSCAR) 5 MG tablet            (I25.10) Coronary artery disease involving native heart without angina pectoris, unspecified vessel or lesion type  Comment: per cardiology   Plan: metoprolol succinate ER (TOPROL-XL) 25 MG 24 hr        tablet            (I10) Essential hypertension  Comment: high today. He has long insisted this is only at MD office. Monitors at home and all is well. meds refilled.   Plan:  metoprolol succinate ER (TOPROL-XL) 50         MG 24 hr tablet, lisinopril (ZESTRIL) 40 MG tablet            (E78.49) Other hyperlipidemia  Comment: med refilled for him   Plan: simvastatin (ZOCOR) 40 MG tablet            (I25.10) Coronary arteriosclerosis  Comment: 5/4/10 - 100% occlusion of proximal LAD with 2 Maxwell stents.   6/28/11 - seen in f/u. Adenosine stress 6/23/11 - normal LV perfusion, some atypical chest pain  Follows with cardiology in [facility], also went to [place]   Plan: simvastatin (ZOCOR) 40 MG tablet            (N52.01) Erectile dysfunction due to arterial insufficiency  Comment: discussed and refilled   Plan: sildenafil (VIAGRA) 100 MG tablet            (Z23) Encounter for immunization   Comment:   Plan: INFLUENZA, QUAD, HIGH DOSE, PF, 65YR + (FLUZONE        HD)            (Z23) Need for prophylactic vaccination and inoculation  "against influenza  Comment:   Plan: ADMIN INFLUENZA (For MEDICARE Patients ONLY)         []                Patient has been advised of split billing requirements and indicates understanding: Yes    COUNSELING:  Reviewed preventive health counseling, as reflected in patient instructions       Regular exercise       Healthy diet/nutrition    Estimated body mass index is 21.65 kg/m  as calculated from the following:    Height as of 8/25/20: 1.816 m (5' 11.5\").    Weight as of 8/25/20: 71.4 kg (157 lb 6.4 oz).        He reports that he quit smoking about 11 years ago. His smoking use included cigarettes. He has a 15.00 pack-year smoking history. He has never used smokeless tobacco.    Appropriate preventive services were discussed with this patient, including applicable screening as appropriate for cardiovascular disease, diabetes, osteopenia/osteoporosis, and glaucoma.  As appropriate for age/gender, discussed screening for colorectal cancer, prostate cancer, breast cancer, and cervical cancer. Checklist reviewing preventive services available has been given to the patient.    Reviewed patients plan of care and provided an AVS. The Basic Care Plan (routine screening as documented in Health Maintenance) for Ambrocio meets the Care Plan requirement. This Care Plan has been established and reviewed with the Patient.    Counseling Resources:  ATP IV Guidelines  Pooled Cohorts Equation Calculator  Breast Cancer Risk Calculator  BRCA-Related Cancer Risk Assessment: FHS-7 Tool  FRAX Risk Assessment  ICSI Preventive Guidelines  Dietary Guidelines for Americans, 2010  USDA's MyPlate  ASA Prophylaxis  Lung CA Screening    Joelle Dill MD  [facility]  "

## 2021-09-16 ENCOUNTER — TELEPHONE (OUTPATIENT)
Dept: FAMILY MEDICINE | Facility: CLINIC | Age: 71
End: 2021-09-16

## 2021-09-16 NOTE — TELEPHONE ENCOUNTER
I already discussed this with the station sec.   Dose is metoprolol 50mg po day   I fixed the med list     NEDA Dill MD

## 2021-09-16 NOTE — TELEPHONE ENCOUNTER
metoprolol succinate ER (TOPROL-XL) 50 MG 24 hr tablet 90 tablet 3 9/9/2021     Pharmacy has received two inconsistent prescriptions. Please indicate which one to dispense:    Metoprolol SUCC ER TAB 50 MG, 1 Tab Daily, #90, 3 RF    Or    Metoprolol SUCC ER TAB 25 MG, 2 TABS Daily, #180, 3 RF

## 2021-11-02 PROBLEM — E71.30: Status: ACTIVE | Noted: 2020-10-05

## 2021-11-02 PROBLEM — Z86.19 HISTORY OF LYME DISEASE: Status: ACTIVE | Noted: 2020-10-05

## 2021-11-02 RX ORDER — LEVOCARNITINE 330 MG/1
TABLET ORAL
COMMUNITY
Start: 2021-02-16 | End: 2021-11-03

## 2021-11-03 ENCOUNTER — TELEPHONE (OUTPATIENT)
Dept: FAMILY MEDICINE | Facility: CLINIC | Age: 71
End: 2021-11-03

## 2021-11-03 ENCOUNTER — OFFICE VISIT (OUTPATIENT)
Dept: FAMILY MEDICINE | Facility: CLINIC | Age: 71
End: 2021-11-03
Payer: COMMERCIAL

## 2021-11-03 VITALS
HEIGHT: 72 IN | HEART RATE: 72 BPM | BODY MASS INDEX: 21.94 KG/M2 | RESPIRATION RATE: 16 BRPM | WEIGHT: 162 LBS | SYSTOLIC BLOOD PRESSURE: 164 MMHG | DIASTOLIC BLOOD PRESSURE: 90 MMHG | TEMPERATURE: 97.4 F

## 2021-11-03 DIAGNOSIS — L08.9 INFECTED CYST OF SKIN: Primary | ICD-10-CM

## 2021-11-03 DIAGNOSIS — L72.9 INFECTED CYST OF SKIN: Primary | ICD-10-CM

## 2021-11-03 PROCEDURE — 99207 PR NO CHARGE LOS: CPT | Performed by: FAMILY MEDICINE

## 2021-11-03 ASSESSMENT — MIFFLIN-ST. JEOR: SCORE: 1524.89

## 2021-11-03 NOTE — TELEPHONE ENCOUNTER
"Call placed to patient    Patient reports he gets cysts chronically to this area - would not elaborate when current cyst formed  Reports he has had 2-3 of these cysts removed in the past    Reports cyst is dime sized and located behind his right ear  Reports sensitivity to the area  Denies increased warmth to the area  Patient states he is unable to tell if there is redness to the cyst / surrounding area  Denies recent fevers  Denies drainage from cyst    Patient adamant to have cyst removed in clinic - states \"it's simple and I want it removed\"  Prefers to see Dr. Dill - advised patient that same day provider Destiny had availability tomorrow and scheduled appointment for 11/4/2021 at 1000    After call - author noted that patient had an appointment with Dr. Adkins this morning   Reviewed note  Will forward to Dr. Dill to review and determine if patient should be evaluated by Destiny or if patient could be worked into her scheduled    Vasile Caruso RN    "

## 2021-11-03 NOTE — Clinical Note
Hey - FYI - this patient came in today hopeful for a sebaceous cyst removal behind his right ear.  He states that you have done this for him in the past.  The cyst did appear somewhat infected to me today and I recommended some antibiotics prior to removal.  Patient declined and states that he will schedule with you.    We discussed why treatment with antibiotics prior to a cyst removal would be helpful to decrease inflammation and prevent further infection but he states that he has had these removed by you in the past.    I am unsure your comfort level of removing a cyst behind his ear.    I apologize that he was frustrated and hope that this does not affect this clinic's relationship with him.    Sharri

## 2021-11-03 NOTE — TELEPHONE ENCOUNTER
I think I have drained this area for him in the past but I would not be comfortable removing a cyst from this area.  I would recommend general surgery or dermatology in follow up.   NEDA Dill MD

## 2021-11-03 NOTE — TELEPHONE ENCOUNTER
Pt is looking to get in sooner than his Nov. 19th scheduled appt. Pt concerned of his infected cyst.    Please call him if you can squeeze him in your schedule

## 2021-11-03 NOTE — PROGRESS NOTES
Ambrocio presented today with concerns over an infected cyst behind his right ear.    He states that he has had several of these removed in the past.  He states that this is been bothersome for several weeks and is getting bigger.  It has not drained.  He would like this removed today.    On examination he has a red and somewhat tender area behind his right ear which does appear to be a slightly infected cyst.  He also notes that he has some pain going down his neck    I discussed with the patient that I do not feel comfortable removing this today and would recommend a week of antibiotics with return either to here or potentially to dermatology for removal.  Discussed that it would be better to remove entirely rather than just drain to prevent reoccurrence.    Patient is upset with this information and states that he would like it removed.  I do not feel comfortable doing this today and do not feel an I&D would be the most helpful given hx reoccurance.  We will not charge him for visit and he will make a appointment with his primary.  Discussed that I am happy to refer him either to dermatology or to start him on antibiotics but he declines both.      We will route this message to Dr. Aniyah GUIDO

## 2021-11-03 NOTE — TELEPHONE ENCOUNTER
"Eleonora Ortiz approached author and states she is not comfortable draining the area and would recommend the same as Dr. Adkins did this morning     Call placed to patient   Cancelled appointment with Destiny  Patient verbalized understanding   States \"either I get a clinic appointment for it to be drained or I go the ER when it gets bad\"    Will forward to Dr. Dill to review and determine if patient can be worked into schedule to have cyst drained     Vasile Caruso RN    "

## 2021-11-04 ENCOUNTER — TRANSFERRED RECORDS (OUTPATIENT)
Dept: HEALTH INFORMATION MANAGEMENT | Facility: CLINIC | Age: 71
End: 2021-11-04
Payer: COMMERCIAL

## 2021-11-04 NOTE — TELEPHONE ENCOUNTER
I am not in the office tomorrow.   He needs to understand that this is a complicated area and cysts/infections here can track into the neck/vasculature/ear canal area. This is why my colleagues are concerned about draining it for him in clinic.   I will place a referral for him to derm.  He can monitor it and may need to go to emergency room/uc if it gets worse.  Thanks, NEDA Dill MD

## 2021-11-04 NOTE — TELEPHONE ENCOUNTER
Call placed to patient.  Relayed message per Dr Dill.    Patient states he dose not understand.  Patient states he had the cyst drained twice at the [facility] in the past, once by Dr Dill and once by the [facility] nurse.  Offered appointment with Dr Dill next week.  Patient states he can't wait until next week as the site is swollen and painful.  Patient state he will just drain it himself.  Advised patient to seek assistance in UC or ED due to risk of infection.  Jacy Claros RN

## 2022-03-03 ENCOUNTER — TELEPHONE (OUTPATIENT)
Dept: FAMILY MEDICINE | Facility: CLINIC | Age: 72
End: 2022-03-03
Payer: COMMERCIAL

## 2022-03-03 NOTE — TELEPHONE ENCOUNTER
Panel Management Review      Patient has the following on his problem list:     Hypertension   Last three blood pressure readings:  BP Readings from Last 3 Encounters:   11/03/21 (!) 164/90   09/09/21 (!) 168/107   01/12/21 127/80     Blood pressure: FAILED    HTN Guidelines:  Less than 140/90      Composite cancer screening  Chart review shows that this patient is due/due soon for the following None  Summary:    Patient is due/failing the following:   BMP,eye exam, and BP CHECK    Action needed:   Routed to provider for review.    Type of outreach:    will call patient     Questions for provider review:    Do you want patient to be seen for a BP follow up since due for labs too?                                                                                                                                     Kristin Damian LPN     Chart routed to Provider .

## 2022-04-07 DIAGNOSIS — I25.10 CORONARY ARTERIOSCLEROSIS: ICD-10-CM

## 2022-04-07 DIAGNOSIS — N42.9 DISORDER OF PROSTATE: ICD-10-CM

## 2022-04-07 DIAGNOSIS — I10 ESSENTIAL HYPERTENSION: ICD-10-CM

## 2022-04-07 DIAGNOSIS — E78.49 OTHER HYPERLIPIDEMIA: ICD-10-CM

## 2022-04-08 RX ORDER — LISINOPRIL 40 MG/1
TABLET ORAL
Qty: 100 TABLET | Refills: 2 | Status: SHIPPED | OUTPATIENT
Start: 2022-04-08 | End: 2022-09-27

## 2022-04-08 RX ORDER — SIMVASTATIN 40 MG
TABLET ORAL
Qty: 90 TABLET | Refills: 1 | Status: SHIPPED | OUTPATIENT
Start: 2022-04-08 | End: 2022-09-27

## 2022-04-08 RX ORDER — METOPROLOL SUCCINATE 50 MG/1
TABLET, EXTENDED RELEASE ORAL
Qty: 100 TABLET | Refills: 2 | Status: SHIPPED | OUTPATIENT
Start: 2022-04-08 | End: 2022-09-27

## 2022-04-08 RX ORDER — FINASTERIDE 5 MG/1
TABLET, FILM COATED ORAL
Qty: 90 TABLET | Refills: 1 | Status: SHIPPED | OUTPATIENT
Start: 2022-04-08 | End: 2023-09-05

## 2022-04-08 NOTE — TELEPHONE ENCOUNTER
Routing refill request to provider for review/approval because:  BP failed parameters.  Jacy Claros RN

## 2022-05-16 ENCOUNTER — TELEPHONE (OUTPATIENT)
Dept: FAMILY MEDICINE | Facility: CLINIC | Age: 72
End: 2022-05-16

## 2022-05-16 NOTE — TELEPHONE ENCOUNTER
Reason for Call:      Detailed comments: Pt has upcoming appt with Dr. Dill. Pt has some B/P Issues and Cognitive issues and would like pt to get a referral for the Cognitive issues  Wife Nava would like to discuss OV 5/19/22    Phone Number Patient can be reached at: Home number on file Nava [phone]  home)    Best Time: Any Time      Can we leave a detailed message on this number? YES    Call taken on 5/16/2022 at 1:40 PM by Shannon Mustafa

## 2022-05-18 ENCOUNTER — TELEPHONE (OUTPATIENT)
Dept: FAMILY MEDICINE | Facility: CLINIC | Age: 72
End: 2022-05-18

## 2022-05-18 NOTE — TELEPHONE ENCOUNTER
"FYI to Dr. Dill,   Nava, wife, called and wanted to tell you concerns that she has regarding Ambrocio.   She report that Ambrocio:   Frequently misplaces keys and tools. \"Some tools we have not found.\"   If we have an appointment or a granddaughters game in the afternoon; several times he will ask what time are we leaving again and again.  If we are having people over ; he will ask Who's coming and why are they coming and what time over and over.  Loses his train of thought.  Seems overwhelmed about making decisions about things.  Anxious.  Seems a little depressed.  Some of our children have noticed the above things  He tells the same stories over and over again.  He has passed the medicare clock and 3 word test in the past but Nava is concerned that he needs a more thorough assessment.  Nava says that she is coming with Ambrocio (Nava says that she is getting a covid booster)and would like Dr. Dill to pull her out to talk privately if needed.  Lonnie Mckeon RN    "

## 2022-05-19 ENCOUNTER — OFFICE VISIT (OUTPATIENT)
Dept: FAMILY MEDICINE | Facility: CLINIC | Age: 72
End: 2022-05-19
Payer: COMMERCIAL

## 2022-05-19 VITALS
HEIGHT: 72 IN | SYSTOLIC BLOOD PRESSURE: 122 MMHG | DIASTOLIC BLOOD PRESSURE: 71 MMHG | TEMPERATURE: 97.4 F | WEIGHT: 158.4 LBS | BODY MASS INDEX: 21.45 KG/M2 | HEART RATE: 56 BPM

## 2022-05-19 DIAGNOSIS — I10 ESSENTIAL HYPERTENSION: ICD-10-CM

## 2022-05-19 DIAGNOSIS — I71.20 THORACIC AORTIC ANEURYSM WITHOUT RUPTURE (H): ICD-10-CM

## 2022-05-19 DIAGNOSIS — E71.41 PRIMARY CARNITINE DEFICIENCY (H): ICD-10-CM

## 2022-05-19 DIAGNOSIS — R41.3 MEMORY LOSS: Primary | ICD-10-CM

## 2022-05-19 DIAGNOSIS — Z23 HIGH PRIORITY FOR 2019-NCOV VACCINE: ICD-10-CM

## 2022-05-19 DIAGNOSIS — I42.9 CARDIOMYOPATHY, UNSPECIFIED TYPE (H): ICD-10-CM

## 2022-05-19 LAB
ANION GAP SERPL CALCULATED.3IONS-SCNC: 3 MMOL/L (ref 3–14)
BUN SERPL-MCNC: 18 MG/DL (ref 7–30)
CALCIUM SERPL-MCNC: 9.1 MG/DL (ref 8.5–10.1)
CHLORIDE BLD-SCNC: 108 MMOL/L (ref 94–109)
CO2 SERPL-SCNC: 31 MMOL/L (ref 20–32)
CREAT SERPL-MCNC: 0.92 MG/DL (ref 0.66–1.25)
GFR SERPL CREATININE-BSD FRML MDRD: 89 ML/MIN/1.73M2
GLUCOSE BLD-MCNC: 128 MG/DL (ref 70–99)
POTASSIUM BLD-SCNC: 4.5 MMOL/L (ref 3.4–5.3)
SODIUM SERPL-SCNC: 142 MMOL/L (ref 133–144)
TSH SERPL DL<=0.005 MIU/L-ACNC: 1.21 MU/L (ref 0.4–4)
VIT B12 SERPL-MCNC: 365 PG/ML (ref 193–986)

## 2022-05-19 PROCEDURE — 91305 COVID-19,PF,PFIZER (12+ YRS): CPT | Performed by: FAMILY MEDICINE

## 2022-05-19 PROCEDURE — 0054A COVID-19,PF,PFIZER (12+ YRS): CPT | Performed by: FAMILY MEDICINE

## 2022-05-19 PROCEDURE — 36415 COLL VENOUS BLD VENIPUNCTURE: CPT | Performed by: FAMILY MEDICINE

## 2022-05-19 PROCEDURE — 82607 VITAMIN B-12: CPT | Performed by: FAMILY MEDICINE

## 2022-05-19 PROCEDURE — 84443 ASSAY THYROID STIM HORMONE: CPT | Performed by: FAMILY MEDICINE

## 2022-05-19 PROCEDURE — 99214 OFFICE O/P EST MOD 30 MIN: CPT | Mod: 25 | Performed by: FAMILY MEDICINE

## 2022-05-19 PROCEDURE — 80048 BASIC METABOLIC PNL TOTAL CA: CPT | Performed by: FAMILY MEDICINE

## 2022-05-19 NOTE — PROGRESS NOTES
Assessment & Plan     Memory loss  Discussed with him. Will check labs and head ct. neuropsych testing is ordered. The patient indicates understanding of these issues and agrees with the plan.   - TSH with free T4 reflex; Future  - Vitamin B12; Future  - CT Head w/o Contrast; Future  - Adult Neuropsychology Referral; Future      High priority for 2019-nCoV vaccine  Booster today   - COVID-19,PF,PFIZER (12+ Yrs GRAY LABEL)    Cardiomyopathy, unspecified type (H)  Sees cardiology at [place]     Thoracic aortic aneurysm without rupture (H)  Cardiology at [place]     Primary carnitine deficiency (H)  Off carnitine now     Essential hypertension  Normal home numbers. Using auto arm cuff which has been validated. No change to meds   - BASIC METABOLIC PANEL; Future    Return in about 4 weeks (around 6/16/2022) for as needed based on discussion in clinic.    Joelle Dill MD  [facility] DAVID Albrecht is a 72 year old who presents for the following health issues       History of Present Illness       Reason for visit:  Laboratory workup  Symptoms include:  None    He eats 2-3 servings of fruits and vegetables daily.He consumes 1 sweetened beverage(s) daily.He exercises with enough effort to increase his heart rate 10 to 19 minutes per day.  He exercises with enough effort to increase his heart rate 4 days per week.   He is taking medications regularly.        Discuss blood pressure and cognitive issues.   On lisinopril 40  Metoprolol 50  Home blood pressure  122-133/71-80    Taswell -   Stopped taking carnitine   Ok to be off it     Saw cardiology - question of PFO?   Hasn't had TTE - hasn't followed up with it - isn't worrying about it     He feels memory is good   Wife is reporting issues with memory  - she sent in some concerns - see telephone note 5/18/22 - details from wife   Misplaces things - he says this isn't new     He is worried about family history of alzheimers - in his father  "    Says he is working hard in longterm - getting a lot of exercise       Review of Systems   Constitutional, HEENT, cardiovascular, pulmonary, gi and gu systems are negative, except as otherwise noted.      Objective    /71   Pulse 56   Temp 97.4  F (36.3  C) (Tympanic)   Ht 1.816 m (5' 11.5\")   Wt 71.8 kg (158 lb 6.4 oz)   BMI 21.78 kg/m      Body mass index is 21.78 kg/m .  Physical Exam   GENERAL: healthy, alert and no distress  NEURO: Normal strength and tone, mentation intact and speech normal  PSYCH: mentation appears normal, affect normal/bright           "

## 2022-05-21 ENCOUNTER — MYC MEDICAL ADVICE (OUTPATIENT)
Dept: FAMILY MEDICINE | Facility: CLINIC | Age: 72
End: 2022-05-21
Payer: COMMERCIAL

## 2022-05-21 DIAGNOSIS — R73.01 ELEVATED FASTING GLUCOSE: Primary | ICD-10-CM

## 2022-05-25 ENCOUNTER — DOCUMENTATION ONLY (OUTPATIENT)
Dept: LAB | Facility: CLINIC | Age: 72
End: 2022-05-25
Payer: COMMERCIAL

## 2022-05-25 DIAGNOSIS — R73.01 ELEVATED FASTING GLUCOSE: ICD-10-CM

## 2022-05-25 NOTE — PROGRESS NOTES
Ambrocio Vázquez has an upcoming lab appointment:    Future Appointments   Date Time Provider Department Center   5/27/2022  2:45 PM [facility]   5/31/2022 10:40 AM [id number]   2/17/2023  9:00 AM Xuan Ang, PhD [facility]     Patient is scheduled for the following lab(s): Appointment on 5/27/22, A1C order is placed for August 22, 2022 . There is no available current order.    There is no order available. Please review and place either future orders or HMPO (Review of Health Maintenance Protocol Orders), as appropriate.    There are no preventive care reminders to display for this patient.  Karen Strauss

## 2022-05-25 NOTE — LETTER
May 31, 2022      Ambrocio Vázquez  [address] [address]        Dear ,    We are writing to inform you of your test results.    The A1c is slightly elevated. This indicates you have pre -diabetes. We can discuss this in person or at our next visit. We'll need to keep an eye on it. I recommend rechecking in 6-12 months. Work on reducing simple sugars in your diet and getting daily exercise.     Resulted Orders   **A1C FUTURE 3mo   Result Value Ref Range    Hemoglobin A1C 6.2 (H) 0.0 - 5.6 %       If you have any questions or concerns, please call the clinic at the number listed above.       Sincerely,      Joelle Dill MD/chris

## 2022-05-27 LAB — HBA1C MFR BLD: 6.2 % (ref 0–5.6)

## 2022-05-27 PROCEDURE — 83036 HEMOGLOBIN GLYCOSYLATED A1C: CPT | Performed by: FAMILY MEDICINE

## 2022-05-27 PROCEDURE — 36415 COLL VENOUS BLD VENIPUNCTURE: CPT | Performed by: FAMILY MEDICINE

## 2022-05-31 ENCOUNTER — MYC MEDICAL ADVICE (OUTPATIENT)
Dept: FAMILY MEDICINE | Facility: CLINIC | Age: 72
End: 2022-05-31
Payer: COMMERCIAL

## 2022-06-03 ENCOUNTER — OFFICE VISIT (OUTPATIENT)
Dept: NEUROLOGY | Facility: CLINIC | Age: 72
End: 2022-06-03
Attending: FAMILY MEDICINE
Payer: COMMERCIAL

## 2022-06-03 DIAGNOSIS — G31.84 MILD NEUROCOGNITIVE DISORDER: Primary | ICD-10-CM

## 2022-06-03 DIAGNOSIS — R41.3 MEMORY LOSS: ICD-10-CM

## 2022-06-03 NOTE — LETTER
"    6/3/2022         RE: Ambrocio Vázquez  [address] [facility] [address]        Dear Colleague,    Thank you for referring your patient, Ambrocio Vázquez, to the [facility]. Please see a copy of my visit note below.    NEUROPSYCHOLOGY HYBRID (FACE-TO-FACE AND TELE-HEALTH) CONSULT  [facility]    NAME: Ambrocio Vázquez    YOB: 1950   AGE: 72  EDU: 18  DATE OF EVALUATION: 6/3/2022 (Tele-Health interview and office visit for testing)    Video Visit for Interview on 6/3/2022  Ambrocio Vázquez is a 72 year old male who is being evaluated via a billable video visit in light of the ongoing global health crisis (COVID-19) that requires us to abide by social distancing mandates in order to reduce the risk of COVID-19 exposure.      The patient has been notified of following:     \"This video visit will be conducted via a call between you and your provider. We have found that certain health care needs can be provided without the need for an in-person physical exam.  This service lets us provide the care you need with a video conversation. If during the course of the call the physician/provider feels a video visit is not appropriate, you will not be charged for this service.\"    In addition, information was provided about the risks, benefits and limitations of participating in the current hybrid (testing conducted both face-to-face and via in-clinic Tele-Health procedures) evaluation via Tele-Health as well more general explanation of the services to be provided today. He was told we would not be recording the Tele-Health aspects of the session and Mr. Vázquez agreed to not record the session or write down (or otherwise attempt to duplicate or retain) any information from the testing component of the evaluation.    Patient has given verbal consent to a Video visit? Yes    Patient would like the video invitation sent by: " Chetan    REASON FOR REFERRAL:  Mr. Vázquez is a 72 year old, left-handed, White male presenting with concerns about cognitive functioning in the context of hypertension, hyperlipidemia, cardiomyopathy, unspecified type, and coronary artery disease. He was referred for a neurocognitive evaluation by his primary care provider, Dr. Joelle Dill to assist with differential diagnosis and care planning.     DIAGNOSTIC SUMMARY:  Due to the current COVID-19 pandemic that limits contact during in-person clinical visits, the testing portion of this assessment was conducted using face-to-face methods with PPE worn by the examiner and a face-mask for the patient. The standard administration of these tests involves in-person, direct face-to-face methods. The full impact of applying non-standard administration methods with PPE is not fully appreciated at this time. The diagnostic conclusions and recommendations provided in this report are being advanced with caution.    With these limitations in mind, results of testing indicate that Mr. Vázquez is a gentleman of estimated average to high average premorbid intellectual functioning whose performance is notable for borderline impaired prose memory and mildly impaired nonverbal memory. More subtly below expectation performances (low average) were evident on measures of prose learning, deductive reasoning, and inhibition. These weaknesses were evident in the context of otherwise mostly average performance on measures of basic attention, cognitive efficiency, rote verbal learning and memory, nonverbal learning, language (sight word reading, abstraction, fund of knowledge, semantic fluency, confrontation naming), visuospatial reasoning skills and several measures of executive functioning (working memory, complex attention, phonemic fluency, visuospatial planning and organization). Finally, on self-report questionnaires, he denied any significant symptoms of depression or anxiety.      ASSESSMENT:    Borderline to mild impairments identified in prose memory and nonverbal memory    Subtly below expectation performances (low average) were evident on measures of prose learning, deductive reasoning, and inhibition    Otherwise mostly average performance on measures of basic attention, cognitive efficiency, rote verbal learning and memory, nonverbal learning, language, visuospatial reasoning skills, and several measures of executive functioning    Cognitive testing reveals limited impairments, but given his higher level of premorbid functioning, low average (and even some average) scores likely represent a decline for him    Given his medical history including a number of cerebrovascular risk factors (hypertension, hyperlipidemia, cardiomyopathy, CAD, smoking history), these impairments in aspects of memory and weaknesses on some measures of executive functioning, could represent subtle frontal subcortical dysfunction that is often present in cerebrovascular disease. Updated neuroimaging would be helpful to clarify the extent of any burden of SVID or stroke.    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 This could reflect signs of depression but apathy or motivational concerns could also indicate an emerging neurodegenerative process.     PLAN:    Agree with recommendations for updated neuroimaging, ideally MRI if there are no contra-indications (to clarify the extent/ burden of SVID)    No concerns with his ability to continue to live independently, drive or manage  his personal affairs    Continue to take care of himself physically (healthy diet, regular exercise, medication adherence)    Continue to stay cognitively active    He denied significant mood symptoms but there did appear to be some signs of apathy/ reduced initiative as well as anxiety. This should be monitored and if symptoms worsen, psychotropic medication could be considered.     Given some evidence of cognitive impairment, re-evaluation in 1 years time is recommended    FEEDBACK:  Mr. Vázquez will receive the results of this evaluation via a formal feedback appointment with me on June 28th.     Thank you for allowing me to participate in Mr. Vázquez's care.  Please contact me with any questions regarding the content of this report.      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 Vázquez should be reassured that his mental faculties, outside of mild weaknesses in memory and problem solving, are well preserved    There are no concerns with Mr. Vázquez s current ability to continue to live independently, drive and manage his finances or personal affairs.  Physical and Emotional Health    It is important that Mr. Vázquez continue to adhere to his medication treatment regimen and follow a healthy diet so as to maintain his physical health, as this can have a significant impact on his physical, emotional, and  cognitive functioning.     Mr. Vázquez does not appear to be struggling with any significant emotional symptoms. He does have some difficulties with initiation and anxiety. If this worsens or other symptoms of depression emerge, he may wish to consult with his physician to consider a trial of psychotropic medication.     In the meantime, hehavioral activation techniques such as regular exercise (under the guidance of his physician), recreational activities and regular social interaction (with proper social distancing when indicated) would likely be effective in helping Mr. Vázquez to continue to maintain his mood.   Memory and Organization    Mr. Vázquez is encouraged to continue to engage in stimulating activities, (i.e., reading, card games, puzzles, house projects) to keep him cognitively active.     In his daily life, Mr. Vázquez will continue to benefit from the use of compensation techniques. That is, he may find it helpful to post reminder notes around the house, make lists, and carry a small calendar so that he can feel more comfortable and confident in his ability to remember information. A daily planner could also be used as a memory book where important information is recorded and organized for future reference.     Mr. Vázquez should also create a system to establish set locations for certain items (i.e., keys) such that he always knows where to put them upon entering the house and where to look when he needs them. If he would like to keep certain items out of sight (i.e., a wallet), he could set up a specific hidden place to keep items and use that same place so as to ensure he can find the required item when needed.     Mr. Vázquez exhibits memory weaknesses but does benefit from cues, thus he will do best when provided with reminders to facilitate retrieval of important information.     When required to learn new information, Mr. Vázquez showed good benefit from repetition. Thus it is recommended that  "information be broken down into small units and repeated to facilitate his learning.  Follow-up    Given evidence of cognitive impairment on current testing re-evaluation in 12 months is recommended. The current test data can be used as a baseline to which future comparisons can be made.    --------------------------------EXTENDED REPORT--------------------------------  Verbal consent for neuropsychological testing was received following the provision of information about the nature of the evaluation, and the opportunity to ask questions.     HISTORY OF PRESENTING PROBLEM:    Relevant History  Mr. Vázquez completed an annual wellness exam on September 9, 2021.  At that time he obtained a perfect score on the Mini-Cog.  However in a subsequent appointment (May 19, 2022), concerns were expressed about some memory changes.  Dr. Dill recommended labs (which were unremarkable), updated CT head scan (which does not yet appear to have been completed), and neuropsychological evaluation.    Current Interview  Mr. Vázquez was accompanied by his wife Nava and was an adequate historian. Together they provided the following information.     At the present time, Mr. Vázquez reports some concerns about lapses in memory noting that he has to rely on his wife a little more than he used to.  He acknowledges some difficulties with misplacing items and some increased distractibility.  He otherwise denies any significant concerns with language skills, speed of thinking, or problem-solving.  He noted that he functions as a  at home and has no difficulty working through projects.  He was able to describe the ongoing COVID pandemic; however he indicated that it has been going on since about 2018 \"over 3 years,\" (actually a little over 2 years, since early 2020).    His wife Nava agreed that he has been having difficulties misplacing items and feels that she has been noticing changes in her 's memory for a little over a " year.  She indicated at first that she was noticing lapses that might occur once a week but are now occurring daily.  She described difficulties with misplacing items and forgetting details of conversations and upcoming events.  He will ask her repeatedly about what they are doing or what time they are doing it.    With regard to the activities of daily living, Mr. Vázquez reported that his wife has always done the cooking, managed the calendar, and managed the family finances.  He manages his own medications and sets up a pillbox and members to take them without difficulty and his wife agreed.  He continues to drive with no recent tickets accidents or significant incidents of becoming lost and his wife agreed.    MEDICAL HISTORY:  Mr. 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     PFO (patent foramen ovale)     ASD with poss RV enlargement     Sciatica      Mr. Vázquez's current problem list includes   Patient Active Problem List   Diagnosis     Primary carnitine deficiency (H)     Sciatica     ERECTILE DYSFUNCTION     Disorder of prostate     Colon polyp     Abnormal echocardiogram     CAD (coronary artery disease)     Cardiomyopathy (H)      CARDIOVASCULAR SCREENING; LDL GOAL LESS THAN 100     Advanced directives, counseling/discussion     Stopped smoking with greater than 30 pack year history     Hyperlipidemia     History of angina     Coronary arteriosclerosis     Family history of aortic aneurysm     PFO (patent foramen ovale)     Essential hypertension     Aneurysm of aortic root (H)     S/P lumbar fusion     Age-related nuclear cataract of both eyes     PVD (posterior vitreous detachment), left eye     Perforated ear drum, left     Anxiety     Other insomnia     Bilateral carpal tunnel syndrome     History of Lyme disease     Disorder of fatty acid metabolism     Mr. Vázquez denied any history of stroke or seizure.  He did endorse a history of approximately 3 head injuries with loss of consciousness related to playing sports in college.  He denied any significant subsequent difficulties following the head injuries.. He denied having tested positive for COVID or experiencing an illness concerning for COVID.  His wife agreed.    Mr. Vázquez indicated that he is fairly active and continues to be in good physical health.  He does have a history of multiple back surgeries and has some limitations related to this.    Mr. Vázquez does have a history of ruptured blood vessel in his left ear related to scuba diving that occurred approximately 15 years ago.  He has very limited hearing in his left ear.  Right ear hearing is essentially normal.  He otherwise denies any significant sensory changes or disturbance in appetite.  He did describe some difficulties falling asleep noting that he is often thinking about various projects and things on there to do list.  However, when he falls asleep he sleeps through the night without difficulty.  He and his wife denied any unusual sleep behaviors.    Diagnostic studies:  No recent neuroimaging was evident in the chart.     Past Surgical History:   Procedure Laterality Date     ABDOMEN SURGERY       COLONOSCOPY        COLONOSCOPY  5/3/2013    Procedure: COLONOSCOPY;  Colonoscopy;  Surgeon: eGnaro Krishnan MD;  Location: [facility]     CORONARY ANGIOGRAPHY ADULT ORDER  6/7/2014    No restenosis     HEART CATH, ANGIOPLASTY  5/2010    complex  angioplasty/thrombectomy SHAZIA stenting proximal and ostial LAD;LMA-nl,   stenting of Lad, diag small 80%, Lcx30%, RCA50%     [facility]     HERNI[facility]RHAPHY INGUINAL  1/11/2013    Procedure: HERNIORRHAPHY INGUINAL;  LEFT INGUINAL HERNIA REPAIR WITH MESH;  Surgeon: Santos Coleman MD;  Location:  OR     HERNIORRHAPHY INGUINAL Right 12/29/2015    Procedure: HERNIORRHAPHY INGUINAL;  Surgeon: Santos Coleman MD;  Location:  OR     OPTICAL TRACKING SYSTEM FUSION SPINE POSTERIOR LUMBAR THREE+ LEVELS N/A 1/29/2018    Procedure: OPTICAL TRACKING SYSTEM FUSION SPINE POSTERIOR LUMBAR THREE+ LEVELS;  L3, L4 and L5 Berg-Maloney osteotomies with L3-4, L4-5 and L5-S1 transforaminal lumbar interbody fusion ;  Surgeon: Gabino Velez MD;  Location: [facility]     PAST SURGICAL HISTORY  2002    hernia repair, right     PAST SURGICAL HISTORY      pilonidal cyst and sebaceous cyst surg     PE TUBES  2/10    left ear PE for persistant ZINA     SOFT TISSUE SURGERY      right axilla lipoma removed     ZZC LAMINECTOMY,>2 SGMT,LUMBAR  2001       Current medications include (per medical record):   Current Outpatient Medications:      aspirin 81 MG tablet, Take 1 tablet (81 mg) by mouth daily, Disp: 30 tablet, Rfl: 0     finasteride (PROSCAR) 5 MG tablet, TAKE 1 TABLET BY MOUTH  DAILY, Disp: 90 tablet, Rfl: 1     fish oil-omega-3 fatty acids 1000 MG capsule, Take 1 g by mouth daily , Disp: , Rfl:      lisinopril (ZESTRIL) 40 MG tablet, TAKE 1 TABLET BY MOUTH  DAILY, Disp: 100 tablet, Rfl: 2     metoprolol succinate ER (TOPROL-XL) 50 MG 24 hr tablet, TAKE 1 TABLET BY MOUTH  DAILY, Disp: 100 tablet, Rfl: 2     nitroGLYcerin (NITROSTAT) 0.4 MG sublingual tablet, FOR CHEST PAIN  "PLACE 1 TABLET UNDER THE TONGUE EVERY 5 MINUTES FOR 3 DOSES. IF SYMPTOMS PERSIST 5 MINUTES AFTER FIRST DOSE CALL 911., Disp: 25 tablet, Rfl: 3     sildenafil (VIAGRA) 100 MG tablet, Take 1 tablet (100 mg) by mouth daily as needed (erectile dysfunction) Take 30 min to 4 hours before intercourse.  Never use with nitroglycerin, terazosin or doxazosin., Disp: 10 tablet, Rfl: 1     simvastatin (ZOCOR) 40 MG tablet, TAKE 1 TABLET BY MOUTH AT  BEDTIME, Disp: 90 tablet, Rfl: 1.    FAMILY HISTORY:   Family medical history is significant for:   Family History   Problem Relation Age of Onset     C.A.D. Father      Alzheimer Disease Father      Heart Disease Father      Hypertension Father         age post 75     Cancer Brother         brain tumor- age 46     Heart Disease Mother         aortic anerysum      Alzheimer Disease Paternal Grandmother      Other Cancer Brother         Krishna, , age 46     Glaucoma No family hx of      Macular Degeneration No family hx of    Mr. Vázquez reported that his paternal grandmother had dementia and that his father had Alzheimer's disease for approximately 5 to 10 years before he  age 82.  He also noted that his brother  at approximately age 45 from a \"massive brain tumor,\" and when asked he felt that the term GBM was familiar but was not certain.  He was otherwise unaware of any other neurologic or neurodegenerative conditions in the family.    PSYCHIATRIC AND SUBSTANCE USE HISTORY:  With regard to his psychiatric history, Mr. Vázquez denied a history of past psychiatric conditions, but did acknowledge a brief period of mental health treatment related to significant professional stress approximately 20 years ago. He stated that he was referred to a psychiatrist and took a medication briefly but had negative side effects so stopped it almost immediately.  He otherwise denied any mental health interventions.  At the current time, he described his mood as \"excellent\".  He " "denies any significant anxiety or depression.  However his wife did note that he tends to worry about their projects list and rather lengthy to do list.  She notes that he does have a hard time getting started but acknowledges that there are rather challenging labor-intensive tasks on the list and not exactly \"fun.\"  Once he does get started, he can finish them.  Mr. Vázquez himself acknowledges that he sometimes can be a little bit of a worrier noting that was his job, to worry about other people's problems (as an ).  He denied any suicidal ideation, plan or intent or hallucinations or delusions.    With regard to substance use, Mr. Vázquez stated that he is not a big drinker.  His wife agreed noting that he might have an alcoholic beverage twice a month.  He denied any history of problematic alcohol use and his wife agreed.  He did smoke most of his adult life but quit approximately 5 years ago.  At that time he was smoking up to half a pack a day.    SOCIAL HISTORY:  Mr. Vázquez was born and raised in [place], [place]. He was unaware of any complications in his mother's pregnancy with him or in his birth, or delays in reaching developmental milestones. He denied a history of early learning or attention difficulties, individualized instruction, or grade repetition. He described himself as an \"average\" student earning mostly B's.  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  He later went back to school and earned a bachelor's degree in math and subsequently entered a doctoral program in environmental engineering which was very new at the time.  He only completed a masters as he  and focused on his family.  He worked as, what he describes as a  for 42 years.  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  He retired in " "2018.    Mr. Vázquez indicated that he was  in 1973 and has been  for approximately 47 years (actually 1974 and will be 49 years this fall).  He was able to correctly state that he has 3 children and 5 grandchildren.    MENTAL STATUS AND BEHAVIORAL OBSERVATIONS:   Mr. Vázquez arrived 15 minutes early and accompanied by his wife to today's appointment. He was appropriately dressed and groomed. He was alert and engaged during the interview.  Gait was not observed. His mood was described as \"excellent\" but his affective presentation was somewhat flat. Rapport was easily established and eye contact was unremarkable. He was pleasant and cooperative. Prosody and content of speech were grossly normal.  Rate of speech was mildly slow and methodical. No significant word finding difficulties or paraphasic errors were evident. There was no evidence of a royal thought disorder; no hallucinations or delusions were apparent. Judgment and insight appeared fair.       Mr. Vázquez appeared adequately motivated and engaged easily in the testing component of the evaluation. His performance was fully intact on embedded measures of objective effort. He attempted all tasks presented to him and worked at a quick pace.  He did not appear overly frustrated by difficult or challenging tasks and responded appropriately to encouragement from the examiner.  No significant barriers to testing were observed and the following is judged to be a valid representation of Mr. Vázquez's current cognitive strengths and weaknesses.    LIMITATIONS:  Due to circumstances that limit contact during in-person clinical visits, this assessment was conducted using face-to-face testing with the examiner wearing [employer] designated PPE and the patient wearing a face mask. The standard administration of these procedures involves in-person, face-to-face methods without PPE. The impact of applying non-standard administration methods has been " evaluated only in part by scientific research. While every effort was made to simulate standard assessment practices, the diagnostic conclusions and recommendations for treatment provided in this report are being advanced with these limitations in mind.    TESTS ADMINISTERED:   [place] Naming Test (BNT), Brief Visuospatial Memory Test - R Form 1 (BVMT-R), Category Fluency- AFV (CAT), Clock Drawing, Controlled Oral Word Association Test CFL (COWAT), Generalized Anxiety Disorder-7 (GEOFF-7), Geriatric Depression Scale 30 (GDS), Dunne Verbal Learning Test - R Form 1 (HVLT-R), Harry-Osterrieth Complex Figure Test, copy only (RCFT), Stroop Color and Word Test, Trail Making Test (TMT), WAIS-IV (Similarities, Information, Block Design, Matrix Reasoning, Digit Span), WMS-IV Logical Memory, WRAT-5 Word Reading (blue), [place] Card Sorting Test-1 deck (WCST) and WMS-III Information and Orientation.    MOANS norms were used for BNT, CAT, COWAT, RCFT, Stroop, TMT    Raw scores in parentheses    DESCRIPTIVE PERFORMANCE KEY:    Labels for tests with Normal Distributions  Score Label Standard Score %ile Rank   Exceptionally high score  > 130 > 98   Above average score 120-129 91-97   High average score 110-119 75-90   Average score  25-74   Low average score 80-89 9-24   Below average score 70-79 2-8   Exceptionally low score < 70 < 2     Labels for tests with Non-Normal Distributions  Score Label %ile Rank   Within normal expectations/ limits score (WNL) > 24   Low average score 9-24   Below average score 2-8   Exceptionally low score < 2     The following test results utilize score labels as adapted from David Kraus, José Buenrostro, Anh Cook, RIC Major, Donna Drake, Shaheen Isaac & Conference Participants (2020): American Academy of Clinical Neuropsychology consensus conference statement on uniform labeling of performance test scores, The Clinical Neuropsychologist,  "DOI: [id number]    All scores contain some measure of error; scores are reported here as they are obtained by the individual (without reference to the range of error). These are meant as labels and not interpretation of performance. While other relevant comments regarding task performance are provided below, please see the Assessment, Impressions and Diagnostic Summary sections of this report for interpretation of the scores and the cognitive profile as a whole, including what does and does not constitute impairment.    OPTIMAL PREMORBID INTELLECT:  Optimal premorbid intellectual abilities were estimated as falling in the average to high average range based on Mr. Vázquez's educational and occupational histories and performance on tasks least likely to be affected by acquired brain dysfunction (i.e.,  hold tests ).    SUMMARY OF TEST RESULTS:     Orientation, Attention and Processing Speed  Mental status exam was measured as a low average score for his age (12). He was oriented to person, place, and time, and was able to correctly name the current and previous presidents. He was oriented to month and day of the week but stated it was the 4th (actually 3rd) and 2021 (actually 2022).    Performance on a measure of basic attention and working memory was assessed as an average score (26). This reflected an average score for basic attention skills (LDF = 7) and an average score for working memory skills (LDB = 5, LDS = 5).     A simple sequencing task (45\" ) and a speeded word reading task (82) were both assessed as a low average to average score. A speeded color naming task (63) was assessed as an average score.     Language  Sight word reading skills (63) were assessed as an average score. Verbal abstraction skills (24) were assessed as an average score. Fund of general knowledge (15) was assessed as an average score. His performance on a measure of semantic fluency was measured as an average score " (39). Confrontation naming was measured as a within normal limits score (56).    Visuospatial Skills  Performance on a block construction task (36) resulted in an average score. Performance on a pattern completion task (18) was measured as a high average score. His copy of a series of 6 simple geometric figures was performed without error.     Learning and Memory  Immediate memory for two short stories was measured as a low average score (23). Delayed recall of these stories resulted in a low average score (9, 56% retention). Recognition memory was measured as a low average score (15/23). He was also administered a measure of rote auditory verbal list learning that required him to learn a series of 12 words over three trials and retain and recall them over a delay. His initial rate of learning (6,9,10) reflected an average score. He retained and recalled 9 words (90% retention) after the delay for an average score for delayed recall. Recognition memory was measured as an average score as he correctly identified 11 of the original words and made no false positive errors.     Immediate nonverbal memory (2,7,9) for a series of 6 geometric figures was measured as an average score while delayed recall of these figures (4 details) resulted in a below average score. Recognition memory was measured as a low average score as he correctly identified 5 of the original figures and made 1 false positive error.    Executive Functioning  Working memory skills were assessed as an average score (LDB = 5, LDS = 5). A complex sequencing and set shifting task was measured as an average to high average score (70 ). This task was completed without error. His performance on a measure of phonemic fluency resulted in a high average score (46). On a measure of deductive reasoning and problem-solving, overall performance was assessed as a low average score for his age and education in terms of the number of categories learned (1). His  performance was characterized by a mildly error prone response style (below average score, NPE= 17, MS= 15). His ability to inhibit a dominant response was assessed as a low average score (24). His performance on this task was notable for one error. His copy of a complex figure was performed as an average score for his age and notable only for mildly poor planning and mild carelessness (29). His drawing of a clock was notable for incorrect hand size.     Mood  On the Geriatric Depression Scale-30 (GDS), a self report measure of depressive symptomatology, he obtained a score of 4, placing him in the range of no significant symptoms of depression.     On the Generalized Anxiety Disorder-7, a self-report measure of anxiety, he obtained a score of 0, placing him in the range of no significant anxiety.     EVALUATION SERVICES AND TIME:   A clinical interview/neurobehavioral status examination was conducted with the patient and documented. I thoroughly reviewed the medical record, selected the neuropsychological test battery, provided supervision to the individual who administered and scored the neuropsychological test battery, interpreted/integrated patient data and test results, engaged in clinical decision making, treatment planning, report writing/preparation and provided and documented interactive feedback of test results on June 28th. A trained examiner/technician directly administered and scored 2+ neuropsychological tests. Please see below for a breakdown of time spent and the associated codes billed for these services. Please note, all charges are filed at the completion of the Episode of Care and associated with the final encounter date (feedback session on June 28th).    Services   Time Spent  CPT Codes   Neurobehavioral Status Exam:  (e.g., face-to-face, interpretation, report) 70 minutes 1 x 96116   Neuropsychological Evaluation Services:   (e.g., integration, interpretation, treatment planning, clinical  decision making, feedback)   155 minutes   1 x 96132  2 x 96133        Neuropsychological Testing by Trained Examiner/Technician:  (e.g., test administration, scoring, 2+ tests administered)   200 minutes   1 x 96138  6 x 96139     Video-Visit Details (6/3/2022)    Type of service:  Video Visit    Interview with Provider:  Start Time: 1225pm  End Time: 130pm    Originating Location (pt. 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 Vázquez has a history of hypertension, hyperlipidemia, cardiomyopathy, unspecified type, and coronary artery disease and was referred for an evaluation of Mild Neurocognitive Disorder. Feedback of results will be provided via a formal feedback appointment with me on June 28th.       Xuan Ang, PhD, LP, ABPP  Clinical Neuropsychologist, [id number]  Board Certified in Clinical Neuropsychology    [place] [address], [address]  [place] [address]  Phone:  [phone]    The patient was seen for a neuropsychological evaluation for the purposes of diagnostic clarification and treatment planning. 150 minutes of face-to-face testing were provided by this writer. An additional 50 minutes were spent scoring and compiling test results. The patient was cooperative with testing. No concerns were brought to my attention. Please see Dr. Ang's report for a detailed description of the charges and interpretation and integration of the findings.      Again, thank you for allowing me to participate in the care of  your patient.        Sincerely,        Xuan Ang, PhD LP

## 2022-06-03 NOTE — PROGRESS NOTES
"NEUROPSYCHOLOGY HYBRID (FACE-TO-FACE AND TELE-HEALTH) CONSULT  [facility] Neu[facility]    NAME: Ambrocio Vázquez    YOB: 1950   AGE: 72  EDU: 18  DATE OF EVALUATION: 6/3/2022 (Tele-Health interview and office visit for testing)    Video Visit for Interview on 6/3/2022  Ambrocio Vázquez is a 72 year old male who is being evaluated via a billable video visit in light of the ongoing global health crisis (COVID-19) that requires us to abide by social distancing mandates in order to reduce the risk of COVID-19 exposure.      The patient has been notified of following:     \"This video visit will be conducted via a call between you and your provider. We have found that certain health care needs can be provided without the need for an in-person physical exam.  This service lets us provide the care you need with a video conversation. If during the course of the call the physician/provider feels a video visit is not appropriate, you will not be charged for this service.\"    In addition, information was provided about the risks, benefits and limitations of participating in the current hybrid (testing conducted both face-to-face and via in-clinic Tele-Health procedures) evaluation via Tele-Health as well more general explanation of the services to be provided today. He was told we would not be recording the Tele-Health aspects of the session and Mr. Vázquez agreed to not record the session or write down (or otherwise attempt to duplicate or retain) any information from the testing component of the evaluation.    Patient has given verbal consent to a Video visit? Yes    Patient would like the video invitation sent by: Chetan    REASON FOR REFERRAL:  Mr. Vázquez is a 72 year old, left-handed, White male presenting with concerns about cognitive functioning in the context of hypertension, hyperlipidemia, cardiomyopathy, unspecified type, and coronary artery disease. He was referred for a " neurocognitive evaluation by his primary care provider, Dr. Joelle Dill to assist with differential diagnosis and care planning.     DIAGNOSTIC SUMMARY:  Due to the current COVID-19 pandemic that limits contact during in-person clinical visits, the testing portion of this assessment was conducted using face-to-face methods with PPE worn by the examiner and a face-mask for the patient. The standard administration of these tests involves in-person, direct face-to-face methods. The full impact of applying non-standard administration methods with PPE is not fully appreciated at this time. The diagnostic conclusions and recommendations provided in this report are being advanced with caution.    With these limitations in mind, results of testing indicate that Mr. Vázquez is a gentleman of estimated average to high average premorbid intellectual functioning whose performance is notable for borderline impaired prose memory and mildly impaired nonverbal memory. More subtly below expectation performances (low average) were evident on measures of prose learning, deductive reasoning, and inhibition. These weaknesses were evident in the context of otherwise mostly average performance on measures of basic attention, cognitive efficiency, rote verbal learning and memory, nonverbal learning, language (sight word reading, abstraction, fund of knowledge, semantic fluency, confrontation naming), visuospatial reasoning skills and several measures of executive functioning (working memory, complex attention, phonemic fluency, visuospatial planning and organization). Finally, on self-report questionnaires, he denied any significant symptoms of depression or anxiety.     ASSESSMENT:    Borderline to mild impairments identified in prose memory and nonverbal memory    Subtly below expectation performances (low average) were evident on measures of prose learning, deductive reasoning, and inhibition    Otherwise mostly average performance  on measures of basic attention, cognitive efficiency, rote verbal learning and memory, nonverbal learning, language, visuospatial reasoning skills, and several measures of executive functioning    Cognitive testing reveals limited impairments, but given his higher level of premorbid functioning, low average (and even some average) scores likely represent a decline for him    Given his medical history including a number of cerebrovascular risk factors (hypertension, hyperlipidemia, cardiomyopathy, CAD, smoking history), these impairments in aspects of memory and weaknesses on some measures of executive functioning, could represent subtle frontal subcortical dysfunction that is often present in cerebrovascular disease. Updated neuroimaging would be helpful to clarify the extent of any burden of SVID or stroke.    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 This could reflect signs of depression but apathy or motivational concerns could also indicate an emerging neurodegenerative process.     PLAN:    Agree with recommendations for updated neuroimaging, ideally MRI if there are no contra-indications (to clarify the extent/ burden of SVID)    No concerns with his ability to continue to live independently, drive or manage his personal affairs    Continue to take care of himself physically (healthy diet, regular exercise, medication adherence)    Continue to stay cognitively active    He denied significant mood symptoms but there did appear to be some signs of apathy/ reduced initiative as  well as anxiety. This should be monitored and if symptoms worsen, psychotropic medication could be considered.     Given some evidence of cognitive impairment, re-evaluation in 1 years time is recommended    FEEDBACK:  Mr. Vázquez will receive the results of this evaluation via a formal feedback appointment with me on June 28th.     Thank you for allowing me to participate in Mr. Vázquez's care.  Please contact me with any questions regarding the content of this report.      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 Vázquez should be reassured that his mental faculties, outside of mild weaknesses in memory and problem solving, are well preserved    There are no concerns with Mr. Vázquez s current ability to continue to live independently, drive and manage his finances or personal affairs.  Physical and Emotional Health    It is important that Mr. Vázquez continue to adhere to his medication treatment regimen and follow a healthy diet so as to maintain his physical health, as this can have a significant impact on his physical, emotional, and cognitive functioning.     Mr. Vázquez does not appear to be struggling with any significant emotional symptoms. He does have some difficulties with initiation and anxiety. If this worsens or other symptoms of depression emerge, he may wish to consult with his physician to  consider a trial of psychotropic medication.     In the meantime, hehavioral activation techniques such as regular exercise (under the guidance of his physician), recreational activities and regular social interaction (with proper social distancing when indicated) would likely be effective in helping Mr. Vázquez to continue to maintain his mood.   Memory and Organization    Mr. Vázquez is encouraged to continue to engage in stimulating activities, (i.e., reading, card games, puzzles, house projects) to keep him cognitively active.     In his daily life, Mr. Vázquez will continue to benefit from the use of compensation techniques. That is, he may find it helpful to post reminder notes around the house, make lists, and carry a small calendar so that he can feel more comfortable and confident in his ability to remember information. A daily planner could also be used as a memory book where important information is recorded and organized for future reference.     Mr. Vázquez should also create a system to establish set locations for certain items (i.e., keys) such that he always knows where to put them upon entering the house and where to look when he needs them. If he would like to keep certain items out of sight (i.e., a wallet), he could set up a specific hidden place to keep items and use that same place so as to ensure he can find the required item when needed.     Mr. Vázquez exhibits memory weaknesses but does benefit from cues, thus he will do best when provided with reminders to facilitate retrieval of important information.     When required to learn new information, Mr. Vázquez showed good benefit from repetition. Thus it is recommended that information be broken down into small units and repeated to facilitate his learning.  Follow-up    Given evidence of cognitive impairment on current testing re-evaluation in 12 months is recommended. The current test data can be used as a baseline to which future  "comparisons can be made.    --------------------------------EXTENDED REPORT--------------------------------  Verbal consent for neuropsychological testing was received following the provision of information about the nature of the evaluation, and the opportunity to ask questions.     HISTORY OF PRESENTING PROBLEM:    Relevant History  Mr. Vázquez completed an annual wellness exam on September 9, 2021.  At that time he obtained a perfect score on the Mini-Cog.  However in a subsequent appointment (May 19, 2022), concerns were expressed about some memory changes.  Dr. Dill recommended labs (which were unremarkable), updated CT head scan (which does not yet appear to have been completed), and neuropsychological evaluation.    Current Interview  Mr. Vázquez was accompanied by his wife Nava and was an adequate historian. Together they provided the following information.     At the present time, Mr. Vázquez reports some concerns about lapses in memory noting that he has to rely on his wife a little more than he used to.  He acknowledges some difficulties with misplacing items and some increased distractibility.  He otherwise denies any significant concerns with language skills, speed of thinking, or problem-solving.  He noted that he functions as a  at home and has no difficulty working through projects.  He was able to describe the ongoing COVID pandemic; however he indicated that it has been going on since about 2018 \"over 3 years,\" (actually a little over 2 years, since early 2020).    His wife Nava agreed that he has been having difficulties misplacing items and feels that she has been noticing changes in her 's memory for a little over a year.  She indicated at first that she was noticing lapses that might occur once a week but are now occurring daily.  She described difficulties with misplacing items and forgetting details of conversations and upcoming events.  He will ask her repeatedly about what " they are doing or what time they are doing it.    With regard to the activities of daily living, Mr. Vázquez reported that his wife has always done the cooking, managed the calendar, and managed the family finances.  He manages his own medications and sets up a pillbox and members to take them without difficulty and his wife agreed.  He continues to drive with no recent tickets accidents or significant incidents of becoming lost and his wife agreed.    MEDICAL HISTORY:  Mr. 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     PFO (patent foramen ovale)     ASD with poss RV enlargement     Sciatica      Mr. Vázquez's current problem list includes   Patient Active Problem List   Diagnosis     Primary carnitine deficiency (H)     Sciatica     ERECTILE DYSFUNCTION     Disorder of prostate     Colon polyp     Abnormal echocardiogram     CAD (coronary artery disease)     Cardiomyopathy (H)     CARDIOVASCULAR SCREENING; LDL GOAL LESS THAN 100     Advanced directives, counseling/discussion     Stopped smoking with greater than 30 pack year history     Hyperlipidemia     History of angina     Coronary arteriosclerosis     Family history of aortic aneurysm     PFO  (patent foramen ovale)     Essential hypertension     Aneurysm of aortic root (H)     S/P lumbar fusion     Age-related nuclear cataract of both eyes     PVD (posterior vitreous detachment), left eye     Perforated ear drum, left     Anxiety     Other insomnia     Bilateral carpal tunnel syndrome     History of Lyme disease     Disorder of fatty acid metabolism     Mr. Vázquez denied any history of stroke or seizure.  He did endorse a history of approximately 3 head injuries with loss of consciousness related to playing sports in college.  He denied any significant subsequent difficulties following the head injuries.. He denied having tested positive for COVID or experiencing an illness concerning for COVID.  His wife agreed.    Mr. Vázquez indicated that he is fairly active and continues to be in good physical health.  He does have a history of multiple back surgeries and has some limitations related to this.    Mr. Vázquez does have a history of ruptured blood vessel in his left ear related to scuba diving that occurred approximately 15 years ago.  He has very limited hearing in his left ear.  Right ear hearing is essentially normal.  He otherwise denies any significant sensory changes or disturbance in appetite.  He did describe some difficulties falling asleep noting that he is often thinking about various projects and things on there to do list.  However, when he falls asleep he sleeps through the night without difficulty.  He and his wife denied any unusual sleep behaviors.    Diagnostic studies:  No recent neuroimaging was evident in the chart.     Past Surgical History:   Procedure Laterality Date     ABDOMEN SURGERY       COLONOSCOPY       COLONOSCOPY  5/3/2013    Procedure: COLONOSCOPY;  Colonoscopy;  Surgeon: Genaro Krishnan MD;  Location: [facility]     CORONARY ANGIOGRAPHY ADULT ORDER  6/7/2014    No restenosis     HEART CATH, ANGIOPLASTY  5/2010    complex  angioplasty/thrombectomy SHAZIA stenting  proximal and ostial LAD;LMA-nl,   stenting of Lad, diag small 80%, Lcx30%, RCA50%     HERNIA REPAIR      [facility] RI     HERNIORRHAPHY INGUINAL  1/11/2013    Procedure: HERNIORRHAPHY INGUINAL;  LEFT INGUINAL HERNIA REPAIR WITH MESH;  Surgeon: Santos Coleman MD;  Location: [facility]     HERNIORRHAPHY INGUINAL Right 12/29/2015    Procedure: HERNIORRHAPHY INGUINAL;  Surgeon: Santos Coleman MD;  Location: [facility]     OPTICAL TRACKING SYSTEM FUSION SPINE POSTERIOR LUMBAR THREE+ LEVELS N/A 1/29/2018    Procedure: OPTICAL TRACKING SYSTEM FUSION SPINE POSTERIOR LUMBAR THREE+ LEVELS;  L3, L4 and L5 Berg-Maloney osteotomies with L3-4, L4-5 and L5-S1 transforaminal lumbar interbody fusion ;  Surgeon: Gabino Velez MD;  Location: [facility]     PAST SURGICAL HISTORY  2002    hernia repair, right     PAST SURGICAL HISTORY      pilonidal cyst and sebaceous cyst surg     PE TUBES  2/10    left ear PE for persistant ZINA     SOFT TISSUE SURGERY      right axilla lipoma removed     ZZC LAMINECTOMY,>2 SGMT,LUMBAR  2001       Current medications include (per medical record):   Current Outpatient Medications:      aspirin 81 MG tablet, Take 1 tablet (81 mg) by mouth daily, Disp: 30 tablet, Rfl: 0     finasteride (PROSCAR) 5 MG tablet, TAKE 1 TABLET BY MOUTH  DAILY, Disp: 90 tablet, Rfl: 1     fish oil-omega-3 fatty acids 1000 MG capsule, Take 1 g by mouth daily , Disp: , Rfl:      lisinopril (ZESTRIL) 40 MG tablet, TAKE 1 TABLET BY MOUTH  DAILY, Disp: 100 tablet, Rfl: 2     metoprolol succinate ER (TOPROL-XL) 50 MG 24 hr tablet, TAKE 1 TABLET BY MOUTH  DAILY, Disp: 100 tablet, Rfl: 2     nitroGLYcerin (NITROSTAT) 0.4 MG sublingual tablet, FOR CHEST PAIN PLACE 1 TABLET UNDER THE TONGUE EVERY 5 MINUTES FOR 3 DOSES. IF SYMPTOMS PERSIST 5 MINUTES AFTER FIRST DOSE CALL 911., Disp: 25 tablet, Rfl: 3     sildenafil (VIAGRA) 100 MG tablet, Take 1 tablet (100 mg) by mouth daily as needed (erectile dysfunction) Take 30 min to  "4 hours before intercourse.  Never use with nitroglycerin, terazosin or doxazosin., Disp: 10 tablet, Rfl: 1     simvastatin (ZOCOR) 40 MG tablet, TAKE 1 TABLET BY MOUTH AT  BEDTIME, Disp: 90 tablet, Rfl: 1.    FAMILY HISTORY:   Family medical history is significant for:   Family History   Problem Relation Age of Onset     C.A.D. Father      Alzheimer Disease Father      Heart Disease Father      Hypertension Father         age post 75     Cancer Brother         brain tumor- age 46     Heart Disease Mother         aortic anerysum      Alzheimer Disease Paternal Grandmother      Other Cancer Brother         Krishna, , age 46     Glaucoma No family hx of      Macular Degeneration No family hx of    Mr. Vázquez reported that his paternal grandmother had dementia and that his father had Alzheimer's disease for approximately 5 to 10 years before he  age 82.  He also noted that his brother  at approximately age 45 from a \"massive brain tumor,\" and when asked he felt that the term GBM was familiar but was not certain.  He was otherwise unaware of any other neurologic or neurodegenerative conditions in the family.    PSYCHIATRIC AND SUBSTANCE USE HISTORY:  With regard to his psychiatric history, Mr. Vázquez denied a history of past psychiatric conditions, but did acknowledge a brief period of mental health treatment related to significant professional stress approximately 20 years ago. He stated that he was referred to a psychiatrist and took a medication briefly but had negative side effects so stopped it almost immediately.  He otherwise denied any mental health interventions.  At the current time, he described his mood as \"excellent\".  He denies any significant anxiety or depression.  However his wife did note that he tends to worry about their projects list and rather lengthy to do list.  She notes that he does have a hard time getting started but acknowledges that there are rather challenging " "labor-intensive tasks on the list and not exactly \"fun.\"  Once he does get started, he can finish them.  Mr. Vázquez himself acknowledges that he sometimes can be a little bit of a worrier noting that was his job, to worry about other people's problems (as an ).  He denied any suicidal ideation, plan or intent or hallucinations or delusions.    With regard to substance use, Mr. Vázquez stated that he is not a big drinker.  His wife agreed noting that he might have an alcoholic beverage twice a month.  He denied any history of problematic alcohol use and his wife agreed.  He did smoke most of his adult life but quit approximately 5 years ago.  At that time he was smoking up to half a pack a day.    SOCIAL HISTORY:  Mr. Vázquez was born and raised in [place], [place]. He was unaware of any complications in his mother's pregnancy with him or in his birth, or delays in reaching developmental milestones. He denied a history of early learning or attention difficulties, individualized instruction, or grade repetition. He described himself as an \"average\" student earning mostly B's.  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  He later went back to school and earned a bachelor's degree in math and subsequently entered a doctoral program in environmental engineering which was very new at the time.  He only completed a masters as he  and focused on his family.  He worked as, what he describes as a  for 42 years.  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  He retired in 2018.    Mr. Vázquez indicated that he was  in 1973 and has been  for approximately 47 years (actually 1974 and will be 49 years this fall).  He was able to correctly state that he has 3 children and 5 grandchildren.    MENTAL STATUS AND BEHAVIORAL " "OBSERVATIONS:   Mr. Vázquez arrived 15 minutes early and accompanied by his wife to today's appointment. He was appropriately dressed and groomed. He was alert and engaged during the interview.  Gait was not observed. His mood was described as \"excellent\" but his affective presentation was somewhat flat. Rapport was easily established and eye contact was unremarkable. He was pleasant and cooperative. Prosody and content of speech were grossly normal.  Rate of speech was mildly slow and methodical. No significant word finding difficulties or paraphasic errors were evident. There was no evidence of a royal thought disorder; no hallucinations or delusions were apparent. Judgment and insight appeared fair.       Mr. Vázquez appeared adequately motivated and engaged easily in the testing component of the evaluation. His performance was fully intact on embedded measures of objective effort. He attempted all tasks presented to him and worked at a quick pace.  He did not appear overly frustrated by difficult or challenging tasks and responded appropriately to encouragement from the examiner.  No significant barriers to testing were observed and the following is judged to be a valid representation of Mr. Vázquez's current cognitive strengths and weaknesses.    LIMITATIONS:  Due to circumstances that limit contact during in-person clinical visits, this assessment was conducted using face-to-face testing with the examiner wearing [employer] designated PPE and the patient wearing a face mask. The standard administration of these procedures involves in-person, face-to-face methods without PPE. The impact of applying non-standard administration methods has been evaluated only in part by scientific research. While every effort was made to simulate standard assessment practices, the diagnostic conclusions and recommendations for treatment provided in this report are being advanced with these limitations in mind.    TESTS " ADMINISTERED:   Grethel Naming Test (BNT), Brief Visuospatial Memory Test - R Form 1 (BVMT-R), Category Fluency- AFV (CAT), Clock Drawing, Controlled Oral Word Association Test CFL (COWAT), Generalized Anxiety Disorder-7 (GEOFF-7), Geriatric Depression Scale 30 (GDS), Dunne Verbal Learning Test - R Form 1 (HVLT-R), Harry-Osterrieth Complex Figure Test, copy only (RCFT), Stroop Color and Word Test, Trail Making Test (TMT), WAIS-IV (Similarities, Information, Block Design, Matrix Reasoning, Digit Span), WMS-IV Logical Memory, WRAT-5 Word Reading (blue), [place] Card Sorting Test-1 deck (WCST) and WMS-III Information and Orientation.    MOANS norms were used for BNT, CAT, COWAT, RCFT, Stroop, TMT    Raw scores in parentheses    DESCRIPTIVE PERFORMANCE KEY:    Labels for tests with Normal Distributions  Score Label Standard Score %ile Rank   Exceptionally high score  > 130 > 98   Above average score 120-129 91-97   High average score 110-119 75-90   Average score  25-74   Low average score 80-89 9-24   Below average score 70-79 2-8   Exceptionally low score < 70 < 2     Labels for tests with Non-Normal Distributions  Score Label %ile Rank   Within normal expectations/ limits score (WNL) > 24   Low average score 9-24   Below average score 2-8   Exceptionally low score < 2     The following test results utilize score labels as adapted from David Kraus, José Buenrostro, Anh Cook, RIC Major, Prabhakar Senior Michael Westerveld & Conference Participants (2020): American Academy of Clinical Neuropsychology consensus conference statement on uniform labeling of performance test scores, The Clinical Neuropsychologist, DOI: 10.1080/[email or web address]    All scores contain some measure of error; scores are reported here as they are obtained by the individual (without reference to the range of error). These are meant as labels and not interpretation of performance. While other  "relevant comments regarding task performance are provided below, please see the Assessment, Impressions and Diagnostic Summary sections of this report for interpretation of the scores and the cognitive profile as a whole, including what does and does not constitute impairment.    OPTIMAL PREMORBID INTELLECT:  Optimal premorbid intellectual abilities were estimated as falling in the average to high average range based on Mr. Vázquez's educational and occupational histories and performance on tasks least likely to be affected by acquired brain dysfunction (i.e.,  hold tests ).    SUMMARY OF TEST RESULTS:     Orientation, Attention and Processing Speed  Mental status exam was measured as a low average score for his age (12). He was oriented to person, place, and time, and was able to correctly name the current and previous presidents. He was oriented to month and day of the week but stated it was the 4th (actually 3rd) and 2021 (actually 2022).    Performance on a measure of basic attention and working memory was assessed as an average score (26). This reflected an average score for basic attention skills (LDF = 7) and an average score for working memory skills (LDB = 5, LDS = 5).     A simple sequencing task (45\" ) and a speeded word reading task (82) were both assessed as a low average to average score. A speeded color naming task (63) was assessed as an average score.     Language  Sight word reading skills (63) were assessed as an average score. Verbal abstraction skills (24) were assessed as an average score. Fund of general knowledge (15) was assessed as an average score. His performance on a measure of semantic fluency was measured as an average score (39). Confrontation naming was measured as a within normal limits score (56).    Visuospatial Skills  Performance on a block construction task (36) resulted in an average score. Performance on a pattern completion task (18) was measured as a high average score. His " copy of a series of 6 simple geometric figures was performed without error.     Learning and Memory  Immediate memory for two short stories was measured as a low average score (23). Delayed recall of these stories resulted in a low average score (9, 56% retention). Recognition memory was measured as a low average score (15/23). He was also administered a measure of rote auditory verbal list learning that required him to learn a series of 12 words over three trials and retain and recall them over a delay. His initial rate of learning (6,9,10) reflected an average score. He retained and recalled 9 words (90% retention) after the delay for an average score for delayed recall. Recognition memory was measured as an average score as he correctly identified 11 of the original words and made no false positive errors.     Immediate nonverbal memory (2,7,9) for a series of 6 geometric figures was measured as an average score while delayed recall of these figures (4 details) resulted in a below average score. Recognition memory was measured as a low average score as he correctly identified 5 of the original figures and made 1 false positive error.    Executive Functioning  Working memory skills were assessed as an average score (LDB = 5, LDS = 5). A complex sequencing and set shifting task was measured as an average to high average score (70 ). This task was completed without error. His performance on a measure of phonemic fluency resulted in a high average score (46). On a measure of deductive reasoning and problem-solving, overall performance was assessed as a low average score for his age and education in terms of the number of categories learned (1). His performance was characterized by a mildly error prone response style (below average score, NPE= 17, FL= 15). His ability to inhibit a dominant response was assessed as a low average score (24). His performance on this task was notable for one error. His copy of a complex  figure was performed as an average score for his age and notable only for mildly poor planning and mild carelessness (29). His drawing of a clock was notable for incorrect hand size.     Mood  On the Geriatric Depression Scale-30 (GDS), a self report measure of depressive symptomatology, he obtained a score of 4, placing him in the range of no significant symptoms of depression.     On the Generalized Anxiety Disorder-7, a self-report measure of anxiety, he obtained a score of 0, placing him in the range of no significant anxiety.     EVALUATION SERVICES AND TIME:   A clinical interview/neurobehavioral status examination was conducted with the patient and documented. I thoroughly reviewed the medical record, selected the neuropsychological test battery, provided supervision to the individual who administered and scored the neuropsychological test battery, interpreted/integrated patient data and test results, engaged in clinical decision making, treatment planning, report writing/preparation and provided and documented interactive feedback of test results on June 28th. A trained examiner/technician directly administered and scored 2+ neuropsychological tests. Please see below for a breakdown of time spent and the associated codes billed for these services. Please note, all charges are filed at the completion of the Episode of Care and associated with the final encounter date (feedback session on June 28th).    Services   Time Spent  CPT Codes   Neurobehavioral Status Exam:  (e.g., face-to-face, interpretation, report) 70 minutes 1 x 96116   Neuropsychological Evaluation Services:   (e.g., integration, interpretation, treatment planning, clinical decision making, feedback)   155 minutes   1 x 96132  2 x 96133        Neuropsychological Testing by Trained Examiner/Technician:  (e.g., test administration, scoring, 2+ tests administered)   200 minutes   1 x 96138  6 x 96139     Video-Visit Details (6/3/2022)    Type of  service:  Video Visit    Interview with Provider:  Start Time: 1225pm  End Time: 130pm    Originating Location (pt. 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 Vázquez has a history of hypertension, hyperlipidemia, cardiomyopathy, unspecified type, and coronary artery disease and was referred for an evaluation of Mild Neurocognitive Disorder. Feedback of results will be provided via a formal feedback appointment with me on June 28th.       Xaun Ang, PhD, LP, ABPP  Clinical Neuropsychologist, LP#[id number]  Board Certified in Clinical Neuropsychology    [facility] [place] [address], [address]  [place] [address]  Phone:  [phone]

## 2022-06-13 ENCOUNTER — MYC MEDICAL ADVICE (OUTPATIENT)
Dept: FAMILY MEDICINE | Facility: CLINIC | Age: 72
End: 2022-06-13
Payer: COMMERCIAL

## 2022-06-13 DIAGNOSIS — R73.03 PRE-DIABETES: Primary | ICD-10-CM

## 2022-06-13 NOTE — PROGRESS NOTES
The patient was seen for a neuropsychological evaluation for the purposes of diagnostic clarification and treatment planning. 150 minutes of face-to-face testing were provided by this writer. An additional 50 minutes were spent scoring and compiling test results. The patient was cooperative with testing. No concerns were brought to my attention. Please see Dr. Ang's report for a detailed description of the charges and interpretation and integration of the findings.

## 2022-06-15 ENCOUNTER — TELEPHONE (OUTPATIENT)
Dept: FAMILY MEDICINE | Facility: CLINIC | Age: 72
End: 2022-06-15
Payer: COMMERCIAL

## 2022-06-15 NOTE — TELEPHONE ENCOUNTER
Diabetes Education Scheduling Outreach #1:    Call to patient to schedule. Patient asked us to call spouse, Nava, after 330pm to discuss scheduling.    Plan for 2nd outreach attempt within 1 business day.    Maty [facility]  Diabetes and Nutrition Scheduling

## 2022-06-16 NOTE — TELEPHONE ENCOUNTER
Diabetes Education Scheduling Outreach #2:    Call to patient to schedule. Left message with phone number to call to schedule.    Maty Beaulieu  [facility]  Diabetes and Nutrition Scheduling

## 2022-06-28 ENCOUNTER — VIRTUAL VISIT (OUTPATIENT)
Dept: NEUROLOGY | Facility: CLINIC | Age: 72
End: 2022-06-28
Payer: COMMERCIAL

## 2022-06-28 DIAGNOSIS — G31.84 MILD NEUROCOGNITIVE DISORDER: Primary | ICD-10-CM

## 2022-06-28 PROCEDURE — 96133 NRPSYC TST EVAL PHYS/QHP EA: CPT | Mod: 95 | Performed by: CLINICAL NEUROPSYCHOLOGIST

## 2022-06-28 PROCEDURE — 96138 PSYCL/NRPSYC TECH 1ST: CPT | Performed by: CLINICAL NEUROPSYCHOLOGIST

## 2022-06-28 PROCEDURE — 96132 NRPSYC TST EVAL PHYS/QHP 1ST: CPT | Mod: 95 | Performed by: CLINICAL NEUROPSYCHOLOGIST

## 2022-06-28 PROCEDURE — 96139 PSYCL/NRPSYC TST TECH EA: CPT | Performed by: CLINICAL NEUROPSYCHOLOGIST

## 2022-06-28 PROCEDURE — 96116 NUBHVL XM PHYS/QHP 1ST HR: CPT | Mod: 95 | Performed by: CLINICAL NEUROPSYCHOLOGIST

## 2022-06-28 NOTE — PROGRESS NOTES
"NEUROPSYCHOLOGY TELE-HEALTH FEEDBACK VISIT  [facility]    Telephone Visit  Ambrocio Vázquez is a 72 year old male who is being evaluated via a billable telephone visit.    The patient has been notified of the following:      \"This telephone visit will be conducted via a call between you and your provider. We have found that certain health care needs can be provided without the need for a physical exam.  This service lets us provide the care you need with a phone conversation. If during the course of the call the provider feels a telephone visit is not appropriate, you will not be charged for this service.\"     Patient has given verbal consent to a Telephone visit? Yes  Consent has been obtained for this service by 1 care team member: yes.    Visit Summary  The purpose of today s appointment was to provide feedback regarding Mr. Vázquez's recent Neuropsychological Consult completed on June 3rd. I provided Mr. Vázquez and his wife Nava with feedback regarding his performance on cognitive testing and his pattern of cognitive strengths and weaknesses including borderline to mild impairments in prose memory and nonverbal memory as well as more subtly below expectation performances (low average) on measures of prose learning, deductive reasoning, and inhibition. I discussed my overall impressions that these weaknesses are very mild at present and likely related to cerebrovascular disease (in light of his medical history) and are consistent with a diagnosis of Mild Neurocognitive Disorder (likely due to vascular disease). We discussed recommendations including continuing to stay cognitively and physically active and re-evaluation in one years time to monitor his cognition. I provided the opportunity for Mr. Vázquez and his wife to ask questions about the evaluation and results. At the end of the session, they indicated that they understood the results and that I had answered all of their " questions. They were encouraged to reach out to me (contact information included in the AVS) should any further questions or concerns arise in the future. Impressions and recommendations from the report were provided as part of the After Visit Summary which he elected to receive via [employer]. I explained that Dr. Dill would be receiving the full report as the consulting provider.       Xuan Ang, PhD, LP, [facility]  Clinical Neuropsychologist, LP#[id number]  Board Certified in Clinical Neuropsychology    [facility]  [address], [address]  [place] [address]  Phone:  [phone]    Telephone Visit Details    Type of service:  Telephone Visit  Start Time: 930am  End Time: 955am    Originating Location (pt. Location): Home    Distant Location (provider location):  Remote work for [facility]    Mode of Communication:  Telephone    Ambrocio Vázquez was evaluated via a billable telephone visit.       For diagnostic and coding purposes, Mr. Vázquez has a history of hypertension, hyperlipidemia, cardiomyopathy, unspecified type, and coronary artery disease and was referred for an evaluation of Mild Neurocognitive Disorder.  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 Please see the 6/3/2022 evaluation for a detailed description of codes and services, including services provided today.      In brief:   1 x 96116  1 x 96132  2 x 96133  1 x 96138  6 x 96139

## 2022-06-28 NOTE — LETTER
"    6/28/2022         RE: Ambrocio Vázquez  [address] [address]        Dear Colleague,    Thank you for referring your patient, Ambrocio Vázquez, to the [facility]. Please see a copy of my visit note below.    NEUROPSYCHOLOGY TELE-HEALTH FEEDBACK VISIT  [facility]    Telephone Visit  Ambrocio Vázquez is a 72 year old male who is being evaluated via a billable telephone visit.    The patient has been notified of the following:      \"This telephone visit will be conducted via a call between you and your provider. We have found that certain health care needs can be provided without the need for a physical exam.  This service lets us provide the care you need with a phone conversation. If during the course of the call the provider feels a telephone visit is not appropriate, you will not be charged for this service.\"     Patient has given verbal consent to a Telephone visit? Yes  Consent has been obtained for this service by 1 care team member: yes.    Visit Summary  The purpose of today s appointment was to provide feedback regarding Mr. Vázquez's recent Neuropsychological Consult completed on June 3rd. I provided Mr. Vázquez and his wife Nava with feedback regarding his performance on cognitive testing and his pattern of cognitive strengths and weaknesses including borderline to mild impairments in prose memory and nonverbal memory as well as more subtly below expectation performances (low average) on measures of prose learning, deductive reasoning, and inhibition. I discussed my overall impressions that these weaknesses are very mild at present and likely related to cerebrovascular disease (in light of his medical history) and are consistent with a diagnosis of Mild Neurocognitive Disorder (likely due to vascular disease). We discussed recommendations including continuing to stay cognitively and physically active and " re-evaluation in one years time to monitor his cognition. I provided the opportunity for Mr. Vázquez and his wife to ask questions about the evaluation and results. At the end of the session, they indicated that they understood the results and that I had answered all of their questions. They were encouraged to reach out to me (contact information included in the AVS) should any further questions or concerns arise in the future. Impressions and recommendations from the report were provided as part of the After Visit Summary which he elected to receive via [employer]. I explained that Dr. Dill would be receiving the full report as the consulting provider.       Xuan Ang, PhD, LP, ABPP  Clinical Neuropsychologist, LP#[id number]  Board Certified in Clinical Neuropsychology    [address] , [address]  [place] [address]  Phone:  [phone]    Telephone Visit Details    Type of service:  Telephone Visit  Start Time: 930am  End Time: 955am    Originating Location (pt. Location): Home    Distant Location (provider location):  Remote work for [facility]    Mode of Communication:  Telephone    Ambrocio Vázquez was evaluated via a billable telephone visit.       For diagnostic and coding purposes, Mr. Vázquez has a history of hypertension, hyperlipidemia, cardiomyopathy, unspecified type, and coronary artery disease and was referred for an evaluation of Mild Neurocognitive Disorder.  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 Please see the 6/3/2022 evaluation for a detailed description of codes and services, including services provided today.      In brief:   1 x 96116  1 x 96132  2 x  89019  1 x 96138  6 x 96139      Again, thank you for allowing me to participate in the care of your patient.        Sincerely,        Xuan Ang, PhD LP

## 2022-06-28 NOTE — PATIENT INSTRUCTIONS
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 Vázquez should be reassured that his mental faculties, outside of mild weaknesses in memory and problem solving, are well preserved  There are no concerns with Mr. Vázquez's current ability to continue to live independently, drive and manage his finances or personal affairs.  Physical and Emotional Health  It is important that Mr. Vázquez continue to adhere to his medication treatment regimen and follow a healthy diet so as to maintain his physical health, as this can have a significant impact on his physical, emotional, and cognitive functioning.   Mr. Váqzuez does not appear to be struggling with any significant emotional symptoms. He does have some difficulties with initiation and anxiety. If this worsens or other symptoms of depression emerge, he may wish to consult with his physician to consider a trial of psychotropic medication.   In the meantime, behavioral activation techniques such as regular exercise (under the guidance of his physician), recreational activities and regular social interaction (with proper social distancing when indicated) would likely be effective in helping Mr. Vázquez to continue to maintain his mood.   Memory and Organization  Mr. Vázquez is encouraged to continue to engage in stimulating activities, (i.e., reading, card games, puzzles, house projects) to keep him  cognitively active.   In his daily life, Mr. Vázquez will continue to benefit from the use of compensation techniques. That is, he may find it helpful to post reminder notes around the house, make lists, and carry a small calendar so that he can feel more comfortable and confident in his ability to remember information. A daily planner could also be used as a memory book where important information is recorded and organized for future reference.   Mr. Vázquez should also create a system to establish set locations for certain items (i.e., keys) such that he always knows where to put them upon entering the house and where to look when he needs them. If he would like to keep certain items out of sight (i.e., a wallet), he could set up a specific hidden place to keep items and use that same place so as to ensure he can find the required item when needed.   Mr. Vázquez exhibits memory weaknesses but does benefit from cues, thus he will do best when provided with reminders to facilitate retrieval of important information.   When required to learn new information, Mr. Vázquez showed good benefit from repetition. Thus it is recommended that information be broken down into small units and repeated to facilitate his learning.  Follow-up  Given evidence of cognitive impairment on current testing re-evaluation in 12 months is recommended. The current test data can be used as a baseline to which future comparisons can be made.

## 2022-07-12 DIAGNOSIS — N42.9 DISORDER OF PROSTATE: ICD-10-CM

## 2022-07-12 DIAGNOSIS — I25.10 CORONARY ARTERIOSCLEROSIS: ICD-10-CM

## 2022-07-12 DIAGNOSIS — E78.49 OTHER HYPERLIPIDEMIA: ICD-10-CM

## 2022-07-13 RX ORDER — FINASTERIDE 5 MG/1
TABLET, FILM COATED ORAL
Qty: 100 TABLET | Refills: 2 | OUTPATIENT
Start: 2022-07-13

## 2022-07-13 RX ORDER — SIMVASTATIN 40 MG
TABLET ORAL
Qty: 100 TABLET | Refills: 2 | OUTPATIENT
Start: 2022-07-13

## 2022-09-20 ASSESSMENT — ENCOUNTER SYMPTOMS
ABDOMINAL PAIN: 0
SHORTNESS OF BREATH: 0
NAUSEA: 0
FREQUENCY: 0
CHILLS: 0
MYALGIAS: 1
HEADACHES: 0
DIARRHEA: 0
NERVOUS/ANXIOUS: 0
FEVER: 0
EYE PAIN: 0
ARTHRALGIAS: 0
CONSTIPATION: 0
PARESTHESIAS: 0
SORE THROAT: 0
HEMATURIA: 0
HEARTBURN: 0
COUGH: 0
WEAKNESS: 0
HEMATOCHEZIA: 0
PALPITATIONS: 0
JOINT SWELLING: 0
DIZZINESS: 0
DYSURIA: 0

## 2022-09-20 ASSESSMENT — ACTIVITIES OF DAILY LIVING (ADL): CURRENT_FUNCTION: NO ASSISTANCE NEEDED

## 2022-09-25 DIAGNOSIS — I25.10 CORONARY ARTERIOSCLEROSIS: ICD-10-CM

## 2022-09-25 DIAGNOSIS — N42.9 DISORDER OF PROSTATE: ICD-10-CM

## 2022-09-25 DIAGNOSIS — E78.49 OTHER HYPERLIPIDEMIA: ICD-10-CM

## 2022-09-27 ENCOUNTER — OFFICE VISIT (OUTPATIENT)
Dept: FAMILY MEDICINE | Facility: CLINIC | Age: 72
End: 2022-09-27
Payer: COMMERCIAL

## 2022-09-27 VITALS
HEIGHT: 71 IN | WEIGHT: 158.2 LBS | DIASTOLIC BLOOD PRESSURE: 77 MMHG | TEMPERATURE: 96.5 F | BODY MASS INDEX: 22.15 KG/M2 | HEART RATE: 48 BPM | SYSTOLIC BLOOD PRESSURE: 131 MMHG

## 2022-09-27 DIAGNOSIS — I25.10 CORONARY ARTERIOSCLEROSIS: ICD-10-CM

## 2022-09-27 DIAGNOSIS — Z13.220 SCREENING FOR HYPERLIPIDEMIA: ICD-10-CM

## 2022-09-27 DIAGNOSIS — N52.01 ERECTILE DYSFUNCTION DUE TO ARTERIAL INSUFFICIENCY: ICD-10-CM

## 2022-09-27 DIAGNOSIS — Z23 NEED FOR VACCINATION: ICD-10-CM

## 2022-09-27 DIAGNOSIS — R73.01 ELEVATED FASTING GLUCOSE: ICD-10-CM

## 2022-09-27 DIAGNOSIS — Z87.891 STOPPED SMOKING WITH GREATER THAN 30 PACK YEAR HISTORY: ICD-10-CM

## 2022-09-27 DIAGNOSIS — Z82.49 FAMILY HISTORY OF AORTIC ANEURYSM: ICD-10-CM

## 2022-09-27 DIAGNOSIS — E78.49 OTHER HYPERLIPIDEMIA: ICD-10-CM

## 2022-09-27 DIAGNOSIS — R41.89 COGNITIVE DECLINE: ICD-10-CM

## 2022-09-27 DIAGNOSIS — Z87.891 PERSONAL HISTORY OF TOBACCO USE: ICD-10-CM

## 2022-09-27 DIAGNOSIS — Z12.5 SCREENING FOR PROSTATE CANCER: ICD-10-CM

## 2022-09-27 DIAGNOSIS — Z00.00 MEDICARE ANNUAL WELLNESS VISIT, SUBSEQUENT: Primary | ICD-10-CM

## 2022-09-27 DIAGNOSIS — I10 ESSENTIAL HYPERTENSION: ICD-10-CM

## 2022-09-27 DIAGNOSIS — N42.9 DISORDER OF PROSTATE: ICD-10-CM

## 2022-09-27 LAB
ANION GAP SERPL CALCULATED.3IONS-SCNC: 9 MMOL/L (ref 7–15)
BUN SERPL-MCNC: 13.4 MG/DL (ref 8–23)
CALCIUM SERPL-MCNC: 9.3 MG/DL (ref 8.8–10.2)
CHLORIDE SERPL-SCNC: 103 MMOL/L (ref 98–107)
CHOLEST SERPL-MCNC: 160 MG/DL
CREAT SERPL-MCNC: 0.97 MG/DL (ref 0.67–1.17)
DEPRECATED HCO3 PLAS-SCNC: 29 MMOL/L (ref 22–29)
GFR SERPL CREATININE-BSD FRML MDRD: 83 ML/MIN/1.73M2
GLUCOSE SERPL-MCNC: 115 MG/DL (ref 70–99)
HDLC SERPL-MCNC: 49 MG/DL
LDLC SERPL CALC-MCNC: 98 MG/DL
NONHDLC SERPL-MCNC: 111 MG/DL
POTASSIUM SERPL-SCNC: 4.6 MMOL/L (ref 3.4–5.3)
PSA SERPL-MCNC: 1.14 NG/ML (ref 0–6.5)
SODIUM SERPL-SCNC: 141 MMOL/L (ref 136–145)
TRIGL SERPL-MCNC: 66 MG/DL

## 2022-09-27 PROCEDURE — 0124A COVID-19,PF,PFIZER BOOSTER BIVALENT: CPT | Performed by: FAMILY MEDICINE

## 2022-09-27 PROCEDURE — G0296 VISIT TO DETERM LDCT ELIG: HCPCS | Performed by: FAMILY MEDICINE

## 2022-09-27 PROCEDURE — 36415 COLL VENOUS BLD VENIPUNCTURE: CPT | Performed by: FAMILY MEDICINE

## 2022-09-27 PROCEDURE — G0438 PPPS, INITIAL VISIT: HCPCS | Performed by: FAMILY MEDICINE

## 2022-09-27 PROCEDURE — 90662 IIV NO PRSV INCREASED AG IM: CPT | Performed by: FAMILY MEDICINE

## 2022-09-27 PROCEDURE — 91312 COVID-19,PF,PFIZER BOOSTER BIVALENT: CPT | Performed by: FAMILY MEDICINE

## 2022-09-27 PROCEDURE — 80048 BASIC METABOLIC PNL TOTAL CA: CPT | Performed by: FAMILY MEDICINE

## 2022-09-27 PROCEDURE — G0008 ADMIN INFLUENZA VIRUS VAC: HCPCS | Performed by: FAMILY MEDICINE

## 2022-09-27 PROCEDURE — 80061 LIPID PANEL: CPT | Performed by: FAMILY MEDICINE

## 2022-09-27 PROCEDURE — 99213 OFFICE O/P EST LOW 20 MIN: CPT | Mod: 25 | Performed by: FAMILY MEDICINE

## 2022-09-27 PROCEDURE — G0103 PSA SCREENING: HCPCS | Performed by: FAMILY MEDICINE

## 2022-09-27 RX ORDER — LISINOPRIL 40 MG/1
40 TABLET ORAL DAILY
Qty: 90 TABLET | Refills: 3 | Status: SHIPPED | OUTPATIENT
Start: 2022-09-27 | End: 2023-09-19

## 2022-09-27 RX ORDER — SIMVASTATIN 40 MG
TABLET ORAL
Qty: 80 TABLET | Refills: 3 | OUTPATIENT
Start: 2022-09-27

## 2022-09-27 RX ORDER — SILDENAFIL 100 MG/1
100 TABLET, FILM COATED ORAL DAILY PRN
Qty: 10 TABLET | Refills: 1 | Status: SHIPPED | OUTPATIENT
Start: 2022-09-27 | End: 2023-09-21 | Stop reason: ALTCHOICE

## 2022-09-27 RX ORDER — METOPROLOL SUCCINATE 50 MG/1
50 TABLET, EXTENDED RELEASE ORAL DAILY
Qty: 100 TABLET | Refills: 3 | Status: SHIPPED | OUTPATIENT
Start: 2022-09-27 | End: 2023-10-05

## 2022-09-27 RX ORDER — SIMVASTATIN 40 MG
40 TABLET ORAL AT BEDTIME
Qty: 90 TABLET | Refills: 3 | Status: SHIPPED | OUTPATIENT
Start: 2022-09-27 | End: 2023-09-05

## 2022-09-27 ASSESSMENT — ENCOUNTER SYMPTOMS
DIARRHEA: 0
HEMATOCHEZIA: 0
HEADACHES: 0
CONSTIPATION: 0
SORE THROAT: 0
SHORTNESS OF BREATH: 0
ABDOMINAL PAIN: 0
COUGH: 0
HEMATURIA: 0
DYSURIA: 0
FREQUENCY: 0
MYALGIAS: 1
FEVER: 0
NAUSEA: 0
ARTHRALGIAS: 0
JOINT SWELLING: 0
DIZZINESS: 0
EYE PAIN: 0
CHILLS: 0
PARESTHESIAS: 0
HEARTBURN: 0
PALPITATIONS: 0
WEAKNESS: 0
NERVOUS/ANXIOUS: 0

## 2022-09-27 ASSESSMENT — ACTIVITIES OF DAILY LIVING (ADL): CURRENT_FUNCTION: NO ASSISTANCE NEEDED

## 2022-09-27 NOTE — TELEPHONE ENCOUNTER
Routing refill request to provider for review/approval because:  Today's; 9/27/22; lab results are in process.  Thank you.  Lonnie Mckeon RN

## 2022-09-27 NOTE — PROGRESS NOTES
"SUBJECTIVE:   Ambrocio is a 72 year old who presents for Preventive Visit.      Patient has been advised of split billing requirements and indicates understanding: Yes  Are you in the first 12 months of your Medicare coverage?  No    Healthy Habits:     In general, how would you rate your overall health?  Good    Frequency of exercise:  2-3 days/week    Duration of exercise:  45-60 minutes    Do you usually eat at least 4 servings of fruit and vegetables a day, include whole grains    & fiber and avoid regularly eating high fat or \"junk\" foods?  Yes    Taking medications regularly:  Yes    Medication side effects:  None    Ability to successfully perform activities of daily living:  No assistance needed    Home Safety:  No safety concerns identified    Hearing Impairment:  Difficulty following a conversation in a noisy restaurant or crowded room, feel that people are mumbling or not speaking clearly, difficulty following dialogue in the theater, difficult to understand a speaker at a public meeting or Taoism service and difficulty understanding soft or whispered speech    In the past 6 months, have you been bothered by leaking of urine?  No    In general, how would you rate your overall mental or emotional health?  Good      PHQ-2 Total Score: 0    Additional concerns today:  No    Taking care of grandkids multiple days a week    Had neuropsych testing this spring - mild cognitive decline  He feels a little paranoid about it  Will repeat in 2023         Do you feel safe in your environment? Yes    Have you ever done Advance Care Planning? (For example, a Health Directive, POLST, or a discussion with a medical provider or your loved ones about your wishes): Yes, advance care planning is on file.      Fall risk  Fallen 2 or more times in the past year?: No  Any fall with injury in the past year?: No    Cognitive Screening   1) Repeat 3 items (Leader, Season, Table)    2) Clock draw: NORMAL  3) 3 item recall: Recalls 1 " object   Results: NORMAL clock, 1-2 items recalled: COGNITIVE IMPAIRMENT LESS LIKELY    Mini-CogTM Copyright DISHA Coleman. Licensed by the author for use in [facility]; reprinted with permission (ubaldo@[facility]). All rights reserved.      Do you have sleep apnea, excessive snoring or daytime drowsiness?: no    Reviewed and updated as needed this visit by clinical staff                    Reviewed and updated as needed this visit by Provider                   Social History     Tobacco Use     Smoking status: Former Smoker     Packs/day: 0.50     Years: 30.00     Pack years: 15.00     Types: Cigarettes, Cigarettes     Quit date: 1/15/2010     Years since quittin.7     Smokeless tobacco: Never Used   Substance Use Topics     Alcohol use: Yes     Comment: 1-2 a week     If you drink alcohol do you typically have >3 drinks per day or >7 drinks per week? No    Alcohol Use 2022   Prescreen: >3 drinks/day or >7 drinks/week? No   Prescreen: >3 drinks/day or >7 drinks/week? -   No flowsheet data found.    Current providers sharing in care for this patient include:   Patient Care Team:  Joelle Dill MD as PCP - General (Family Practice)  Daniel Villa MD as MD (Otolaryngology)  Mary Garcia MD as MD (Pediatric Metabolism)  Joelle Dill MD as Assigned PCP  Xuan Ang, PhD LP as Assigned Behavioral Health Provider    The following health maintenance items are reviewed in Epic and correct as of today:  Health Maintenance   Topic Date Due     LUNG CANCER SCREENING  Never done     ZOSTER IMMUNIZATION (2 of 2) 2019     EYE EXAM  2020     COVID-19 Vaccine (5 - Booster for Pfizer series) 2022     INFLUENZA VACCINE (1) 2022     LIPID  2022     MEDICARE ANNUAL WELLNESS VISIT  2022     BMP  2022     ANNUAL REVIEW OF HM ORDERS  2023     COLORECTAL CANCER SCREENING  2023     FALL RISK ASSESSMENT  2023     ADVANCE CARE PLANNING   "09/10/2026     DTAP/TDAP/TD IMMUNIZATION (5 - Td or Tdap) 10/22/2029     HEPATITIS C SCREENING  Completed     PHQ-2 (once per calendar year)  Completed     Pneumococcal Vaccine: 65+ Years  Completed     AORTIC ANEURYSM SCREENING (SYSTEM ASSIGNED)  Completed     IPV IMMUNIZATION  Aged Out     MENINGITIS IMMUNIZATION  Aged Out     HEPATITIS B IMMUNIZATION  Aged Out       Review of Systems   Constitutional: Negative for chills and fever.   HENT: Positive for hearing loss. Negative for congestion, ear pain and sore throat.    Eyes: Negative for pain and visual disturbance.   Respiratory: Negative for cough and shortness of breath.    Cardiovascular: Negative for chest pain, palpitations and peripheral edema.   Gastrointestinal: Negative for abdominal pain, constipation, diarrhea, heartburn, hematochezia and nausea.   Genitourinary: Positive for impotence. Negative for dysuria, frequency, genital sores, hematuria, penile discharge and urgency.   Musculoskeletal: Positive for myalgias. Negative for arthralgias and joint swelling.   Skin: Negative for rash.   Neurological: Negative for dizziness, weakness, headaches and paresthesias.   Psychiatric/Behavioral: The patient is not nervous/anxious.        OBJECTIVE:   /77   Pulse (!) 48   Temp (!) 96.5  F (35.8  C) (Tympanic)   Ht 1.793 m (5' 10.6\")   Wt 71.8 kg (158 lb 3.2 oz)   BMI 22.32 kg/m   Estimated body mass index is 21.78 kg/m  as calculated from the following:    Height as of 5/19/22: 1.816 m (5' 11.5\").    Weight as of 5/19/22: 71.8 kg (158 lb 6.4 oz).  Physical Exam  GENERAL: healthy, alert and no distress  EYES: Eyes grossly normal to inspection, PERRL and conjunctivae and sclerae normal  HENT: ear canals and TM's normal, nose and mouth without ulcers or lesions  NECK: no adenopathy, no asymmetry, masses, or scars and thyroid normal to palpation  RESP: lungs clear to auscultation - no rales, rhonchi or wheezes  CV: regular rate and rhythm, normal S1 S2, " no S3 or S4, no murmur, click or rub, no peripheral edema and peripheral pulses strong  ABDOMEN: soft, nontender, no hepatosplenomegaly, no masses and bowel sounds normal  MS: no gross musculoskeletal defects noted, no edema  SKIN: no suspicious lesions or rashes  NEURO: Normal strength and tone, mentation intact and speech normal  PSYCH: mentation appears normal, affect normal/bright    Diagnostic Test Results:  Labs reviewed in Epic    ASSESSMENT / PLAN:   (Z00.00) Medicare annual wellness visit, subsequent  (primary encounter diagnosis)  Comment: We discussed exercise 30mins/day, and calcium with vitamin D at 1200mg/day, preferably from dietary sources.  Diet, Weight loss, and Exercise were discussed as well       (Z13.220) Screening for hyperlipidemia  Comment: discussed  Plan: Lipid panel reflex to direct LDL Non-fasting            (I10) Essential hypertension  Comment: home bps are looking good. Continue meds and check labs   Plan: Basic metabolic panel  (Ca, Cl, CO2, Creat,         Gluc, K, Na, BUN), lisinopril (ZESTRIL) 40 MG         tablet, metoprolol succinate ER (TOPROL XL) 50         MG 24 hr tablet            (Z87.891) Stopped smoking with greater than 30 pack year history  Comment: discussed with him - he declines but will think about it   Plan: Prof Fee: Shared Decision Making Visit for Lung        Cancer Screening            (E78.49) Other hyperlipidemia  Comment: med refilled   Plan: simvastatin (ZOCOR) 40 MG tablet            (Z82.49) Family history of aortic aneurysm  Comment: negative aaa us in 2017   Plan:     (R41.89) Cognitive decline  Comment: mild - neuropsych testing summer 2022  Plan: reviewed with him .repeat in a year     (I25.10) Coronary arteriosclerosis  Comment: 5/4/10 - 100% occlusion of proximal LAD with 2 [place] stents.   6/28/11 - seen in f/u. Adenosine stress 6/23/11 - normal LV perfusion, some atypical chest qamar  Plan: simvastatin (ZOCOR) 40 MG tablet            (N52.01)  "Erectile dysfunction due to arterial insufficiency  Comment: med helps  - discussed risks/benefits   Plan: sildenafil (VIAGRA) 100 MG tablet            (Z23) Need for vaccination  Comment: discussed with him shingrix at pharmacy   Plan: COVID-19,PF,PFIZER BOOSTER BIVALENT (12+YRS),         INFLUENZA, QUAD, HIGH DOSE, PF, 65YR + (FLUZONE        HD)            (N42.9) Disorder of prostate  Comment: Discussed that yearly prostate screening ( psa) is not recommended by USPSTF  Discussed risks/benefits of screening including that no screening could miss cancer and that screening could cause unnecessary procedures.   He understands and agrees and chooses to do psa testing.   Plan: PSA, screen            (Z12.5) Screening for prostate cancer  Comment: see above   Plan: PSA, screen            (Z87.891) Personal history of tobacco use  Comment: discussed   Plan: Prof Fee: Shared Decision Making Visit for Lung        Cancer Screening              Patient has been advised of split billing requirements and indicates understanding: Yes    COUNSELING:  Reviewed preventive health counseling, as reflected in patient instructions       Regular exercise       Healthy diet/nutrition    Estimated body mass index is 21.78 kg/m  as calculated from the following:    Height as of 5/19/22: 1.816 m (5' 11.5\").    Weight as of 5/19/22: 71.8 kg (158 lb 6.4 oz).        He reports that he quit smoking about 12 years ago. His smoking use included cigarettes and cigarettes. He has a 15.00 pack-year smoking history. He has never used smokeless tobacco.      Appropriate preventive services were discussed with this patient, including applicable screening as appropriate for cardiovascular disease, diabetes, osteopenia/osteoporosis, and glaucoma.  As appropriate for age/gender, discussed screening for colorectal cancer, prostate cancer, breast cancer, and cervical cancer. Checklist reviewing preventive services available has been given to the " patient.    Reviewed patients plan of care and provided an AVS. The Basic Care Plan (routine screening as documented in Health Maintenance) for Ambrocio meets the Care Plan requirement. This Care Plan has been established and reviewed with the Patient.    Counseling Resources:  ATP IV Guidelines  Pooled Cohorts Equation Calculator  Breast Cancer Risk Calculator  Breast Cancer: Medication to Reduce Risk  FRAX Risk Assessment  ICSI Preventive Guidelines  Dietary Guidelines for Americans, 2010  [employer]'s MyPlate  ASA Prophylaxis  Lung CA Screening    Joelle Dill MD  [facility]    Identified Health Risks:  Lung Cancer Screening Shared Decision Making Visit     Ambrocio Vázquez, a 72 year old male, is eligible for lung cancer screening    History   Smoking Status     Former Smoker     Packs/day: 0.50     Years: 30.00     Types: Cigarettes, Cigarettes     Quit date: 1/15/2010   Smokeless Tobacco     Never Used       I have discussed with patient the risks and benefits of screening for lung cancer with low-dose CT.     The risks include:    radiation exposure: one low dose chest CT has as much ionizing radiation as about 15 chest x-rays, or 6 months of background radiation living in [place]      false positives: most findings/nodules are NOT cancer, but some might still require additional diagnostic evaluation, including biopsy    over-diagnosis: some slow growing cancers that might never have been clinically significant will be detected and treated unnecessarily     The benefit of early detection of lung cancer is contingent upon adherence to annual screening or more frequent follow up if indicated.     Furthermore, to benefit from screening, Ambrocio must be willing and able to undergo diagnostic procedures, if indicated. Although no specific guide is available for determining severity of comorbidities, it is reasonable to withhold screening in patients who have greater mortality risk from other diseases.      We did discuss that the best way to prevent lung cancer is to not smoke.    Some patients may value a numeric estimation of lung cancer risk when evaluating if lung cancer screening is right for them, here is one calculator:    ShouldIScreen    Ambrocio Vázquez has declined screening at this time

## 2022-09-28 RX ORDER — FINASTERIDE 5 MG/1
TABLET, FILM COATED ORAL
Qty: 80 TABLET | Refills: 3 | OUTPATIENT
Start: 2022-09-28

## 2022-10-06 ENCOUNTER — LAB (OUTPATIENT)
Dept: LAB | Facility: CLINIC | Age: 72
End: 2022-10-06
Payer: COMMERCIAL

## 2022-10-06 DIAGNOSIS — R73.01 ELEVATED FASTING GLUCOSE: ICD-10-CM

## 2022-10-06 LAB — HBA1C MFR BLD: 6 % (ref 0–5.6)

## 2022-10-06 PROCEDURE — 36415 COLL VENOUS BLD VENIPUNCTURE: CPT

## 2022-10-06 PROCEDURE — 83036 HEMOGLOBIN GLYCOSYLATED A1C: CPT

## 2022-12-29 ENCOUNTER — TELEPHONE (OUTPATIENT)
Dept: UROLOGY | Facility: CLINIC | Age: 72
End: 2022-12-29

## 2022-12-29 NOTE — TELEPHONE ENCOUNTER
Last OV 1/12/21 with Dr. Iglesias for ED.  Will need a f/u appointment at some point soon for med refills, etc.    Sent mychart letter informing patient that we do not know if Dr. Iglesias will be returning to practice. Advised that he schedule a follow up appointment with ANY urologist in the [facility] system.     Gave phone # to schedule.  Steffanie Dumont RN on 12/29/2022 at 10:34 AM

## 2022-12-29 NOTE — TELEPHONE ENCOUNTER
Health Call Center    Phone Message    May a detailed message be left on voicemail: yes     Reason for Call: Other: . pt is returning a call- pts appt was cancelled with  and rescheduled with Stacia, however the pt ONLY wants to see , he is not having any issues currently- I did inform him  is on an indefinite leave of absence, but he would like a call back to schedule something once  is back, unsure if this is an appropriate request, thank you    Action Taken: Message routed to:  Other: uro    Travel Screening: Not Applicable

## 2023-01-03 NOTE — TELEPHONE ENCOUNTER
Urology follow up appointment  Message [phone]  From   Steffanie Dumont, AMISHA To   Ambrocio Vázquez Sent and Delivered   12/29/2022 10:31 AM     Patient read [employer] message.   Closing note.  Steffanie Dumont RN on 1/3/2023 at 8:05 AM

## 2023-04-11 NOTE — PROGRESS NOTES
Mackenzie from Lab called with critical result of WBC 1.9 at 1149. Critical lab result read back to Mackenzie.   Dr. Faye notified of critical lab result at 1150.  Critical lab result read back by Dr. Faye.   [facility]    Hospitalist Progress Note    Date of Service (when I saw the patient): 01/31/2018    Assessment & Plan    Patient is a 67-year-old gentleman with a history of hypertension, hyperlipidemia, BPH, and coronary artery disease who we were asked to consult for postoperative medical management.     Problem list/Plan:  Status post  bilateral L3-S1 decompression with facetectomies and trans-foraminal lumbar interbody fusion: Will defer pain control and DVT prophylaxis to neurosurgery. Postoperative PT/OT consultation.    1. History of hypertension: BP controlled.   Will hold Toprol-XL for now due to mild bradycardia     2. History of BPH: Continue finasteride    3. Hyperlipidemia: Continue simvastatin     DVT Prophylaxis: Pneumatic Compression Devices    Code Status: Full code    Disposition: Expected discharge: Per primary team    Marva Onofre MD    Interval History   Recent claims that he is doing well.  He denies shortness of breath, cough, fever.  Pain well controlled.    -Data reviewed today: I reviewed all new labs and imaging results over the last 24 hours. I personally reviewed    Physical Exam   Temp: 97.3  F (36.3  C) Temp src: Oral BP: 124/68   Heart Rate: 57 Resp: 16 SpO2: 97 % O2 Device: None (Room air) Oxygen Delivery: 2 LPM  Vitals:    01/15/18 1447 01/29/18 0606   Weight: 74.8 kg (165 lb) 74.8 kg (165 lb)     Vital Signs with Ranges  Temp:  [97.2  F (36.2  C)-98.7  F (37.1  C)] 97.3  F (36.3  C)  Heart Rate:  [54-96] 57  Resp:  [16-18] 16  BP: (110-124)/(59-69) 124/68  SpO2:  [96 %-100 %] 97 %  I/O last 3 completed shifts:  In: 1260 [P.O.:1160; I.V.:100]  Out: 2370 [Urine:1950; Drains:420]    GEN:  Alert, oriented x 3, appears comfortable, NAD.  HEENT:  Normocephalic/atraumatic, no scleral icterus, no nasal discharge, mouth moist.  CV:  Regular rate and rhythm, no murmur or JVD.  S1 + S2 noted, no S3 or S4.  LUNGS:  Clear to auscultation bilaterally without  rales/rhonchi/wheezing/retractions.  Symmetric chest rise on inhalation noted.  ABD:  Active bowel sounds, soft, non-tender/non-distended.  No rebound/guarding/rigidity.  EXT:  No edema or cyanosis.  Hands/feet warm to touch with good signs of peripheral perfusion.  No joint synovitis noted.  SKIN:  Dry to touch, no exanthems noted in the visualized areas.  NEURO:  Symmetric muscle strength, sensation to touch grossly intact.  No new focal deficits appreciated.    Medications       metoprolol succinate  25 mg Oral Daily     finasteride  5 mg Oral Daily     simvastatin  40 mg Oral At Bedtime     sodium chloride (PF)  3 mL Intracatheter Q8H     ceFAZolin  2 g Intravenous Q8H     acetaminophen  975 mg Oral Q8H     docusate sodium  100 mg Oral BID       Data     Recent Labs  Lab 01/30/18  0659   WBC 9.7   HGB 12.7*   MCV 98   *   *       No results found for this or any previous visit (from the past 24 hour(s)).

## 2023-05-26 ENCOUNTER — TRANSFERRED RECORDS (OUTPATIENT)
Dept: HEALTH INFORMATION MANAGEMENT | Facility: CLINIC | Age: 73
End: 2023-05-26
Payer: COMMERCIAL

## 2023-06-02 ENCOUNTER — TRANSFERRED RECORDS (OUTPATIENT)
Dept: HEALTH INFORMATION MANAGEMENT | Facility: CLINIC | Age: 73
End: 2023-06-02
Payer: COMMERCIAL

## 2023-06-12 ENCOUNTER — TRANSFERRED RECORDS (OUTPATIENT)
Dept: HEALTH INFORMATION MANAGEMENT | Facility: CLINIC | Age: 73
End: 2023-06-12
Payer: COMMERCIAL

## 2023-06-30 ENCOUNTER — MYC MEDICAL ADVICE (OUTPATIENT)
Dept: FAMILY MEDICINE | Facility: CLINIC | Age: 73
End: 2023-06-30
Payer: COMMERCIAL

## 2023-07-05 ENCOUNTER — HOSPITAL ENCOUNTER (OUTPATIENT)
Dept: CT IMAGING | Facility: HOSPITAL | Age: 73
Discharge: HOME | End: 2023-07-05
Attending: FAMILY MEDICINE | Admitting: FAMILY MEDICINE
Payer: COMMERCIAL

## 2023-07-05 DIAGNOSIS — R41.3 MEMORY LOSS: ICD-10-CM

## 2023-07-05 PROCEDURE — 70450 CT HEAD/BRAIN W/O DYE: CPT

## 2023-07-07 DIAGNOSIS — I25.10 CORONARY ARTERIOSCLEROSIS: ICD-10-CM

## 2023-07-07 DIAGNOSIS — E78.49 OTHER HYPERLIPIDEMIA: ICD-10-CM

## 2023-07-10 RX ORDER — SIMVASTATIN 40 MG
TABLET ORAL
Qty: 100 TABLET | Refills: 2 | OUTPATIENT
Start: 2023-07-10

## 2023-07-10 NOTE — TELEPHONE ENCOUNTER
Medication not refilled   Was filled on 9/27/23 for 90 days with 3 refills.   To early to refill.  Phillip Benton RN

## 2023-07-23 DIAGNOSIS — I10 ESSENTIAL HYPERTENSION: ICD-10-CM

## 2023-07-24 RX ORDER — LISINOPRIL 40 MG/1
TABLET ORAL
Qty: 100 TABLET | Refills: 2 | OUTPATIENT
Start: 2023-07-24

## 2023-08-25 ENCOUNTER — PATIENT OUTREACH (OUTPATIENT)
Dept: GASTROENTEROLOGY | Facility: CLINIC | Age: 73
End: 2023-08-25
Payer: COMMERCIAL

## 2023-08-25 DIAGNOSIS — Z12.11 SPECIAL SCREENING FOR MALIGNANT NEOPLASMS, COLON: Primary | ICD-10-CM

## 2023-08-25 NOTE — PROGRESS NOTES
"Pt with hx of adenomatous polyp with 5 year recall recommended on last colonoscopy performed in 2018    Ordering/Referring Provider: Joelle Dill   BMI: Estimated body mass index is 22.32 kg/m  as calculated from the following:    Height as of 9/27/22: 1.793 m (5' 10.6\").    Weight as of 9/27/22: 71.8 kg (158 lb 3.2 oz).     Sedation:  Based on patient's medical history patient is appropriate for   moderate sedation. If patient's BMI > 50 do not schedule in ASC.    Location:  Does patient have an LVAD?  No    Does patient have a history of moderate to severe sleep apnea?  No    Does patient have a history of asthma, COPD or any other lung disease?  No    Does patient have a history of cardiac disease?  Yes     In the past 6 months, has the patient been hospitalized for any cardiac issues including cardiomyopathy, heart attack, or stent placement?  No    Does the patient have any implantable devices (pacemaker, ICD)?  No    Is patient awaiting a heart or lung transplant?   No    Has patient had a stroke or transient ischemic attack in the last 6 months?   No    Is the patient currently on dialysis?   No    Prep:  Previous prep (last colonoscopy):   Unable to determine    Quality of previous prep:   Excellent    Is patient currently on dialysis, is ESRD, or CKD stage 4/5?   No (standard prep)    Does patient have a diagnosis of diabetes?  No    Does patient have a diagnosis of cystic fibrosis (CF)?  No    BMI > 40?  No    Final Referral Status:  meets the criteria for placement of colonoscopy screening  referral order.      Referral order placed with the following recommendations:  Sedation: Moderate Sedation  Location Type: No Scheduling Restrictions  Prep: MiraLAX (No Mag Citrate)  "

## 2023-09-02 DIAGNOSIS — E78.49 OTHER HYPERLIPIDEMIA: ICD-10-CM

## 2023-09-02 DIAGNOSIS — I25.10 CORONARY ARTERIOSCLEROSIS: ICD-10-CM

## 2023-09-04 DIAGNOSIS — N42.9 DISORDER OF PROSTATE: ICD-10-CM

## 2023-09-05 ENCOUNTER — MYC MEDICAL ADVICE (OUTPATIENT)
Dept: FAMILY MEDICINE | Facility: CLINIC | Age: 73
End: 2023-09-05
Payer: COMMERCIAL

## 2023-09-05 DIAGNOSIS — N42.9 DISORDER OF PROSTATE: ICD-10-CM

## 2023-09-05 RX ORDER — FINASTERIDE 5 MG/1
TABLET, FILM COATED ORAL
Qty: 80 TABLET | Refills: 3 | OUTPATIENT
Start: 2023-09-05

## 2023-09-05 RX ORDER — SIMVASTATIN 40 MG
40 TABLET ORAL AT BEDTIME
Qty: 30 TABLET | Refills: 0 | Status: SHIPPED | OUTPATIENT
Start: 2023-09-05 | End: 2023-10-05

## 2023-09-05 RX ORDER — FINASTERIDE 5 MG/1
1 TABLET, FILM COATED ORAL DAILY
Qty: 90 TABLET | Refills: 3 | Status: SHIPPED | OUTPATIENT
Start: 2023-09-05 | End: 2023-10-06

## 2023-09-05 NOTE — TELEPHONE ENCOUNTER
"Prescription approved per [facility] Refill Protocol.       Requested Prescriptions   Pending Prescriptions Disp Refills    simvastatin (ZOCOR) 40 MG tablet [Pharmacy Med Name: Simvastatin 40 MG Oral Tablet] 30 tablet 0     Sig: TAKE 1 TABLET BY MOUTH AT  BEDTIME       [facility] Protocol Passed - 9/2/2023  9:05 PM        Passed - LDL on file in past 12 months     Recent Labs   Lab Test 09/27/22  1057   LDL 98             Passed - No abnormal creatine kinase in past 12 months     No lab results found.             Passed - Recent (12 mo) or future (30 days) visit within the authorizing provider's specialty     Patient has had an office visit with the authorizing provider or a provider within the authorizing providers department within the previous 12 mos or has a future within next 30 days. See \"Patient Info\" tab in inbasket, or \"Choose Columns\" in Meds & Orders section of the refill encounter.              Passed - Medication is active on med list        Passed - Patient is age 18 or older                 Holly Borjas RN 09/05/23 1:25 PM   "

## 2023-09-05 NOTE — TELEPHONE ENCOUNTER
"Routing refill request to provider for review/approval because:  A break in medication            Requested Prescriptions   Pending Prescriptions Disp Refills    finasteride (PROSCAR) 5 MG tablet [Pharmacy Med Name: Finasteride 5 MG Oral Tablet] 80 tablet 3     Sig: TAKE 1 TABLET BY MOUTH  DAILY       BPH Agents Passed - 9/4/2023  9:04 AM        Passed - Recent (12 mo) or future (30 days) visit within the authorizing provider's department     Patient has had an office visit with the authorizing provider or a provider within the authorizing providers department within the previous 12 mos or has a future within next 30 days. See \"Patient Info\" tab in inbasket, or \"Choose Columns\" in Meds & Orders section of the refill encounter.              Passed - Medication is active on med list        Passed - Patient is 18 years of age or older                 Holly Borjas RN 09/05/23 2:28 PM    "

## 2023-09-11 ENCOUNTER — LAB (OUTPATIENT)
Dept: LAB | Facility: CLINIC | Age: 73
End: 2023-09-11
Payer: COMMERCIAL

## 2023-09-11 DIAGNOSIS — N42.9 DISORDER OF PROSTATE: ICD-10-CM

## 2023-09-11 LAB — PSA SERPL DL<=0.01 NG/ML-MCNC: 1.09 NG/ML (ref 0–6.5)

## 2023-09-11 PROCEDURE — G0103 PSA SCREENING: HCPCS

## 2023-09-11 PROCEDURE — 36415 COLL VENOUS BLD VENIPUNCTURE: CPT

## 2023-09-13 ENCOUNTER — OFFICE VISIT (OUTPATIENT)
Dept: NEUROPSYCHOLOGY | Facility: CLINIC | Age: 73
End: 2023-09-13
Payer: COMMERCIAL

## 2023-09-13 DIAGNOSIS — G31.84 MILD NEUROCOGNITIVE DISORDER: Primary | ICD-10-CM

## 2023-09-13 PROCEDURE — 96139 PSYCL/NRPSYC TST TECH EA: CPT | Performed by: CLINICAL NEUROPSYCHOLOGIST

## 2023-09-13 PROCEDURE — 96116 NUBHVL XM PHYS/QHP 1ST HR: CPT | Performed by: CLINICAL NEUROPSYCHOLOGIST

## 2023-09-13 PROCEDURE — 96132 NRPSYC TST EVAL PHYS/QHP 1ST: CPT | Performed by: CLINICAL NEUROPSYCHOLOGIST

## 2023-09-13 PROCEDURE — 96133 NRPSYC TST EVAL PHYS/QHP EA: CPT | Performed by: CLINICAL NEUROPSYCHOLOGIST

## 2023-09-13 PROCEDURE — 96138 PSYCL/NRPSYC TECH 1ST: CPT | Performed by: CLINICAL NEUROPSYCHOLOGIST

## 2023-09-13 NOTE — LETTER
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 Learning performance was inconsistent with basic attention skills. Later during feedback, he admitted that he was not fully engaged during memory tasks and was generally frustrated with having to go through the testing process and was frequently distracted. He attempted all tasks presented to him, but as noted, would often give up quickly. He worked at a steady pace. Given his own report of inconsistent engagement and frustration with testing that contributed to distractibility, the following results are interpreted with caution as they likely underestimate 's true cognitive capabilities.     TESTS ADMINISTERED:   [place] Naming Test (BNT), Brief Visuospatial Memory Test - R Form 2 (BVMT-R), Category Fluency- AFV (CAT), Clock Drawing, Controlled Oral Word Association Test CFL (COWAT), Generalized Anxiety Disorder-7 (GEOFF-7), Geriatric Depression Scale 30 (GDS), Dunne Verbal Learning Test - R Form 3 (HVLT-R), Harry-Osterrieth Complex Figure Test, copy only (RCFT), Stroop Color and Word Test, Trail Making Test (TMT), WAIS-IV (Similarities, Information, Block Design, Matrix Reasoning, Digit Span),  WMS-IV Logical Memory, [place] Card Sorting Test-1 deck (WCST) and WMS-III Information and Orientation.    MOANS norms were used for BNT, CAT, COWAT, RCFT, Stroop, TMT    While an attempt was made to match the battery administered in 2022, exact comparisons are made with some caution given that original testing was performed with PPE (unknown effects of PPE use on testing).     DESCRIPTIVE PERFORMANCE KEY:    Labels for tests with Normal Distributions  Score Label Standard Score %ile Rank   Exceptionally high score  > 130 > 98   Above average score 120-129 91-97   High average score 110-119 75-90   Average score  25-74   Low average score 80-89 9-24   Below average score 70-79 2-8   Exceptionally low score < 70 < 2     Labels for tests with Non-Normal Distributions  Score Label %ile Rank   Within normal expectations/ limits score (WNL) > 24   Low average score 9-24   Below average score 2-8   Exceptionally low score < 2     The following test results utilize score labels as adapted from David Kraus, José Buenrostro, Anh Cook, RIC Major, Prabhakar Senior Michael Westerveld & Conference Participants (2020): American Academy of Clinical Neuropsychology consensus conference statement on uniform labeling of performance test scores, The Clinical Neuropsychologist, DOI: 10.1080/[email or web address]    All scores contain some measure of error; scores are reported here as they are obtained by the individual (without reference to the range of error). These are meant as labels and not interpretation of performance. While other relevant comments regarding task performance are provided below, please see the Assessment, Impressions and Diagnostic Summary sections of this report for interpretation of the scores and the cognitive profile as a whole, including what does and does not constitute impairment.    OPTIMAL PREMORBID INTELLECT:  Optimal premorbid intellectual  "abilities were estimated as falling in the average to high average range based on Mr. Vázquez's educational and occupational histories and performance on tasks least likely to be affected by acquired brain dysfunction (i.e.,  hold tests ) administered in 2022.    SUMMARY OF TEST RESULTS:     Orientation, Attention and Processing Speed  Mental status exam was measured as a within normal limits score for his age (14). He was oriented to person, place, time, and date and was able to correctly name the current and previous presidents. Last year he lost two points on this task for stating the wrong date and year.     Performance on a measure of basic attention and working memory was assessed as an average score (23). This reflected an average score for basic attention skills (LDF = 7) and working memory scores that ranged from a low average score (LDS = 4) to an average score (LDB = 5). Performance on this task was very similar a year ago, although at that time, working memory had been assessed solidly as an average score (LDF = 7, LDB = 5, LDS = 5).     A speeded word reading task (82) and a speeded color naming task (62) were assessed as an average score. A simple sequencing task (29\") was assessed as a high average score. This is consistent with previous testing.    Orientation, attention, working memory and cognitive efficiency are stable and intact over the last year.     Language  Fund of general knowledge (14) was assessed as an average score, consistent with previous testing. Verbal abstraction skills (17) were assessed as a low average score representing a slight decline from a previous average score last year. His performance on a measure of semantic fluency was measured as an average score (45), consistent with previous testing (but a 6 raw point improvement). Confrontation naming was measured as a within normal limits score (59) consistent with previous testing (but a 3 raw point improvement).    Language " skills are largely stable and intact as compared to previous testing.     Visuospatial Skills  Performance on a block construction task (32) resulted in an average score and consistent with previous testing. Performance on a pattern completion task (20) was measured as an above average score representing notable improvement from a high average score last year. His copy of a series of 6 simple geometric figures was notable for one error (11). In 2022 he had copied the figures without error.     Nonverbal skills are solidly intact and represent stable to improved performance as compared to a year ago.     Learning and Memory  He was administered a measure of rote auditory verbal list learning that required him to learn a series of 12 words over three trials and retain and recall them over a delay. His initial rate of learning (6,6,8) reflected a low average score. He retained and recalled 3 words (38% retention) after the delay for an exceptionally low score for delayed recall.  Recognition memory was measured as a low average score as he correctly identified 10 of the original words and made 1 false positive error.  2022 performance on a different form of this task was stronger with average immediate, delayed and recognition memory.     Immediate memory for two short stories was measured as an exceptionally low score (9). Delayed recall of these stories resulted in a below average (4, 67% retention). Recognition memory was measured as a low average score (16/23).  2022 performance was stronger with low average immediate and delayed recall, but with weaker (56%) retention and stable recognition memory.     Immediate nonverbal memory (5,3,6) for a series of 6 geometric figures was measured as a low average score while delayed recall of these figures ( 3 details) resulted in an exceptionally low score. Recognition memory was measured as a low average score as he correctly identified 4 of the original figures and made   no false positive errors. In 2022, nonverbal learning and memory had been stronger and measured as average and below average scores respectively but with stable recognition memory.     Learning and memory have declined across 3 different memory tasks in the last year; however, performance is notable for limited and variable learning that is not always consistent with basic attention skills.     Executive Functioning  Working memory skills ranged from a low average score (LDS = 4) to an average score (LDB = 5) representing a very slight decline from previous average scores for working memory a year ago (LDB = 5, LDS = 5). A complex sequencing and set shifting task was measured as a high average score (75 ) and completed without error, consistent with 2022 testing. His performance on a measure of phonemic fluency resulted in a high average score (45) and consistent with previous testing. On a measure of deductive reasoning and problem-solving, overall performance was assessed as a low average score for his age and education in terms of the number of categories learned (1). His performance was characterized by an error prone (below average score, NPE = 17) and perseverative (below average score, OK= 21) response style. Performance on this task is stable as compared to 2022 testing. His ability to inhibit an over-learned response was assessed as an average score (28) and this task was completed without error. In 2022, performance was slightly weaker and assessed as a low average score and notable for one error. His copy of a complex figure was performed as a high average score for his age (33) was well planned and organized and represents improvement from a previous average score last year. His drawing of a clock was unremarkable. Last year his clock was notable for incorrect hand size.     Performance on measures of executive functioning was notable for stability (complex attention, phonemic fluency, deductive  reasoning), improvement (inhibition, clock drawing, visuospatial planning and organization) and decline (working memory) as compared to 2022 with only a few tasks performed below expectation for his age.     Mood  On the Geriatric Depression Scale-30 (GDS), a self report measure of depressive symptomatology, he obtained a score of 6, placing him in the range of no significant symptoms of depression.     On the Generalized Anxiety Disorder-7, a self-report measure of anxiety, he obtained a score of 0, placing him in the range of minimal anxiety.     EVALUATION SERVICES AND TIME:   A clinical interview/neurobehavioral status examination was conducted with the patient and documented. I thoroughly reviewed the medical record, selected the neuropsychological test battery, provided supervision to the individual who administered and scored the neuropsychological test battery, interpreted/integrated patient data and test results, engaged in clinical decision making, treatment planning, report writing/preparation, and provided and documented interactive feedback of test results on the day of testing . A trained examiner/technician directly administered and scored 2+ neuropsychological tests. Please see below for a breakdown of time spent and the associated codes billed for these services.     Services   Time Spent  CPT Codes   Neurobehavioral Status Exam:  (e.g., face-to-face, interpretation, report) 35 minutes 1 x 96116   Neuropsychological Evaluation Services:   (e.g., integration, interpretation, treatment planning, clinical decision making, feedback)   180 minutes   1 x 96132  2 x 96133        Neuropsychological Testing by Trained Examiner/Technician:  (e.g., test administration, scoring, 2+ tests administered)   165 minutes   1 x 96138  4 x 96139     Diagnosis:  Unspecified Mild Neurocognitive Disorder, rule out  Mild Neurocognitive Disorder due to Alzheimer's     For diagnostic and coding purposes, Mr. Vázquez has a  history of hypertension, hyperlipidemia, cardiomyopathy, unspecified type, coronary artery disease and 2022 Neuropsychological evaluation notable for Mild Vascular Neurocognitive Disorder and was referred for an evaluation of Mild Vascular Neurocognitive Disorder. Feedback of results was provided on the day of testing.       Xuan Ang, PhD, LP, [facility]  Clinical Neuropsychologist, [id number]  Board Certified in Clinical Neuropsychology    [facility] [address], [address]  [place] [address]  Phone:  [phone]    The patient was seen for a neuropsychological evaluation for the purposes of diagnostic clarification and treatment planning. 125 minutes of face-to-face testing were provided by this writer. An additional 40 minutes were spent scoring and compiling test results. The patient was cooperative with testing. No concerns were brought to my attention. Please see Dr. Ang's report for a detailed description of the charges and interpretation and integration of the findings.

## 2023-09-13 NOTE — PROGRESS NOTES
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 distracted. He attempted all tasks presented to him, but as noted, would often give up quickly. He worked at a steady pace. Given his own report of inconsistent engagement and frustration with testing that contributed to distractibility, the following results are interpreted with caution as they likely underestimate 's true cognitive capabilities.     TESTS ADMINISTERED:   [place] Naming Test (BNT), Brief Visuospatial Memory Test - R Form 2 (BVMT-R), Category Fluency- AFV (CAT), Clock Drawing, Controlled Oral Word Association Test CFL (COWAT), Generalized Anxiety Disorder-7 (GEOFF-7), Geriatric Depression Scale 30 (GDS), Dunne Verbal Learning Test - R Form 3 (HVLT-R), Harry-Osterrieth Complex Figure Test, copy only (RCFT), Stroop Color and Word Test, Trail Making Test (TMT), WAIS-IV (Similarities, Information, Block Design, Matrix Reasoning, Digit Span), WMS-IV Logical Memory, [place] Card Sorting Test-1 deck (WCST) and WMS-III Information and Orientation.    MOANS norms were used for BNT, CAT, COWAT, RCFT, Stroop, TMT    While an attempt was made to match the battery administered in 2022, exact comparisons are made with some caution given that original  testing was performed with PPE (unknown effects of PPE use on testing).     DESCRIPTIVE PERFORMANCE KEY:    Labels for tests with Normal Distributions  Score Label Standard Score %ile Rank   Exceptionally high score  > 130 > 98   Above average score 120-129 91-97   High average score 110-119 75-90   Average score  25-74   Low average score 80-89 9-24   Below average score 70-79 2-8   Exceptionally low score < 70 < 2     Labels for tests with Non-Normal Distributions  Score Label %ile Rank   Within normal expectations/ limits score (WNL) > 24   Low average score 9-24   Below average score 2-8   Exceptionally low score < 2     The following test results utilize score labels as adapted from David Kraus, José Buenrostro, Anh Cook, RIC Major, Donna Drake, Prabhakar Carbajal, Shaheen Ambrosio & Conference Participants (2020): American Academy of Clinical Neuropsychology consensus conference statement on uniform labeling of performance test scores, The Clinical Neuropsychologist, DOI: 10.1080/[email or web address]    All scores contain some measure of error; scores are reported here as they are obtained by the individual (without reference to the range of error). These are meant as labels and not interpretation of performance. While other relevant comments regarding task performance are provided below, please see the Assessment, Impressions and Diagnostic Summary sections of this report for interpretation of the scores and the cognitive profile as a whole, including what does and does not constitute impairment.    OPTIMAL PREMORBID INTELLECT:  Optimal premorbid intellectual abilities were estimated as falling in the average to high average range based on Mr. Vázquez's educational and occupational histories and performance on tasks least likely to be affected by acquired brain dysfunction (i.e.,  hold tests ) administered in 2022.    SUMMARY OF TEST RESULTS:     Orientation, Attention  "and Processing Speed  Mental status exam was measured as a within normal limits score for his age (14). He was oriented to person, place, time, and date and was able to correctly name the current and previous presidents. Last year he lost two points on this task for stating the wrong date and year.     Performance on a measure of basic attention and working memory was assessed as an average score (23). This reflected an average score for basic attention skills (LDF = 7) and working memory scores that ranged from a low average score (LDS = 4) to an average score (LDB = 5). Performance on this task was very similar a year ago, although at that time, working memory had been assessed solidly as an average score (LDF = 7, LDB = 5, LDS = 5).     A speeded word reading task (82) and a speeded color naming task (62) were assessed as an average score. A simple sequencing task (29\") was assessed as a high average score. This is consistent with previous testing.    Orientation, attention, working memory and cognitive efficiency are stable and intact over the last year.     Language  Fund of general knowledge (14) was assessed as an average score, consistent with previous testing. Verbal abstraction skills (17) were assessed as a low average score representing a slight decline from a previous average score last year. His performance on a measure of semantic fluency was measured as an average score (45), consistent with previous testing (but a 6 raw point improvement). Confrontation naming was measured as a within normal limits score (59) consistent with previous testing (but a 3 raw point improvement).    Language skills are largely stable and intact as compared to previous testing.     Visuospatial Skills  Performance on a block construction task (32) resulted in an average score and consistent with previous testing. Performance on a pattern completion task (20) was measured as an above average score representing notable " improvement from a high average score last year. His copy of a series of 6 simple geometric figures was notable for one error (11). In 2022 he had copied the figures without error.     Nonverbal skills are solidly intact and represent stable to improved performance as compared to a year ago.     Learning and Memory  He was administered a measure of rote auditory verbal list learning that required him to learn a series of 12 words over three trials and retain and recall them over a delay. His initial rate of learning (6,6,8) reflected a low average score. He retained and recalled 3 words (38% retention) after the delay for an exceptionally low score for delayed recall.  Recognition memory was measured as a low average score as he correctly identified 10 of the original words and made 1 false positive error.  2022 performance on a different form of this task was stronger with average immediate, delayed and recognition memory.     Immediate memory for two short stories was measured as an exceptionally low score (9). Delayed recall of these stories resulted in a below average (4, 67% retention). Recognition memory was measured as a low average score (16/23).  2022 performance was stronger with low average immediate and delayed recall, but with weaker (56%) retention and stable recognition memory.     Immediate nonverbal memory (5,3,6) for a series of 6 geometric figures was measured as a low average score while delayed recall of these figures ( 3 details) resulted in an exceptionally low score. Recognition memory was measured as a low average score as he correctly identified 4 of the original figures and made  no false positive errors. In 2022, nonverbal learning and memory had been stronger and measured as average and below average scores respectively but with stable recognition memory.     Learning and memory have declined across 3 different memory tasks in the last year; however, performance is notable for limited  and variable learning that is not always consistent with basic attention skills.     Executive Functioning  Working memory skills ranged from a low average score (LDS = 4) to an average score (LDB = 5) representing a very slight decline from previous average scores for working memory a year ago (LDB = 5, LDS = 5). A complex sequencing and set shifting task was measured as a high average score (75 ) and completed without error, consistent with 2022 testing. His performance on a measure of phonemic fluency resulted in a high average score (45) and consistent with previous testing. On a measure of deductive reasoning and problem-solving, overall performance was assessed as a low average score for his age and education in terms of the number of categories learned (1). His performance was characterized by an error prone (below average score, NPE = 17) and perseverative (below average score, GA= 21) response style. Performance on this task is stable as compared to 2022 testing. His ability to inhibit an over-learned response was assessed as an average score (28) and this task was completed without error. In 2022, performance was slightly weaker and assessed as a low average score and notable for one error. His copy of a complex figure was performed as a high average score for his age (33) was well planned and organized and represents improvement from a previous average score last year. His drawing of a clock was unremarkable. Last year his clock was notable for incorrect hand size.     Performance on measures of executive functioning was notable for stability (complex attention, phonemic fluency, deductive reasoning), improvement (inhibition, clock drawing, visuospatial planning and organization) and decline (working memory) as compared to 2022 with only a few tasks performed below expectation for his age.     Mood  On the Geriatric Depression Scale-30 (GDS), a self report measure of depressive symptomatology, he obtained  a score of 6, placing him in the range of no significant symptoms of depression.     On the Generalized Anxiety Disorder-7, a self-report measure of anxiety, he obtained a score of 0, placing him in the range of minimal anxiety.     EVALUATION SERVICES AND TIME:   A clinical interview/neurobehavioral status examination was conducted with the patient and documented. I thoroughly reviewed the medical record, selected the neuropsychological test battery, provided supervision to the individual who administered and scored the neuropsychological test battery, interpreted/integrated patient data and test results, engaged in clinical decision making, treatment planning, report writing/preparation, and provided and documented interactive feedback of test results on the day of testing . A trained examiner/technician directly administered and scored 2+ neuropsychological tests. Please see below for a breakdown of time spent and the associated codes billed for these services.     Services   Time Spent  CPT Codes   Neurobehavioral Status Exam:  (e.g., face-to-face, interpretation, report) 35 minutes 1 x 96116   Neuropsychological Evaluation Services:   (e.g., integration, interpretation, treatment planning, clinical decision making, feedback)   180 minutes   1 x 96132  2 x 96133        Neuropsychological Testing by Trained Examiner/Technician:  (e.g., test administration, scoring, 2+ tests administered)   165 minutes   1 x 96138  4 x 96139     Diagnosis:  Unspecified Mild Neurocognitive Disorder, rule out  Mild Neurocognitive Disorder due to Alzheimer's     For diagnostic and coding purposes, Mr. Vázquez has a history of hypertension, hyperlipidemia, cardiomyopathy, unspecified type, coronary artery disease and 2022 Neuropsychological evaluation notable for Mild Vascular Neurocognitive Disorder and was referred for an evaluation of Mild Vascular Neurocognitive Disorder. Feedback of results was provided on the day of testing.        Xuan Ang, PhD, LP, ABPP  Clinical Neuropsychologist, LP#[id number]  Board Certified in Clinical Neuropsychology    [facility]  [address] [place] [address], [address]  [place] [address]  Phone:  [phone]

## 2023-09-14 NOTE — PROGRESS NOTES
The patient was seen for a neuropsychological evaluation for the purposes of diagnostic clarification and treatment planning. 125 minutes of face-to-face testing were provided by this writer. An additional 40 minutes were spent scoring and compiling test results. The patient was cooperative with testing. No concerns were brought to my attention. Please see Dr. Ang's report for a detailed description of the charges and interpretation and integration of the findings.

## 2023-09-18 DIAGNOSIS — I10 ESSENTIAL HYPERTENSION: ICD-10-CM

## 2023-09-19 RX ORDER — LISINOPRIL 40 MG/1
40 TABLET ORAL DAILY
Qty: 90 TABLET | Refills: 0 | Status: SHIPPED | OUTPATIENT
Start: 2023-09-19 | End: 2023-10-05

## 2023-09-21 ENCOUNTER — OFFICE VISIT (OUTPATIENT)
Dept: UROLOGY | Facility: CLINIC | Age: 73
End: 2023-09-21
Payer: COMMERCIAL

## 2023-09-21 VITALS
OXYGEN SATURATION: 98 % | TEMPERATURE: 97.4 F | WEIGHT: 158 LBS | HEART RATE: 80 BPM | RESPIRATION RATE: 16 BRPM | SYSTOLIC BLOOD PRESSURE: 143 MMHG | BODY MASS INDEX: 22.29 KG/M2 | DIASTOLIC BLOOD PRESSURE: 79 MMHG

## 2023-09-21 DIAGNOSIS — N52.9 IMPOTENCE OF ORGANIC ORIGIN: Primary | ICD-10-CM

## 2023-09-21 PROCEDURE — 99214 OFFICE O/P EST MOD 30 MIN: CPT | Performed by: STUDENT IN AN ORGANIZED HEALTH CARE EDUCATION/TRAINING PROGRAM

## 2023-09-21 RX ORDER — TADALAFIL 10 MG/1
TABLET ORAL
Qty: 10 TABLET | Refills: 3 | Status: SHIPPED | OUTPATIENT
Start: 2023-09-21

## 2023-09-21 NOTE — PROGRESS NOTES
UROLOGY FOLLOW-UP NOTE          Chief Complaint:   Today I had the pleasure of seeing Mr. Ambrocio Vázquez in follow-up for a chief complaint of LUTS.          Interval Update   Ambrocio Vázquez is a very pleasant 73 year old male with a history of HLD, PFO, HTN, CAD, and lumbar fusion.    Brief History: Mr. Ambrocio Vázquez has followed with Dr. Iglesias for LUTS and erectile dysfunction. He takes finasteride 5 mg daily and uses sildenafil prn.     Today's notes: He is doing well today. He is not using the sildenafil very often at this time. He denies bothersome urinary symptoms.          Physical Exam:   Patient is a 73 year old  male   Vitals: Blood pressure (!) 143/79, pulse 80, temperature 97.4  F (36.3  C), temperature source Tympanic, resp. rate 16, weight 71.7 kg (158 lb), SpO2 98 %.  General: Alert and oriented x 3, no acute distress.  Respiratory: Non-labored breathing.  Cardiac: Regular rate.        Labs and Pathology:    I personally reviewed all applicable laboratory data and went over findings with patient  Significant for:    CBC RESULTS:  Recent Labs   Lab Test 08/25/20  1628 10/04/19  1439 01/30/18  0659 01/16/18  1217   WBC 8.1 6.9 9.7 9.5   HGB 16.9 17.0 12.7* 18.0*    176 138* 204        BMP RESULTS:  Recent Labs   Lab Test 09/27/22  1057 05/19/22  1051 09/09/21  1120 08/25/20  1628 03/13/20  0812 10/04/19  1439 02/14/19  0908    142 139 139 137 136 137   POTASSIUM 4.6 4.5 4.2 3.8 4.1 4.0 4.0   CHLORIDE 103 108 107 108 105 101 103   CO2 29 31 29 25 31 31 31   ANIONGAP 9 3 3 6 1* 4 3   * 128* 105* 117* 116* 91 98   BUN 13.4 18 18 15 21 15 16   CR 0.97 0.92 0.94 0.85 0.99 0.82 0.90   GFRESTIMATED 83 89 82 89 77 >90 88   GFRESTBLACK  --   --   --  >90 90 >90 >90   KENY 9.3 9.1 8.6 9.0 8.7 8.8 8.9       UA RESULTS:   Recent Labs   Lab Test 01/12/21  1100 12/03/19  0938 07/20/16  1710   SG >1.030 >1.030 <=1.005   URINEPH 5.0 5.5 5.5   NITRITE Negative Negative Negative   RBCU 5-10* O -  2 2-5*   WBCU 0 - 5 0 - 5 O - 2       PSA RESULTS  PSA   Date Value Ref Range Status   12/18/2020 1.12 0 - 4 ug/L Final     Comment:     Assay Method:  Chemiluminescence using Siemens Vista analyzer   10/04/2019 1.20 0 - 4 ug/L Final     Comment:     Assay Method:  Chemiluminescence using Siemens Vista analyzer   04/26/2018 1.36 0 - 4 ug/L Final     Comment:     Assay Method:  Chemiluminescence using Siemens Vista analyzer   01/18/2017 1.49 0 - 4 ug/L Final     Comment:     Assay Method:  Chemiluminescence using Siemens Vista analyzer   11/10/2015 1.88 0 - 4 ug/L Final   07/22/2014 1.74 0 - 4 ug/L Final   04/10/2013 1.54 0 - 4 ug/L Final   01/08/2013 3.75 0 - 4 ug/L Final   08/22/2011 1.61 0 - 4 ug/L Final   04/06/2009 2.9 ng/mL      Prostate Specific Antigen Screen   Date Value Ref Range Status   09/11/2023 1.09 0.00 - 6.50 ng/mL Final   09/27/2022 1.14 0.00 - 6.50 ng/mL Final            Assessment/Plan   73 year old male seen in follow up for LUTS managed with finasteride 5 mg daily. He has used sildenafil 100 mg prn for erectile dysfunction. We discussed treatment options for erectile dysfunction including oral medications, a vacuum erection device, ICI, and an IPP. The patient has an interest in trying tadalafil. It appears he may have tried a 10 mg dose many years ago.     His PSA was 1.09 ng/mL on 9/11/2023, 2.18 ng/mL when corrected for finasteride. He elected to defer GUILHERME today as he has some sebaceous cysts near his rectum that are healing. We may do a screening GUILHERME every 1-2 years, per patient preference.     Plan:  Continue finasteride 5 mg daily.   Try tadalafil 10 mg prn for sexual activity, take 1-2 tablets.   Follow up in one year, sooner if concerns.              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    PFO (patent foramen ovale)     ASD with poss RV enlargement    Sciatica             Past Surgical History:     Past Surgical History:   Procedure Laterality Date    ABDOMEN SURGERY      COLONOSCOPY      COLONOSCOPY  5/3/2013    Procedure: COLONOSCOPY;  Colonoscopy;  Surgeon: Genaro Krishnan MD;  Location: [facility]    CORONARY ANGIOGRAPHY ADULT ORDER  6/7/2014    No restenosis    HEART CATH, ANGIOPLASTY  5/2010    complex  angioplasty/thrombectomy SHAZIA stenting proximal and ostial LAD;LMA-nl,   stenting of Lad, diag small 80%, Lcx30%, RCA50%    HERNIA REPAIR      LIH RI    HERNIORRHAPHY INGUINAL  1/11/2013    Procedure: HERNIORRHAPHY INGUINAL;  LEFT INGUINAL HERNIA REPAIR WITH MESH;  Surgeon: Santos Coleman MD;  Location:  OR    HERNIORRHAPHY INGUINAL Right 12/29/2015    Procedure: HERNIORRHAPHY INGUINAL;  Surgeon: Santos Coleman MD;  Location:  OR    OPTICAL TRACKING SYSTEM FUSION SPINE POSTERIOR LUMBAR THREE+ LEVELS N/A 1/29/2018    Procedure: OPTICAL TRACKING SYSTEM FUSION SPINE POSTERIOR LUMBAR THREE+ LEVELS;  L3, L4 and L5 Berg-Maloney osteotomies with L3-4, L4-5 and L5-S1 transforaminal lumbar interbody fusion ;  Surgeon: Gabino Velez MD;  Location:  OR    PAST SURGICAL HISTORY  2002    hernia repair, right    PAST SURGICAL HISTORY      pilonidal cyst and sebaceous cyst surg    PE TUBES  2/10    left ear PE for persistant  ZINA    SOFT TISSUE SURGERY      right axilla lipoma removed    ZZC LAMINECTOMY,>2 SGMT,LUMBAR  2001            Medications     Current Outpatient Medications   Medication    aspirin 81 MG tablet    finasteride (PROSCAR) 5 MG tablet    fish oil-omega-3 fatty acids 1000 MG capsule    lisinopril (ZESTRIL) 40 MG tablet    metoprolol succinate ER (TOPROL XL) 50 MG 24 hr tablet    nitroGLYcerin (NITROSTAT) 0.4 MG sublingual tablet    sildenafil (VIAGRA) 100 MG tablet    simvastatin (ZOCOR) 40 MG tablet     No current facility-administered medications for this visit.            Family History:     Family History   Problem Relation Age of Onset    C.A.D. Father     Alzheimer Disease Father     Heart Disease Father     Hypertension Father         age post 75    Cancer Brother         brain tumor- age 46    Heart Disease Mother         aortic anerysum     Alzheimer Disease Paternal Grandmother     Other Cancer Brother         Krishna, , age 46    Glaucoma No family hx of     Macular Degeneration No family hx of             Social History:     Social History     Socioeconomic History    Marital status:      Spouse name: Nava Vázquez    Number of children: 3    Years of education: 18+    Highest education level: Not on file   Occupational History    Occupation:      Comment: [employer]      Employer: [employer]   Tobacco Use    Smoking status: Former     Packs/day: 0.50     Years: 30.00     Pack years: 15.00     Types: Cigarettes     Quit date: 1/15/2010     Years since quittin.6    Smokeless tobacco: Never   Substance and Sexual Activity    Alcohol use: Yes     Comment: 1-2 a week    Drug use: No    Sexual activity: Yes     Partners: Female     Birth control/protection: Male Surgical   Other Topics Concern    Parent/sibling w/ CABG, MI or angioplasty before 65F 55M? No     Service Not Asked    Blood Transfusions Not Asked    Caffeine Concern No     Comment: 2-3  coffee a day    Occupational Exposure Not Asked    Hobby Hazards Not Asked    Sleep Concern Not Asked    Stress Concern Not Asked    Weight Concern Not Asked    Special Diet Not Asked    Back Care Not Asked    Exercise Yes     Comment: 12 visits to the gym per month.    Bike Helmet Not Asked    Seat Belt Yes    Self-Exams Not Asked   Social History Narrative    Not on file     Social Determinants of Health     Financial Resource Strain: Not on file   Food Insecurity: Not on file   Transportation Needs: Not on file   Physical Activity: Not on file   Stress: Not on file   Social Connections: Not on file   Interpersonal Safety: Not on file   Housing Stability: Not on file            Allergies:   Amoxicillin-pot clavulanate and Sulfa antibiotics         Review of Systems:  From intake questionnaire   Negative 14 system review except as noted on HPI, nurse's note.        KAREL MORALES PA-C  Department of Urology

## 2023-09-21 NOTE — NURSING NOTE
Chief Complaint   Patient presents with    Follow Up     Follow up PSA       Vitals:    09/21/23 0951   Resp: 16   Weight: 71.7 kg (158 lb)     Wt Readings from Last 1 Encounters:   09/21/23 71.7 kg (158 lb)       Xuan Tabor MA

## 2023-09-24 NOTE — PATIENT INSTRUCTIONS
DIAGNOSTIC IMPRESSIONS (from 9/13/2023 Neuropsychology Consult):    Results  Moderate impairments identified in memory and severe impairments in prose (story) learning  Subtly below expectation performances (low average) were evident on measures of learning and select problem solving measures  Otherwise intact performance on measures of basic attention, speed of thinking, language, spatial skills and most measures of problem solving  As compared to testing in 2022, profile is largely stable with even some evidence of improvement across most cognitive domains assessed  Only cognitive domains with consistent evidence of decline were learning and memory where performance declined across all 3 tasks; however, performance may have been undermined by distractibility, reduced engagement and frustration with the testing process    Diagnosis  Unspecified Mild Neurocognitive Disorder, rule out  Mild Neurocognitive Disorder due to Alzheimer's     RECOMMENDATIONS:  Driving and Activities of Daily Living  There are no concerns with Mr. Vázquez s current ability to continue to live independently, drive, or manage his finances or personal affairs.  Physical and Emotional Health  It is important that Mr. Vázquez continue to adhere to his medication treatment regimen and follow a healthy diet so as to maintain his physical health, as this can have a significant impact on his physical, emotional, and cognitive functioning.   Under the guidance of his physician, It is recommended that regular exercise be integrated into Mr. Vázquez's routine as it will likely benefit him cognitively and emotionally as well as physically.   Mr. Vázquez does not appear to be struggling with any significant emotional symptoms. Behavioral activation techniques such as regular exercise (under the guidance of his physician), recreational activities and regular social interaction would likely be effective in helping Mr. Vázquez to continue to maintain his  mood.   Memory and Organization  Mr. Vázquez is encouraged to continue to engage in stimulating activities, (i.e., reading, card games, puzzles, house projects) to keep him cognitively active.   Importantly, Mr. Vázquez does struggle with hearing difficulties.  Thus it is important to make sure he actually hears the information that he must remember (i.e., have him repeat it back to ensure comprehension).  In his daily life, Mr. Vázquez will continue to benefit from the use of compensation techniques. That is, he may find it helpful to post reminder notes around the house, make lists, and carry a small calendar so that he can feel more comfortable and confident in his ability to remember information. A daily planner could also be used as a memory book where important information is recorded and organized for future reference.   Follow-up  Given evidence of cognitive impairment on current testing and potential for further decline, re-evaluation in 2-3 years is recommended. The current test data can be used as an updated baseline to which future comparisons can be made. If his wife should detect cognitive changes, re-evaluation could occur as soon as 1 year.

## 2023-09-25 NOTE — PATIENT INSTRUCTIONS
- continue activity     - followup as needed     - Call the clinic at [phone] for increased pain or any other questions and concerns.    -Patient to follow up with Primary Care provider regarding elevated blood pressure.           steady

## 2023-10-05 ASSESSMENT — ENCOUNTER SYMPTOMS
EYE PAIN: 0
SHORTNESS OF BREATH: 0
FREQUENCY: 0
PALPITATIONS: 0
MYALGIAS: 0
HEADACHES: 0
FEVER: 0
WEAKNESS: 0
HEMATOCHEZIA: 0
COUGH: 0
ARTHRALGIAS: 1
PARESTHESIAS: 0
JOINT SWELLING: 0
HEARTBURN: 0
CHILLS: 0
DIZZINESS: 0
SORE THROAT: 0
DIARRHEA: 0
CONSTIPATION: 0
ABDOMINAL PAIN: 0
NERVOUS/ANXIOUS: 0
HEMATURIA: 0
DYSURIA: 0
NAUSEA: 0

## 2023-10-05 ASSESSMENT — ACTIVITIES OF DAILY LIVING (ADL): CURRENT_FUNCTION: NO ASSISTANCE NEEDED

## 2023-10-05 NOTE — PROGRESS NOTES
"SUBJECTIVE:   Ambrocio is a 73 year old who presents for Preventive Visit.      10/5/2023    12:56 PM   Additional Questions   Roomed by EMELINA Flores   Accompanied by self       Are you in the first 12 months of your Medicare coverage?  No    Healthy Habits:     In general, how would you rate your overall health?  Excellent    Frequency of exercise:  2-3 days/week    Duration of exercise:  45-60 minutes    Do you usually eat at least 4 servings of fruit and vegetables a day, include whole grains    & fiber and avoid regularly eating high fat or \"junk\" foods?  Yes    Taking medications regularly:  Yes    Medication side effects:  None    Ability to successfully perform activities of daily living:  No assistance needed    Home Safety:  No safety concerns identified    Hearing Impairment:  Difficulty following a conversation in a noisy restaurant or crowded room    In the past 6 months, have you been bothered by leaking of urine?  No    In general, how would you rate your overall mental or emotional health?  Excellent    Additional concerns today:  No      He is monitoring BP at home, results are not over 140/90.  States his wife serves his food and believes she uses low salt     States he has an appt to receive his flu shot.    Does not plan to received 2nd shingles, had a bad reaction to first dose.     Spent the last year renovating his home  [place]    Had right trigger finger release and hard to make a  now, struggling to play piano. Hasn't done any therapy     Echo 1/19/2018: \"mild aortic root dilation\"  3.9       Today's PHQ-2 Score:       10/5/2023    10:08 AM   PHQ-2 ( 1999 [employer])   Q1: Little interest or pleasure in doing things 0   Q2: Feeling down, depressed or hopeless 0   PHQ-2 Score 0   Q1: Little interest or pleasure in doing things Not at all   Q2: Feeling down, depressed or hopeless Not at all   PHQ-2 Score 0         Have you ever done Advance " Care Planning? (For example, a Health Directive, POLST, or a discussion with a medical provider or your loved ones about your wishes): Yes, advance care planning is on file.    Fall risk  Fallen 2 or more times in the past year?: No  Any fall with injury in the past year?: No    Cognitive Screening   1) Repeat 3 items (Leader, Season, Table)    2) Clock draw: NORMAL  3) 3 item recall: Recalls 3 objects  Results: 3 items recalled: COGNITIVE IMPAIRMENT LESS LIKELY    Mini-CogTM Copyright [place]. Licensed by the author for use in [facility]; reprinted with permission (ubaldo@[facility]). All rights reserved.      Do you have sleep apnea, excessive snoring or daytime drowsiness? : no    Reviewed and updated as needed this visit by clinical staff                  Reviewed and updated as needed this visit by Provider                 Social History     Tobacco Use     Smoking status: Former     Packs/day: 0.50     Years: 30.00     Pack years: 15.00     Types: Cigarettes     Quit date: 1/15/2010     Years since quittin.7     Smokeless tobacco: Never   Substance Use Topics     Alcohol use: Yes     Comment: Social             10/5/2023    10:07 AM   Alcohol Use   Prescreen: >3 drinks/day or >7 drinks/week? No          No data to display              Do you have a current opioid prescription? No  Do you use any other controlled substances or medications that are not prescribed by a provider? None            Current providers sharing in care for this patient include:   Patient Care Team:  Joelle Dill MD as PCP - General (Family Practice)  Daniel Villa MD as MD (Otolaryngology)  Mary Garcia MD as MD (Pediatric Metabolism)  Joelle Dill MD as Assigned PCP  Xuan Ang, PhD LP as Assigned Behavioral Health Provider  Stacia Barrett PA-C as Physician Assistant (Urology)  Stacia Barrett PA-C as Assigned Surgical Provider    The following health maintenance items are reviewed in Epic and  "correct as of today:  Health Maintenance   Topic Date Due     LUNG CANCER SCREENING  Never done     ZOSTER IMMUNIZATION (2 of 2) 03/26/2019     ANNUAL REVIEW OF HM ORDERS  03/03/2023     BMP  03/27/2023     COLORECTAL CANCER SCREENING  06/12/2023     INFLUENZA VACCINE (1) 09/01/2023     COVID-19 Vaccine (6 - 2023-24 season) 09/01/2023     LIPID  09/27/2023     MEDICARE ANNUAL WELLNESS VISIT  09/27/2023     FALL RISK ASSESSMENT  10/06/2024     ADVANCE CARE PLANNING  09/27/2027     DTAP/TDAP/TD IMMUNIZATION (5 - Td or Tdap) 10/22/2029     HEPATITIS C SCREENING  Completed     PHQ-2 (once per calendar year)  Completed     Pneumococcal Vaccine: 65+ Years  Completed     AORTIC ANEURYSM SCREENING (SYSTEM ASSIGNED)  Completed     IPV IMMUNIZATION  Aged Out     HPV IMMUNIZATION  Aged Out     MENINGITIS IMMUNIZATION  Aged Out     EYE EXAM  Discontinued       Review of Systems   Constitutional:  Negative for chills and fever.   HENT:  Positive for hearing loss. Negative for congestion, ear pain and sore throat.    Eyes:  Negative for pain and visual disturbance.   Respiratory:  Negative for cough and shortness of breath.    Cardiovascular:  Negative for chest pain, palpitations and peripheral edema.   Gastrointestinal:  Negative for abdominal pain, constipation, diarrhea, heartburn, hematochezia and nausea.   Genitourinary:  Positive for impotence. Negative for dysuria, frequency, genital sores, hematuria, penile discharge and urgency.   Musculoskeletal:  Positive for arthralgias. Negative for joint swelling and myalgias.   Skin:  Negative for rash.   Neurological:  Negative for dizziness, weakness, headaches and paresthesias.   Psychiatric/Behavioral:  Negative for mood changes. The patient is not nervous/anxious.    .    OBJECTIVE:   /86   Pulse 61   Temp 97  F (36.1  C) (Tympanic)   Resp 14   Ht 1.79 m (5' 10.47\")   Wt 73.2 kg (161 lb 6.4 oz)   SpO2 95%   BMI 22.85 kg/m   Estimated body mass index is 22.29 " "kg/m  as calculated from the following:    Height as of 9/27/22: 1.793 m (5' 10.6\").    Weight as of 9/21/23: 71.7 kg (158 lb).  Physical Exam  GENERAL: healthy, alert and no distress  EYES: Eyes grossly normal to inspection, PERRL and conjunctivae and sclerae normal  HENT: ear canals and TM's normal, nose and mouth without ulcers or lesions  NECK: no adenopathy, no asymmetry, masses, or scars and thyroid normal to palpation  RESP: lungs clear to auscultation - no rales, rhonchi or wheezes  CV: regular rate and rhythm, normal S1 S2, no S3 or S4, no murmur, click or rub, no peripheral edema and peripheral pulses strong  ABDOMEN: soft, nontender, no hepatosplenomegaly, no masses and bowel sounds normal  MS: no gross musculoskeletal defects noted, no edema  SKIN: no suspicious lesions or rashes  NEURO: Normal strength and tone, mentation intact and speech normal  PSYCH: mentation appears normal, affect normal/bright      ASSESSMENT / PLAN:   (Z00.00) Encounter for Medicare annual wellness exam  (primary encounter diagnosis)  Comment: discussed vaccinations, screenings, and meds .   Plan:     (I25.10) Coronary arteriosclerosis  Comment: stable and risks are managed   Plan: Lipid panel reflex to direct LDL Non-fasting,         simvastatin (ZOCOR) 40 MG tablet            (I10) Essential hypertension  Comment: stable check labs   Plan: BASIC METABOLIC PANEL, lisinopril (ZESTRIL) 40         MG tablet, metoprolol succinate ER (TOPROL XL)         50 MG 24 hr tablet, Basic metabolic panel  (Ca,        Cl, CO2, Creat, Gluc, K, Na, BUN)            (E78.49) Other hyperlipidemia  Comment: check labs. Refilled meds   Plan: simvastatin (ZOCOR) 40 MG tablet, Lipid panel         reflex to direct LDL Fasting            (I25.10) Coronary arteriosclerosis  Comment: 5/4/10 - 100% occlusion of proximal LAD with 2 Scarbro stents.   6/28/11 - seen in f/u. 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 Will see if he wants to recheck   Plan:     Mild neurocog disorder   Stable.     Patient has been advised of split billing requirements and indicates understanding: Yes      COUNSELING:  Reviewed preventive health counseling, as reflected in patient instructions       Regular exercise       Healthy diet/nutrition        He reports that he quit smoking about 13 years ago. His smoking use included cigarettes. He has a 15.00 pack-year smoking history. He has never used smokeless tobacco.      Appropriate preventive services were discussed with this patient, including applicable screening as appropriate for fall prevention, nutrition, physical activity, Tobacco-use cessation, weight loss and cognition.  Checklist reviewing preventive services available has been given to the patient.    Reviewed patients plan of care and provided an AVS. The Basic Care Plan (routine screening as documented in Health Maintenance) for  Ambrocio meets the Care Plan requirement. This Care Plan has been established and reviewed with the Patient.          Joelle Dill MD  [facility]    Identified Health Risks:  I have reviewed Opioid Use Disorder and Substance Use Disorder risk factors and made any needed referrals.

## 2023-10-05 NOTE — PATIENT INSTRUCTIONS
Patient Education   Personalized Prevention Plan  You are due for the preventive services outlined below.  Your care team is available to assist you in scheduling these services.  If you have already completed any of these items, please share that information with your care team to update in your medical record.  Health Maintenance Due   Topic Date Due     LUNG CANCER SCREENING  Never done     Zoster (Shingles) Vaccine (2 of 2) 03/26/2019     ANNUAL REVIEW OF HM ORDERS  03/03/2023     Basic Metabolic Panel  03/27/2023     Colorectal Cancer Screening  06/12/2023     Flu Vaccine (1) 09/01/2023     COVID-19 Vaccine (6 - 2023-24 season) 09/01/2023     Cholesterol Lab  09/27/2023     Annual Wellness Visit  09/27/2023

## 2023-10-06 ENCOUNTER — MYC MEDICAL ADVICE (OUTPATIENT)
Dept: FAMILY MEDICINE | Facility: CLINIC | Age: 73
End: 2023-10-06

## 2023-10-06 ENCOUNTER — OFFICE VISIT (OUTPATIENT)
Dept: FAMILY MEDICINE | Facility: CLINIC | Age: 73
End: 2023-10-06
Payer: COMMERCIAL

## 2023-10-06 VITALS
RESPIRATION RATE: 14 BRPM | HEART RATE: 61 BPM | TEMPERATURE: 97 F | SYSTOLIC BLOOD PRESSURE: 138 MMHG | OXYGEN SATURATION: 95 % | BODY MASS INDEX: 23.11 KG/M2 | DIASTOLIC BLOOD PRESSURE: 86 MMHG | HEIGHT: 70 IN | WEIGHT: 161.4 LBS

## 2023-10-06 DIAGNOSIS — Z23 NEED FOR VACCINATION: ICD-10-CM

## 2023-10-06 DIAGNOSIS — Z98.890 S/P TRIGGER FINGER RELEASE: ICD-10-CM

## 2023-10-06 DIAGNOSIS — I71.20 THORACIC AORTIC ANEURYSM WITHOUT RUPTURE, UNSPECIFIED PART (H): ICD-10-CM

## 2023-10-06 DIAGNOSIS — G31.84 MILD NEUROCOGNITIVE DISORDER: ICD-10-CM

## 2023-10-06 DIAGNOSIS — I25.10 CORONARY ARTERIOSCLEROSIS: ICD-10-CM

## 2023-10-06 DIAGNOSIS — E78.49 OTHER HYPERLIPIDEMIA: ICD-10-CM

## 2023-10-06 DIAGNOSIS — H72.92 PERFORATED EAR DRUM, LEFT: ICD-10-CM

## 2023-10-06 DIAGNOSIS — I10 ESSENTIAL HYPERTENSION: ICD-10-CM

## 2023-10-06 DIAGNOSIS — N42.9 DISORDER OF PROSTATE: ICD-10-CM

## 2023-10-06 DIAGNOSIS — Z00.00 ENCOUNTER FOR MEDICARE ANNUAL WELLNESS EXAM: Primary | ICD-10-CM

## 2023-10-06 DIAGNOSIS — I25.10 CORONARY ARTERY DISEASE INVOLVING NATIVE HEART WITHOUT ANGINA PECTORIS, UNSPECIFIED VESSEL OR LESION TYPE: ICD-10-CM

## 2023-10-06 DIAGNOSIS — E71.41 PRIMARY CARNITINE DEFICIENCY (H): ICD-10-CM

## 2023-10-06 DIAGNOSIS — R73.09 ELEVATED GLUCOSE: ICD-10-CM

## 2023-10-06 LAB
ANION GAP SERPL CALCULATED.3IONS-SCNC: 7 MMOL/L (ref 7–15)
BUN SERPL-MCNC: 17 MG/DL (ref 8–23)
CALCIUM SERPL-MCNC: 9.3 MG/DL (ref 8.8–10.2)
CHLORIDE SERPL-SCNC: 107 MMOL/L (ref 98–107)
CHOLEST SERPL-MCNC: 134 MG/DL
CREAT SERPL-MCNC: 1.04 MG/DL (ref 0.67–1.17)
DEPRECATED HCO3 PLAS-SCNC: 30 MMOL/L (ref 22–29)
EGFRCR SERPLBLD CKD-EPI 2021: 76 ML/MIN/1.73M2
GLUCOSE SERPL-MCNC: 116 MG/DL (ref 70–99)
HDLC SERPL-MCNC: 43 MG/DL
LDLC SERPL CALC-MCNC: 73 MG/DL
NONHDLC SERPL-MCNC: 91 MG/DL
POTASSIUM SERPL-SCNC: 4.6 MMOL/L (ref 3.4–5.3)
SODIUM SERPL-SCNC: 144 MMOL/L (ref 135–145)
TRIGL SERPL-MCNC: 91 MG/DL

## 2023-10-06 PROCEDURE — 36415 COLL VENOUS BLD VENIPUNCTURE: CPT | Performed by: FAMILY MEDICINE

## 2023-10-06 PROCEDURE — 99214 OFFICE O/P EST MOD 30 MIN: CPT | Mod: 25 | Performed by: FAMILY MEDICINE

## 2023-10-06 PROCEDURE — 80048 BASIC METABOLIC PNL TOTAL CA: CPT | Performed by: FAMILY MEDICINE

## 2023-10-06 PROCEDURE — 80061 LIPID PANEL: CPT | Performed by: FAMILY MEDICINE

## 2023-10-06 PROCEDURE — G0439 PPPS, SUBSEQ VISIT: HCPCS | Performed by: FAMILY MEDICINE

## 2023-10-06 RX ORDER — OFLOXACIN 3 MG/ML
5 SOLUTION AURICULAR (OTIC) DAILY
Qty: 10 ML | Refills: 0 | Status: SHIPPED | OUTPATIENT
Start: 2023-10-06

## 2023-10-06 RX ORDER — METOPROLOL SUCCINATE 50 MG/1
50 TABLET, EXTENDED RELEASE ORAL DAILY
Qty: 100 TABLET | Refills: 3 | Status: SHIPPED | OUTPATIENT
Start: 2023-10-06

## 2023-10-06 RX ORDER — LISINOPRIL 40 MG/1
40 TABLET ORAL DAILY
Qty: 90 TABLET | Refills: 3 | Status: SHIPPED | OUTPATIENT
Start: 2023-10-06 | End: 2024-06-04

## 2023-10-06 RX ORDER — SIMVASTATIN 40 MG
40 TABLET ORAL AT BEDTIME
Qty: 30 TABLET | Refills: 3 | Status: SHIPPED | OUTPATIENT
Start: 2023-10-06 | End: 2024-01-04

## 2023-10-06 RX ORDER — NITROGLYCERIN 0.4 MG/1
TABLET SUBLINGUAL
Qty: 25 TABLET | Refills: 3 | Status: SHIPPED | OUTPATIENT
Start: 2023-10-06 | End: 2023-12-04

## 2023-10-06 RX ORDER — OFLOXACIN 3 MG/ML
5 SOLUTION AURICULAR (OTIC) DAILY
COMMUNITY
End: 2023-10-06

## 2023-10-06 RX ORDER — FINASTERIDE 5 MG/1
1 TABLET, FILM COATED ORAL DAILY
Qty: 90 TABLET | Refills: 3 | Status: SHIPPED | OUTPATIENT
Start: 2023-10-06 | End: 2024-09-26

## 2023-10-10 ENCOUNTER — TELEPHONE (OUTPATIENT)
Dept: FAMILY MEDICINE | Facility: CLINIC | Age: 73
End: 2023-10-10
Payer: COMMERCIAL

## 2023-10-10 NOTE — TELEPHONE ENCOUNTER
Medical clarification request  Patient was prescribed tadalafil (CIALIS) 10 MG tablet from [facility] TURNER, which may interact with     nitroGLYcerin (NITROSTAT) 0.4 MG sublingual tablet   Prescribed by your office. Taking both medications together is contraindicated and may cause severe hypotension.    Order number: [id number]  Sent by: [facility]  If you respond via electronic Rx please reference the order number to ensure accurate processing of your script.

## 2023-10-11 NOTE — TELEPHONE ENCOUNTER
It is a known issue that a patient should not use nitrog within 24-48 hours of taking on an erectile dysfunction medication. It can cause very low blood pressure. Patients are supposed to tell the EMS or ER team if they have taken a viagra/cialis type med, should they go to the hospital with chest pain.     It is a patient's choice if they want to use erectile dysfunction meds knowing they can interact poorly with nitrog if they need it     NEDA Dill MD

## 2023-10-11 NOTE — TELEPHONE ENCOUNTER
Call placed to patient   Relayed Dr. Dill's message regarding medication interaction    Patient states he was aware of potential interaction and will follow steps as provided by Dr. Dill  No further questions/concerns        Call placed to [facility]  Relayed Dr. Dill's message     Pharmacist verbalized understanding  No further questions/concerns    Vasile Caruso RN

## 2023-10-19 ENCOUNTER — LAB (OUTPATIENT)
Dept: LAB | Facility: CLINIC | Age: 73
End: 2023-10-19
Payer: COMMERCIAL

## 2023-10-19 DIAGNOSIS — R73.09 ELEVATED GLUCOSE: ICD-10-CM

## 2023-10-19 LAB — HBA1C MFR BLD: 6.2 % (ref 0–5.6)

## 2023-10-19 PROCEDURE — 83036 HEMOGLOBIN GLYCOSYLATED A1C: CPT

## 2023-10-19 PROCEDURE — 36415 COLL VENOUS BLD VENIPUNCTURE: CPT

## 2023-11-30 DIAGNOSIS — I25.10 CORONARY ARTERY DISEASE INVOLVING NATIVE HEART WITHOUT ANGINA PECTORIS, UNSPECIFIED VESSEL OR LESION TYPE: ICD-10-CM

## 2023-12-04 RX ORDER — NITROGLYCERIN 0.4 MG/1
TABLET SUBLINGUAL
Qty: 100 TABLET | Refills: 3 | Status: SHIPPED | OUTPATIENT
Start: 2023-12-04 | End: 2024-08-16

## 2023-12-20 ENCOUNTER — ANESTHESIA EVENT (OUTPATIENT)
Dept: GASTROENTEROLOGY | Facility: CLINIC | Age: 73
End: 2023-12-20
Payer: COMMERCIAL

## 2023-12-20 NOTE — ANESTHESIA PREPROCEDURE EVALUATION
Anesthesia Pre-Procedure Evaluation    Patient: Ambrocio Vázquez   MRN: [id number] : 1950        Procedure : Procedure(s):  Colonoscopy          Past Medical History:   Diagnosis Date    Arthritis     BPH (benign prostatic hyperplasia)     Cardiomyopathy (H)     Carnitine deficiency (H24)     pt and wife both recessive carnitine def and daughter has inherited both genes    Colon polyp     tubular adenoma    Coronary artery disease     ischemic heart dz.LMA-nl,   stenting of Lad, diag small 80%, Lcx30%, RCA50%    ED (erectile dysfunction)     Hyperlipidaemia     Hypertension     Cardilogist last visit several yeara ago now follewd by Primary DRRogelio     Impaired fasting glucose     Intervertebral lumbar disc disorder with myelopathy, lumbar region (aka DISK)     lumbar disk disease    Non-compliance     multiple instances of no follow up appointments    Other chronic pain     Back pain for many years.    PFO (patent foramen ovale)     ASD with poss RV enlargement    Sciatica       Past Surgical History:   Procedure Laterality Date    ABDOMEN SURGERY      COLONOSCOPY      COLONOSCOPY  5/3/2013    Procedure: COLONOSCOPY;  Colonoscopy;  Surgeon: Genaro Krishnan MD;  Location: [facility]    CORONARY ANGIOGRAPHY ADULT ORDER  2014    No restenosis    HEART CATH, ANGIOPLASTY  2010    complex  angioplasty/thrombectomy SHAZIA stenting proximal and ostial LAD;LMA-nl,   stenting of Lad, diag small 80%, Lcx30%, RCA50%    HERNIA REPAIR      [facility]    HERNIORRHAPHY INGUINAL  2013    Procedure: HERNIORRHAPHY INGUINAL;  LEFT INGUINAL HERNIA REPAIR WITH MESH;  Surgeon: Santos Coleman MD;  Location:  OR    HERNIORRHAPHY INGUINAL Right 2015    Procedure: HERNIORRHAPHY INGUINAL;  Surgeon: Santos Coleman MD;  Location:  OR    OPTICAL TRACKING SYSTEM FUSION SPINE POSTERIOR LUMBAR THREE+ LEVELS N/A 2018    Procedure: OPTICAL TRACKING SYSTEM FUSION SPINE POSTERIOR LUMBAR THREE+ LEVELS;   L3, L4 and L5 Berg-Maloney osteotomies with L3-4, L4-5 and L5-S1 transforaminal lumbar interbody fusion ;  Surgeon: Gabino Velez MD;  Location: [facility] OR    PAST SURGICAL HISTORY  2002    hernia repair, right    PAST SURGICAL HISTORY      pilonidal cyst and sebaceous cyst surg    PE TUBES  2/10    left ear PE for persistant ZINA    SOFT TISSUE SURGERY      right axilla lipoma removed    ZZC LAMINECTOMY,>2 SGMT,LUMBAR        Allergies   Allergen Reactions    Amoxicillin-Pot Clavulanate Nausea and Vomiting    Sulfa Antibiotics Nausea      Social History     Tobacco Use    Smoking status: Former     Packs/day: 0.50     Years: 30.00     Additional pack years: 0.00     Total pack years: 15.00     Types: Cigarettes     Quit date: 1/15/2010     Years since quittin.9    Smokeless tobacco: Never   Substance Use Topics    Alcohol use: Yes     Comment: Social      Wt Readings from Last 1 Encounters:   10/06/23 73.2 kg (161 lb 6.4 oz)        Anesthesia Evaluation   Pt has had prior anesthetic. Type: General and MAC.        ROS/MED HX  ENT/Pulmonary:     (+)                tobacco use, Past use,  15  Pack-Year Hx,                      Neurologic: Comment: Neurocognitive disorder      Cardiovascular: Comment: PFO (patent foramen ovale)    (+) Dyslipidemia hypertension- -  CAD angina-  - -                           valvular problems/murmurs  pfo.    Previous cardiac testing   Echo: Date: 3/2/21 Results:  Final Impressions  1. Normal left ventricular chamber size, wall thickness and wall motion.  Left ventricular  ejection fraction; 63 %  2. Normal left ventricular diastolic function.  3. Mildly enlarged right ventricular chamber size with normal systolic function.  Estimated  right ventricular systolic pressure; 28 mmHg  4. Moderately enlarged right atrial size.  5. Mild tricuspid valve regurgitation.  6. Small secundum atrial septal defect.  Atrial septal aneurysm.  Recommend EMEKA  7. Normal inferior vena cava  size with normal inspiratory collapse (>50%).    Stress Test:  Date: Results:    ECG Reviewed:  Date: Results:    Cath:  Date: Results:      METS/Exercise Tolerance:     Hematologic:       Musculoskeletal:   (+)  arthritis,             GI/Hepatic:  - neg GI/hepatic ROS     Renal/Genitourinary:     (+) renal disease,       BPH,      Endo:       Psychiatric/Substance Use:     (+) psychiatric history anxiety       Infectious Disease:       Malignancy:       Other:      (+)  , H/O Chronic Pain,         Physical Exam    Airway  airway exam normal      Mallampati: II   TM distance: > 3 FB   Neck ROM: full   Mouth opening: > 3 cm    Respiratory Devices and Support         Dental  no notable dental history     (+) Minor Abnormalities - some fillings, tiny chips      Cardiovascular   cardiovascular exam normal          Pulmonary   pulmonary exam normal                OUTSIDE LABS:  CBC:   Lab Results   Component Value Date    WBC 8.1 08/25/2020    WBC 6.9 10/04/2019    HGB 16.9 08/25/2020    HGB 17.0 10/04/2019    HCT 50.2 08/25/2020    HCT 50.0 10/04/2019     08/25/2020     10/04/2019     BMP:   Lab Results   Component Value Date     10/06/2023     09/27/2022    POTASSIUM 4.6 10/06/2023    POTASSIUM 4.6 09/27/2022    CHLORIDE 107 10/06/2023    CHLORIDE 103 09/27/2022    CO2 30 (H) 10/06/2023    CO2 29 09/27/2022    BUN 17.0 10/06/2023    BUN 13.4 09/27/2022    CR 1.04 10/06/2023    CR 0.97 09/27/2022     (H) 10/06/2023     (H) 09/27/2022     COAGS:   Lab Results   Component Value Date    PTT 29 06/07/2010    INR 1.08 06/07/2010     POC:   Lab Results   Component Value Date     (H) 01/31/2018     HEPATIC:   Lab Results   Component Value Date    ALBUMIN 3.8 08/25/2020    PROTTOTAL 7.6 08/25/2020    ALT 18 08/25/2020    AST 14 08/25/2020    ALKPHOS 69 08/25/2020    BILITOTAL 0.8 08/25/2020     OTHER:   Lab Results   Component Value Date    LACT 0.8 07/21/2016    A1C 6.2 (H)  10/19/2023    KENY 9.3 10/06/2023    LIPASE 108 03/28/2017    TSH 1.21 05/19/2022    CRP <2.9 08/25/2020    SED 4 08/25/2020       Anesthesia Plan    ASA Status:  3    NPO Status:  NPO Appropriate    Anesthesia Type: General.   Induction: Propofol, Intravenous.   Maintenance: TIVA.        Consents    Anesthesia Plan(s) and associated risks, benefits, and realistic alternatives discussed. Questions answered and patient/representative(s) expressed understanding.     - Discussed: Risks, Benefits and Alternatives for BOTH SEDATION and the PROCEDURE were discussed     - Discussed with:  Patient            Postoperative Care    Pain management: Multi-modal analgesia, IV analgesics, Oral pain medications.   PONV prophylaxis: Ondansetron (or other 5HT-3), Dexamethasone or Solumedrol, Background Propofol Infusion     Comments:               LILI Siddiqui CRNA    I have reviewed the pertinent notes and labs in the chart from the past 30 days and (re)examined the patient.  Any updates or changes from those notes are reflected in this note.

## 2023-12-22 ENCOUNTER — ANESTHESIA (OUTPATIENT)
Dept: GASTROENTEROLOGY | Facility: CLINIC | Age: 73
End: 2023-12-22
Payer: COMMERCIAL

## 2023-12-22 ENCOUNTER — HOSPITAL ENCOUNTER (OUTPATIENT)
Facility: CLINIC | Age: 73
Discharge: HOME | End: 2023-12-22
Attending: SURGERY | Admitting: SURGERY
Payer: COMMERCIAL

## 2023-12-22 VITALS
TEMPERATURE: 98 F | HEART RATE: 52 BPM | DIASTOLIC BLOOD PRESSURE: 75 MMHG | SYSTOLIC BLOOD PRESSURE: 113 MMHG | OXYGEN SATURATION: 98 % | RESPIRATION RATE: 12 BRPM

## 2023-12-22 LAB — COLONOSCOPY: NORMAL

## 2023-12-22 PROCEDURE — 88305 TISSUE EXAM BY PATHOLOGIST: CPT | Mod: 26 | Performed by: PATHOLOGY

## 2023-12-22 PROCEDURE — 88305 TISSUE EXAM BY PATHOLOGIST: CPT | Mod: TC | Performed by: SURGERY

## 2023-12-22 PROCEDURE — 370N000017 HC ANESTHESIA TECHNICAL FEE, PER MIN: Performed by: SURGERY

## 2023-12-22 PROCEDURE — 258N000003 HC RX IP 258 OP 636: Performed by: NURSE ANESTHETIST, CERTIFIED REGISTERED

## 2023-12-22 PROCEDURE — 45385 COLONOSCOPY W/LESION REMOVAL: CPT | Performed by: SURGERY

## 2023-12-22 PROCEDURE — 250N000011 HC RX IP 250 OP 636: Performed by: NURSE ANESTHETIST, CERTIFIED REGISTERED

## 2023-12-22 PROCEDURE — 250N000009 HC RX 250: Performed by: NURSE ANESTHETIST, CERTIFIED REGISTERED

## 2023-12-22 RX ORDER — ONDANSETRON 4 MG/1
4 TABLET, ORALLY DISINTEGRATING ORAL EVERY 30 MIN PRN
Status: DISCONTINUED | OUTPATIENT
Start: 2023-12-22 | End: 2023-12-22 | Stop reason: HOSPADM

## 2023-12-22 RX ORDER — ONDANSETRON 2 MG/ML
4 INJECTION INTRAMUSCULAR; INTRAVENOUS EVERY 6 HOURS PRN
Status: DISCONTINUED | OUTPATIENT
Start: 2023-12-22 | End: 2023-12-22 | Stop reason: HOSPADM

## 2023-12-22 RX ORDER — PROPOFOL 10 MG/ML
INJECTION, EMULSION INTRAVENOUS CONTINUOUS PRN
Status: DISCONTINUED | OUTPATIENT
Start: 2023-12-22 | End: 2023-12-22

## 2023-12-22 RX ORDER — PROPOFOL 10 MG/ML
INJECTION, EMULSION INTRAVENOUS PRN
Status: DISCONTINUED | OUTPATIENT
Start: 2023-12-22 | End: 2023-12-22

## 2023-12-22 RX ORDER — PROCHLORPERAZINE MALEATE 5 MG
5 TABLET ORAL EVERY 6 HOURS PRN
Status: DISCONTINUED | OUTPATIENT
Start: 2023-12-22 | End: 2023-12-22 | Stop reason: HOSPADM

## 2023-12-22 RX ORDER — FLUMAZENIL 0.1 MG/ML
0.2 INJECTION, SOLUTION INTRAVENOUS
Status: DISCONTINUED | OUTPATIENT
Start: 2023-12-22 | End: 2023-12-22 | Stop reason: HOSPADM

## 2023-12-22 RX ORDER — LIDOCAINE HYDROCHLORIDE 20 MG/ML
INJECTION, SOLUTION INFILTRATION; PERINEURAL PRN
Status: DISCONTINUED | OUTPATIENT
Start: 2023-12-22 | End: 2023-12-22

## 2023-12-22 RX ORDER — NALOXONE HYDROCHLORIDE 0.4 MG/ML
0.2 INJECTION, SOLUTION INTRAMUSCULAR; INTRAVENOUS; SUBCUTANEOUS
Status: DISCONTINUED | OUTPATIENT
Start: 2023-12-22 | End: 2023-12-22 | Stop reason: HOSPADM

## 2023-12-22 RX ORDER — FENTANYL CITRATE 50 UG/ML
25 INJECTION, SOLUTION INTRAMUSCULAR; INTRAVENOUS
Status: DISCONTINUED | OUTPATIENT
Start: 2023-12-22 | End: 2023-12-22 | Stop reason: HOSPADM

## 2023-12-22 RX ORDER — DEXAMETHASONE SODIUM PHOSPHATE 4 MG/ML
4 INJECTION, SOLUTION INTRA-ARTICULAR; INTRALESIONAL; INTRAMUSCULAR; INTRAVENOUS; SOFT TISSUE
Status: DISCONTINUED | OUTPATIENT
Start: 2023-12-22 | End: 2023-12-22 | Stop reason: HOSPADM

## 2023-12-22 RX ORDER — LIDOCAINE 40 MG/G
CREAM TOPICAL
Status: DISCONTINUED | OUTPATIENT
Start: 2023-12-22 | End: 2023-12-22 | Stop reason: HOSPADM

## 2023-12-22 RX ORDER — OXYCODONE HYDROCHLORIDE 5 MG/1
5 TABLET ORAL
Status: DISCONTINUED | OUTPATIENT
Start: 2023-12-22 | End: 2023-12-22 | Stop reason: HOSPADM

## 2023-12-22 RX ORDER — NALOXONE HYDROCHLORIDE 0.4 MG/ML
0.4 INJECTION, SOLUTION INTRAMUSCULAR; INTRAVENOUS; SUBCUTANEOUS
Status: DISCONTINUED | OUTPATIENT
Start: 2023-12-22 | End: 2023-12-22 | Stop reason: HOSPADM

## 2023-12-22 RX ORDER — ONDANSETRON 2 MG/ML
4 INJECTION INTRAMUSCULAR; INTRAVENOUS EVERY 30 MIN PRN
Status: DISCONTINUED | OUTPATIENT
Start: 2023-12-22 | End: 2023-12-22 | Stop reason: HOSPADM

## 2023-12-22 RX ORDER — OXYCODONE HYDROCHLORIDE 5 MG/1
10 TABLET ORAL
Status: DISCONTINUED | OUTPATIENT
Start: 2023-12-22 | End: 2023-12-22 | Stop reason: HOSPADM

## 2023-12-22 RX ORDER — SODIUM CHLORIDE, SODIUM LACTATE, POTASSIUM CHLORIDE, CALCIUM CHLORIDE 600; 310; 30; 20 MG/100ML; MG/100ML; MG/100ML; MG/100ML
INJECTION, SOLUTION INTRAVENOUS CONTINUOUS
Status: DISCONTINUED | OUTPATIENT
Start: 2023-12-22 | End: 2023-12-22 | Stop reason: HOSPADM

## 2023-12-22 RX ORDER — ONDANSETRON 4 MG/1
4 TABLET, ORALLY DISINTEGRATING ORAL EVERY 6 HOURS PRN
Status: DISCONTINUED | OUTPATIENT
Start: 2023-12-22 | End: 2023-12-22 | Stop reason: HOSPADM

## 2023-12-22 RX ADMIN — PROPOFOL 150 MCG/KG/MIN: 10 INJECTION, EMULSION INTRAVENOUS at 13:15

## 2023-12-22 RX ADMIN — PROPOFOL 70 MG: 10 INJECTION, EMULSION INTRAVENOUS at 13:15

## 2023-12-22 RX ADMIN — SODIUM CHLORIDE, POTASSIUM CHLORIDE, SODIUM LACTATE AND CALCIUM CHLORIDE: 600; 310; 30; 20 INJECTION, SOLUTION INTRAVENOUS at 13:13

## 2023-12-22 RX ADMIN — LIDOCAINE HYDROCHLORIDE 40 MG: 20 INJECTION, SOLUTION INFILTRATION; PERINEURAL at 13:15

## 2023-12-22 ASSESSMENT — LIFESTYLE VARIABLES: TOBACCO_USE: 1

## 2023-12-22 ASSESSMENT — ACTIVITIES OF DAILY LIVING (ADL): ADLS_ACUITY_SCORE: 35

## 2023-12-22 NOTE — ANESTHESIA CARE TRANSFER NOTE
Patient: Ambrocio Vázquez    Procedure: Procedure(s):  COLONOSCOPY, FLEXIBLE, WITH LESION REMOVAL USING SNARE       Diagnosis: Special screening for malignant neoplasms, colon [Z12.11]  Diagnosis Additional Information: No value filed.    Anesthesia Type:   General     Note:    Oropharynx: oropharynx clear of all foreign objects and spontaneously breathing  Level of Consciousness: drowsy  Oxygen Supplementation: room air    Independent Airway: airway patency satisfactory and stable  Dentition: dentition unchanged  Vital Signs Stable: post-procedure vital signs reviewed and stable  Report to RN Given: handoff report given  Patient transferred to: Phase II    Handoff Report: Identifed the Patient, Identified the Reponsible Provider, Reviewed the pertinent medical history, Discussed the surgical course, Reviewed Intra-OP anesthesia mangement and issues during anesthesia, Set expectations for post-procedure period and Allowed opportunity for questions and acknowledgement of understanding      Vitals:  Vitals Value Taken Time   BP 90/57 12/22/23 1339   Temp     Pulse 51 12/22/23 1339   Resp     SpO2 97 % 12/22/23 1340   Vitals shown include unfiled device data.    Electronically Signed By: LILI Hernandes CRNA  December 22, 2023  1:42 PM

## 2023-12-22 NOTE — H&P
General Surgery History and Physical    Ambrocio Vázquez MRN# [id number]     Date of Admission: 12/22/2023    Reason for Endoscopy  Surveillance colonoscopy    History of Present Illness  Patient is a 73-year-old man who presents for surveillance colonoscopy.  He had colonoscopy done 5 years ago that showed a small adenomatous polyp.  He returns for 5-year follow-up.  He has had no new GI complaints.  He has no family history of colon cancer or polyps.    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     Impaired fasting glucose     Intervertebral lumbar disc disorder with myelopathy, lumbar region (aka DISK)     lumbar disk disease    Non-compliance     multiple instances of no follow up appointments    Other chronic pain     Back pain for many years.    PFO (patent foramen ovale)     ASD with poss RV enlargement    Sciatica        Past Surgical History:  Past Surgical History:   Procedure Laterality Date    ABDOMEN SURGERY      COLONOSCOPY      COLONOSCOPY  5/3/2013    Procedure: COLONOSCOPY;  Colonoscopy;  Surgeon: Genaro Krishnan MD;  Location: [facility]    CORONARY ANGIOGRAPHY ADULT ORDER  6/7/2014    No restenosis    HEART CATH, ANGIOPLASTY  5/2010    complex  angioplasty/thrombectomy SHAZIA stenting proximal and ostial LAD;LMA-nl,   stenting of Lad, diag small 80%, Lcx30%, RCA50%    HERNIA REPAIR      [facility]    HERNIORRHAPHY INGUINAL  1/11/2013    Procedure: HERNIORRHAPHY INGUINAL;  LEFT INGUINAL HERNIA REPAIR WITH MESH;   Surgeon: Santos Coleman MD;  Location: [facility]    HERNIORRHAPHY INGUINAL Right 2015    Procedure: HERNIORRHAPHY INGUINAL;  Surgeon: Santos Coleman MD;  Location: [facility]    OPTICAL TRACKING SYSTEM FUSION SPINE POSTERIOR LUMBAR THREE+ LEVELS N/A 2018    Procedure: OPTICAL TRACKING SYSTEM FUSION SPINE POSTERIOR LUMBAR THREE+ LEVELS;  L3, L4 and L5 Berg-Maloney osteotomies with L3-4, L4-5 and L5-S1 transforaminal lumbar interbody fusion ;  Surgeon: Gabino Velez MD;  Location: [facility]    PAST SURGICAL HISTORY  2002    hernia repair, right    PAST SURGICAL HISTORY      pilonidal cyst and sebaceous cyst surg    PE TUBES  2/10    left ear PE for persistant ZINA    SOFT TISSUE SURGERY      right axilla lipoma removed    ZZC LAMINECTOMY,>2 SGMT,LUMBAR         Allergies:     Allergies   Allergen Reactions    Amoxicillin-Pot Clavulanate Nausea and Vomiting    Sulfa Antibiotics Nausea     Medications:  No current facility-administered medications on file prior to encounter.  aspirin 81 MG tablet, Take 1 tablet (81 mg) by mouth daily  fish oil-omega-3 fatty acids 1000 MG capsule, Take 1 g by mouth daily       Social History:  Social History     Socioeconomic History    Marital status:      Spouse name: Nava Vázquez    Number of children: 3    Years of education: 18+    Highest education level: Not on file   Occupational History    Occupation:      Comment: [employer]      Employer: [employer]   Tobacco Use    Smoking status: Former     Packs/day: 0.50     Years: 30.00     Additional pack years: 0.00     Total pack years: 15.00     Types: Cigarettes     Quit date: 1/15/2010     Years since quittin.9    Smokeless tobacco: Never   Substance and Sexual Activity    Alcohol use: Yes     Comment: Social    Drug use: No    Sexual activity: Not Currently     Partners: Female     Birth control/protection: Male Surgical   Other Topics Concern     Parent/sibling w/ CABG, MI or angioplasty before 65F 55M? No     Service Not Asked    Blood Transfusions Not Asked    Caffeine Concern No     Comment: 2-3 coffee a day    Occupational Exposure Not Asked    Hobby Hazards Not Asked    Sleep Concern Not Asked    Stress Concern Not Asked    Weight Concern Not Asked    Special Diet Not Asked    Back Care Not Asked    Exercise Yes     Comment: 12 visits to the gym per month.    Bike Helmet Not Asked    Seat Belt Yes    Self-Exams Not Asked   Social History Narrative    Not on file     Social Determinants of Health     Financial Resource Strain: Low Risk  (10/5/2023)    Financial Resource Strain     Within the past 12 months, have you or your family members you live with been unable to get utilities (heat, electricity) when it was really needed?: No   Food Insecurity: Low Risk  (10/5/2023)    Food Insecurity     Within the past 12 months, did you worry that your food would run out before you got money to buy more?: No     Within the past 12 months, did the food you bought just not last and you didn t have money to get more?: No   Transportation Needs: Low Risk  (10/5/2023)    Transportation Needs     Within the past 12 months, has lack of transportation kept you from medical appointments, getting your medicines, non-medical meetings or appointments, work, or from getting things that you need?: No   Physical Activity: Not on file   Stress: Not on file   Social Connections: Not on file   Interpersonal Safety: Low Risk  (10/6/2023)    Interpersonal Safety     Do you feel physically and emotionally safe where you currently live?: Yes     Within the past 12 months, have you been hit, slapped, kicked or otherwise physically hurt by someone?: No     Within the past 12 months, have you been humiliated or emotionally abused in other ways by your partner or ex-partner?: No   Housing Stability: Low Risk  (10/5/2023)    Housing Stability     Do you have housing? : Yes     Are  you worried about losing your housing?: No     Family History:  Family History   Problem Relation Age of Onset    C.A.D. Father     Alzheimer Disease Father     Heart Disease Father     Hypertension Father         age post 75    Cancer Brother         brain tumor- age 46    Heart Disease Mother         aortic anerysum     Alzheimer Disease Paternal Grandmother     Other Cancer Brother         Krishna, , age 46    Glaucoma No family hx of     Macular Degeneration No family hx of        ROS:  12 point review negative except as stated in H&P    Exam:  BP (!) 157/89 (BP Location: Right arm)   Pulse 63   Temp 98.7  F (37.1  C) (Oral)   Resp 16   SpO2 98%   General: Alert, interactive, NAD  Resp: Non-labored breathing  Cardiac: RRR  Abdomen: Soft, non-tender, non-distended      Assessment: 73 year old male presenting for surveillance colonoscopy.  We discussed the details of the procedure. This included a discussion of the risks of bleeding, perforation, and missed lesions. The patient consents to the procedure.    Plan:   - To the Endoscopy suite for planned procedure.       Ruiz Caballero MD  General Surgeon  [facility]  Surgery [address]  [address] [place] [id number]  Office: [phone]

## 2023-12-22 NOTE — ANESTHESIA POSTPROCEDURE EVALUATION
Patient: Ambrocio Vázquez    Procedure: Procedure(s):  COLONOSCOPY, FLEXIBLE, WITH LESION REMOVAL USING SNARE       Anesthesia Type:  General    Note:  Disposition: Outpatient   Postop Pain Control: Uneventful            Sign Out: Well controlled pain   PONV: No   Neuro/Psych: Uneventful            Sign Out: Acceptable/Baseline neuro status   Airway/Respiratory: Uneventful            Sign Out: Acceptable/Baseline resp. status   CV/Hemodynamics: Uneventful            Sign Out: Acceptable CV status; No obvious hypovolemia; No obvious fluid overload   Other NRE: NONE   DID A NON-ROUTINE EVENT OCCUR? No           Last vitals:  Vitals Value Taken Time   BP 90/57 12/22/23 1339   Temp     Pulse 51 12/22/23 1339   Resp     SpO2 97 % 12/22/23 1340   Vitals shown include unfiled device data.    Electronically Signed By: LILI Hernandes CRNA  December 22, 2023  1:42 PM

## 2023-12-27 LAB
PATH REPORT.COMMENTS IMP SPEC: NORMAL
PATH REPORT.COMMENTS IMP SPEC: NORMAL
PATH REPORT.FINAL DX SPEC: NORMAL
PATH REPORT.GROSS SPEC: NORMAL
PATH REPORT.MICROSCOPIC SPEC OTHER STN: NORMAL
PATH REPORT.RELEVANT HX SPEC: NORMAL
PHOTO IMAGE: NORMAL

## 2024-01-04 ENCOUNTER — TELEPHONE (OUTPATIENT)
Dept: SURGERY | Facility: CLINIC | Age: 74
End: 2024-01-04
Payer: COMMERCIAL

## 2024-01-04 DIAGNOSIS — I25.10 CORONARY ARTERIOSCLEROSIS: ICD-10-CM

## 2024-01-04 DIAGNOSIS — E78.49 OTHER HYPERLIPIDEMIA: ICD-10-CM

## 2024-01-04 RX ORDER — SIMVASTATIN 40 MG
40 TABLET ORAL AT BEDTIME
Qty: 100 TABLET | Refills: 2 | Status: SHIPPED | OUTPATIENT
Start: 2024-01-04 | End: 2024-09-18

## 2024-01-04 NOTE — TELEPHONE ENCOUNTER
Patient had a colonoscopy and he got the results via [facility] [employer] for the biopsy from Dr. Caballero and it said to call if he has questions.  He does have additional questions, please call and advise.    Thanks!

## 2024-01-04 NOTE — TELEPHONE ENCOUNTER
Attempted to reach patient. Left message to call back.    Jennifer Menchaca RN    Spoke to Ambrocio. He had questions about the tubular adenoma that was removed during his colonoscopy and what the chances of this returning is. He also is questioning waiting 5-7 years for his next colonoscopy. He would like to speak with Dr. Caballero.

## 2024-04-04 ENCOUNTER — TRANSFERRED RECORDS (OUTPATIENT)
Dept: HEALTH INFORMATION MANAGEMENT | Facility: CLINIC | Age: 74
End: 2024-04-04
Payer: COMMERCIAL

## 2024-06-03 DIAGNOSIS — I10 ESSENTIAL HYPERTENSION: ICD-10-CM

## 2024-06-04 RX ORDER — LISINOPRIL 40 MG/1
40 TABLET ORAL DAILY
Qty: 100 TABLET | Refills: 1 | Status: SHIPPED | OUTPATIENT
Start: 2024-06-04

## 2024-07-30 DIAGNOSIS — N42.9 DISORDER OF PROSTATE: ICD-10-CM

## 2024-07-31 RX ORDER — FINASTERIDE 5 MG/1
1 TABLET, FILM COATED ORAL DAILY
Qty: 100 TABLET | Refills: 2 | OUTPATIENT
Start: 2024-07-31

## 2024-08-14 DIAGNOSIS — I25.10 CORONARY ARTERY DISEASE INVOLVING NATIVE HEART WITHOUT ANGINA PECTORIS, UNSPECIFIED VESSEL OR LESION TYPE: ICD-10-CM

## 2024-08-16 RX ORDER — NITROGLYCERIN 0.4 MG/1
TABLET SUBLINGUAL
Qty: 100 TABLET | Refills: 3 | Status: SHIPPED | OUTPATIENT
Start: 2024-08-16

## 2024-08-16 RX ORDER — NITROGLYCERIN 0.4 MG/1
TABLET SUBLINGUAL
Qty: 125 TABLET | Refills: 2 | OUTPATIENT
Start: 2024-08-16

## 2024-08-29 ENCOUNTER — TRANSFERRED RECORDS (OUTPATIENT)
Dept: HEALTH INFORMATION MANAGEMENT | Facility: CLINIC | Age: 74
End: 2024-08-29
Payer: COMMERCIAL

## 2024-09-11 ENCOUNTER — TRANSFERRED RECORDS (OUTPATIENT)
Dept: HEALTH INFORMATION MANAGEMENT | Facility: CLINIC | Age: 74
End: 2024-09-11
Payer: COMMERCIAL

## 2024-09-18 DIAGNOSIS — I25.10 CORONARY ARTERIOSCLEROSIS: ICD-10-CM

## 2024-09-18 DIAGNOSIS — E78.49 OTHER HYPERLIPIDEMIA: ICD-10-CM

## 2024-09-18 RX ORDER — SIMVASTATIN 40 MG
40 TABLET ORAL AT BEDTIME
Qty: 100 TABLET | Refills: 0 | Status: SHIPPED | OUTPATIENT
Start: 2024-09-18

## 2024-09-18 NOTE — TELEPHONE ENCOUNTER
Summer refill given x 1 patient has upcoming appointment with Dr. Josephine Silva on 11/5/24.  Prescription approved per [facility] Refill Protocol.  Julie Behrendt RN

## 2024-09-23 ENCOUNTER — TRANSFERRED RECORDS (OUTPATIENT)
Dept: HEALTH INFORMATION MANAGEMENT | Facility: CLINIC | Age: 74
End: 2024-09-23
Payer: COMMERCIAL

## 2024-09-25 DIAGNOSIS — N42.9 DISORDER OF PROSTATE: ICD-10-CM

## 2024-09-26 RX ORDER — FINASTERIDE 5 MG/1
1 TABLET, FILM COATED ORAL DAILY
Qty: 60 TABLET | Refills: 5 | Status: SHIPPED | OUTPATIENT
Start: 2024-09-26

## 2024-10-31 ENCOUNTER — TRANSFERRED RECORDS (OUTPATIENT)
Dept: HEALTH INFORMATION MANAGEMENT | Facility: CLINIC | Age: 74
End: 2024-10-31
Payer: COMMERCIAL

## 2024-11-02 DIAGNOSIS — I10 ESSENTIAL HYPERTENSION: ICD-10-CM

## 2024-11-04 NOTE — PATIENT INSTRUCTIONS
Patient Education   Preventive Care Advice   This is general advice given by our system to help you stay healthy. However, your care team may have specific advice just for you. Please talk to your care team about your preventive care needs.  Nutrition  Eat 5 or more servings of fruits and vegetables each day.  Try wheat bread, brown rice and whole grain pasta (instead of white bread, rice, and pasta).  Get enough calcium and vitamin D. Check the label on foods and aim for 100% of the RDA (recommended daily allowance).  Lifestyle  Exercise at least 150 minutes each week  (30 minutes a day, 5 days a week).  Do muscle strengthening activities 2 days a week. These help control your weight and prevent disease.  No smoking.  Wear sunscreen to prevent skin cancer.  Have a dental exam and cleaning every 6 months.  Yearly exams  See your health care team every year to talk about:  Any changes in your health.  Any medicines your care team has prescribed.  Preventive care, family planning, and ways to prevent chronic diseases.  Shots (vaccines)   HPV shots (up to age 26), if you've never had them before.  Hepatitis B shots (up to age 59), if you've never had them before.  COVID-19 shot: Get this shot when it's due.  Flu shot: Get a flu shot every year.  Tetanus shot: Get a tetanus shot every 10 years.  Pneumococcal, hepatitis A, and RSV shots: Ask your care team if you need these based on your risk.  Shingles shot (for age 50 and up)  General health tests  Diabetes screening:  Starting at age 35, Get screened for diabetes at least every 3 years.  If you are younger than age 35, ask your care team if you should be screened for diabetes.  Cholesterol test: At age 39, start having a cholesterol test every 5 years, or more often if advised.  Bone density scan (DEXA): At age 50, ask your care team if you should have this scan for osteoporosis (brittle bones).  Hepatitis C: Get tested at least once in your life.  STIs (sexually  transmitted infections)  Before age 24: Ask your care team if you should be screened for STIs.  After age 24: Get screened for STIs if you're at risk. You are at risk for STIs (including HIV) if:  You are sexually active with more than one person.  You don't use condoms every time.  You or a partner was diagnosed with a sexually transmitted infection.  If you are at risk for HIV, ask about PrEP medicine to prevent HIV.  Get tested for HIV at least once in your life, whether you are at risk for HIV or not.  Cancer screening tests  Cervical cancer screening: If you have a cervix, begin getting regular cervical cancer screening tests starting at age 21.  Breast cancer scan (mammogram): If you've ever had breasts, begin having regular mammograms starting at age 40. This is a scan to check for breast cancer.  Colon cancer screening: It is important to start screening for colon cancer at age 45.  Have a colonoscopy test every 10 years (or more often if you're at risk) Or, ask your provider about stool tests like a FIT test every year or Cologuard test every 3 years.  To learn more about your testing options, visit:   .  For help making a decision, visit:   https://bit.ly/wq57975.  Prostate cancer screening test: If you have a prostate, ask your care team if a prostate cancer screening test (PSA) at age 55 is right for you.  Lung cancer screening: If you are a current or former smoker ages 50 to 80, ask your care team if ongoing lung cancer screenings are right for you.  For informational purposes only. Not to replace the advice of your health care provider. Copyright   2023 [employer]. All rights reserved. Clinically reviewed by the [facility]. [employer] [id number] - REV 01/24.

## 2024-11-05 ENCOUNTER — OFFICE VISIT (OUTPATIENT)
Dept: FAMILY MEDICINE | Facility: CLINIC | Age: 74
End: 2024-11-05
Attending: FAMILY MEDICINE
Payer: COMMERCIAL

## 2024-11-05 VITALS
OXYGEN SATURATION: 97 % | WEIGHT: 156.3 LBS | DIASTOLIC BLOOD PRESSURE: 84 MMHG | HEIGHT: 70 IN | RESPIRATION RATE: 16 BRPM | SYSTOLIC BLOOD PRESSURE: 132 MMHG | TEMPERATURE: 97 F | BODY MASS INDEX: 22.38 KG/M2 | HEART RATE: 54 BPM

## 2024-11-05 DIAGNOSIS — I25.10 CORONARY ARTERIOSCLEROSIS: ICD-10-CM

## 2024-11-05 DIAGNOSIS — I10 ESSENTIAL HYPERTENSION: ICD-10-CM

## 2024-11-05 DIAGNOSIS — I42.9 CARDIOMYOPATHY, UNSPECIFIED TYPE (H): ICD-10-CM

## 2024-11-05 DIAGNOSIS — Z12.5 SCREENING FOR PROSTATE CANCER: ICD-10-CM

## 2024-11-05 DIAGNOSIS — I71.21 ANEURYSM OF ASCENDING AORTA WITHOUT RUPTURE (H): ICD-10-CM

## 2024-11-05 DIAGNOSIS — E71.41 PRIMARY CARNITINE DEFICIENCY (H): ICD-10-CM

## 2024-11-05 DIAGNOSIS — E78.49 OTHER HYPERLIPIDEMIA: ICD-10-CM

## 2024-11-05 DIAGNOSIS — R73.09 ELEVATED GLUCOSE: ICD-10-CM

## 2024-11-05 DIAGNOSIS — N42.9 DISORDER OF PROSTATE: ICD-10-CM

## 2024-11-05 DIAGNOSIS — Z00.00 ENCOUNTER FOR MEDICARE ANNUAL WELLNESS EXAM: Primary | ICD-10-CM

## 2024-11-05 PROBLEM — Q25.43 ANEURYSM OF AORTIC ROOT: Status: RESOLVED | Noted: 2018-01-23 | Resolved: 2024-11-05

## 2024-11-05 LAB
ANION GAP SERPL CALCULATED.3IONS-SCNC: 8 MMOL/L (ref 7–15)
BUN SERPL-MCNC: 16.5 MG/DL (ref 8–23)
CALCIUM SERPL-MCNC: 9.3 MG/DL (ref 8.8–10.4)
CHLORIDE SERPL-SCNC: 106 MMOL/L (ref 98–107)
CHOLEST SERPL-MCNC: 130 MG/DL
CREAT SERPL-MCNC: 1.06 MG/DL (ref 0.67–1.17)
EGFRCR SERPLBLD CKD-EPI 2021: 74 ML/MIN/1.73M2
FASTING STATUS PATIENT QL REPORTED: YES
FASTING STATUS PATIENT QL REPORTED: YES
GLUCOSE SERPL-MCNC: 123 MG/DL (ref 70–99)
HCO3 SERPL-SCNC: 28 MMOL/L (ref 22–29)
HDLC SERPL-MCNC: 47 MG/DL
LDLC SERPL CALC-MCNC: 70 MG/DL
NONHDLC SERPL-MCNC: 83 MG/DL
POTASSIUM SERPL-SCNC: 4.8 MMOL/L (ref 3.4–5.3)
PSA SERPL DL<=0.01 NG/ML-MCNC: 1.02 NG/ML (ref 0–6.5)
SODIUM SERPL-SCNC: 142 MMOL/L (ref 135–145)
TRIGL SERPL-MCNC: 65 MG/DL

## 2024-11-05 PROCEDURE — 99214 OFFICE O/P EST MOD 30 MIN: CPT | Mod: 25 | Performed by: FAMILY MEDICINE

## 2024-11-05 PROCEDURE — 36415 COLL VENOUS BLD VENIPUNCTURE: CPT | Performed by: FAMILY MEDICINE

## 2024-11-05 PROCEDURE — G0103 PSA SCREENING: HCPCS | Performed by: FAMILY MEDICINE

## 2024-11-05 PROCEDURE — 80061 LIPID PANEL: CPT | Performed by: FAMILY MEDICINE

## 2024-11-05 PROCEDURE — 80048 BASIC METABOLIC PNL TOTAL CA: CPT | Performed by: FAMILY MEDICINE

## 2024-11-05 PROCEDURE — G0439 PPPS, SUBSEQ VISIT: HCPCS | Performed by: FAMILY MEDICINE

## 2024-11-05 RX ORDER — METOPROLOL SUCCINATE 50 MG/1
50 TABLET, EXTENDED RELEASE ORAL DAILY
Qty: 100 TABLET | Refills: 2 | OUTPATIENT
Start: 2024-11-05

## 2024-11-05 RX ORDER — LISINOPRIL 40 MG/1
40 TABLET ORAL DAILY
Qty: 100 TABLET | Refills: 4 | Status: SHIPPED | OUTPATIENT
Start: 2024-11-05

## 2024-11-05 RX ORDER — SIMVASTATIN 40 MG
40 TABLET ORAL AT BEDTIME
Qty: 100 TABLET | Refills: 4 | Status: SHIPPED | OUTPATIENT
Start: 2024-11-05

## 2024-11-05 RX ORDER — METOPROLOL SUCCINATE 50 MG/1
50 TABLET, EXTENDED RELEASE ORAL DAILY
Qty: 100 TABLET | Refills: 4 | Status: SHIPPED | OUTPATIENT
Start: 2024-11-05

## 2024-11-05 SDOH — HEALTH STABILITY: PHYSICAL HEALTH: ON AVERAGE, HOW MANY DAYS PER WEEK DO YOU ENGAGE IN MODERATE TO STRENUOUS EXERCISE (LIKE A BRISK WALK)?: 7 DAYS

## 2024-11-05 ASSESSMENT — SOCIAL DETERMINANTS OF HEALTH (SDOH): HOW OFTEN DO YOU GET TOGETHER WITH FRIENDS OR RELATIVES?: ONCE A WEEK

## 2024-11-05 NOTE — PROGRESS NOTES
"Preventive Care Visit  [facility] DAVID Dill MD, Family Medicine  Nov 5, 2024      Assessment & Plan     (Z00.00) Encounter for Medicare annual wellness exam  (primary encounter diagnosis)  Comment: We discussed exercise 30mins/day, and calcium with vitamin D at 1200mg/day, preferably from dietary sources.  Reviewed screenings and vaccinations   Plan:     (I10) Essential hypertension  Comment: doing well on current regimen. Refilled and check labs   Plan: lisinopril (ZESTRIL) 40 MG tablet, metoprolol         succinate ER (TOPROL XL) 50 MG 24 hr tablet,         Basic metabolic panel  (Ca, Cl, CO2, Creat,         Gluc, K, Na, BUN)            (I25.10) Coronary arteriosclerosis  Comment: continue current meds. Continue exercise daily. Check labs   Plan: simvastatin (ZOCOR) 40 MG tablet, Lipid panel         reflex to direct LDL Fasting            (E78.49) Other hyperlipidemia  Comment: doing well no statin and lipids recheck   Plan: simvastatin (ZOCOR) 40 MG tablet, Lipid panel         reflex to direct LDL Fasting            (I25.10) Coronary arteriosclerosis  Comment: 5/4/10 - 100% occlusion of proximal LAD with 2 [place] stents.   6/28/11 - seen in f/u. 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 It was only 3.9cm which isn't actually dilated. We can repeat the echo to confirm stability or we can leave it be.   Plan:     (N42.9) Disorder of prostate  Comment: discussed and ordered   Plan: PSA, screen            (Z12.5) " Screening for prostate cancer  Comment: discussed and ordered   Plan: PSA, screen            Patient has been advised of split billing requirements and indicates understanding: Yes        Counseling  Appropriate preventive services were addressed with this patient via screening, questionnaire, or discussion as appropriate for fall prevention, nutrition, physical activity, Tobacco-use cessation, social engagement, weight loss and cognition.  Checklist reviewing preventive services available has been given to the patient.  Reviewed patient's diet, addressing concerns and/or questions.   He is at risk for psychosocial distress and has been provided with information to reduce risk.       Giorgio Albrecht is a 74 year old, presenting for the following:  Physical        11/4/2024     2:01 PM   Additional Questions   Roomed by EMELINA Flores   Accompanied by Self            HPI    Fasting today for lab work       Health Care Directive  Patient has a Health Care Directive on file  Advance care planning document is on file and is current.      11/5/2024   General Health   How would you rate your overall physical health? Excellent   Feel stress (tense, anxious, or unable to sleep) Only a little      (!) STRESS CONCERN      11/5/2024   Nutrition   Diet: Regular (no restrictions)            11/5/2024   Exercise   Days per week of moderate/strenous exercise 7 days            11/5/2024   Social Factors   Frequency of gathering with friends or relatives Once a week   Worry food won't last until get money to buy more No   Food not last or not have enough money for food? No   Do you have housing? (Housing is defined as stable permanent housing and does not include staying ouside in a car, in a tent, in an abandoned building, in an overnight shelter, or couch-surfing.) Yes   Are you worried about losing your housing? No   Lack of transportation? No   Unable to get utilities (heat,electricity)? No            11/5/2024   Fall Risk    Fallen 2 or more times in the past year? No     No    Trouble with walking or balance? No     No        Patient-reported    Multiple values from one day are sorted in reverse-chronological order          2024   Activities of Daily Living- Home Safety   Needs help with the following daily activites None of the above   Safety concerns in the home None of the above            2024   Dental   Dentist two times every year? Yes            2024   Hearing Screening   Hearing concerns? None of the above            2024   Driving Risk Screening   Patient/family members have concerns about driving No            2024   General Alertness/Fatigue Screening   Have you been more tired than usual lately? No            2024   Urinary Incontinence Screening   Bothered by leaking urine in past 6 months No            2024   TB Screening   Were you born outside of the [place]? No            Today's PHQ-2 Score:       2024     8:53 AM   PHQ-2 (  [employer])   Q1: Little interest or pleasure in doing things 0   Q2: Feeling down, depressed or hopeless 0   PHQ-2 Score 0           2024   Substance Use   Alcohol more than 3/day or more than 7/wk No   Do you have a current opioid prescription? No   How severe/bad is pain from 1 to 10? 1/10   Do you use any other substances recreationally? No        Social History     Tobacco Use    Smoking status: Former     Current packs/day: 0.00     Average packs/day: 0.5 packs/day for 30.0 years (15.0 ttl pk-yrs)     Types: Cigarettes     Start date: 1/15/1980     Quit date: 1/15/2010     Years since quittin.8    Smokeless tobacco: Never   Substance Use Topics    Alcohol use: Yes     Comment: Social    Drug use: No       ASCVD Risk   The 10-year ASCVD risk score (Danielito FAULKNER, et al., 2019) is: 18.8%    Values used to calculate the score:      Age: 74 years      Sex: Male      Is Non- : No      Diabetic: No      Tobacco smoker:  "No      Systolic Blood Pressure: 113 mmHg      Is BP treated: Yes      HDL Cholesterol: 43 mg/dL      Total Cholesterol: 134 mg/dL            Reviewed and updated as needed this visit by Provider                      Current providers sharing in care for this patient include:  Patient Care Team:  Joelle Dill MD as PCP - General (Family Practice)  Daniel Villa MD as MD (Otolaryngology)  Mary Garcia MD as MD (Pediatric Metabolism)  Joelle Dill MD as Assigned PCP  Xuan Ang, PhD LP as Assigned Behavioral Health Provider  Stacia Barrett PA-C as Physician Assistant (Urology)  Stacia Barrett PA-C as Assigned Surgical Provider    The following health maintenance items are reviewed in Epic and correct as of today:  Health Maintenance   Topic Date Due    LUNG CANCER SCREENING  Never done    ZOSTER IMMUNIZATION (2 of 2) 03/26/2019    BMP  04/06/2024    LIPID  10/06/2024    ANNUAL REVIEW OF HM ORDERS  10/06/2024    MEDICARE ANNUAL WELLNESS VISIT  11/05/2025    FALL RISK ASSESSMENT  11/05/2025    GLUCOSE  10/06/2026    ADVANCE CARE PLANNING  10/06/2028    DTAP/TDAP/TD IMMUNIZATION (5 - Td or Tdap) 10/22/2029    COLORECTAL CANCER SCREENING  12/22/2030    HEPATITIS C SCREENING  Completed    PHQ-2 (once per calendar year)  Completed    INFLUENZA VACCINE  Completed    Pneumococcal Vaccine: 65+ Years  Completed    RSV VACCINE  Completed    AORTIC ANEURYSM SCREENING (SYSTEM ASSIGNED)  Completed    COVID-19 Vaccine  Completed    HPV IMMUNIZATION  Aged Out    MENINGITIS IMMUNIZATION  Aged Out    RSV MONOCLONAL ANTIBODY  Aged Out    EYE EXAM  Discontinued         Review of Systems  Constitutional, neuro, ENT, endocrine, pulmonary, cardiac, gastrointestinal, genitourinary, musculoskeletal, integument and psychiatric systems are negative, except as otherwise noted.     Objective    Exam  /84   Pulse 54   Temp 97  F (36.1  C) (Tympanic)   Resp 16   Ht 1.785 m (5' 10.28\")   Wt 70.9 kg (156 lb " "4.8 oz)   SpO2 97%   BMI 22.25 kg/m     Estimated body mass index is 22.85 kg/m  as calculated from the following:    Height as of 10/6/23: 1.79 m (5' 10.47\").    Weight as of 10/6/23: 73.2 kg (161 lb 6.4 oz).  BP Readings from Last 6 Encounters:   11/05/24 132/84   12/22/23 113/75   10/06/23 138/86   09/21/23 (!) 143/79   09/27/22 131/77   05/19/22 122/71     Physical Exam  GENERAL: alert and no distress  EYES: Eyes grossly normal to inspection, PERRL and conjunctivae and sclerae normal  HENT: ear canals and TM's normal, nose and mouth without ulcers or lesions  NECK: no adenopathy, no asymmetry, masses, or scars  RESP: lungs clear to auscultation - no rales, rhonchi or wheezes  CV: regular rate and rhythm, normal S1 S2, no S3 or S4, no murmur, click or rub, no peripheral edema  MS: no gross musculoskeletal defects noted, no edema  SKIN: no suspicious lesions or rashes  NEURO: Normal strength and tone, mentation intact and speech normal  PSYCH: mentation appears normal, affect normal/bright        11/5/2024   Mini Cog   Clock Draw Score 2 Normal   3 Item Recall 3 objects recalled   Mini Cog Total Score 5                 Signed Electronically by: Joelle Dill MD    "

## 2024-11-07 ENCOUNTER — LAB (OUTPATIENT)
Dept: LAB | Facility: CLINIC | Age: 74
End: 2024-11-07
Payer: COMMERCIAL

## 2024-11-07 DIAGNOSIS — R73.09 ELEVATED GLUCOSE: ICD-10-CM

## 2024-11-07 LAB
EST. AVERAGE GLUCOSE BLD GHB EST-MCNC: 131 MG/DL
HBA1C MFR BLD: 6.2 % (ref 0–5.6)

## 2024-11-07 PROCEDURE — 83036 HEMOGLOBIN GLYCOSYLATED A1C: CPT

## 2024-11-07 PROCEDURE — 36415 COLL VENOUS BLD VENIPUNCTURE: CPT

## 2025-02-11 DIAGNOSIS — I25.10 CORONARY ARTERY DISEASE INVOLVING NATIVE HEART WITHOUT ANGINA PECTORIS, UNSPECIFIED VESSEL OR LESION TYPE: ICD-10-CM

## 2025-02-12 RX ORDER — NITROGLYCERIN 0.4 MG/1
TABLET SUBLINGUAL
Qty: 200 TABLET | Refills: 2 | OUTPATIENT
Start: 2025-02-12

## 2025-06-09 ENCOUNTER — TRANSFERRED RECORDS (OUTPATIENT)
Dept: HEALTH INFORMATION MANAGEMENT | Facility: CLINIC | Age: 75
End: 2025-06-09
Payer: COMMERCIAL

## 2025-06-22 DIAGNOSIS — N42.9 DISORDER OF PROSTATE: ICD-10-CM

## 2025-06-25 RX ORDER — FINASTERIDE 5 MG/1
1 TABLET, FILM COATED ORAL DAILY
Qty: 100 TABLET | Refills: 2 | Status: SHIPPED | OUTPATIENT
Start: 2025-06-25

## 2025-08-19 ENCOUNTER — LAB REQUISITION (OUTPATIENT)
Dept: LAB | Facility: CLINIC | Age: 75
End: 2025-08-19
Payer: COMMERCIAL

## 2025-08-19 DIAGNOSIS — H72.02 CENTRAL PERFORATION OF TYMPANIC MEMBRANE, LEFT EAR: ICD-10-CM

## 2025-08-19 DIAGNOSIS — H66.92 OTITIS MEDIA, UNSPECIFIED, LEFT EAR: ICD-10-CM

## 2025-08-19 DIAGNOSIS — H92.12 OTORRHEA, LEFT EAR: ICD-10-CM

## 2025-08-19 DIAGNOSIS — H92.02 OTALGIA, LEFT EAR: ICD-10-CM

## 2025-08-21 LAB — BACTERIA SPEC CULT: ABNORMAL

## 2025-08-25 ENCOUNTER — TRANSFERRED RECORDS (OUTPATIENT)
Dept: HEALTH INFORMATION MANAGEMENT | Facility: CLINIC | Age: 75
End: 2025-08-25
Payer: COMMERCIAL

## (undated) DEVICE — LINEN DRAPE 54X72" 5467

## (undated) DEVICE — BLADE KNIFE SURG 11 371111

## (undated) DEVICE — DRSG TELFA ISLAND 4X10"

## (undated) DEVICE — TUBING SUCTION 12"X1/4" N612

## (undated) DEVICE — GLOVE PROTEXIS W/NEU-THERA 7.5  2D73TE75

## (undated) DEVICE — LINEN ORTHO ACL PACK 5447

## (undated) DEVICE — DRAPE IOBAN INCISE 23X17" 6650EZ

## (undated) DEVICE — DRAPE MICROSCOPE OPMI ZEISS 48X118" 306071-0000-000

## (undated) DEVICE — SPONGE COTTONOID 1/2X1" 80-1402

## (undated) DEVICE — PACK SET-UP STD 9102

## (undated) DEVICE — MIDAS REX DISSECTING TOOL  14MH30

## (undated) DEVICE — RX SURGIFLO HEMOSTATIC MATRIX 8ML 2991

## (undated) DEVICE — DRAIN JACKSON PRATT CHANNEL 10FR RND SIL W/TROCAR JP-2227

## (undated) DEVICE — PACK SMALL SPINE RIDGES

## (undated) DEVICE — SPONGE RAY-TEC 4X8" 7318

## (undated) DEVICE — SUCTION FRAZIER 12FR W/OBTURATOR 33120

## (undated) DEVICE — GOWN REINFORCED XXLG 9071

## (undated) DEVICE — Device

## (undated) DEVICE — GLOVE PROTEXIS BLUE W/NEU-THERA 8.5  2D73EB85

## (undated) DEVICE — MARKER SPHERES PASSIVE MEDT PACK 5 8801075

## (undated) DEVICE — DRAIN JACKSON PRATT RESERVOIR 100ML SU130-1305

## (undated) DEVICE — SOL NACL 0.9% INJ 250ML BAG 2B1322Q

## (undated) DEVICE — BLADE BONE MILL STRK 5.0MM MED 5400-701-000

## (undated) DEVICE — GLOVE PROTEXIS W/NEU-THERA 8.5  2D73TE85

## (undated) DEVICE — ESU BIPOLAR SEALER AQUAMANTYS 6MM 23-112-1

## (undated) DEVICE — ESU GROUND PAD ADULT W/CORD E7507

## (undated) DEVICE — NDL SPINAL 18GA 3.5" 405184

## (undated) DEVICE — DECANTER BAG 2002S

## (undated) DEVICE — SU VICRYL 0 CT-1 CR 8X18" J740D

## (undated) DEVICE — SUCTION TIP YANKAUER W/O VENT K86

## (undated) DEVICE — CATH TRAY FOLEY COUDE SURESTEP 16FR W/DRN BAG LATEX A304416A

## (undated) DEVICE — LINEN POUCH DBL 5427

## (undated) DEVICE — PEN MARKING SKIN W/LABELS 31145884

## (undated) DEVICE — DRSG KERLIX FLUFFS X5

## (undated) DEVICE — PREP DURAPREP 26ML APL 8630

## (undated) DEVICE — ESU CLEANER TIP 31142717

## (undated) DEVICE — SPONGE SURGIFOAM 100 1974

## (undated) DEVICE — SUCTION MANIFOLD NEPTUNE 2 SYS 4 PORT 0702-020-000

## (undated) DEVICE — SU WND CLOSURE VLOC 180 ABS 3-0 12" V-20 VLOCL0614

## (undated) DEVICE — STPL SKIN 35W APPOSE 8886803712

## (undated) DEVICE — ENDO SNARE EXACTO COLD 9MM LOOP 2.4MMX230CM 00711115

## (undated) DEVICE — SU VICRYL 2-0 CT-1 18' J739D

## (undated) DEVICE — CUSHION INSERT LG PRONE VIEW JACKSON TABLE

## (undated) RX ORDER — FENTANYL CITRATE 50 UG/ML
INJECTION, SOLUTION INTRAMUSCULAR; INTRAVENOUS
Status: DISPENSED
Start: 2018-01-29

## (undated) RX ORDER — CEFAZOLIN SODIUM 1 G/3ML
INJECTION, POWDER, FOR SOLUTION INTRAMUSCULAR; INTRAVENOUS
Status: DISPENSED
Start: 2018-01-29

## (undated) RX ORDER — PHENYLEPHRINE HCL IN 0.9% NACL 1 MG/10 ML
SYRINGE (ML) INTRAVENOUS
Status: DISPENSED
Start: 2018-01-29

## (undated) RX ORDER — GLYCOPYRROLATE 0.2 MG/ML
INJECTION INTRAMUSCULAR; INTRAVENOUS
Status: DISPENSED
Start: 2018-01-29

## (undated) RX ORDER — PROPOFOL 10 MG/ML
INJECTION, EMULSION INTRAVENOUS
Status: DISPENSED
Start: 2018-01-29

## (undated) RX ORDER — ONDANSETRON 2 MG/ML
INJECTION INTRAMUSCULAR; INTRAVENOUS
Status: DISPENSED
Start: 2018-01-29

## (undated) RX ORDER — EPHEDRINE SULFATE 50 MG/ML
INJECTION, SOLUTION INTRAMUSCULAR; INTRAVENOUS; SUBCUTANEOUS
Status: DISPENSED
Start: 2018-01-29

## (undated) RX ORDER — GLYCOPYRROLATE 0.2 MG/ML
INJECTION, SOLUTION INTRAMUSCULAR; INTRAVENOUS
Status: DISPENSED
Start: 2018-06-12

## (undated) RX ORDER — DEXAMETHASONE SODIUM PHOSPHATE 4 MG/ML
INJECTION, SOLUTION INTRA-ARTICULAR; INTRALESIONAL; INTRAMUSCULAR; INTRAVENOUS; SOFT TISSUE
Status: DISPENSED
Start: 2018-01-29

## (undated) RX ORDER — DIAZEPAM 5 MG
TABLET ORAL
Status: DISPENSED
Start: 2018-01-29

## (undated) RX ORDER — CEFAZOLIN SODIUM 2 G/100ML
INJECTION, SOLUTION INTRAVENOUS
Status: DISPENSED
Start: 2018-01-29

## (undated) RX ORDER — NEOSTIGMINE METHYLSULFATE 1 MG/ML
VIAL (ML) INJECTION
Status: DISPENSED
Start: 2018-01-29

## (undated) RX ORDER — LIDOCAINE HYDROCHLORIDE 10 MG/ML
INJECTION, SOLUTION EPIDURAL; INFILTRATION; INTRACAUDAL; PERINEURAL
Status: DISPENSED
Start: 2018-01-29

## (undated) RX ORDER — LIDOCAINE HYDROCHLORIDE 10 MG/ML
INJECTION, SOLUTION EPIDURAL; INFILTRATION; INTRACAUDAL; PERINEURAL
Status: DISPENSED
Start: 2018-06-12

## (undated) RX ORDER — BUPIVACAINE HYDROCHLORIDE AND EPINEPHRINE 5; 5 MG/ML; UG/ML
INJECTION, SOLUTION EPIDURAL; INTRACAUDAL; PERINEURAL
Status: DISPENSED
Start: 2018-01-29